# Patient Record
Sex: FEMALE | Race: WHITE | NOT HISPANIC OR LATINO | Employment: UNEMPLOYED
[De-identification: names, ages, dates, MRNs, and addresses within clinical notes are randomized per-mention and may not be internally consistent; named-entity substitution may affect disease eponyms.]

---

## 2017-06-24 ENCOUNTER — HEALTH MAINTENANCE LETTER (OUTPATIENT)
Age: 62
End: 2017-06-24

## 2018-01-25 ENCOUNTER — APPOINTMENT (OUTPATIENT)
Dept: CARDIOLOGY | Facility: CLINIC | Age: 63
End: 2018-01-25
Attending: EMERGENCY MEDICINE
Payer: COMMERCIAL

## 2018-01-25 ENCOUNTER — APPOINTMENT (OUTPATIENT)
Dept: GENERAL RADIOLOGY | Facility: CLINIC | Age: 63
End: 2018-01-25
Attending: EMERGENCY MEDICINE
Payer: COMMERCIAL

## 2018-01-25 ENCOUNTER — HOSPITAL ENCOUNTER (INPATIENT)
Facility: CLINIC | Age: 63
LOS: 6 days | Discharge: HOME HEALTH | End: 2018-01-31
Attending: INTERNAL MEDICINE | Admitting: SURGERY
Payer: COMMERCIAL

## 2018-01-25 ENCOUNTER — APPOINTMENT (OUTPATIENT)
Dept: GENERAL RADIOLOGY | Facility: CLINIC | Age: 63
End: 2018-01-25
Attending: SURGERY
Payer: COMMERCIAL

## 2018-01-25 DIAGNOSIS — T14.8XXA ABRASION: ICD-10-CM

## 2018-01-25 DIAGNOSIS — S06.9X9S TRAUMATIC BRAIN INJURY WITH LOSS OF CONSCIOUSNESS, SEQUELA (H): ICD-10-CM

## 2018-01-25 DIAGNOSIS — I21.11 ST ELEVATION MYOCARDIAL INFARCTION INVOLVING RIGHT CORONARY ARTERY (H): Primary | ICD-10-CM

## 2018-01-25 DIAGNOSIS — R55 SYNCOPE, UNSPECIFIED SYNCOPE TYPE: ICD-10-CM

## 2018-01-25 DIAGNOSIS — I21.3 ST ELEVATION MYOCARDIAL INFARCTION (STEMI), UNSPECIFIED ARTERY (H): ICD-10-CM

## 2018-01-25 DIAGNOSIS — R06.02 SOB (SHORTNESS OF BREATH): ICD-10-CM

## 2018-01-25 PROBLEM — I95.9 HYPOTENSION: Status: ACTIVE | Noted: 2018-01-25

## 2018-01-25 LAB
ANION GAP SERPL CALCULATED.3IONS-SCNC: 13 MMOL/L (ref 3–14)
BASOPHILS # BLD AUTO: 0 10E9/L (ref 0–0.2)
BASOPHILS NFR BLD AUTO: 0.2 %
BUN SERPL-MCNC: 14 MG/DL (ref 7–30)
CALCIUM SERPL-MCNC: 8.6 MG/DL (ref 8.5–10.1)
CHLORIDE SERPL-SCNC: 107 MMOL/L (ref 94–109)
CO2 BLD-SCNC: 19 MMOL/L (ref 21–28)
CO2 SERPL-SCNC: 22 MMOL/L (ref 20–32)
CREAT SERPL-MCNC: 0.91 MG/DL (ref 0.52–1.04)
DIFFERENTIAL METHOD BLD: NORMAL
EOSINOPHIL # BLD AUTO: 0.2 10E9/L (ref 0–0.7)
EOSINOPHIL NFR BLD AUTO: 1.7 %
ERYTHROCYTE [DISTWIDTH] IN BLOOD BY AUTOMATED COUNT: 12.4 % (ref 10–15)
GFR SERPL CREATININE-BSD FRML MDRD: 63 ML/MIN/1.7M2
GLUCOSE BLDC GLUCOMTR-MCNC: 224 MG/DL (ref 70–99)
GLUCOSE SERPL-MCNC: 235 MG/DL (ref 70–99)
HCT VFR BLD AUTO: 38.9 % (ref 35–47)
HCT VFR BLD CALC: 33 %PCV (ref 35–47)
HGB BLD CALC-MCNC: 11.2 G/DL (ref 11.7–15.7)
HGB BLD-MCNC: 12.6 G/DL (ref 11.7–15.7)
IMM GRANULOCYTES # BLD: 0 10E9/L (ref 0–0.4)
IMM GRANULOCYTES NFR BLD: 0.4 %
INTERPRETATION ECG - MUSE: NORMAL
KCT BLD-ACNC: 230 SEC (ref 105–167)
LYMPHOCYTES # BLD AUTO: 4.9 10E9/L (ref 0.8–5.3)
LYMPHOCYTES NFR BLD AUTO: 52.5 %
MCH RBC QN AUTO: 31 PG (ref 26.5–33)
MCHC RBC AUTO-ENTMCNC: 32.4 G/DL (ref 31.5–36.5)
MCV RBC AUTO: 96 FL (ref 78–100)
MONOCYTES # BLD AUTO: 0.8 10E9/L (ref 0–1.3)
MONOCYTES NFR BLD AUTO: 8.3 %
NEUTROPHILS # BLD AUTO: 3.5 10E9/L (ref 1.6–8.3)
NEUTROPHILS NFR BLD AUTO: 36.9 %
NRBC # BLD AUTO: 0 10*3/UL
NRBC BLD AUTO-RTO: 0 /100
PCO2 BLD: 29 MM HG (ref 35–45)
PH BLD: 7.44 PH (ref 7.35–7.45)
PLATELET # BLD AUTO: 296 10E9/L (ref 150–450)
PO2 BLD: 137 MM HG (ref 80–105)
POTASSIUM BLD-SCNC: 3.7 MMOL/L (ref 3.4–5.3)
POTASSIUM SERPL-SCNC: 3.4 MMOL/L (ref 3.4–5.3)
RBC # BLD AUTO: 4.07 10E12/L (ref 3.8–5.2)
SAO2 % BLDA FROM PO2: 99 % (ref 92–100)
SODIUM BLD-SCNC: 141 MMOL/L (ref 133–144)
SODIUM SERPL-SCNC: 142 MMOL/L (ref 133–144)
TROPONIN I BLD-MCNC: 3.84 UG/L (ref 0–0.1)
TROPONIN I SERPL-MCNC: 3.71 UG/L (ref 0–0.04)
TROPONIN I SERPL-MCNC: 3.93 UG/L (ref 0–0.04)
WBC # BLD AUTO: 9.4 10E9/L (ref 4–11)

## 2018-01-25 PROCEDURE — 92973 PRQ TRLUML C MCHN ASP THRMBC: CPT | Mod: RC

## 2018-01-25 PROCEDURE — 02713ZZ DILATION OF CORONARY ARTERY, TWO ARTERIES, PERCUTANEOUS APPROACH: ICD-10-PCS | Performed by: INTERNAL MEDICINE

## 2018-01-25 PROCEDURE — 92941 PRQ TRLML REVSC TOT OCCL AMI: CPT | Mod: LD | Performed by: INTERNAL MEDICINE

## 2018-01-25 PROCEDURE — 92924 PRQ TRLUML C ATHRC 1 ART&/BR: CPT | Mod: RC

## 2018-01-25 PROCEDURE — 93005 ELECTROCARDIOGRAM TRACING: CPT

## 2018-01-25 PROCEDURE — 27210946 ZZH KIT HC TOTES DISP CR8

## 2018-01-25 PROCEDURE — 33967 INSERT I-AORT PERCUT DEVICE: CPT

## 2018-01-25 PROCEDURE — 84132 ASSAY OF SERUM POTASSIUM: CPT

## 2018-01-25 PROCEDURE — 93458 L HRT ARTERY/VENTRICLE ANGIO: CPT

## 2018-01-25 PROCEDURE — C1887 CATHETER, GUIDING: HCPCS

## 2018-01-25 PROCEDURE — 99152 MOD SED SAME PHYS/QHP 5/>YRS: CPT | Mod: GC | Performed by: INTERNAL MEDICINE

## 2018-01-25 PROCEDURE — B2111ZZ FLUOROSCOPY OF MULTIPLE CORONARY ARTERIES USING LOW OSMOLAR CONTRAST: ICD-10-PCS | Performed by: INTERNAL MEDICINE

## 2018-01-25 PROCEDURE — 99285 EMERGENCY DEPT VISIT HI MDM: CPT | Mod: 25

## 2018-01-25 PROCEDURE — 85025 COMPLETE CBC W/AUTO DIFF WBC: CPT | Performed by: EMERGENCY MEDICINE

## 2018-01-25 PROCEDURE — 96374 THER/PROPH/DIAG INJ IV PUSH: CPT

## 2018-01-25 PROCEDURE — 84484 ASSAY OF TROPONIN QUANT: CPT

## 2018-01-25 PROCEDURE — 84295 ASSAY OF SERUM SODIUM: CPT

## 2018-01-25 PROCEDURE — 25000132 ZZH RX MED GY IP 250 OP 250 PS 637: Performed by: INTERNAL MEDICINE

## 2018-01-25 PROCEDURE — 92920 PRQ TRLUML C ANGIOP 1ART&/BR: CPT

## 2018-01-25 PROCEDURE — 92921 ZZHC PRQ TRLUML CORONARY ANGIOPLASTY ADDL BRANCH: CPT | Mod: LD

## 2018-01-25 PROCEDURE — C1769 GUIDE WIRE: HCPCS

## 2018-01-25 PROCEDURE — 27211089 ZZH KIT ACIST INJECTOR CR3

## 2018-01-25 PROCEDURE — 00000146 ZZHCL STATISTIC GLUCOSE BY METER IP

## 2018-01-25 PROCEDURE — 20000003 ZZH R&B ICU

## 2018-01-25 PROCEDURE — 85347 COAGULATION TIME ACTIVATED: CPT

## 2018-01-25 PROCEDURE — 25000125 ZZHC RX 250: Performed by: EMERGENCY MEDICINE

## 2018-01-25 PROCEDURE — 84484 ASSAY OF TROPONIN QUANT: CPT | Performed by: EMERGENCY MEDICINE

## 2018-01-25 PROCEDURE — 27210759 ZZH DEVICE INFLATION CR6

## 2018-01-25 PROCEDURE — 25000128 H RX IP 250 OP 636: Performed by: EMERGENCY MEDICINE

## 2018-01-25 PROCEDURE — 5A02210 ASSISTANCE WITH CARDIAC OUTPUT USING BALLOON PUMP, CONTINUOUS: ICD-10-PCS | Performed by: INTERNAL MEDICINE

## 2018-01-25 PROCEDURE — 80048 BASIC METABOLIC PNL TOTAL CA: CPT | Performed by: EMERGENCY MEDICINE

## 2018-01-25 PROCEDURE — C1725 CATH, TRANSLUMIN NON-LASER: HCPCS

## 2018-01-25 PROCEDURE — 71045 X-RAY EXAM CHEST 1 VIEW: CPT

## 2018-01-25 PROCEDURE — C1757 CATH, THROMBECTOMY/EMBOLECT: HCPCS

## 2018-01-25 PROCEDURE — 82803 BLOOD GASES ANY COMBINATION: CPT

## 2018-01-25 PROCEDURE — 85014 HEMATOCRIT: CPT

## 2018-01-25 PROCEDURE — 27210848 ZZH CATH IABP PUMP CR18

## 2018-01-25 PROCEDURE — 40000986 XR CHEST PORT 1 VW

## 2018-01-25 PROCEDURE — 33967 INSERT I-AORT PERCUT DEVICE: CPT | Mod: XU | Performed by: INTERNAL MEDICINE

## 2018-01-25 PROCEDURE — 02C00ZZ EXTIRPATION OF MATTER FROM CORONARY ARTERY, ONE ARTERY, OPEN APPROACH: ICD-10-PCS | Performed by: INTERNAL MEDICINE

## 2018-01-25 PROCEDURE — 27210795 ZZH PAD DEFIB QUICK CR4

## 2018-01-25 PROCEDURE — 3E043XZ INTRODUCTION OF VASOPRESSOR INTO CENTRAL VEIN, PERCUTANEOUS APPROACH: ICD-10-PCS | Performed by: INTERNAL MEDICINE

## 2018-01-25 PROCEDURE — B2151ZZ FLUOROSCOPY OF LEFT HEART USING LOW OSMOLAR CONTRAST: ICD-10-PCS | Performed by: INTERNAL MEDICINE

## 2018-01-25 PROCEDURE — 99291 CRITICAL CARE FIRST HOUR: CPT | Mod: 25 | Performed by: SURGERY

## 2018-01-25 PROCEDURE — 99153 MOD SED SAME PHYS/QHP EA: CPT

## 2018-01-25 PROCEDURE — 84484 ASSAY OF TROPONIN QUANT: CPT | Performed by: INTERNAL MEDICINE

## 2018-01-25 PROCEDURE — 99152 MOD SED SAME PHYS/QHP 5/>YRS: CPT

## 2018-01-25 PROCEDURE — 25000128 H RX IP 250 OP 636: Performed by: INTERNAL MEDICINE

## 2018-01-25 PROCEDURE — 4A023N7 MEASUREMENT OF CARDIAC SAMPLING AND PRESSURE, LEFT HEART, PERCUTANEOUS APPROACH: ICD-10-PCS | Performed by: INTERNAL MEDICINE

## 2018-01-25 PROCEDURE — 27210787 ZZH MANIFOLD CR2

## 2018-01-25 PROCEDURE — 93799 UNLISTED CV SVC/PROCEDURE: CPT | Mod: RC

## 2018-01-25 PROCEDURE — 93458 L HRT ARTERY/VENTRICLE ANGIO: CPT | Mod: 26 | Performed by: INTERNAL MEDICINE

## 2018-01-25 PROCEDURE — 36556 INSERT NON-TUNNEL CV CATH: CPT | Performed by: SURGERY

## 2018-01-25 RX ORDER — NICARDIPINE HYDROCHLORIDE 2.5 MG/ML
100 INJECTION INTRAVENOUS
Status: DISCONTINUED | OUTPATIENT
Start: 2018-01-25 | End: 2018-01-25 | Stop reason: HOSPADM

## 2018-01-25 RX ORDER — DOPAMINE HYDROCHLORIDE 160 MG/100ML
2-20 INJECTION, SOLUTION INTRAVENOUS CONTINUOUS PRN
Status: DISCONTINUED | OUTPATIENT
Start: 2018-01-25 | End: 2018-01-25 | Stop reason: HOSPADM

## 2018-01-25 RX ORDER — AMLODIPINE BESYLATE 10 MG/1
10 TABLET ORAL DAILY
Status: DISCONTINUED | OUTPATIENT
Start: 2018-01-26 | End: 2018-01-25

## 2018-01-25 RX ORDER — ASPIRIN 81 MG/1
81-324 TABLET, CHEWABLE ORAL
Status: DISCONTINUED | OUTPATIENT
Start: 2018-01-25 | End: 2018-01-25 | Stop reason: HOSPADM

## 2018-01-25 RX ORDER — POTASSIUM CHLORIDE 29.8 MG/ML
20 INJECTION INTRAVENOUS
Status: DISCONTINUED | OUTPATIENT
Start: 2018-01-25 | End: 2018-01-31 | Stop reason: HOSPADM

## 2018-01-25 RX ORDER — PRASUGREL 10 MG/1
10-60 TABLET, FILM COATED ORAL
Status: DISCONTINUED | OUTPATIENT
Start: 2018-01-25 | End: 2018-01-25 | Stop reason: HOSPADM

## 2018-01-25 RX ORDER — AMMONIUM LACTATE 12 G/100G
CREAM TOPICAL 2 TIMES DAILY
COMMUNITY
End: 2021-12-07

## 2018-01-25 RX ORDER — GABAPENTIN 100 MG/1
100 CAPSULE ORAL 3 TIMES DAILY
Status: DISCONTINUED | OUTPATIENT
Start: 2018-01-26 | End: 2018-01-31 | Stop reason: HOSPADM

## 2018-01-25 RX ORDER — HEPARIN SODIUM 1000 [USP'U]/ML
1000-10000 INJECTION, SOLUTION INTRAVENOUS; SUBCUTANEOUS EVERY 5 MIN PRN
Status: DISCONTINUED | OUTPATIENT
Start: 2018-01-25 | End: 2018-01-25 | Stop reason: HOSPADM

## 2018-01-25 RX ORDER — POTASSIUM CL/LIDO/0.9 % NACL 10MEQ/0.1L
10 INTRAVENOUS SOLUTION, PIGGYBACK (ML) INTRAVENOUS
Status: DISCONTINUED | OUTPATIENT
Start: 2018-01-25 | End: 2018-01-31 | Stop reason: HOSPADM

## 2018-01-25 RX ORDER — LOPERAMIDE HCL 2 MG
2 CAPSULE ORAL 4 TIMES DAILY PRN
COMMUNITY

## 2018-01-25 RX ORDER — ATROPINE SULFATE 0.1 MG/ML
0.5 INJECTION INTRAVENOUS EVERY 5 MIN PRN
Status: ACTIVE | OUTPATIENT
Start: 2018-01-25 | End: 2018-01-26

## 2018-01-25 RX ORDER — NALOXONE HYDROCHLORIDE 0.4 MG/ML
.1-.4 INJECTION, SOLUTION INTRAMUSCULAR; INTRAVENOUS; SUBCUTANEOUS
Status: DISCONTINUED | OUTPATIENT
Start: 2018-01-25 | End: 2018-01-25

## 2018-01-25 RX ORDER — POTASSIUM CHLORIDE 29.8 MG/ML
20 INJECTION INTRAVENOUS
Status: DISCONTINUED | OUTPATIENT
Start: 2018-01-25 | End: 2018-01-25 | Stop reason: HOSPADM

## 2018-01-25 RX ORDER — ENALAPRILAT 1.25 MG/ML
1.25-2.5 INJECTION INTRAVENOUS
Status: DISCONTINUED | OUTPATIENT
Start: 2018-01-25 | End: 2018-01-25 | Stop reason: HOSPADM

## 2018-01-25 RX ORDER — LORAZEPAM 0.5 MG/1
0.5 TABLET ORAL 3 TIMES DAILY PRN
Status: DISCONTINUED | OUTPATIENT
Start: 2018-01-25 | End: 2018-01-31 | Stop reason: HOSPADM

## 2018-01-25 RX ORDER — FLUMAZENIL 0.1 MG/ML
0.2 INJECTION, SOLUTION INTRAVENOUS
Status: ACTIVE | OUTPATIENT
Start: 2018-01-25 | End: 2018-01-26

## 2018-01-25 RX ORDER — TRIAMCINOLONE ACETONIDE 5 MG/G
CREAM TOPICAL 2 TIMES DAILY PRN
COMMUNITY
End: 2021-12-07

## 2018-01-25 RX ORDER — METOPROLOL TARTRATE 50 MG
50 TABLET ORAL 2 TIMES DAILY
Status: DISCONTINUED | OUTPATIENT
Start: 2018-01-25 | End: 2018-01-26

## 2018-01-25 RX ORDER — ASPIRIN 81 MG/1
81 TABLET ORAL DAILY
Status: DISCONTINUED | OUTPATIENT
Start: 2018-01-26 | End: 2018-01-31 | Stop reason: HOSPADM

## 2018-01-25 RX ORDER — CLOPIDOGREL 300 MG/1
300-600 TABLET, FILM COATED ORAL
Status: DISCONTINUED | OUTPATIENT
Start: 2018-01-25 | End: 2018-01-25 | Stop reason: HOSPADM

## 2018-01-25 RX ORDER — PROTAMINE SULFATE 10 MG/ML
25-100 INJECTION, SOLUTION INTRAVENOUS EVERY 5 MIN PRN
Status: DISCONTINUED | OUTPATIENT
Start: 2018-01-25 | End: 2018-01-25 | Stop reason: HOSPADM

## 2018-01-25 RX ORDER — DIPHENHYDRAMINE HYDROCHLORIDE 50 MG/ML
25-50 INJECTION INTRAMUSCULAR; INTRAVENOUS
Status: DISCONTINUED | OUTPATIENT
Start: 2018-01-25 | End: 2018-01-25 | Stop reason: HOSPADM

## 2018-01-25 RX ORDER — MORPHINE SULFATE 2 MG/ML
1-2 INJECTION, SOLUTION INTRAMUSCULAR; INTRAVENOUS EVERY 5 MIN PRN
Status: DISCONTINUED | OUTPATIENT
Start: 2018-01-25 | End: 2018-01-25 | Stop reason: HOSPADM

## 2018-01-25 RX ORDER — ACETAMINOPHEN 325 MG/1
325-650 TABLET ORAL EVERY 6 HOURS PRN
Status: DISCONTINUED | OUTPATIENT
Start: 2018-01-25 | End: 2018-01-25

## 2018-01-25 RX ORDER — NITROGLYCERIN 20 MG/100ML
.07-2 INJECTION INTRAVENOUS CONTINUOUS
Status: DISCONTINUED | OUTPATIENT
Start: 2018-01-25 | End: 2018-01-25

## 2018-01-25 RX ORDER — MAGNESIUM SULFATE HEPTAHYDRATE 40 MG/ML
4 INJECTION, SOLUTION INTRAVENOUS EVERY 4 HOURS PRN
Status: DISCONTINUED | OUTPATIENT
Start: 2018-01-25 | End: 2018-01-31 | Stop reason: HOSPADM

## 2018-01-25 RX ORDER — FENTANYL CITRATE 50 UG/ML
25-50 INJECTION, SOLUTION INTRAMUSCULAR; INTRAVENOUS
Status: DISCONTINUED | OUTPATIENT
Start: 2018-01-25 | End: 2018-01-25 | Stop reason: HOSPADM

## 2018-01-25 RX ORDER — LIDOCAINE HYDROCHLORIDE 10 MG/ML
30 INJECTION, SOLUTION EPIDURAL; INFILTRATION; INTRACAUDAL; PERINEURAL
Status: DISCONTINUED | OUTPATIENT
Start: 2018-01-25 | End: 2018-01-25 | Stop reason: HOSPADM

## 2018-01-25 RX ORDER — VERAPAMIL HYDROCHLORIDE 2.5 MG/ML
1-2.5 INJECTION, SOLUTION INTRAVENOUS
Status: DISCONTINUED | OUTPATIENT
Start: 2018-01-25 | End: 2018-01-25 | Stop reason: HOSPADM

## 2018-01-25 RX ORDER — POTASSIUM CHLORIDE 1500 MG/1
20-40 TABLET, EXTENDED RELEASE ORAL
Status: DISCONTINUED | OUTPATIENT
Start: 2018-01-25 | End: 2018-01-31 | Stop reason: HOSPADM

## 2018-01-25 RX ORDER — FENTANYL CITRATE 50 UG/ML
25-50 INJECTION, SOLUTION INTRAMUSCULAR; INTRAVENOUS
Status: ACTIVE | OUTPATIENT
Start: 2018-01-25 | End: 2018-01-26

## 2018-01-25 RX ORDER — NITROGLYCERIN 5 MG/ML
100-500 VIAL (ML) INTRAVENOUS
Status: DISCONTINUED | OUTPATIENT
Start: 2018-01-25 | End: 2018-01-25 | Stop reason: HOSPADM

## 2018-01-25 RX ORDER — POTASSIUM CHLORIDE 7.45 MG/ML
10 INJECTION INTRAVENOUS
Status: DISCONTINUED | OUTPATIENT
Start: 2018-01-25 | End: 2018-01-31 | Stop reason: HOSPADM

## 2018-01-25 RX ORDER — LEVETIRACETAM 500 MG/1
500 TABLET ORAL 2 TIMES DAILY
Status: DISCONTINUED | OUTPATIENT
Start: 2018-01-26 | End: 2018-01-31 | Stop reason: HOSPADM

## 2018-01-25 RX ORDER — DEXTROSE MONOHYDRATE 25 G/50ML
25-50 INJECTION, SOLUTION INTRAVENOUS
Status: DISCONTINUED | OUTPATIENT
Start: 2018-01-25 | End: 2018-01-29

## 2018-01-25 RX ORDER — PHENYTOIN 50 MG/1
150 TABLET, CHEWABLE ORAL 2 TIMES DAILY
Status: DISCONTINUED | OUTPATIENT
Start: 2018-01-25 | End: 2018-01-25

## 2018-01-25 RX ORDER — SODIUM CHLORIDE 9 MG/ML
INJECTION, SOLUTION INTRAVENOUS CONTINUOUS
Status: ACTIVE | OUTPATIENT
Start: 2018-01-25 | End: 2018-01-26

## 2018-01-25 RX ORDER — PROMETHAZINE HYDROCHLORIDE 25 MG/ML
6.25-25 INJECTION, SOLUTION INTRAMUSCULAR; INTRAVENOUS EVERY 4 HOURS PRN
Status: DISCONTINUED | OUTPATIENT
Start: 2018-01-25 | End: 2018-01-25 | Stop reason: HOSPADM

## 2018-01-25 RX ORDER — PHENYLEPHRINE HCL IN 0.9% NACL 1 MG/10 ML
20-100 SYRINGE (ML) INTRAVENOUS
Status: DISCONTINUED | OUTPATIENT
Start: 2018-01-25 | End: 2018-01-25 | Stop reason: HOSPADM

## 2018-01-25 RX ORDER — POTASSIUM CHLORIDE 7.45 MG/ML
10 INJECTION INTRAVENOUS
Status: DISCONTINUED | OUTPATIENT
Start: 2018-01-25 | End: 2018-01-25 | Stop reason: HOSPADM

## 2018-01-25 RX ORDER — DEXTROSE MONOHYDRATE 25 G/50ML
12.5-5 INJECTION, SOLUTION INTRAVENOUS EVERY 30 MIN PRN
Status: DISCONTINUED | OUTPATIENT
Start: 2018-01-25 | End: 2018-01-25 | Stop reason: HOSPADM

## 2018-01-25 RX ORDER — LORAZEPAM 2 MG/ML
.5-2 INJECTION INTRAMUSCULAR EVERY 4 HOURS PRN
Status: DISCONTINUED | OUTPATIENT
Start: 2018-01-25 | End: 2018-01-25 | Stop reason: HOSPADM

## 2018-01-25 RX ORDER — DOBUTAMINE HYDROCHLORIDE 200 MG/100ML
2-20 INJECTION INTRAVENOUS CONTINUOUS PRN
Status: DISCONTINUED | OUTPATIENT
Start: 2018-01-25 | End: 2018-01-25 | Stop reason: HOSPADM

## 2018-01-25 RX ORDER — NALOXONE HYDROCHLORIDE 0.4 MG/ML
0.4 INJECTION, SOLUTION INTRAMUSCULAR; INTRAVENOUS; SUBCUTANEOUS EVERY 5 MIN PRN
Status: DISCONTINUED | OUTPATIENT
Start: 2018-01-25 | End: 2018-01-25 | Stop reason: HOSPADM

## 2018-01-25 RX ORDER — EPINEPHRINE 1 MG/ML
0.3 INJECTION, SOLUTION, CONCENTRATE INTRAVENOUS
Status: DISCONTINUED | OUTPATIENT
Start: 2018-01-25 | End: 2018-01-25 | Stop reason: HOSPADM

## 2018-01-25 RX ORDER — ATORVASTATIN CALCIUM 40 MG/1
40 TABLET, FILM COATED ORAL DAILY
Status: DISCONTINUED | OUTPATIENT
Start: 2018-01-25 | End: 2018-01-31 | Stop reason: HOSPADM

## 2018-01-25 RX ORDER — BUPIVACAINE HYDROCHLORIDE 2.5 MG/ML
1-10 INJECTION, SOLUTION EPIDURAL; INFILTRATION; INTRACAUDAL
Status: DISCONTINUED | OUTPATIENT
Start: 2018-01-25 | End: 2018-01-25 | Stop reason: HOSPADM

## 2018-01-25 RX ORDER — CLOPIDOGREL BISULFATE 75 MG/1
75 TABLET ORAL
Status: DISCONTINUED | OUTPATIENT
Start: 2018-01-25 | End: 2018-01-25 | Stop reason: HOSPADM

## 2018-01-25 RX ORDER — ADENOSINE 3 MG/ML
12-12000 INJECTION, SOLUTION INTRAVENOUS
Status: DISCONTINUED | OUTPATIENT
Start: 2018-01-25 | End: 2018-01-25 | Stop reason: HOSPADM

## 2018-01-25 RX ORDER — NITROGLYCERIN 0.4 MG/1
0.4 TABLET SUBLINGUAL EVERY 5 MIN PRN
Status: DISCONTINUED | OUTPATIENT
Start: 2018-01-25 | End: 2018-01-25 | Stop reason: HOSPADM

## 2018-01-25 RX ORDER — NITROGLYCERIN 5 MG/ML
100-200 VIAL (ML) INTRAVENOUS
Status: DISCONTINUED | OUTPATIENT
Start: 2018-01-25 | End: 2018-01-25 | Stop reason: HOSPADM

## 2018-01-25 RX ORDER — LIDOCAINE 40 MG/G
CREAM TOPICAL
Status: DISCONTINUED | OUTPATIENT
Start: 2018-01-25 | End: 2018-01-31 | Stop reason: HOSPADM

## 2018-01-25 RX ORDER — FUROSEMIDE 10 MG/ML
20-100 INJECTION INTRAMUSCULAR; INTRAVENOUS
Status: DISCONTINUED | OUTPATIENT
Start: 2018-01-25 | End: 2018-01-25 | Stop reason: HOSPADM

## 2018-01-25 RX ORDER — NOREPINEPHRINE BITARTRATE/D5W 16MG/250ML
PLASTIC BAG, INJECTION (ML) INTRAVENOUS
Status: DISCONTINUED
Start: 2018-01-25 | End: 2018-01-25 | Stop reason: HOSPADM

## 2018-01-25 RX ORDER — NITROGLYCERIN 0.4 MG/1
0.4 TABLET SUBLINGUAL EVERY 5 MIN PRN
Status: DISCONTINUED | OUTPATIENT
Start: 2018-01-25 | End: 2018-01-31 | Stop reason: HOSPADM

## 2018-01-25 RX ORDER — METHYLPREDNISOLONE SODIUM SUCCINATE 125 MG/2ML
125 INJECTION, POWDER, LYOPHILIZED, FOR SOLUTION INTRAMUSCULAR; INTRAVENOUS
Status: DISCONTINUED | OUTPATIENT
Start: 2018-01-25 | End: 2018-01-25 | Stop reason: HOSPADM

## 2018-01-25 RX ORDER — SODIUM NITROPRUSSIDE 25 MG/ML
100-200 INJECTION INTRAVENOUS
Status: DISCONTINUED | OUTPATIENT
Start: 2018-01-25 | End: 2018-01-25 | Stop reason: HOSPADM

## 2018-01-25 RX ORDER — FLUMAZENIL 0.1 MG/ML
0.2 INJECTION, SOLUTION INTRAVENOUS
Status: DISCONTINUED | OUTPATIENT
Start: 2018-01-25 | End: 2018-01-25 | Stop reason: HOSPADM

## 2018-01-25 RX ORDER — ACETAMINOPHEN 325 MG/1
325-650 TABLET ORAL EVERY 4 HOURS PRN
Status: DISCONTINUED | OUTPATIENT
Start: 2018-01-25 | End: 2018-01-31 | Stop reason: HOSPADM

## 2018-01-25 RX ORDER — HYDROCODONE BITARTRATE AND ACETAMINOPHEN 5; 325 MG/1; MG/1
1-2 TABLET ORAL EVERY 4 HOURS PRN
Status: DISCONTINUED | OUTPATIENT
Start: 2018-01-25 | End: 2018-01-31

## 2018-01-25 RX ORDER — NICOTINE POLACRILEX 4 MG
15-30 LOZENGE BUCCAL
Status: DISCONTINUED | OUTPATIENT
Start: 2018-01-25 | End: 2018-01-29

## 2018-01-25 RX ORDER — PROTAMINE SULFATE 10 MG/ML
1-5 INJECTION, SOLUTION INTRAVENOUS
Status: DISCONTINUED | OUTPATIENT
Start: 2018-01-25 | End: 2018-01-25 | Stop reason: HOSPADM

## 2018-01-25 RX ORDER — BENZOYL PEROXIDE 5 G/100G
GEL TOPICAL 2 TIMES DAILY PRN
COMMUNITY
End: 2021-12-07

## 2018-01-25 RX ORDER — IOPAMIDOL 755 MG/ML
200 INJECTION, SOLUTION INTRAVASCULAR ONCE
Status: DISCONTINUED | OUTPATIENT
Start: 2018-01-25 | End: 2018-01-25

## 2018-01-25 RX ORDER — NALOXONE HYDROCHLORIDE 0.4 MG/ML
.2-.4 INJECTION, SOLUTION INTRAMUSCULAR; INTRAVENOUS; SUBCUTANEOUS
Status: DISCONTINUED | OUTPATIENT
Start: 2018-01-25 | End: 2018-01-26

## 2018-01-25 RX ORDER — ASPIRIN 325 MG
325 TABLET ORAL
Status: DISCONTINUED | OUTPATIENT
Start: 2018-01-25 | End: 2018-01-25 | Stop reason: HOSPADM

## 2018-01-25 RX ORDER — LORAZEPAM 1 MG/1
1 TABLET ORAL AT BEDTIME
Status: DISCONTINUED | OUTPATIENT
Start: 2018-01-25 | End: 2018-01-31 | Stop reason: HOSPADM

## 2018-01-25 RX ORDER — NIFEDIPINE 10 MG/1
10 CAPSULE ORAL
Status: DISCONTINUED | OUTPATIENT
Start: 2018-01-25 | End: 2018-01-25 | Stop reason: HOSPADM

## 2018-01-25 RX ORDER — NITROGLYCERIN 20 MG/100ML
.07-2 INJECTION INTRAVENOUS CONTINUOUS PRN
Status: DISCONTINUED | OUTPATIENT
Start: 2018-01-25 | End: 2018-01-25 | Stop reason: HOSPADM

## 2018-01-25 RX ORDER — LISINOPRIL 2.5 MG/1
2.5 TABLET ORAL DAILY
Status: DISCONTINUED | OUTPATIENT
Start: 2018-01-26 | End: 2018-01-26

## 2018-01-25 RX ORDER — POTASSIUM CHLORIDE 1.5 G/1.58G
20-40 POWDER, FOR SOLUTION ORAL
Status: DISCONTINUED | OUTPATIENT
Start: 2018-01-25 | End: 2018-01-31 | Stop reason: HOSPADM

## 2018-01-25 RX ORDER — NITROGLYCERIN 20 MG/100ML
INJECTION INTRAVENOUS
Status: DISCONTINUED
Start: 2018-01-25 | End: 2018-01-25 | Stop reason: HOSPADM

## 2018-01-25 RX ORDER — ATROPINE SULFATE 0.1 MG/ML
.5-1 INJECTION INTRAVENOUS
Status: DISCONTINUED | OUTPATIENT
Start: 2018-01-25 | End: 2018-01-25 | Stop reason: HOSPADM

## 2018-01-25 RX ORDER — NOREPINEPHRINE BITARTRATE/D5W 16MG/250ML
0.03-0.4 PLASTIC BAG, INJECTION (ML) INTRAVENOUS CONTINUOUS
Status: DISCONTINUED | OUTPATIENT
Start: 2018-01-25 | End: 2018-01-28

## 2018-01-25 RX ORDER — NALOXONE HYDROCHLORIDE 0.4 MG/ML
.1-.4 INJECTION, SOLUTION INTRAMUSCULAR; INTRAVENOUS; SUBCUTANEOUS
Status: DISCONTINUED | OUTPATIENT
Start: 2018-01-25 | End: 2018-01-31 | Stop reason: HOSPADM

## 2018-01-25 RX ORDER — LIDOCAINE HYDROCHLORIDE 10 MG/ML
1-10 INJECTION, SOLUTION EPIDURAL; INFILTRATION; INTRACAUDAL; PERINEURAL
Status: DISCONTINUED | OUTPATIENT
Start: 2018-01-25 | End: 2018-01-25 | Stop reason: HOSPADM

## 2018-01-25 RX ORDER — HYDRALAZINE HYDROCHLORIDE 20 MG/ML
10-20 INJECTION INTRAMUSCULAR; INTRAVENOUS
Status: DISCONTINUED | OUTPATIENT
Start: 2018-01-25 | End: 2018-01-25 | Stop reason: HOSPADM

## 2018-01-25 RX ORDER — TROLAMINE SALICYLATE 10 G/100G
CREAM TOPICAL PRN
COMMUNITY

## 2018-01-25 RX ORDER — ONDANSETRON 2 MG/ML
4 INJECTION INTRAMUSCULAR; INTRAVENOUS EVERY 4 HOURS PRN
Status: DISCONTINUED | OUTPATIENT
Start: 2018-01-25 | End: 2018-01-25 | Stop reason: HOSPADM

## 2018-01-25 RX ORDER — METOPROLOL TARTRATE 1 MG/ML
5 INJECTION, SOLUTION INTRAVENOUS EVERY 5 MIN PRN
Status: DISCONTINUED | OUTPATIENT
Start: 2018-01-25 | End: 2018-01-25 | Stop reason: HOSPADM

## 2018-01-25 RX ADMIN — Medication 5000 UNITS: at 20:31

## 2018-01-25 RX ADMIN — SODIUM CHLORIDE 1000 ML: 9 INJECTION, SOLUTION INTRAVENOUS at 20:28

## 2018-01-25 RX ADMIN — MIDAZOLAM 1 MG: 1 INJECTION INTRAMUSCULAR; INTRAVENOUS at 20:41

## 2018-01-25 RX ADMIN — HEPARIN SODIUM 5000 UNITS: 1000 INJECTION, SOLUTION INTRAVENOUS; SUBCUTANEOUS at 20:00

## 2018-01-25 RX ADMIN — NOREPINEPHRINE BITARTRATE 0.03 MCG/KG/MIN: 1 INJECTION INTRAVENOUS at 19:58

## 2018-01-25 RX ADMIN — Medication 2000 UNITS: at 20:41

## 2018-01-25 RX ADMIN — MIDAZOLAM 1 MG: 1 INJECTION INTRAMUSCULAR; INTRAVENOUS at 20:23

## 2018-01-25 RX ADMIN — SODIUM CHLORIDE: 9 INJECTION, SOLUTION INTRAVENOUS at 23:03

## 2018-01-25 RX ADMIN — TICAGRELOR 180 MG: 90 TABLET ORAL at 20:32

## 2018-01-25 ASSESSMENT — ENCOUNTER SYMPTOMS
WEAKNESS: 1
LIGHT-HEADEDNESS: 1
DIAPHORESIS: 1
SHORTNESS OF BREATH: 1
DIZZINESS: 1

## 2018-01-25 NOTE — IP AVS SNAPSHOT
MRN:[id number]                      After Visit Summary   1/25/2018    Amy Bryan    MRN: [id number]           Thank you!     Thank you for choosing [facility] for your care. Our goal is always to provide you with excellent care. Hearing back from our patients is one way we can continue to improve our services. Please take a few minutes to complete the written survey that you may receive in the mail after you visit with us. Thank you!        Patient Information     Date Of Birth          1955        Designated Caregiver       Most Recent Value    Caregiver    Will someone help with your care after discharge? no      About your hospital stay     You were admitted on:  January 25, 2018 You last received care in the:  [facility]    You were discharged on:  January 31, 2018        Reason for your hospital stay       Syncope, ST elevation MI. Pericardial hematoma.                  Who to Call     For medical emergencies, please call 911.  For non-urgent questions about your medical care, please call your primary care provider or clinic, None          Attending Provider     Provider Specialty    Tr Suazo MD Cardiology    Darvin, Saeid Rasmussen MD Cardiology    [facility] MD Surgery       Primary Care Provider    Imelda Hooper RN       When to contact your care team       Call your primary doctor if you have any of the following:  increased shortness of breath or increased pain.                  After Care Instructions     Activity       Your activity upon discharge: activity as tolerated            Diet       Follow this diet upon discharge: Orders Placed This Encounter      Low Saturated Fat Na <2400 mg                  Follow-up Appointments     Follow-up and recommended labs and tests        Follow up with primary care provider, Imelda Hooper, within 7-10 days to evaluate medication change and for hospital follow- up.                   Your next 10 appointments already scheduled     Feb 07, 2018  8:00 AM CST   (Arrive by 7:20 AM)   Return Discharge with ORESTES Ray   [facility] ([facility])    [address]  [place] [address]   [phone]            Feb 07, 2018  9:20 AM CST   LAB with [facility]   [facility] [place] ([facility])    [address]  [place]  [place] [address]   [phone]           Please do not eat 10-12 hours before your appointment if you are coming in fasting for labs on lipids, cholesterol, or glucose (sugar). This does not apply to pregnant women. Water, hot tea and black coffee (with nothing added) are okay. Do not drink other fluids, diet soda or chew gum.              Additional Services     CARDIAC REHAB REFERRAL       Patient may choose their preference of the site for Cardiac Rehab.            Follow-Up with Cardiac Advanced Practice Provider           Home Care OT Referral for Hospital Discharge       OT to eval and treat    Your provider has ordered home care - occupational therapy. If you have not been contacted within 2 days of your discharge please call the department phone number listed on the top of this document.            Home Care PT Referral for Hospital Discharge       PT to eval and treat    Cardiac Rehab to begin when home PT ends.    Your provider has ordered home care - physical therapy. If you have not been contacted within 2 days of your discharge please call the department phone number listed on the top of this document.            Home care nursing referral       RN skilled nursing visit. RN to assess vital signs and weight, respiratory and cardiac status and hydration, nutrition and bowel status.    Your provider has ordered home care nursing services. If you have not been contacted within 2 days of your discharge please call the inpatient department phone number at  "[phone] .                  Future tests that were ordered for you     Basic metabolic panel                 Further instructions from your care team       Patient will discharge to home with [facility] for RN/PT/OT.  [facility] will contact patient directly to arrange for the first visit.  [facility]'s phone number is [phone].    Pending Results     Date and Time Order Name Status Description    2018 0623 EKG 12-lead, tracing only - STAT Preliminary     2018 0556 EKG 12-lead, tracing only - STAT Preliminary             Statement of Approval     Ordered          18 1304  I have reviewed and agree with all the recommendations and orders detailed in this document.  EFFECTIVE NOW     Approved and electronically signed by:  Bhanu Grossman MD             Admission Information     Date & Time Provider Department Dept. Phone    2018 Mor Riggins MD [facility] [phone]      Your Vitals Were     Blood Pressure Pulse Temperature Respirations Height Weight    112/91 (BP Location: Left arm) 64 98  F (36.7  C) (Oral) 18 1.651 m (5' 5\") 84.6 kg (186 lb 6.4 oz)    Pulse Oximetry BMI (Body Mass Index)                96% 31.02 kg/m2          AGLOGIC Information     AGLOGIC lets you send messages to your doctor, view your test results, renew your prescriptions, schedule appointments and more. To sign up, go to [email or web address] . Click on \"Log in\" on the left side of the screen, which will take you to the Welcome page. Then click on \"Sign up Now\" on the right side of the page.     You will be asked to enter the access code listed below, as well as some personal information. Please follow the directions to create your username and password.     Your access code is: [email or web address]  Expires: 2018  8:55 AM     Your access code will  in 90 days. If you need help or a new code, please call your [place] clinic or [phone].   "      Care EveryWhere ID     This is your Care EveryWhere ID. This could be used by other organizations to access your [facility] medical records  [id number]        Equal Access to Services     TRICE BLAND : Benson Almanza, tony james, nevinrod salinassamantha palmer, feli staplesmu sal So [facility] [phone].    ATENCIÓN: Si habla español, tiene a zavala disposición servicios gratuitos de asistencia lingüística. Llame al [phone].    We comply with applicable federal civil rights laws and [place] laws. We do not discriminate on the basis of race, color, national origin, age, disability, sex, sexual orientation, or gender identity.               Review of your medicines      START taking        Dose / Directions    aspirin 81 MG EC tablet        Dose:  81 mg   Start taking on:  2/1/2018   Take 1 tablet (81 mg) by mouth daily   Quantity:  90 tablet   Refills:  0       atorvastatin 40 MG tablet   Commonly known as:  LIPITOR        Dose:  40 mg   Start taking on:  2/1/2018   Take 1 tablet (40 mg) by mouth daily   Quantity:  30 tablet   Refills:  0       bacitracin 500 UNIT/GM Oint   Used for:  Abrasion        Apply topically 2 times daily for 7 days Over the scab in neck.   Quantity:  1 Tube   Refills:  0       nitroGLYcerin 0.4 MG sublingual tablet   Commonly known as:  NITROSTAT        For chest pain place 1 tablet under the tongue every 5 minutes for 3 doses. If symptoms persist 5 minutes after 1st dose call 911.   Quantity:  25 tablet   Refills:  0       ticagrelor 90 MG tablet   Commonly known as:  BRILINTA        Dose:  90 mg   Take 1 tablet (90 mg) by mouth every 12 hours   Quantity:  60 tablet   Refills:  0         CONTINUE these medicines which may have CHANGED, or have new prescriptions. If we are uncertain of the size of tablets/capsules you have at home, strength may be listed as something that might have changed.        Dose / Directions    metoprolol tartrate 100 MG  tablet   Commonly known as:  LOPRESSOR   This may have changed:    - medication strength  - how much to take        Dose:  100 mg   Take 1 tablet (100 mg) by mouth 2 times daily   Quantity:  60 tablet   Refills:  0         CONTINUE these medicines which have NOT CHANGED        Dose / Directions    AMITRIPTYLINE HCL PO        Dose:  25 mg   Take 25 mg by mouth At Bedtime   Refills:  0       ammonium lactate 12 % cream   Commonly known as:  AMLACTIN        Apply topically 2 times daily Apply to feet   Refills:  0       benzoyl peroxide 5 % topical gel        Apply topically 2 times daily as needed (redness/rosacea of face)   Refills:  0       GABAPENTIN PO        Dose:  100 mg   Take 100 mg by mouth 3 times daily   Refills:  0       GAVISCON PO        Dose:  1 tablet   Take 1 tablet by mouth every 4 hours as needed (indigestion)   Refills:  0       KEPPRA PO        Dose:  500 mg   Take 500 mg by mouth 2 times daily   Refills:  0       loperamide 2 MG capsule   Commonly known as:  IMODIUM        Dose:  2 mg   Take 2 mg by mouth 4 times daily as needed for diarrhea   Refills:  0       * LORAZEPAM PO        Dose:  0.5 mg   Take 0.5 mg by mouth 3 times daily as needed for anxiety   Refills:  0       * LORazepam 1 MG tablet   Commonly known as:  ATIVAN   Used for:  Medication refill        Dose:  1 mg   Take 1 tablet by mouth At Bedtime.   Quantity:  30 tablet   Refills:  0       MIRTAZAPINE PO        Dose:  30 mg   Take 30 mg by mouth At Bedtime   Refills:  0       OMEPRAZOLE PO        Dose:  20 mg   Take 20 mg by mouth every other day   Refills:  0       PHENAZOPYRIDINE HCL PO        Dose:  200 mg   Take 200 mg by mouth 3 times daily as needed (burning upon urination)   Refills:  0       psyllium 58.6 % Powd   Commonly known as:  METAMUCIL        Dose:  1 teaspoonful   Take 1 teaspoonful by mouth daily Natural Fiber Pow Therapy   Refills:  0       traZODone 50 MG tablet   Commonly known as:  DESYREL        Dose:  75 mg    Take 75 mg by mouth nightly as needed for sleep   Refills:  0       triamcinolone 0.5 % cream   Commonly known as:  KENALOG        Apply topically 2 times daily as needed (right elbow and handfor psoriasis)   Refills:  0       trolamine salicylate 10 % cream   Commonly known as:  ASPERCREME        Apply topically as needed for moderate pain To affected areas   Refills:  0       TYLENOL 325 MG tablet   Generic drug:  acetaminophen        Dose:  1000 mg   Take 1,000 mg by mouth 3 times daily And at bedtime as needed for pain   Refills:  0       ZANTAC PO        Dose:  150 mg   Take 150 mg by mouth 2 times daily   Refills:  0       * Notice:  This list has 2 medication(s) that are the same as other medications prescribed for you. Read the directions carefully, and ask your doctor or other care provider to review them with you.         Where to get your medicines      These medications were sent to [facility] - [address]  [address] [facility] [address], [place] [address]     Phone:  [phone]     aspirin 81 MG EC tablet    atorvastatin 40 MG tablet    bacitracin 500 UNIT/GM Oint    metoprolol tartrate 100 MG tablet    nitroGLYcerin 0.4 MG sublingual tablet    ticagrelor 90 MG tablet                Protect others around you: Learn how to safely use, store and throw away your medicines at www.disposemymeds.org.             Medication List: This is a list of all your medications and when to take them. Check marks below indicate your daily home schedule. Keep this list as a reference.      Medications           Morning Afternoon Evening Bedtime As Needed    AMITRIPTYLINE HCL PO   Take 25 mg by mouth At Bedtime   Last time this was given:  25 mg on 1/30/2018  9:47 PM                                ammonium lactate 12 % cream   Commonly known as:  AMLACTIN   Apply topically 2 times daily Apply to feet                                aspirin 81 MG EC tablet   Take 1 tablet (81 mg) by mouth  daily   Start taking on:  2/1/2018   Last time this was given:  81 mg on 1/31/2018  8:20 AM                                atorvastatin 40 MG tablet   Commonly known as:  LIPITOR   Take 1 tablet (40 mg) by mouth daily   Start taking on:  2/1/2018   Last time this was given:  40 mg on 1/31/2018  8:20 AM                                bacitracin 500 UNIT/GM Oint   Apply topically 2 times daily for 7 days Over the scab in neck.                                benzoyl peroxide 5 % topical gel   Apply topically 2 times daily as needed (redness/rosacea of face)                                GABAPENTIN PO   Take 100 mg by mouth 3 times daily   Last time this was given:  100 mg on 1/31/2018  8:20 AM                                GAVISCON PO   Take 1 tablet by mouth every 4 hours as needed (indigestion)                                KEPPRA PO   Take 500 mg by mouth 2 times daily   Last time this was given:  500 mg on 1/31/2018  8:20 AM                                loperamide 2 MG capsule   Commonly known as:  IMODIUM   Take 2 mg by mouth 4 times daily as needed for diarrhea                                * LORAZEPAM PO   Take 0.5 mg by mouth 3 times daily as needed for anxiety   Last time this was given:  1 mg on 1/30/2018  9:47 PM                                * LORazepam 1 MG tablet   Commonly known as:  ATIVAN   Take 1 tablet by mouth At Bedtime.   Last time this was given:  1 mg on 1/30/2018  9:47 PM                                metoprolol tartrate 100 MG tablet   Commonly known as:  LOPRESSOR   Take 1 tablet (100 mg) by mouth 2 times daily   Last time this was given:  75 mg on 1/31/2018  8:19 AM                                MIRTAZAPINE PO   Take 30 mg by mouth At Bedtime   Last time this was given:  30 mg on 1/30/2018  9:47 PM                                nitroGLYcerin 0.4 MG sublingual tablet   Commonly known as:  NITROSTAT   For chest pain place 1 tablet under the tongue every 5 minutes for 3 doses. If  symptoms persist 5 minutes after 1st dose call 911.   Last time this was given:  0.4 mg on 1/29/2018  6:15 AM                                OMEPRAZOLE PO   Take 20 mg by mouth every other day   Last time this was given:  20 mg on 1/30/2018 10:15 AM                                PHENAZOPYRIDINE HCL PO   Take 200 mg by mouth 3 times daily as needed (burning upon urination)                                psyllium 58.6 % Powd   Commonly known as:  METAMUCIL   Take 1 teaspoonful by mouth daily Natural Fiber Pow Therapy                                ticagrelor 90 MG tablet   Commonly known as:  BRILINTA   Take 1 tablet (90 mg) by mouth every 12 hours   Last time this was given:  90 mg on 1/31/2018  8:20 AM                                traZODone 50 MG tablet   Commonly known as:  DESYREL   Take 75 mg by mouth nightly as needed for sleep                                triamcinolone 0.5 % cream   Commonly known as:  KENALOG   Apply topically 2 times daily as needed (right elbow and handfor psoriasis)                                trolamine salicylate 10 % cream   Commonly known as:  ASPERCREME   Apply topically as needed for moderate pain To affected areas                                TYLENOL 325 MG tablet   Take 1,000 mg by mouth 3 times daily And at bedtime as needed for pain   Last time this was given:  650 mg on 1/26/2018  2:35 PM   Generic drug:  acetaminophen                                ZANTAC PO   Take 150 mg by mouth 2 times daily   Last time this was given:  150 mg on 1/31/2018  8:20 AM                                * Notice:  This list has 2 medication(s) that are the same as other medications prescribed for you. Read the directions carefully, and ask your doctor or other care provider to review them with you.

## 2018-01-25 NOTE — IP AVS SNAPSHOT
[facility]    [address] [place] [address]., [address]    [place] [address]    Phone:  [phone]                                       After Visit Summary   1/25/2018    Amy Bryan    MRN: [id number]           After Visit Summary Signature Page     I have received my discharge instructions, and my questions have been answered. I have discussed any challenges I see with this plan with the nurse or doctor.    ..........................................................................................................................................  Patient/Patient Representative Signature      ..........................................................................................................................................  Patient Representative Print Name and Relationship to Patient    ..................................................               ................................................  Date                                            Time    ..........................................................................................................................................  Reviewed by Signature/Title    ...................................................              ..............................................  Date                                                            Time

## 2018-01-26 ENCOUNTER — APPOINTMENT (OUTPATIENT)
Dept: CARDIOLOGY | Facility: CLINIC | Age: 63
End: 2018-01-26
Attending: INTERNAL MEDICINE
Payer: COMMERCIAL

## 2018-01-26 ENCOUNTER — APPOINTMENT (OUTPATIENT)
Dept: GENERAL RADIOLOGY | Facility: CLINIC | Age: 63
End: 2018-01-26
Attending: INTERNAL MEDICINE
Payer: COMMERCIAL

## 2018-01-26 LAB
ANION GAP SERPL CALCULATED.3IONS-SCNC: 9 MMOL/L (ref 3–14)
BUN SERPL-MCNC: 22 MG/DL (ref 7–30)
CALCIUM SERPL-MCNC: 8 MG/DL (ref 8.5–10.1)
CHLORIDE SERPL-SCNC: 108 MMOL/L (ref 94–109)
CHOLEST SERPL-MCNC: 101 MG/DL
CO2 SERPL-SCNC: 25 MMOL/L (ref 20–32)
CREAT SERPL-MCNC: 1.22 MG/DL (ref 0.52–1.04)
ERYTHROCYTE [DISTWIDTH] IN BLOOD BY AUTOMATED COUNT: 12.5 % (ref 10–15)
GFR SERPL CREATININE-BSD FRML MDRD: 45 ML/MIN/1.7M2
GLUCOSE BLDC GLUCOMTR-MCNC: 108 MG/DL (ref 70–99)
GLUCOSE BLDC GLUCOMTR-MCNC: 111 MG/DL (ref 70–99)
GLUCOSE BLDC GLUCOMTR-MCNC: 120 MG/DL (ref 70–99)
GLUCOSE BLDC GLUCOMTR-MCNC: 120 MG/DL (ref 70–99)
GLUCOSE BLDC GLUCOMTR-MCNC: 122 MG/DL (ref 70–99)
GLUCOSE BLDC GLUCOMTR-MCNC: 122 MG/DL (ref 70–99)
GLUCOSE BLDC GLUCOMTR-MCNC: 129 MG/DL (ref 70–99)
GLUCOSE BLDC GLUCOMTR-MCNC: 129 MG/DL (ref 70–99)
GLUCOSE BLDC GLUCOMTR-MCNC: 134 MG/DL (ref 70–99)
GLUCOSE BLDC GLUCOMTR-MCNC: 141 MG/DL (ref 70–99)
GLUCOSE BLDC GLUCOMTR-MCNC: 173 MG/DL (ref 70–99)
GLUCOSE BLDC GLUCOMTR-MCNC: 195 MG/DL (ref 70–99)
GLUCOSE BLDC GLUCOMTR-MCNC: 93 MG/DL (ref 70–99)
GLUCOSE SERPL-MCNC: 127 MG/DL (ref 70–99)
HBA1C MFR BLD: 5.5 % (ref 4.3–6)
HCT VFR BLD AUTO: 37.9 % (ref 35–47)
HDLC SERPL-MCNC: 27 MG/DL
HGB BLD-MCNC: 12.3 G/DL (ref 11.7–15.7)
KCT BLD-ACNC: 143 SEC (ref 105–167)
LDLC SERPL CALC-MCNC: 42 MG/DL
LMWH PPP CHRO-ACNC: 0.34 IU/ML
MAGNESIUM SERPL-MCNC: 2.3 MG/DL (ref 1.6–2.3)
MCH RBC QN AUTO: 30.9 PG (ref 26.5–33)
MCHC RBC AUTO-ENTMCNC: 32.5 G/DL (ref 31.5–36.5)
MCV RBC AUTO: 95 FL (ref 78–100)
MRSA DNA SPEC QL NAA+PROBE: NEGATIVE
NONHDLC SERPL-MCNC: 74 MG/DL
PHOSPHATE SERPL-MCNC: 4.2 MG/DL (ref 2.5–4.5)
PLATELET # BLD AUTO: 293 10E9/L (ref 150–450)
POTASSIUM SERPL-SCNC: 4.9 MMOL/L (ref 3.4–5.3)
PROCALCITONIN SERPL-MCNC: 0.96 NG/ML
RBC # BLD AUTO: 3.98 10E12/L (ref 3.8–5.2)
SODIUM SERPL-SCNC: 142 MMOL/L (ref 133–144)
SPECIMEN SOURCE: NORMAL
TRIGL SERPL-MCNC: 161 MG/DL
TROPONIN I SERPL-MCNC: 5.25 UG/L (ref 0–0.04)
TROPONIN I SERPL-MCNC: 6.18 UG/L (ref 0–0.04)
TROPONIN I SERPL-MCNC: 6.34 UG/L (ref 0–0.04)
WBC # BLD AUTO: 17.7 10E9/L (ref 4–11)

## 2018-01-26 PROCEDURE — 85027 COMPLETE CBC AUTOMATED: CPT | Performed by: SURGERY

## 2018-01-26 PROCEDURE — 25000125 ZZHC RX 250: Performed by: INTERNAL MEDICINE

## 2018-01-26 PROCEDURE — 93306 TTE W/DOPPLER COMPLETE: CPT

## 2018-01-26 PROCEDURE — 25000132 ZZH RX MED GY IP 250 OP 250 PS 637: Performed by: INTERNAL MEDICINE

## 2018-01-26 PROCEDURE — 25000132 ZZH RX MED GY IP 250 OP 250 PS 637: Performed by: SURGERY

## 2018-01-26 PROCEDURE — 99233 SBSQ HOSP IP/OBS HIGH 50: CPT | Mod: 25 | Performed by: INTERNAL MEDICINE

## 2018-01-26 PROCEDURE — 84484 ASSAY OF TROPONIN QUANT: CPT | Performed by: SURGERY

## 2018-01-26 PROCEDURE — 20000003 ZZH R&B ICU

## 2018-01-26 PROCEDURE — 40000986 XR CHEST PORT 1 VW

## 2018-01-26 PROCEDURE — 85347 COAGULATION TIME ACTIVATED: CPT

## 2018-01-26 PROCEDURE — 80061 LIPID PANEL: CPT | Performed by: SURGERY

## 2018-01-26 PROCEDURE — 84484 ASSAY OF TROPONIN QUANT: CPT | Performed by: INTERNAL MEDICINE

## 2018-01-26 PROCEDURE — 83735 ASSAY OF MAGNESIUM: CPT | Performed by: INTERNAL MEDICINE

## 2018-01-26 PROCEDURE — 84100 ASSAY OF PHOSPHORUS: CPT | Performed by: INTERNAL MEDICINE

## 2018-01-26 PROCEDURE — 93306 TTE W/DOPPLER COMPLETE: CPT | Mod: 26 | Performed by: INTERNAL MEDICINE

## 2018-01-26 PROCEDURE — 25000131 ZZH RX MED GY IP 250 OP 636 PS 637: Performed by: SURGERY

## 2018-01-26 PROCEDURE — 83036 HEMOGLOBIN GLYCOSYLATED A1C: CPT | Performed by: SURGERY

## 2018-01-26 PROCEDURE — 93010 ELECTROCARDIOGRAM REPORT: CPT | Mod: 76 | Performed by: INTERNAL MEDICINE

## 2018-01-26 PROCEDURE — 93308 TTE F-UP OR LMTD: CPT | Mod: 26 | Performed by: INTERNAL MEDICINE

## 2018-01-26 PROCEDURE — 93005 ELECTROCARDIOGRAM TRACING: CPT

## 2018-01-26 PROCEDURE — 93325 DOPPLER ECHO COLOR FLOW MAPG: CPT | Mod: 26 | Performed by: INTERNAL MEDICINE

## 2018-01-26 PROCEDURE — 25000128 H RX IP 250 OP 636: Performed by: INTERNAL MEDICINE

## 2018-01-26 PROCEDURE — 87640 STAPH A DNA AMP PROBE: CPT | Performed by: SURGERY

## 2018-01-26 PROCEDURE — 87641 MR-STAPH DNA AMP PROBE: CPT | Performed by: SURGERY

## 2018-01-26 PROCEDURE — 80048 BASIC METABOLIC PNL TOTAL CA: CPT | Performed by: SURGERY

## 2018-01-26 PROCEDURE — 84145 PROCALCITONIN (PCT): CPT | Performed by: INTERNAL MEDICINE

## 2018-01-26 PROCEDURE — 25000128 H RX IP 250 OP 636: Performed by: SURGERY

## 2018-01-26 PROCEDURE — 25500064 ZZH RX 255 OP 636: Performed by: SURGERY

## 2018-01-26 PROCEDURE — 00000146 ZZHCL STATISTIC GLUCOSE BY METER IP

## 2018-01-26 PROCEDURE — 93321 DOPPLER ECHO F-UP/LMTD STD: CPT | Mod: 26 | Performed by: INTERNAL MEDICINE

## 2018-01-26 PROCEDURE — 93325 DOPPLER ECHO COLOR FLOW MAPG: CPT

## 2018-01-26 PROCEDURE — 85520 HEPARIN ASSAY: CPT | Performed by: SURGERY

## 2018-01-26 PROCEDURE — 99291 CRITICAL CARE FIRST HOUR: CPT | Performed by: INTERNAL MEDICINE

## 2018-01-26 RX ORDER — METOPROLOL TARTRATE 1 MG/ML
5 INJECTION, SOLUTION INTRAVENOUS ONCE
Status: COMPLETED | OUTPATIENT
Start: 2018-01-26 | End: 2018-01-26

## 2018-01-26 RX ORDER — MIRTAZAPINE 15 MG/1
30 TABLET, FILM COATED ORAL AT BEDTIME
Status: DISCONTINUED | OUTPATIENT
Start: 2018-01-26 | End: 2018-01-31 | Stop reason: HOSPADM

## 2018-01-26 RX ORDER — SODIUM CHLORIDE 9 MG/ML
INJECTION, SOLUTION INTRAVENOUS CONTINUOUS
Status: DISCONTINUED | OUTPATIENT
Start: 2018-01-26 | End: 2018-01-29

## 2018-01-26 RX ADMIN — SODIUM CHLORIDE 2 UNITS/HR: 9 INJECTION, SOLUTION INTRAVENOUS at 00:27

## 2018-01-26 RX ADMIN — AMIODARONE HYDROCHLORIDE 1 MG/MIN: 50 INJECTION, SOLUTION INTRAVENOUS at 21:24

## 2018-01-26 RX ADMIN — OMEPRAZOLE 20 MG: 20 CAPSULE, DELAYED RELEASE ORAL at 08:26

## 2018-01-26 RX ADMIN — METOROPROLOL TARTRATE 5 MG: 5 INJECTION, SOLUTION INTRAVENOUS at 18:47

## 2018-01-26 RX ADMIN — LORAZEPAM 0.5 MG: 0.5 TABLET ORAL at 17:34

## 2018-01-26 RX ADMIN — TICAGRELOR 90 MG: 90 TABLET ORAL at 08:26

## 2018-01-26 RX ADMIN — GABAPENTIN 100 MG: 100 CAPSULE ORAL at 08:26

## 2018-01-26 RX ADMIN — SODIUM CHLORIDE: 9 INJECTION, SOLUTION INTRAVENOUS at 07:00

## 2018-01-26 RX ADMIN — ASPIRIN 81 MG: 81 TABLET, COATED ORAL at 08:26

## 2018-01-26 RX ADMIN — HEPARIN SODIUM 850 UNITS/HR: 10000 INJECTION, SOLUTION INTRAVENOUS at 03:21

## 2018-01-26 RX ADMIN — ACETAMINOPHEN 650 MG: 325 TABLET, FILM COATED ORAL at 14:35

## 2018-01-26 RX ADMIN — AMITRIPTYLINE HYDROCHLORIDE 25 MG: 25 TABLET, FILM COATED ORAL at 01:32

## 2018-01-26 RX ADMIN — RANITIDINE 150 MG: 150 TABLET ORAL at 00:14

## 2018-01-26 RX ADMIN — TICAGRELOR 90 MG: 90 TABLET ORAL at 20:27

## 2018-01-26 RX ADMIN — GABAPENTIN 100 MG: 100 CAPSULE ORAL at 00:14

## 2018-01-26 RX ADMIN — ATORVASTATIN CALCIUM 40 MG: 40 TABLET, FILM COATED ORAL at 08:26

## 2018-01-26 RX ADMIN — GABAPENTIN 100 MG: 100 CAPSULE ORAL at 22:54

## 2018-01-26 RX ADMIN — LEVETIRACETAM 500 MG: 500 TABLET, FILM COATED ORAL at 20:27

## 2018-01-26 RX ADMIN — GABAPENTIN 100 MG: 100 CAPSULE ORAL at 16:59

## 2018-01-26 RX ADMIN — Medication 12.5 MG: at 20:27

## 2018-01-26 RX ADMIN — AMITRIPTYLINE HYDROCHLORIDE 25 MG: 25 TABLET, FILM COATED ORAL at 22:54

## 2018-01-26 RX ADMIN — VASOPRESSIN 2.4 UNITS/HR: 20 INJECTION INTRAVENOUS at 10:24

## 2018-01-26 RX ADMIN — LORAZEPAM 1 MG: 1 TABLET ORAL at 00:14

## 2018-01-26 RX ADMIN — LEVETIRACETAM 500 MG: 500 TABLET, FILM COATED ORAL at 00:14

## 2018-01-26 RX ADMIN — VASOPRESSIN 2.4 UNITS/HR: 20 INJECTION INTRAVENOUS at 01:17

## 2018-01-26 RX ADMIN — LEVETIRACETAM 500 MG: 500 TABLET, FILM COATED ORAL at 08:26

## 2018-01-26 RX ADMIN — RANITIDINE 150 MG: 150 TABLET ORAL at 08:27

## 2018-01-26 RX ADMIN — AMIODARONE HYDROCHLORIDE 150 MG: 1.5 INJECTION, SOLUTION INTRAVENOUS at 21:08

## 2018-01-26 RX ADMIN — RANITIDINE 150 MG: 150 TABLET ORAL at 20:27

## 2018-01-26 RX ADMIN — SULFUR HEXAFLUORIDE 5 ML: KIT at 10:34

## 2018-01-26 RX ADMIN — MIRTAZAPINE 30 MG: 15 TABLET, FILM COATED ORAL at 22:54

## 2018-01-26 RX ADMIN — ATORVASTATIN CALCIUM 40 MG: 40 TABLET, FILM COATED ORAL at 00:14

## 2018-01-26 ASSESSMENT — PAIN DESCRIPTION - DESCRIPTORS
DESCRIPTORS: HEADACHE
DESCRIPTORS: HEADACHE

## 2018-01-26 NOTE — H&P
Cc: syncope    HPI  62 year old woman with a history of TBI and developmental delay who had a syncopal episode in her group home.  EMS found her hypotensive with SBP of 60 and identified ST changes in anterolateral leads.  Was taken to cath lab where clot was removed from right and a chronic stenosis opened in the circ.  LV EF noted to be depressed around 25% and not explained by distribution of ischemia.  Presumptive diagnosis of takotsubo cardiomyopathy.  She had some illness earlier in the week.  A balloon pump was placed as she remained with SBP of 60 throughout and despite the intervention.    Past Medical History:   Diagnosis Date     Coma (H) 5-2012    CHT after a fall     Developmental delay      DVT (deep vein thrombosis) in pregnancy (H)     post trauma     Fall 5-2012    multiple fracture     Hypertension      Menopause     age 52     Pelvic fracture (H) 5-201`2     Rib fracture 5-2012     Past Surgical History:   Procedure Laterality Date     COLONOSCOPY  2010   tracheostomy    Allergies   Allergen Reactions     Penicillins      dizziness       No current facility-administered medications on file prior to encounter.   Current Outpatient Prescriptions on File Prior to Encounter:  acetaminophen (TYLENOL) 325 MG tablet Take 1,000 mg by mouth 3 times daily    LORazepam (ATIVAN) 1 MG tablet Take 1 tablet by mouth At Bedtime.   traZODone (DESYREL) 50 MG tablet Take 1 tablet by mouth At Bedtime.   metoprolol (LOPRESSOR) 50 MG tablet Take 50 mg by mouth 2 times daily.   amlodipine  Dilantin    Family History   Problem Relation Age of Onset     Other - See Comments Father      alpha 1 antitrypsin deficiency      DIABETES Sister      Cancer - colorectal Mother      Social History   Substance Use Topics     Smoking status: Never Smoker     Smokeless tobacco: Never Used     Alcohol use 1.0 oz/week     2 drink(s) per week     ROS unobtainable due to condition    B/P: 88/49, T: 97.2, P: Data Unavailable, R: 21  Lungs  clear  Heart irreg  abd soft  Ext cool, mottled  Neuro awake, conversant, knows location and person, moves all extremities  Skin mottled around neck, face pale    A/P  1.  Neuro: pain control as needed  Dilantin level    2.  Pulm: supplemental O2 as needed    3.  Cardiac   Hypotension secondary to depressed cardiac function, thought due to takatsubos.  Support with IABP and vasopressors.  Titrate to keep MAP > 65.  Add vasopressin   Follow troponins    4.  GI: NPO    5.  Renal: follow urine output, bender for close monitoring of tissue perfusion    Needs central line for vasopressors    Updated sister, and friend who is POA    Evaluation and management time exclusive of procedures was 30 minutes critical care time including:  examination with the ICU team, discussion of the patient's condition with other physicians and members of the care team, reviewing all data related to the patient, and time utilizing the EMR for documentation of this patient's care.      Mor Riggins MD  Acute Care Surgery/Critical Care

## 2018-01-26 NOTE — PROGRESS NOTES
Critical Care Progress Note      01/26/2018    Name: Amy Bryan MRN#: [id number]   Age: 62 year old YOB: 1955     Hsptl Day# 1  ICU DAY #1    MV DAY #             Problem List:   Active Problems:    STEMI (ST elevation myocardial infarction) (H)    Hypotension           Summary/Hospital Course:     62 year old woman with a history of TBI and developmental delay who had a syncopal episode in her group home.  EMS found her hypotensive with SBP of 60 and identified ST changes in anterolateral leads.  Was taken to cath lab where clot was removed from right and a chronic stenosis opened in the circ.  LV EF noted to be depressed around 25% and not explained by distribution of ischemia.  Presumptive diagnosis of takotsubo cardiomyopathy.  She had some illness earlier in the week.  A balloon pump was placed as she remained with SBP of 60 throughout and despite the intervention.             Interim History:     - admitted overnight following cath lab with PTCI   - IABP in place  - on levophed and vasopressin - weaning  - denies resp complaints or pain        Assessment and plan :     Amy Bryan IS a 62 year old female admitted on 1/25/2018 for cardiogenic shock following STEMI   I have personally reviewed the daily labs, imaging studies, cultures and discussed the case with referring physician and consulting physicians.     My assessment and plan by system for this patient is as follows:    Neurology/Psychiatry:   1. Hx of TBA and Developmental delay - on home remeron,keppra   2. Hx of seizures - on keppra   3. Analgesia/Anxiety   - home ativan  - home gabapentin  Plan  - prn pain medication  - continue home remeron, gabapentin and ativan     Cardiovascular:   1.Cardiogenic shock - s/p PTCI with ischemic cardiomyopathy - on two pressors and IABP , thought reversable takotsubo   2.NS Rhythm -   3. Acute lateral STEMI - though occluded rPLA, thrombotic mid RCA s/p aspiration thrombectomy   Plan  -  post PTCI cares per lorelei   - okillinta   - wean pressors - goal MAP >65, follow markers of perfusion  - heparin infusion    Pulmonary/Ventilator Management:   1. Airway  - patient protecting, no acute respiratory issues , noted prior tracheostomy  Plan  - supplemental O2 if sats <90%  - aspiration precautions    GI and Nutrition :   1. No active issues   Plan  - clear liquids   - PPI  - aspiration precautions    Renal/Fluids/Electrolytes:   1. GIOVANNI - - in setting of shock, contract and IABP placement    2. Electrolyte abnormality - 2/2 critical illness  3. Acid/base status - stable  4. Volume status- appears euvolemic   Plan  - maintain hemodynamics   - getting fluid from infusions  - monitor function and electrolytes as needed with replacement per ICU protocols.  - generally avoid nephrotoxic agents such as NSAID, IV contrast unless specifically required  - adjust medications as needed for renal clearance  - follow I/O's as appropriate.    Infectious Disease:   1. New fever, suspect related to procedures otherwise no active issues   Plan  - follow fever, curve, WBC  - send procal     Endocrine:   1. Stress induced hyperglycemia  Plan  - ICU insulin protocol, goal sugar <180, insulin infusion    Hematology/Oncology:   1. Leukocytosis -reactive   2. Anticoagulation  Plan  - serial CBC, coags as indicated      MSKL/Rheum:  1. No active issues   Plan  -     IV/Access:   1. Venous access - RIJ 1/25/18    2. Arterial access - groin sheath  Plan  - central access required and necessary      ICU Prophylaxis:   1. DVT: Hep infusion mechanical  2. Asp : HOB 30 degrees,  3. Stress Ulcer: H2   4. Restraints: Nonviolent soft two point restraints required and necessary for patient safety and continued cares and good effect as patient continues to pull at necessary lines, tubes despite education and distraction. Will readdress daily.   5. Wound care - per unit routine   6. Feeding - start clears  7. Family Update:POA at bedside    8. Disposition - ICU        Key goals for next 24 hours:   1. Wean pressors   2. IABP per Cards  3. Supportive cares              Evans Medications:       mirtazapine (REMERON) tablet 30 mg  30 mg Oral At Bedtime     metoprolol tartrate  50 mg Oral BID     LORazepam  1 mg Oral At Bedtime     sodium chloride (PF)  3 mL Intracatheter Q8H     aspirin EC  81 mg Oral Daily     ticagrelor  90 mg Oral Q12H     atorvastatin  40 mg Oral Daily     amitriptyline (ELAVIL) tablet 25 mg  25 mg Oral At Bedtime     gabapentin (NEURONTIN) capsule 100 mg  100 mg Oral TID     levETIRAcetam (KEPPRA) tablet 500 mg  500 mg Oral BID     omeprazole (priLOSEC) CR capsule 20 mg  20 mg Oral Every Other Day     ranitidine  150 mg Oral BID       HEParin 850 Units/hr (01/26/18 0715)     NaCl 10 mL/hr at 01/26/18 0700     NaCl 10 mL/hr at 01/26/18 0700     norepinephrine 0.04 mcg/kg/min (01/26/18 1108)     Percutaneous Coronary Intervention orders placed (this is information for BPA alerting)       - MEDICATION INSTRUCTIONS -       vasopressin (PITRESSIN) infusion ADULT (40 mL) 2.4 Units/hr (01/26/18 1024)     IV fluid REPLACEMENT ONLY       insulin (regular) 0.2 Units/hr (01/26/18 1132)               Physical Examination:   Temp:  [97.2  F (36.2  C)-101.5  F (38.6  C)] 101.3  F (38.5  C)  Heart Rate:  [] 108  Resp:  [10-34] 13  BP: ()/() 124/80  SpO2:  [84 %-100 %] 93 %    Intake/Output Summary (Last 24 hours) at 01/26/18 1140  Last data filed at 01/26/18 1100   Gross per 24 hour   Intake          1029.46 ml   Output              605 ml   Net           424.46 ml     Wt Readings from Last 4 Encounters:   01/26/18 90.2 kg (198 lb 13.7 oz)   10/08/12 71.4 kg (157 lb 6.4 oz)   08/29/12 63.2 kg (139 lb 6.4 oz)   07/20/12 74.4 kg (164 lb)     BP - Mean:  [] 90  Resp: 13    Recent Labs  Lab 01/25/18 2018   PH 7.44   PCO2 29*   PO2 137*   HCO3 19*       GEN: no acute distress lying in reverse trendelenberg  HEENT: head ncat,  sclera anicteric, OP patent, trachea midline   PULM: unlabored respirations, clear anteriorly moving good air   CV/COR: RRR S1S2 no gallop,  No rub, no murmur  ABD: soft nontender, hypoactive bowel sounds, no mass  EXT: no edema  warm  NEURO: grossly intact, moving all 4 extremities  SKIN: no obvious rash  LINES: clean, dry intact         Data:   All data and imaging reviewed     ROUTINE ICU LABS (Last four results)  CMP  Recent Labs  Lab 01/26/18  0500 01/25/18 2018 01/25/18 1945    141 142   POTASSIUM 4.9 3.7 3.4   CHLORIDE 108  --  107   CO2 25  --  22   ANIONGAP 9  --  13   *  --  235*   BUN 22  --  14   CR 1.22*  --  0.91   GFRESTIMATED 45*  --  63   GFRESTBLACK 54*  --  76   HECTOR 8.0*  --  8.6     CBC  Recent Labs  Lab 01/26/18  0500 01/25/18 2018 01/25/18 1945   WBC 17.7*  --  9.4   RBC 3.98  --  4.07   HGB 12.3 11.2* 12.6   HCT 37.9  --  38.9   MCV 95  --  96   MCH 30.9  --  31.0   MCHC 32.5  --  32.4   RDW 12.5  --  12.4     --  296     INRNo lab results found in last 7 days.  Arterial Blood Gas  Recent Labs  Lab 01/25/18 2018   PH 7.44   PCO2 29*   PO2 137*   HCO3 19*       All cultures:  No results for input(s): CULT in the last 168 hours.  Recent Results (from the past 24 hour(s))   XR Chest Port 1 View    Narrative    CHEST PORTABLE ONE VIEW    1/25/2018 7:57 PM     HISTORY: Irregular EKG.    COMPARISON: None.    FINDINGS: Heart size normal for a supine film. Lungs are clear.      Impression    IMPRESSION: Negative.     GUEVARA SCOTT MD   XR Chest Port 1 View    Narrative    CHEST ONE VIEW PORTABLE    1/25/2018 10:55 PM     HISTORY: Confirm line placement.     COMPARISON: 1/25/2018.    FINDINGS: Right jugular central line with tip in the distal SVC in  good position. Heart size normal. Lungs clear. No interval change in  the heart or lungs.      Impression    IMPRESSION: No acute cardiopulmonary abnormality. Right jugular  central line with tip in the SVC.    GUEVARA SCOTT,  MD         Billing: This patient is critically ill: Yes. Total critical care time today 35 min.

## 2018-01-26 NOTE — PROGRESS NOTES
[facility] Cardiology Progress Note          Assessment and Plan:   1. Acute lateral STEMI, likely due to occluded rPLA  2. Thrombotic mid RCA lesion s/p aspiration thrombectomy (no stenting)  3. Occluded (likely chronic) 1st diagonal branch status post PTCA  4. Shock, etiology unclear  5. 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 History of traumatic brain injury and seizures    The patient is a 62 female with history of traumatic brain injury and cognitive impairment who was brought to the hospital last night by EMS after she was noted to be unresponsive and hypotensive at her group home. ECG revealed lateral ST elevation. She subsequently underwent emergent coronary angiogram which revealed thrombotic mid RCA filing, occluded distal rPLA (likely due embolism from the mid RCA), occluded 1st diagonal and 70% diffuse proximal LAD stenosis. The circumflex is small with diffuse disease. She underwent aspiration thrombectomy of the mid RCA and PTCA of the 1st diagonal branch (small vessel). Subsequent angiogram revealed resolution of RCA thrombus and recanalization of the rPLA. IABP was placed for hypotension and patient admitted to ICU. Given the LV gram findings and hypotension there was concern for possible revere takotsubo    Plan:    -- Continue DAPT  -- Wean pressors this morning. Will likely remove the IABP later this afternoon if hemodynamically stable or perhaps tomorrow  -- Follow TnI trend  -- Cardiac MRI when stable.  -- Will initiate rest of cardiac medical program including ACEI, B-blocker when able.         Interval History:   Remained stable overnight. No symptoms this morning       Review of Systems:   Unable to obtain       Medications:     No current outpatient prescriptions on file.               Physical Exam:   All vitals have been reviewed  Patient Vitals for the past 24 hrs:   BP Temp Temp src Heart Rate Resp SpO2 Height Weight   01/26/18 0845 - 100  F (37.8  C) - 92 11 97 % - -    01/26/18 0830 106/43 99.3  F (37.4  C) - 97 15 97 % - -   01/26/18 0815 - 100.8  F (38.2  C) - 101 25 97 % - -   01/26/18 0800 99/87 100.6  F (38.1  C) Bladder 100 28 98 % - -   01/26/18 0745 - 100.4  F (38  C) - 95 14 98 % - -   01/26/18 0730 112/82 100.4  F (38  C) - 94 14 98 % - -   01/26/18 0715 - 100.4  F (38  C) - 96 27 98 % - -   01/26/18 0700 (!) 87/57 99.9  F (37.7  C) - 93 17 99 % - -   01/26/18 0645 - 100.4  F (38  C) - 101 20 98 % - -   01/26/18 0630 106/64 99.5  F (37.5  C) - 98 19 99 % - -   01/26/18 0615 - 100.6  F (38.1  C) - 98 16 97 % - -   01/26/18 0600 (!) 88/64 100.4  F (38  C) - 102 10 97 % - -   01/26/18 0545 - 100.6  F (38.1  C) - 95 17 99 % - -   01/26/18 0530 107/55 100.6  F (38.1  C) - 96 16 98 % - -   01/26/18 0515 - 100.6  F (38.1  C) - 93 30 100 % - -   01/26/18 0500 113/58 100.6  F (38.1  C) - 98 18 98 % - -   01/26/18 0445 - 100.6  F (38.1  C) - 99 (!) 31 99 % - -   01/26/18 0430 99/74 100.6  F (38.1  C) - 98 27 99 % - -   01/26/18 0415 - 100.6  F (38.1  C) - 101 16 99 % - -   01/26/18 0400 112/85 100.6  F (38.1  C) - 101 14 100 % - -   01/26/18 0345 - 100.4  F (38  C) - 103 20 100 % - -   01/26/18 0330 116/68 100.4  F (38  C) - 104 13 100 % - -   01/26/18 0315 - 100.2  F (37.9  C) - 101 29 100 % - -   01/26/18 0300 112/79 99.5  F (37.5  C) - 101 13 100 % - 90.2 kg (198 lb 13.7 oz)   01/26/18 0245 - 100  F (37.8  C) - 104 23 100 % - -   01/26/18 0230 108/57 99.3  F (37.4  C) - 96 11 100 % - -   01/26/18 0215 - 100  F (37.8  C) - 102 22 100 % - -   01/26/18 0200 105/80 99.9  F (37.7  C) - 103 21 100 % - -   01/26/18 0145 - 99.7  F (37.6  C) - 101 26 100 % - -   01/26/18 0130 98/64 99.7  F (37.6  C) - 110 13 100 % - -   01/26/18 0115 - 99.7  F (37.6  C) - 115 20 100 % - -   01/26/18 0100 (!) 113/104 99.7  F (37.6  C) - 116 26 100 % - -   01/26/18 0045 - 99.5  F (37.5  C) - 114 22 100 % - -   01/26/18 0030 (!) 68/46 99.3  F (37.4  C) - 110 19 99 % - -   01/26/18 0015 121/45 99.3  F  "(37.4  C) - 116 23 100 % - -   01/26/18 0000 105/77 99.3  F (37.4  C) - 123 16 100 % - -   01/25/18 2345 116/68 99.1  F (37.3  C) - 128 18 100 % - -   01/25/18 2330 112/59 98.8  F (37.1  C) - 124 20 100 % - -   01/25/18 2315 (!) 51/31 97.7  F (36.5  C) - 105 17 99 % - -   01/25/18 2300 107/44 - - 93 25 94 % - -   01/25/18 2245 92/69 - - 96 21 99 % - -   01/25/18 2230 (!) 88/49 - - 93 18 97 % - -   01/25/18 2215 98/60 - - 92 20 98 % - -   01/25/18 2200 98/48 97.2  F (36.2  C) Oral 94 26 96 % - -   01/25/18 2145 (!) 75/25 - - 97 16 (!) 84 % - -   01/25/18 1955 (!) 82/71 - - 109 13 - - -   01/25/18 1953 (!) 74/52 - - 112 11 - - -   01/25/18 1952 - - - - - - 1.651 m (5' 5\") -   01/25/18 1951 (!) 70/40 - - 109 21 (!) 86 % - -   01/25/18 1949 (!) 156/117 98.1  F (36.7  C) Oral 110 16 99 % - 91.3 kg (201 lb 4.5 oz)   01/25/18 1945 - - - 112 26 99 % - -   01/25/18 1943 - - - 109 16 99 % - -   01/25/18 1940 (!) 156/117 - - 112 (!) 34 - - -       Intake/Output Summary (Last 24 hours) at 01/26/18 0914  Last data filed at 01/26/18 0800   Gross per 24 hour   Intake           868.26 ml   Output              505 ml   Net           363.26 ml     Gen: Resting, NAD  Neck: JVP difficult to assess  Lung: Clear anteriorly  CV: RRR, heart sounds difficult to assess due to IABP  Abd: s,nt, bs+  Ext: no edema          Data:   All laboratory data reviewed  ROUTINE IP LABS (Last four results)  BMP  Recent Labs  Lab 01/26/18 0500 01/25/18 2018 01/25/18 1945    141 142   POTASSIUM 4.9 3.7 3.4   CHLORIDE 108  --  107   HECTOR 8.0*  --  8.6   CO2 25  --  22   BUN 22  --  14   CR 1.22*  --  0.91   *  --  235*     CBC  Recent Labs  Lab 01/26/18 0500 01/25/18 1945   WBC 17.7*  --  9.4   RBC 3.98  --  4.07   HGB 12.3  < > 12.6   HCT 37.9  --  38.9   MCV 95  --  96   MCH 30.9  --  31.0   MCHC 32.5  --  32.4   RDW 12.5  --  12.4     --  296   < > = values in this interval not displayed.  INRNo lab results found in last 7 days.   "             Attestation:  I have reviewed today's vital signs, notes, medications, labs and imaging.  Amount of time performed on this hospital visit: 30 minutes.     Saeid Boston MD

## 2018-01-26 NOTE — H&P
History and Physical  Amy Bryan    Age: 62 year old    YOB: 1955  MRN# [id number]    Primary care provider: Willie Muhammad  Date of Admission:  1/25/2018    CHIEF COMPLAINT: syncope and STEMI    HISTORY OF PRESENT ILLNESS  Amy Bryan is a 62 year old female with a history of traumatic brain injurty in 2012 and seizures, hypertension, who presents to ER with loss of conciousness. Apparently, she was in a friend's room in her group home this evening when she reported feeling light headed and fell to the ground possibly experiencing a syncopal episode. She reports feeling slightly short of breath, dizzy, and diaphorectic prior to this event. As per ER report, she was not postictal. However, she has some incontinent to urine during this period. EMS was called and as per their report the initial blood pressure was 58/40 with a pulse of 100. EMS reports initial 12 lead showed an anterolateral lead STEMI.  The patient was given 324 mg aspirin and 4 mg of Zofran via EMS. Her last blood pressure recorded en route was 114/70.    She denies any personal history of MI. EKG in ER showed ST elevation in the lateral leads.   She was started on pressors and taken to cath lab.    ======================================================  ALLERGIES     Allergies   Allergen Reactions     Penicillins      dizziness       MEDICATIONS  Prescriptions Prior to Admission   Medication Sig Dispense Refill Last Dose     LORazepam (ATIVAN) 0.5 MG tablet Take 2 tablets by mouth/per feeding tube at bedtime and take one tablet every 8 hours as needed. 60 tablet 5      acetaminophen (TYLENOL) 325 MG tablet Take 325-650 mg by mouth at onset of headache.        sertraline (ZOLOFT) 50 MG tablet Take 1 tablet by mouth daily. 30 tablet 1      LORazepam (ATIVAN) 1 MG tablet Take 1 tablet by mouth At Bedtime. 30 tablet       Phenytoin (DILANTIN INFATABS PO) Take 150 mg by mouth 2 times daily.        Polyethylene Glycol  "3350 (MIRALAX PO) Take  by mouth.        traZODone (DESYREL) 50 MG tablet Take 1 tablet by mouth At Bedtime.        metoprolol (LOPRESSOR) 50 MG tablet Take 50 mg by mouth 2 times daily.        amLODIPine (NORVASC) 10 MG tablet Take 10 mg by mouth daily.           PAST MEDICAL HISTORY  Past Medical History:   Diagnosis Date     Coma (H) 5-2012    CHT after a fall     Developmental delay      DVT (deep vein thrombosis) in pregnancy (H)     post trauma     Fall 5-2012    multiple fracture     Hypertension      Menopause     age 52     Pelvic fracture (H) 5-201`2     Rib fracture 5-2012       PAST SURGICAL HISTORY  Past Surgical History:   Procedure Laterality Date     COLONOSCOPY  2010        SOCIAL HISTORY  Social History     Social History     Marital status:      Spouse name: N/A     Number of children: N/A     Years of education: N/A     Occupational History     Not on file.     Social History Main Topics     Smoking status: Never Smoker     Smokeless tobacco: Never Used     Alcohol use 1.0 oz/week     2 drink(s) per week     Drug use: Not on file     Sexual activity: Not on file     Other Topics Concern     Not on file     Social History Narrative     No narrative on file        FAMILY HISTORY  Family History   Problem Relation Age of Onset     Other - See Comments Father      alpha 1 antitrypsin deficiency      DIABETES Sister      Cancer - colorectal Mother       ===================================================  REVIEW OF SYSTEMS  Skin: negative  Eyes: negative  Ears/Nose/Throat: negative  Respiratory: as above  Cardiovascular: as above  Gastrointestinal: negative  Genitourinary:  As above  Musculoskeletal: negative  Neurologic: as above  Psychiatric: negative  Hematologic/Lymphatic/Immunologic: negative  Endocrine: negative  ===================================================  PHYSICAL EXAM  BP 98/48  Temp 97.2  F (36.2  C) (Oral)  Resp 20  Ht 1.651 m (5' 5\")  Wt 91.3 kg (201 lb 4.5 oz)  SpO2 " 98%  BMI 33.49 kg/m2  201 lbs 4.48 oz  Body mass index is 33.49 kg/(m^2).    General: well-appearing, appears dry and pale on intial presentation to ER  HEENT: NC/AT, oropharynx clear  Neck: supple, no LAD  Chest: CTA b/l  Heart: RRR, no murmur or gallop  Vascular: femoral and DP/PT pulses intact b/l, however, they were febile  Abdomen: soft, NT/ND  Extremities: warm and well-perfused w/o cyanosis, clubbing or edema  Neuro: A&Ox3, non-focal  =====================================================  LABORATORY DATA: reviewed  CMP  Recent Labs  Lab 01/25/18 2018 01/25/18 1945    142   POTASSIUM 3.7 3.4   CHLORIDE  --  107   CO2  --  22   ANIONGAP  --  13   GLC  --  235*   BUN  --  14   CR  --  0.91   GFRESTIMATED  --  63   GFRESTBLACK  --  76   HECTOR  --  8.6     CBC  Recent Labs  Lab 01/25/18 2018 01/25/18 1945   WBC  --  9.4   RBC  --  4.07   HGB 11.2* 12.6   HCT  --  38.9   MCV  --  96   MCH  --  31.0   MCHC  --  32.4   RDW  --  12.4   PLT  --  296     INRNo lab results found in last 7 days.   ==================================================  ASSESSMENT AND PLAN    62 year old female with a history of traumatic brain injury in 2012 and seizures, hypertension, who presents to ER with loss of consciousness and EKG with STEMI of the lateral leads   - S/p aspiration thrombectomy of the RCA  - Balloon angioplasty of the diagonal. Eventhough there is MAXIMINO 3 flow, it is a very small caliber vessel.   - to get echocardiogram to evaluate any wall motion recovery  - to continue balloon pump  - restart metoprolol once BP improves  -consider lisinopril once BP stablizes  - to start statin    Discussed with Dr. Gabriele Montano MD  Cardiology Fellow    January 25, 2018

## 2018-01-26 NOTE — PROCEDURES
Right IJ central line placed under sterile conditions using the indirect Seldinger technique. Single pass of needle. Ultrasound used.  Placed at 20 cm.  Post procedure CXR shows good position and no pneumothorax.      Patient identified preprocedure and verbal consent from her obtained after discussing risks and benefits.

## 2018-01-26 NOTE — ED PROVIDER NOTES
History     Chief Complaint:  Loss of conciousness, STEMI    HPI   Amy Bryan is a 62 year old female with a history of TBI in 2012 and seizures who presents with loss of conciousness. The patient was in a friend's room in her group home this evening when she reported feeling light headed and fell to the ground possibly experiencing a syncopal episode. She reports feeling slightly short of breath, dizzy, and diaphorectic prior to this event. She did not experience any trauma during this fall to the ground and was not postictal at any point. The patient was incontinent to urine during this period. EMS reports the initial blood pressure was 58/40 with a pulse of 100. EMS reports initial 12 lead showed an lateral lead STEMI.  The patient was given 324 mg aspirin and 4 mg of Zofran via EMS. Her last blood pressure recorded en route was 114/70. Here in the emergency department the patient denies having any chest pain. The patient denies having consumed any alcohol today. She denies any personal history of MI. She denies having experienced any headache or changes in vision at any point.    Cardiac/PE/DVT Risk Factors:  History of hypertension - Yes  History of hyperlipidemia - No  History of diabetes - No  History of smoking - No  Personal history of PE/DVT - Yes (in pregnancy)  Family history of PE/DVT - No  Family history of heart complications - No  Recent travel - No  Recent surgery - No  Other immobilizations - No  Cancer - No     ECG en route:  ECG taken at 1922  Acute ST elevation posterior-anterolateral infarct. Sinus tachycardia with occasional supraventricular premature complexes. Moderate right axis deviagtion  Rate 105 bpm. ME interval 135. QRS duration 84. QT/QTc 358/475. P-R-T axes 72, 92, 70.     Allergies:  Penicillins    Medications:    Ativan  Zoloft  Dilantin  Trazodone  Metoprolol  Amlodipine    Past Medical History:    Closed TBI  Hypertension  Development delay  Coma  DVT  Menopause  Pelvic  "fracture  Rib fracture    Past Surgical History:    Colonoscopy    Family History:    Alpha 1 antitrypsin deficiency  Diabetes    Social History:  The patient was accompanied to the ED by friends.  Smoking Status: No  Smokeless Tobacco: No  Alcohol Use: Yes   Marital Status:   [2]    Review of Systems   Constitutional: Positive for diaphoresis.   Respiratory: Positive for shortness of breath.    Cardiovascular: Negative for chest pain.   Genitourinary: Positive for enuresis.   Neurological: Positive for dizziness, syncope, weakness and light-headedness.   All other systems reviewed and are negative.    Physical Exam   Vitals:  Patient Vitals for the past 24 hrs:   BP Temp Temp src Heart Rate Resp SpO2 Height Weight   01/26/18 0000 105/77 99.3  F (37.4  C) - 123 16 100 % - -   01/25/18 2345 116/68 99.1  F (37.3  C) - 128 18 100 % - -   01/25/18 2330 112/59 98.8  F (37.1  C) - 124 20 100 % - -   01/25/18 2315 (!) 51/31 97.7  F (36.5  C) - 105 17 99 % - -   01/25/18 2300 107/44 - - 93 25 94 % - -   01/25/18 2245 92/69 - - 96 21 99 % - -   01/25/18 2230 (!) 88/49 - - 93 18 97 % - -   01/25/18 2215 98/60 - - 92 20 98 % - -   01/25/18 2200 98/48 97.2  F (36.2  C) Oral 94 26 96 % - -   01/25/18 2145 (!) 75/25 - - 97 16 (!) 84 % - -   01/25/18 1955 (!) 82/71 - - 109 13 - - -   01/25/18 1953 (!) 74/52 - - 112 11 - - -   01/25/18 1952 - - - - - - 1.651 m (5' 5\") -   01/25/18 1951 (!) 70/40 - - 109 21 (!) 86 % - -   01/25/18 1949 (!) 156/117 98.1  F (36.7  C) Oral 110 16 99 % - 91.3 kg (201 lb 4.5 oz)   01/25/18 1945 - - - 112 26 99 % - -   01/25/18 1943 - - - 109 16 99 % - -   01/25/18 1940 (!) 156/117 - - 112 (!) 34 - - -      Physical Exam  General: Alert and cooperative with exam. Patient in moderate distress.  Baseline mentation.  Head:  Scalp is NC/AT  Eyes:  No scleral icterus, PERRL  ENT:  The external nose and ears are normal. The oropharynx is normal and without erythema; mucus membranes are moist. " \  Neck:  Normal range of motion without rigidity.  CV:  Regular rate and rhythm  Resp:  Breath sounds are clear bilaterally    Non-labored, no retractions or accessory muscle use  GI:  Abdomen is soft, no distension, no tenderness. No peritoneal signs.  Overweight  MS:  No lower extremity edema   Skin:  Cold extremities with delayed capillary refill  Neuro: Oriented x 3. No gross motor deficits.        Emergency Department Course     ECG:  ECG taken at 1941, ECG read at 1942  Sinus tachycardia. ST evaluation consider lateral injury or acute infarct. ACUTE MI/STEMI. Abnormal ECG  Rate 114 bpm. ID interval 132. QRS duration 70. QT/QTc 360/496. P-R-T axes 67, 78, 46.     Imaging:  Radiology findings were communicated with the patient who voiced understanding of the findings.  XR chest port 1 view:  Impression negative  Reading per radiology.     Laboratory:  Laboratory findings were communicated with the patient who voiced understanding of the findings.  BMP: Pending  CBC: AWNL (WBC 9.4, HGB 12.6, )   Troponin (Collected 1945): 3.84(HH)   Troponin: Pending    Interventions:  2000 Heparin 5000 units loading dose IV  2001 Norepinephrine 16 mg IV Infusion     Emergency Department Course:  Nursing notes and vitals reviewed.  I performed an exam of the patient as documented above.   IV was inserted and blood was drawn for laboratory testing, results above.  The patient had portable x ray imaging performed      1930 I arrived in the stabilization room and prepared for patient arrival  1936 Patient arrived  1938 I spoke with Dr. Freitas regarding the patient  1942 I spoke with Dr. Freitas  1949 Patient's family arrived  1953 Blood pressure recorded as 70/43  1954 Troponin resulted at  3.84  1958 Low dose Norepinephrine drip started  1959 Left for Cath Lab; I escorted patient to catheter lab and updated Dr. Freitas    Patient was transferred to the Cath Lab    Impression & Plan      Medical Decision Making:  Amy LOPEZ  Irvin is a 62 year old female who presents to the emergency department today by EMS with concern for STEMI after syncopal episode. Patient's history and medical records were reviewed. Initial consideration for, but not limited to, ACS/MI, seizure, syncope, electrolyte abnormality, infectious process, arrhythmias, among others. Labs, ECG, imaging were obtained. Patient was seen in the stabilization room on arrival and pre hospital ECG concerning for lateral STEMI. ECG obtained and continues to show ST elevation in the lateral leads with reciprocal ST depressions. Cath lab was activated from the field. Patient's blood pressure was noted to be in the 70's on initial arrival (she did have systolic reading in the 170's which was likely errant). She was provided heparin and started on levophed for presumed cardiogenic shock. Case was discussed with Dr. Freitas of interventional cardiology. Chest x ray demonstrated no significant finds. ISTAT troponin was elevated at 3.8. Patient was taken emergently to the cath lab for further evaluation and care. Presentation concerning for STEMI.     Critical Care time was 35 minutes for this patient excluding procedures.     Diagnosis:    ICD-10-CM    1. ST elevation myocardial infarction involving right coronary artery (H) I21.11 traZODone (DESYREL) half-tab 75 mg     metoprolol tartrate (LOPRESSOR) tablet 50 mg     LORazepam (ATIVAN) tablet 1 mg     LORazepam (ATIVAN) tablet 0.5 mg     lidocaine 1 % 1 mL     lidocaine (LMX4) cream     sodium chloride (PF) 0.9% PF flush 3 mL     sodium chloride (PF) 0.9% PF flush 3 mL     CARDIAC REHAB REFERRAL     0.9% sodium chloride BOLUS     lidocaine 1 % 5 mL     atropine injection 0.5 mg     fentaNYL (PF) (SUBLIMAZE) injection 25-50 mcg     midazolam (VERSED) injection 1-2 mg     naloxone (NARCAN) injection 0.2-0.4 mg     flumazenil (ROMAZICON) injection 0.2 mg     HOLD: Metformin and metformin containing medications on day of procedure and 48  hours after IV contrast given     0.9% sodium chloride infusion     nitroGLYcerin (NITROSTAT) sublingual tablet 0.4 mg     aspirin EC EC tablet 81 mg     acetaminophen (TYLENOL) tablet 325-650 mg     HYDROcodone-acetaminophen (NORCO) 5-325 MG per tablet 1-2 tablet     naloxone (NARCAN) injection 0.1-0.4 mg     Percutaneous Coronary Intervention orders placed (this is information for BPA alerting)     ticagrelor (BRILINTA) tablet 90 mg     Continuing beta blocker from home medication list OR beta blocker order already placed during this visit     lisinopril (PRINIVIL/Zestril) tablet 2.5 mg     atorvastatin (LIPITOR) tablet 40 mg     ISTAT gases elec art POCT     ISTAT gases elec art POCT     Activated clotting time POCT     Activated clotting time POCT     Troponin I     Glucose by meter     Glucose by meter     DISCONTINUED: phenytoin (DILANTIN) chewable tablet 150 mg     DISCONTINUED: amLODIPine (NORVASC) tablet 10 mg     DISCONTINUED: acetaminophen (TYLENOL) tablet 325-650 mg     DISCONTINUED: sertraline (ZOLOFT) tablet 50 mg   2. ST elevation myocardial infarction (STEMI), unspecified artery (H) I21.3    3. Syncope, unspecified syncope type R55    4. SOB (shortness of breath) R06.02         Disposition:   Transferred to Cath Lab    CMS Diagnoses: The patient has a STEMI     The patient was evaluated at 1936   Patient was transferred  to the cath lab which was activated due to ECG changes.   ASA given by medics or taken today  Scribe Disclosure:  Landry PEDERSEN, am serving as a scribe at 7:36 PM on 1/25/2018 to document services personally performed by Wale Latham DO based on my observations and the provider's statements to me.    EMERGENCY DEPARTMENT       Wale Latham DO  01/26/18 0014

## 2018-01-26 NOTE — PLAN OF CARE
Problem: Patient Care Overview  Goal: Plan of Care/Patient Progress Review  OT/PT/CR: Discussed with RN, pt not appropriate for therapy at this time.

## 2018-01-26 NOTE — ED NOTES
Bed: ST01  Expected date: 1/25/18  Expected time: 7:35 PM  Means of arrival: Ambulance  Comments:  Mariya M1 62M STEMI

## 2018-01-26 NOTE — PLAN OF CARE
Problem: Intra-Aortic Balloon Pump (Adult)  Goal: Signs and Symptoms of Listed Potential Problems Will be Absent, Minimized or Managed (Intra-Aortic Balloon Pump)  Signs and symptoms of listed potential problems will be absent, minimized or managed by discharge/transition of care (reference Intra-Aortic Balloon Pump (Adult) CPG).   Outcome: Improving  Patient arrived from cath lab following intervention and IABP placement around 2200 last evening. IABP 1:1 with 100% augmentation.  Levophed titrated down throughout shift to maintain IABP mean greater than 65, vasopressin added.  Right IJ CVC placed by Dr. Riggins.  HR SR/ST, patient denies pain, CP or SOB, cool hands and feet and pt denies N/T.  Tolerates ice chips and small sips of water to safely swallow PO meds. Chance inserted with adequate UOP.  +BS but no BM this shift.  Heparin gtt infusing per protocol, recheck 10A @ 0930.  Patient's family and POA updated at bedside re: POC.  Continue to monitor closely.

## 2018-01-26 NOTE — CONSULTS
Received standard consult to instruct patient on AHA diet guidelines post angio.  Patient is currently resting in ICU post angio with IABP in place.  Currently not appropriate for diet education - will check closer to discharge.     Mouna Ruiz RD, LD, CNSC   Clinical Dietitian - [facility]

## 2018-01-26 NOTE — PHARMACY-ADMISSION MEDICATION HISTORY
Admission medication history interview status for the 1/25/2018  admission is complete. See EPIC admission navigator for prior to admission medications     Medication history source reliability:Good, list from [facility] [place]    Actions taken by pharmacist (provider contacted, etc):multiple deletions and additions     Additional medication history information not noted on PTA med list :None    Medication reconciliation/reorder completed by provider prior to medication history? Yes    Time spent in this activity: 20 minutes    Prior to Admission medications    Medication Sig Last Dose Taking? Auth Provider   AMITRIPTYLINE HCL PO Take 25 mg by mouth At Bedtime  Yes Unknown, Entered By History   ammonium lactate (AMLACTIN) 12 % cream Apply topically 2 times daily Apply to feet  Yes Unknown, Entered By History   GABAPENTIN PO Take 100 mg by mouth 3 times daily  Yes Unknown, Entered By History   LevETIRAcetam (KEPPRA PO) Take 500 mg by mouth 2 times daily  Yes Unknown, Entered By History   LORAZEPAM PO Take 0.5 mg by mouth 3 times daily as needed for anxiety  Yes Unknown, Entered By History   MIRTAZAPINE PO Take 30 mg by mouth At Bedtime  Yes Unknown, Entered By History   psyllium (METAMUCIL) 58.6 % POWD Take 1 teaspoonful by mouth daily Natural Fiber Pow Therapy  Yes Unknown, Entered By History   OMEPRAZOLE PO Take 20 mg by mouth every other day  Yes Unknown, Entered By History   benzoyl peroxide 5 % topical gel Apply topically 2 times daily as needed (redness/rosacea of face)  Yes Unknown, Entered By History   Alum Hydroxide-Mag Carbonate (GAVISCON PO) Take 1 tablet by mouth every 4 hours as needed (indigestion)  Yes Unknown, Entered By History   loperamide (IMODIUM) 2 MG capsule Take 2 mg by mouth 4 times daily as needed for diarrhea  Yes Unknown, Entered By History   PHENAZOPYRIDINE HCL PO Take 200 mg by mouth 3 times daily as needed (burning upon urination) prn Yes Unknown, Entered By History    triamcinolone (KENALOG) 0.5 % cream Apply topically 2 times daily as needed (right elbow and handfor psoriasis) prn Yes Unknown, Entered By History   trolamine salicylate (ASPERCREME) 10 % cream Apply topically as needed for moderate pain To affected areas prn Yes Unknown, Entered By History   RaNITidine HCl (ZANTAC PO) Take 150 mg by mouth 2 times daily  Yes Unknown, Entered By History   acetaminophen (TYLENOL) 325 MG tablet Take 1,000 mg by mouth 3 times daily And at bedtime as needed for pain  Yes Reported, Patient   LORazepam (ATIVAN) 1 MG tablet Take 1 tablet by mouth At Bedtime.  Yes Tr Saenz MD   traZODone (DESYREL) 50 MG tablet Take 75 mg by mouth nightly as needed for sleep  prn Yes Reported, Patient   metoprolol (LOPRESSOR) 50 MG tablet Take 50 mg by mouth 2 times daily.  Yes Reported, Patient

## 2018-01-26 NOTE — PROGRESS NOTES
I reviewed the patient's echocardiogram from this morning.  Echo showed mild to moderate LV systolic dysfunction with an EF of 40-45% and significant regional wall motion mother.  The echo also showed a thickened fluid in the pericardial space which is likely a hemopericardium.  Etiology of this is unclear but perhaps related to the procedure last night.    Her hemodynamics have improved significantly throughout the day.  She is now off of pressors and denies any symptoms.  A repeat limited echocardiogram was performed this afternoon which again shows persistent possible coagulated blood in the pericardium.  There is no evidence of shashank tamponade.  Given the echo findings and hemodynamic stability we will discontinue the heparin and likely remove the balloon pump this evening.  If she develops hemodynamic instability tonight or over the weekend, I would recommend a repeat echocardiogram and if there is evidence of tamponade CV surgery evaluation for possible window.    The patient also had infrequent runs of SVT/atrial tachycardia this afternoon.  We will start low-dose beta-blocker this evening if blood pressure allows and continue to monitor this.     The patient is also febrile this afternoon but will defer management of this to the intensive care doctors.

## 2018-01-27 ENCOUNTER — APPOINTMENT (OUTPATIENT)
Dept: PHYSICAL THERAPY | Facility: CLINIC | Age: 63
End: 2018-01-27
Attending: INTERNAL MEDICINE
Payer: COMMERCIAL

## 2018-01-27 LAB
ANION GAP SERPL CALCULATED.3IONS-SCNC: 5 MMOL/L (ref 3–14)
BASOPHILS # BLD AUTO: 0 10E9/L (ref 0–0.2)
BASOPHILS NFR BLD AUTO: 0.1 %
BUN SERPL-MCNC: 17 MG/DL (ref 7–30)
CALCIUM SERPL-MCNC: 7.8 MG/DL (ref 8.5–10.1)
CHLORIDE SERPL-SCNC: 103 MMOL/L (ref 94–109)
CO2 SERPL-SCNC: 29 MMOL/L (ref 20–32)
CREAT SERPL-MCNC: 0.61 MG/DL (ref 0.52–1.04)
DIFFERENTIAL METHOD BLD: ABNORMAL
EOSINOPHIL # BLD AUTO: 0 10E9/L (ref 0–0.7)
EOSINOPHIL NFR BLD AUTO: 0.2 %
ERYTHROCYTE [DISTWIDTH] IN BLOOD BY AUTOMATED COUNT: 12.6 % (ref 10–15)
GFR SERPL CREATININE-BSD FRML MDRD: >90 ML/MIN/1.7M2
GLUCOSE BLDC GLUCOMTR-MCNC: 129 MG/DL (ref 70–99)
GLUCOSE BLDC GLUCOMTR-MCNC: 138 MG/DL (ref 70–99)
GLUCOSE SERPL-MCNC: 107 MG/DL (ref 70–99)
HCT VFR BLD AUTO: 31.3 % (ref 35–47)
HGB BLD-MCNC: 10.3 G/DL (ref 11.7–15.7)
IMM GRANULOCYTES # BLD: 0 10E9/L (ref 0–0.4)
IMM GRANULOCYTES NFR BLD: 0.3 %
LYMPHOCYTES # BLD AUTO: 1.4 10E9/L (ref 0.8–5.3)
LYMPHOCYTES NFR BLD AUTO: 13.2 %
MAGNESIUM SERPL-MCNC: 2.5 MG/DL (ref 1.6–2.3)
MCH RBC QN AUTO: 30.5 PG (ref 26.5–33)
MCHC RBC AUTO-ENTMCNC: 32.9 G/DL (ref 31.5–36.5)
MCV RBC AUTO: 93 FL (ref 78–100)
MONOCYTES # BLD AUTO: 0.5 10E9/L (ref 0–1.3)
MONOCYTES NFR BLD AUTO: 5 %
NEUTROPHILS # BLD AUTO: 8.8 10E9/L (ref 1.6–8.3)
NEUTROPHILS NFR BLD AUTO: 81.2 %
NRBC # BLD AUTO: 0 10*3/UL
NRBC BLD AUTO-RTO: 0 /100
PHOSPHATE SERPL-MCNC: 2.1 MG/DL (ref 2.5–4.5)
PLATELET # BLD AUTO: 172 10E9/L (ref 150–450)
POTASSIUM SERPL-SCNC: 3.8 MMOL/L (ref 3.4–5.3)
POTASSIUM SERPL-SCNC: 4 MMOL/L (ref 3.4–5.3)
RBC # BLD AUTO: 3.38 10E12/L (ref 3.8–5.2)
SODIUM SERPL-SCNC: 137 MMOL/L (ref 133–144)
WBC # BLD AUTO: 10.8 10E9/L (ref 4–11)

## 2018-01-27 PROCEDURE — 85025 COMPLETE CBC W/AUTO DIFF WBC: CPT | Performed by: INTERNAL MEDICINE

## 2018-01-27 PROCEDURE — 40000193 ZZH STATISTIC PT WARD VISIT

## 2018-01-27 PROCEDURE — 21000001 ZZH R&B HEART CARE

## 2018-01-27 PROCEDURE — 25000128 H RX IP 250 OP 636: Performed by: INTERNAL MEDICINE

## 2018-01-27 PROCEDURE — 25000132 ZZH RX MED GY IP 250 OP 250 PS 637: Performed by: SURGERY

## 2018-01-27 PROCEDURE — 84132 ASSAY OF SERUM POTASSIUM: CPT | Performed by: SURGERY

## 2018-01-27 PROCEDURE — 00000146 ZZHCL STATISTIC GLUCOSE BY METER IP

## 2018-01-27 PROCEDURE — 25000125 ZZHC RX 250: Performed by: SURGERY

## 2018-01-27 PROCEDURE — 80048 BASIC METABOLIC PNL TOTAL CA: CPT | Performed by: INTERNAL MEDICINE

## 2018-01-27 PROCEDURE — 25000128 H RX IP 250 OP 636: Performed by: SURGERY

## 2018-01-27 PROCEDURE — 25000132 ZZH RX MED GY IP 250 OP 250 PS 637: Performed by: INTERNAL MEDICINE

## 2018-01-27 PROCEDURE — 99233 SBSQ HOSP IP/OBS HIGH 50: CPT | Performed by: HOSPITALIST

## 2018-01-27 PROCEDURE — 97530 THERAPEUTIC ACTIVITIES: CPT | Mod: GP

## 2018-01-27 PROCEDURE — 97110 THERAPEUTIC EXERCISES: CPT | Mod: GP

## 2018-01-27 PROCEDURE — 40000141 ZZH STATISTIC PERIPHERAL IV START W/O US GUIDANCE

## 2018-01-27 PROCEDURE — 83735 ASSAY OF MAGNESIUM: CPT | Performed by: SURGERY

## 2018-01-27 PROCEDURE — 84100 ASSAY OF PHOSPHORUS: CPT | Performed by: SURGERY

## 2018-01-27 RX ADMIN — LEVETIRACETAM 500 MG: 500 TABLET, FILM COATED ORAL at 20:54

## 2018-01-27 RX ADMIN — RANITIDINE 150 MG: 150 TABLET ORAL at 20:55

## 2018-01-27 RX ADMIN — AMIODARONE HYDROCHLORIDE 0.5 MG/MIN: 50 INJECTION, SOLUTION INTRAVENOUS at 17:26

## 2018-01-27 RX ADMIN — POTASSIUM PHOSPHATE, MONOBASIC AND POTASSIUM PHOSPHATE, DIBASIC 15 MMOL: 224; 236 INJECTION, SOLUTION INTRAVENOUS at 06:43

## 2018-01-27 RX ADMIN — TICAGRELOR 90 MG: 90 TABLET ORAL at 09:03

## 2018-01-27 RX ADMIN — LORAZEPAM 1 MG: 1 TABLET ORAL at 20:56

## 2018-01-27 RX ADMIN — AMIODARONE HYDROCHLORIDE 0.5 MG/MIN: 50 INJECTION, SOLUTION INTRAVENOUS at 08:35

## 2018-01-27 RX ADMIN — AMIODARONE HYDROCHLORIDE 1 MG/MIN: 50 INJECTION, SOLUTION INTRAVENOUS at 01:22

## 2018-01-27 RX ADMIN — LEVETIRACETAM 500 MG: 500 TABLET, FILM COATED ORAL at 09:03

## 2018-01-27 RX ADMIN — GABAPENTIN 100 MG: 100 CAPSULE ORAL at 15:55

## 2018-01-27 RX ADMIN — GABAPENTIN 100 MG: 100 CAPSULE ORAL at 21:00

## 2018-01-27 RX ADMIN — ASPIRIN 81 MG: 81 TABLET, COATED ORAL at 09:03

## 2018-01-27 RX ADMIN — MIRTAZAPINE 30 MG: 15 TABLET, FILM COATED ORAL at 21:00

## 2018-01-27 RX ADMIN — Medication 12.5 MG: at 20:54

## 2018-01-27 RX ADMIN — GABAPENTIN 100 MG: 100 CAPSULE ORAL at 09:03

## 2018-01-27 RX ADMIN — TICAGRELOR 90 MG: 90 TABLET ORAL at 20:54

## 2018-01-27 RX ADMIN — RANITIDINE 150 MG: 150 TABLET ORAL at 09:03

## 2018-01-27 RX ADMIN — Medication 12.5 MG: at 09:03

## 2018-01-27 RX ADMIN — ATORVASTATIN CALCIUM 40 MG: 40 TABLET, FILM COATED ORAL at 09:03

## 2018-01-27 RX ADMIN — AMITRIPTYLINE HYDROCHLORIDE 25 MG: 25 TABLET, FILM COATED ORAL at 21:00

## 2018-01-27 ASSESSMENT — ACTIVITIES OF DAILY LIVING (ADL)
FALL_HISTORY_WITHIN_LAST_SIX_MONTHS: NO
SWALLOWING: 2-->DIFFICULTY SWALLOWING FOODS
TRANSFERRING: 0-->INDEPENDENT
DRESS: 0-->INDEPENDENT
RETIRED_EATING: 0-->INDEPENDENT
COGNITION: 1 - ATTENTION OR MEMORY DEFICITS
BATHING: 2-->ASSISTIVE PERSON
AMBULATION: 1-->ASSISTIVE EQUIPMENT
RETIRED_COMMUNICATION: 0-->UNDERSTANDS/COMMUNICATES WITHOUT DIFFICULTY
TOILETING: 0-->INDEPENDENT

## 2018-01-27 NOTE — PLAN OF CARE
"Problem: Patient Care Overview  Goal: Individualization & Mutuality  Patient has been alert and oriented, BP (!) 133/107 (BP Location: Right arm)  Temp 99.5  F (37.5  C)  Resp 27  Ht 1.651 m (5' 5\")  Wt 88.9 kg (195 lb 15.8 oz)  SpO2 97%  BMI 32.61 kg/m2  Up in halls walking with PT, denies pain, amio gtt 0.5mg/hour, IVF TKO, no episodes of afib/aflutter for day shift.   Ate Regular diet for lunch and tolerating well, yulia durant at 0900, void to bathroom, no BM.  Transfer to floor.      "

## 2018-01-27 NOTE — PROGRESS NOTES
01/27/18 1030   Quick Adds   Type of Visit (CR evaluation)   Living Environment   Lives With facility resident   Living Arrangements group home   Home Accessibility stairs within home   Number of Stairs Within Home (pt unsure but agreeable that there are 10+ with 1 rail)   Stair Railings at Home (1 rail to access bedroom/bathroom on 2nd level)   Transportation Available agency transportation;van, wheelchair accessible  (group home provides transportation)   Self-Care   Usual Activity Tolerance good   Current Activity Tolerance moderate   Regular Exercise yes   Activity/Exercise Type walking;other (see comments)  (bowling)   Exercise Amount/Frequency other (see comments)  (dailing walking; bowling 1x/wk)   Equipment Currently Used at Home none   Functional Level Prior   Ambulation 0-->independent   Transferring 0-->independent   Toileting 0-->independent   Bathing 0-->independent   Dressing 0-->independent   Eating 0-->independent   Communication 0-->understands/communicates without difficulty   Swallowing 0-->swallows foods/liquids without difficulty   Cognition (developmental delay per chart)   Fall history within last six months yes   Number of times patient has fallen within last six months 1  (with syncope; denies other falls)   Which of the above functional risks had a recent onset or change? ambulation;transferring;fall history   General Information   Onset of Illness/Injury or Date of Surgery - Date 01/25/18   Referring Physician Tr Suazo MD   Patient/Family Goals Statement home   Pertinent History of Current Problem (include personal factors and/or comorbidities that impact the POC) Pt is a 61 y/o female admitted 1/25/18 for syncope with fall and STEMI, PMH significant for seizures. Pt is s/p thrombectomy of mid RCA and PTCA, without stenting. 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 Per cardiology, echo showed mild to moderate LV systolic dysfunction with EF of 40-45%, thickened  fluid in pericardial space; recent TANYA with LVEF of 50%. Pt with infrequent runs of SVT/atrial tachycardia in PM 1/26/18.   Precautions/Limitations fall precautions   General Info Comments per RN appropriate to participate, to add activity orders   Cognitive Status Examination   Orientation orientation to person, place and time   Level of Consciousness alert   Follows Commands and Answers Questions 100% of the time;able to follow multistep instructions   Personal Safety and Judgment intact   Memory intact   Pain Assessment   Patient Currently in Pain No   Posture    Posture Not impaired   Range of Motion (ROM)   ROM Quick Adds No deficits were identified   ROM Comment B LEs WFL   Strength   Manual Muscle Testing Quick Adds No deficits were identified   Strength Comments B LEs WFL   Bed Mobility   Bed Mobility Comments pt completes supine>sit via log roll, HOB elevated, increased time to complete with SBA   Transfer Skills   Transfer Comments pt completes sit<>stand with SBA   Gait   Gait Comments pt ambulates x5' with SBA, R UE support to IV pole; widened TERRY and decreaed gina without UE support at this time   Balance   Balance Comments pt with UE support with mobility 2/2 unsteadiness, no overt LOB    Sensory Examination   Sensory Perception Comments pt denies numbness/tingling   General Therapy Interventions   Planned Therapy Interventions balance training;bed mobility training;gait training;transfer training;risk factor education;home program guidelines;progressive activity/exercise   Clinical Impression   Criteria for Skilled Therapeutic Intervention yes, treatment indicated   PT Diagnosis impaired functional activity tolerance   Influenced by the following impairments decreased activity tolerance, balance   Functional limitations due to impairments difficulty with bed mobility, transfers, gait, stairs   Clinical Presentation Evolving/Changing   Clinical Presentation Rationale hypertensive with activity,  "progressive IND with mobiltiy   Clinical Decision Making (Complexity) Low complexity   Therapy Frequency` 2 times/day   Predicted Duration of Therapy Intervention (days/wks) 3-4 days   Anticipated Discharge Disposition Home with Outpatient Therapy   Risk & Benefits of therapy have been explained Yes   Patient, Family & other staff in agreement with plan of care Yes   [facility] TM \"6 Clicks\"   2016, Trustees of [facility], under license to [employer].  All rights reserved.   6 Clicks Short Forms Basic Mobility Inpatient Short Form   [facility]  \"6 Clicks\" V.2 Basic Mobility Inpatient Short Form   1. Turning from your back to your side while in a flat bed without using bedrails? 4 - None   2. Moving from lying on your back to sitting on the side of a flat bed without using bedrails? 3 - A Little   3. Moving to and from a bed to a chair (including a wheelchair)? 3 - A Little   4. Standing up from a chair using your arms (e.g., wheelchair, or bedside chair)? 3 - A Little   5. To walk in hospital room? 3 - A Little   6. Climbing 3-5 steps with a railing? 3 - A Little   Basic Mobility Raw Score (Score out of 24.Lower scores equate to lower levels of function) 19   Total Evaluation Time   Total Evaluation Time (Minutes) 10     "

## 2018-01-27 NOTE — PROVIDER NOTIFICATION
MD Notification    Notified Person:  MD    Notified Persons Name: Dr Patel, Cardiology    Notification Date/Time: 1/26/18 2040    Notification Interaction:  Talked with Physician    Purpose of Notification: Pt having freq bursts of a-fib/a-flutter with rates in the 190s.  Asymptomatic.     Orders Received: Amiodarone bolus and drip per protocol.      Comments:  Also gave pt PO metoprolol scheduled.

## 2018-01-27 NOTE — PLAN OF CARE
Problem: Patient Care Overview  Goal: Plan of Care/Patient Progress Review  Neuro: Alert and oriented x4. Developmentally delayed/ previous TBI  CV: Pulled IABP at 1800. SR and ST with PAC's and this afternoon going in and out of A fib. ECHO today revealed 40-45% EF and pericardial effusion.   Pulmonary: 2L NC. Lungs clear  GI/: Chance with adequate output. Bowel sounds audible and active. No BM today.  Skin: No issues. On bedrest for 6 hrs (started 1800) for removal of IABP.  Drips: Titrated off of pressers today. Insulin drip currently on hold for FGBS of 93 at 1800.  Lines: TL IJ. Left PIV.  Update: POA/ friend Monserrat here all day and updated continuously as needed.

## 2018-01-27 NOTE — PLAN OF CARE
"Problem: Patient Care Overview  Goal: Plan of Care/Patient Progress Review  CR/PT: Orders received, chart reviewed, eval completed and treatment initiated. Per RN appropriate to participate, to enter activity orders. Pt is a 61 y/o female admitted 1/25/18 for syncope with fall and STEMI, PMH significant for seizures. Pt is s/p thrombectomy of mid RCA and PTCA, without stenting. 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 Per cardiology, echo showed mild to moderate LV systolic dysfunction with EF of 40-45%, thickened fluid in pericardial space; recent TANYA with LVEF of 50%. Pt with infrequent runs of SVT/atrial tachycardia in PM 1/26/18. At baseline pt is IND with all daily activities, resides in a group home, does not drive, participates in a [employer] weekly.    Discharge Planner PT   Patient plan for discharge: home with OP CR (referal and appointment already made prior to eval)  Current status: Pt completes supine>sit, sit<>stand with SBA, gait with SBA and single UE support. Tolerates gait for exercise x9 minutes, slowed gina. Pre-activity vitals: /66, HR 76, O2 sats on RA 95%; Post-activity vitals: /107, HR 84, O2 sats on RA 97%. HR in high 80's/low 90's with activity; O2 sats >95% throughout.   Barriers to return to prior living situation: stairs not yet trialed  Recommendations for discharge: home with OP CR  Rationale for recommendations: anticipate continued demonstration of IND with functional mobility, pt is motivated for ambulation and OOB activity, stating multiple times \"I don't want to get weaker\", would benefit from OP CR to progress activity tolerance and further monitoring of vital response to activity       Entered by: Sophy Cristina 01/27/2018 11:06 AM           "

## 2018-01-27 NOTE — PLAN OF CARE
Problem: Patient Care Overview  Goal: Plan of Care/Patient Progress Review  Outcome: Improving  Baseline developmental delay, otherwise oriented and appropriate.  LS diminished.  On 2L NC.  IS encouraged.  SR with PACs.  Amio gtt at 0.5 per protocol. No more a-fib/a-flutter episodes witnessed since amio started.  Clear liquid diet.  Adequate UOP.  Electrolytes monitored, phos replacement infusing per protocol.  Q4h BS, WNL.  Denies pain.  Right groin site CDI, soft with good CMS. POA updated on evenings.  Plan: possible tx to HC today.      Update: 0645: a new few bursts of a-fib RVR non-sustained witnessed by RN.  Amio continues at 0.5, will have day RN follow up with CARDs.

## 2018-01-27 NOTE — PROVIDER NOTIFICATION
BS <150.  Per dr Riggins Ok to check BS q4hr, d/c insulin gtt for now and have day intensivist address need for SSI.

## 2018-01-27 NOTE — PLAN OF CARE
Problem: Cardiac Catheterization (Diagnostic/Interventional) (Adult)  Goal: Signs and Symptoms of Listed Potential Problems Will be Absent, Minimized or Managed (Cardiac Catheterization)  Signs and symptoms of listed potential problems will be absent, minimized or managed by discharge/transition of care (reference Cardiac Catheterization (Diagnostic/Interventional) (Adult) CPG).   A&OX4, Denies any pain, Vss, Teli SR. Up with assist of one to bathroom. Needs a cane for ambulation per POA. Chance was removed this am in ICU and voided x2 with no difficulties. On Amgio gtt 30ml/hr. Plan for C-rehab tomorrow and possible C-MRI on Monday.

## 2018-01-27 NOTE — PROGRESS NOTES
"[facility]  Hospitalist Progress Note        Mitul Lisa Mil, DO  2018        Interval History:      Patient states that she is doing well. Discussed with ICU provider at length, intensivist ok with transfer out of ICU at this time.          Assessment and Plan:        62 year old woman with a history of TBI and developmental delay who had a syncopal episode in her group home.    STEMI: Patient admitted through ED to cardiology then to ICU on pressors with balloon pump for a short time. Left heart cath on  with balloon angioplasty to D1.   - Cardiology following.   - Hemodynamic monitoring.   - Transfer out of ICU if ok with ICU physician.   - Antiplatelets and antihypertensives per Cards.   - Telemetry.     Developmental delay: Patient with history of developmental delay on review in EPIC.   - Monitor.   - Amitriptyline.   - Gabapentin.   - Hold Lorazepam.   - Keppra.     GERD: Stable.   - Omeprazole.   - Ranitidine.     PPx: Defer to cardiology.     Code: Full.     Disposition: Transfer to Cardiology if ok with intensivist.                    Physical Exam:      Heart Rate: 78    Blood pressure 113/72, temperature 99.5  F (37.5  C), resp. rate 27, height 1.651 m (5' 5\"), weight 88.9 kg (195 lb 15.8 oz), SpO2 95 %.    Vitals:    18 1949 18 0300 18 0607   Weight: 91.3 kg (201 lb 4.5 oz) 90.2 kg (198 lb 13.7 oz) 88.9 kg (195 lb 15.8 oz)       Vital Sign Ranges  Temperature Temp  Av.6  F (38.1  C)  Min: 98.4  F (36.9  C)  Max: 101.8  F (38.8  C)   Blood pressure Systolic (24hrs), Av , Min:79 , Max:130        Diastolic (24hrs), Av, Min:53, Max:108      Pulse No Data Recorded   Respirations Resp  Av.9  Min: 8  Max: 37   Pulse oximetry SpO2  Av.7 %  Min: 91 %  Max: 98 %     Vital Signs with Ranges  Temp:  [98.4  F (36.9  C)-101.8  F (38.8  C)] 99.5  F (37.5  C)  Heart Rate:  [] 78  Resp:  [8-37] 27  BP: ()/() 113/72  SpO2:  [91 " %-98 %] 95 %    I/O Last 3 Shifts:   I/O last 3 completed shifts:  In: 1484.87 [P.O.:390; I.V.:1094.87]  Out: 1040 [Urine:1040]    I/O past 24 hours:     Intake/Output Summary (Last 24 hours) at 01/27/18 0858  Last data filed at 01/27/18 0800   Gross per 24 hour   Intake          1443.27 ml   Output             1015 ml   Net           428.27 ml     GENERAL: Alert and oriented. NAD. Conversational, appropriate.   HEENT: Normocephalic. EOMI. No icterus or injection. Nares normal.   LUNGS: Clear to auscultation. No dyspnea at rest.   HEART: Regular rate. Extremities perfused.   ABDOMEN: Soft, nontender, and nondistended. Positive bowel sounds.   EXTREMITIES: No LE edema noted.   NEUROLOGIC: Moves extremities x4. No acute focal neurologic abnormalities noted.          Prior to Admission Medications:        Prescriptions Prior to Admission   Medication Sig Dispense Refill Last Dose     AMITRIPTYLINE HCL PO Take 25 mg by mouth At Bedtime        ammonium lactate (AMLACTIN) 12 % cream Apply topically 2 times daily Apply to feet        GABAPENTIN PO Take 100 mg by mouth 3 times daily        LevETIRAcetam (KEPPRA PO) Take 500 mg by mouth 2 times daily        LORAZEPAM PO Take 0.5 mg by mouth 3 times daily as needed for anxiety        MIRTAZAPINE PO Take 30 mg by mouth At Bedtime        psyllium (METAMUCIL) 58.6 % POWD Take 1 teaspoonful by mouth daily Natural Fiber Pow Therapy        OMEPRAZOLE PO Take 20 mg by mouth every other day        benzoyl peroxide 5 % topical gel Apply topically 2 times daily as needed (redness/rosacea of face)        Alum Hydroxide-Mag Carbonate (GAVISCON PO) Take 1 tablet by mouth every 4 hours as needed (indigestion)        loperamide (IMODIUM) 2 MG capsule Take 2 mg by mouth 4 times daily as needed for diarrhea        PHENAZOPYRIDINE HCL PO Take 200 mg by mouth 3 times daily as needed (burning upon urination)   prn     triamcinolone (KENALOG) 0.5 % cream Apply topically 2 times daily as needed  (right elbow and handfor psoriasis)   prn     trolamine salicylate (ASPERCREME) 10 % cream Apply topically as needed for moderate pain To affected areas   prn     RaNITidine HCl (ZANTAC PO) Take 150 mg by mouth 2 times daily        acetaminophen (TYLENOL) 325 MG tablet Take 1,000 mg by mouth 3 times daily And at bedtime as needed for pain        LORazepam (ATIVAN) 1 MG tablet Take 1 tablet by mouth At Bedtime. 30 tablet       traZODone (DESYREL) 50 MG tablet Take 75 mg by mouth nightly as needed for sleep    prn     metoprolol (LOPRESSOR) 50 MG tablet Take 50 mg by mouth 2 times daily.               Medications:        Current Facility-Administered Medications   Medication Last Rate     NaCl Stopped (01/26/18 1900)     NaCl Stopped (01/27/18 0400)     amiodarone 0.5 mg/min (01/27/18 0835)     norepinephrine Stopped (01/26/18 1303)     Percutaneous Coronary Intervention orders placed (this is information for BPA alerting)       - MEDICATION INSTRUCTIONS -       vasopressin (PITRESSIN) infusion ADULT (40 mL) Stopped (01/26/18 1555)     IV fluid REPLACEMENT ONLY       insulin (regular) Stopped (01/26/18 1800)     Current Facility-Administered Medications   Medication Dose Route Frequency     mirtazapine (REMERON) tablet 30 mg  30 mg Oral At Bedtime     metoprolol tartrate  12.5 mg Oral BID     LORazepam  1 mg Oral At Bedtime     sodium chloride (PF)  3 mL Intracatheter Q8H     aspirin EC  81 mg Oral Daily     ticagrelor  90 mg Oral Q12H     atorvastatin  40 mg Oral Daily     amitriptyline (ELAVIL) tablet 25 mg  25 mg Oral At Bedtime     gabapentin (NEURONTIN) capsule 100 mg  100 mg Oral TID     levETIRAcetam (KEPPRA) tablet 500 mg  500 mg Oral BID     omeprazole (priLOSEC) CR capsule 20 mg  20 mg Oral Every Other Day     ranitidine  150 mg Oral BID     Current Facility-Administered Medications   Medication Dose Route Frequency     traZODone  75 mg Oral At Bedtime PRN     LORazepam  0.5 mg Oral TID PRN     lidocaine  (buffered or not buffered)  1 mL Other Q1H PRN     lidocaine 4%   Topical Q1H PRN     sodium chloride (PF)  3 mL Intracatheter Q1H PRN     HOLD MEDICATION   Does not apply HOLD     nitroGLYcerin  0.4 mg Sublingual Q5 Min PRN     acetaminophen  325-650 mg Oral Q4H PRN     HYDROcodone-acetaminophen  1-2 tablet Oral Q4H PRN     naloxone  0.1-0.4 mg Intravenous Q2 Min PRN     Percutaneous Coronary Intervention orders placed (this is information for BPA alerting)   Does not apply DOES NOT GO TO MAR     - MEDICATION INSTRUCTIONS -   Does not apply DOES NOT GO TO MAR     potassium chloride  20-40 mEq Oral Q2H PRN     potassium chloride  20-40 mEq Oral or Feeding Tube Q2H PRN     potassium chloride  10 mEq Intravenous Q1H PRN     potassium chloride with lidocaine  10 mEq Intravenous Q1H PRN     potassium chloride  20 mEq Intravenous Q1H PRN     magnesium sulfate  4 g Intravenous Q4H PRN     potassium phosphate (KPHOS) in D5W IV  15 mmol Intravenous Daily PRN     potassium phosphate (KPHOS) in D5W IV  20 mmol Intravenous Q6H PRN     potassium phosphate (KPHOS) in D5W IV  20 mmol Intravenous Q6H PRN     potassium phosphate (KPHOS) in D5W IV  25 mmol Intravenous Q8H PRN     IV fluid REPLACEMENT ONLY   Intravenous Continuous PRN     glucose  15-30 g Oral Q15 Min PRN    Or     dextrose  25-50 mL Intravenous Q15 Min PRN    Or     glucagon  1 mg Subcutaneous Q15 Min PRN            Data:      Lab data reviewed.     Recent Labs  Lab 01/27/18  0400 01/26/18  1715 01/26/18  1115 01/26/18  0500  01/25/18 2018 01/25/18  1945   HGB  --   --   --  12.3  --  11.2* 12.6   MCV  --   --   --  95  --   --  96   PLT  --   --   --  293  --   --  296   NA  --   --   --  142  --  141 142   POTASSIUM 3.8  --   --  4.9  --  3.7 3.4   CHLORIDE  --   --   --  108  --   --  107   CO2  --   --   --  25  --   --  22   BUN  --   --   --  22  --   --  14   CR  --   --   --  1.22*  --   --  0.91   ANIONGAP  --   --   --  9  --   --  13   HECTOR  --   --   --   8.0*  --   --  8.6   GLC  --   --   --  127*  --   --  235*   TROPI  --  6.343* 6.180* 5.253*  < >  --  3.934*   TROPONIN  --   --   --   --   --   --  3.84*   < > = values in this interval not displayed.        Imaging:      Imaging data reviewed.     Dr. Mitul Jaeger D.O.  [facility]  Pager [phone]

## 2018-01-27 NOTE — PROGRESS NOTES
Brief ICU Note    Chart, labs and images reviewed by me. She is s/p thrombectomy, PTCA for AMI. IABP and pressors weaned off. Recent TTE with LVEF 50%.   - Off of pressors and on room air today and remains clinically stable   - Able to ambulate a bit in the room without issues.   - RIJ in place and will be removed prior to transfer out of ICU   - Ok to transfer to telemetry with hospitalist as primary service.     Faustino Aguirre MD  Critical Care  Division of Pulmonary, Allergy, Critical Care and Sleep Medicine   [phone]

## 2018-01-28 ENCOUNTER — APPOINTMENT (OUTPATIENT)
Dept: PHYSICAL THERAPY | Facility: CLINIC | Age: 63
End: 2018-01-28
Payer: COMMERCIAL

## 2018-01-28 LAB
ANION GAP SERPL CALCULATED.3IONS-SCNC: 4 MMOL/L (ref 3–14)
BUN SERPL-MCNC: 15 MG/DL (ref 7–30)
CALCIUM SERPL-MCNC: 7.6 MG/DL (ref 8.5–10.1)
CHLORIDE SERPL-SCNC: 110 MMOL/L (ref 94–109)
CO2 SERPL-SCNC: 28 MMOL/L (ref 20–32)
CREAT SERPL-MCNC: 0.62 MG/DL (ref 0.52–1.04)
ERYTHROCYTE [DISTWIDTH] IN BLOOD BY AUTOMATED COUNT: 12.5 % (ref 10–15)
GFR SERPL CREATININE-BSD FRML MDRD: >90 ML/MIN/1.7M2
GLUCOSE SERPL-MCNC: 89 MG/DL (ref 70–99)
HCT VFR BLD AUTO: 31.6 % (ref 35–47)
HGB BLD-MCNC: 10 G/DL (ref 11.7–15.7)
MCH RBC QN AUTO: 30.6 PG (ref 26.5–33)
MCHC RBC AUTO-ENTMCNC: 31.6 G/DL (ref 31.5–36.5)
MCV RBC AUTO: 97 FL (ref 78–100)
PLATELET # BLD AUTO: 185 10E9/L (ref 150–450)
POTASSIUM SERPL-SCNC: 3.8 MMOL/L (ref 3.4–5.3)
RBC # BLD AUTO: 3.27 10E12/L (ref 3.8–5.2)
SODIUM SERPL-SCNC: 142 MMOL/L (ref 133–144)
WBC # BLD AUTO: 8.8 10E9/L (ref 4–11)

## 2018-01-28 PROCEDURE — 21000001 ZZH R&B HEART CARE

## 2018-01-28 PROCEDURE — 25000128 H RX IP 250 OP 636: Performed by: SURGERY

## 2018-01-28 PROCEDURE — 36415 COLL VENOUS BLD VENIPUNCTURE: CPT | Performed by: HOSPITALIST

## 2018-01-28 PROCEDURE — 25000132 ZZH RX MED GY IP 250 OP 250 PS 637: Performed by: INTERNAL MEDICINE

## 2018-01-28 PROCEDURE — 97110 THERAPEUTIC EXERCISES: CPT | Mod: GP

## 2018-01-28 PROCEDURE — 40000193 ZZH STATISTIC PT WARD VISIT

## 2018-01-28 PROCEDURE — 99232 SBSQ HOSP IP/OBS MODERATE 35: CPT | Performed by: INTERNAL MEDICINE

## 2018-01-28 PROCEDURE — 80048 BASIC METABOLIC PNL TOTAL CA: CPT | Performed by: HOSPITALIST

## 2018-01-28 PROCEDURE — 85027 COMPLETE CBC AUTOMATED: CPT | Performed by: HOSPITALIST

## 2018-01-28 PROCEDURE — 25000132 ZZH RX MED GY IP 250 OP 250 PS 637: Performed by: SURGERY

## 2018-01-28 PROCEDURE — 99232 SBSQ HOSP IP/OBS MODERATE 35: CPT | Performed by: HOSPITALIST

## 2018-01-28 RX ORDER — METOPROLOL TARTRATE 25 MG/1
25 TABLET, FILM COATED ORAL 2 TIMES DAILY
Status: DISCONTINUED | OUTPATIENT
Start: 2018-01-28 | End: 2018-01-29

## 2018-01-28 RX ORDER — PROCHLORPERAZINE 25 MG
25 SUPPOSITORY, RECTAL RECTAL EVERY 12 HOURS PRN
Status: DISCONTINUED | OUTPATIENT
Start: 2018-01-28 | End: 2018-01-31 | Stop reason: HOSPADM

## 2018-01-28 RX ADMIN — AMITRIPTYLINE HYDROCHLORIDE 25 MG: 25 TABLET, FILM COATED ORAL at 21:27

## 2018-01-28 RX ADMIN — METOPROLOL TARTRATE 25 MG: 25 TABLET ORAL at 21:27

## 2018-01-28 RX ADMIN — GABAPENTIN 100 MG: 100 CAPSULE ORAL at 21:27

## 2018-01-28 RX ADMIN — ATORVASTATIN CALCIUM 40 MG: 40 TABLET, FILM COATED ORAL at 09:18

## 2018-01-28 RX ADMIN — GABAPENTIN 100 MG: 100 CAPSULE ORAL at 16:30

## 2018-01-28 RX ADMIN — ASPIRIN 81 MG: 81 TABLET, COATED ORAL at 09:18

## 2018-01-28 RX ADMIN — MIRTAZAPINE 30 MG: 15 TABLET, FILM COATED ORAL at 21:27

## 2018-01-28 RX ADMIN — LEVETIRACETAM 500 MG: 500 TABLET, FILM COATED ORAL at 21:26

## 2018-01-28 RX ADMIN — TICAGRELOR 90 MG: 90 TABLET ORAL at 09:18

## 2018-01-28 RX ADMIN — LEVETIRACETAM 500 MG: 500 TABLET, FILM COATED ORAL at 09:18

## 2018-01-28 RX ADMIN — METOPROLOL TARTRATE 25 MG: 25 TABLET ORAL at 10:45

## 2018-01-28 RX ADMIN — GABAPENTIN 100 MG: 100 CAPSULE ORAL at 09:18

## 2018-01-28 RX ADMIN — SODIUM CHLORIDE: 9 INJECTION, SOLUTION INTRAVENOUS at 06:36

## 2018-01-28 RX ADMIN — LORAZEPAM 1 MG: 1 TABLET ORAL at 21:26

## 2018-01-28 RX ADMIN — RANITIDINE 150 MG: 150 TABLET ORAL at 21:27

## 2018-01-28 RX ADMIN — TICAGRELOR 90 MG: 90 TABLET ORAL at 21:27

## 2018-01-28 RX ADMIN — OMEPRAZOLE 20 MG: 20 CAPSULE, DELAYED RELEASE ORAL at 09:18

## 2018-01-28 RX ADMIN — RANITIDINE 150 MG: 150 TABLET ORAL at 09:18

## 2018-01-28 NOTE — PLAN OF CARE
Problem: Patient Care Overview  Goal: Plan of Care/Patient Progress Review  Outcome: No Change  AOx4. VSS. Up with SBA, right IV was infusing TKO and infiltrated. Warm pack applied, and arm elevated to relieve the swelling. Left PIV now infusing. Tele SR with HR of 83. Right IJ site with transparent dressing on. Right groin site WDL, pulses normal. Voiding adequately; pt forgetful so she flushes urine. Accurate UO not obtained this shift. Need for strict I&O reinforced. Metoprolol dose increased to 25 mg.

## 2018-01-28 NOTE — PROGRESS NOTES
"SPIRITUAL HEALTH SERVICES Progress Note  [facility]    Initial visit per pt request.  During this visit, pt spoke with 2 friends who called from her group home, and reported to them: \"They (the EMTs) almost lost me. My face and lips were purple.  But I feel better now.\"    Pt said it's hard to be in the hospital and told her group home friends: \"I miss you.  I want to come back.\"  Pt said she feels well supported by her friends and the group home staff.  Pt adds that she loves to bowl and carries a 210 average.  Pt said she hopes to return to [employer] as soon as she can.  Pt said she trusts God will protect her and told 2 stories of how God did just that.  Pt welcomed prayer--which  provided, and said her friend Jonna is coming back this afternoon to visit her.   will plan to visit again if pt's stay is extended beyond a couple of days.                                                                                                                                           Tr Isidro M.Div., [facility]  Staff   Pager [phone]    "

## 2018-01-28 NOTE — PROGRESS NOTES
[facility]    Cardiology Progress Note    Date of Service (when I saw the patient): 01/28/2018     Assessment & Plan   1. Acute lateral STEMI, likely due to occluded rPLA  2. Thrombotic mid RCA lesion s/p aspiration thrombectomy (no stenting)  3. Occluded 1st diagonal branch status post PTCA  4. Moderate pericardial hematoma     - Patient had a lateral STEMI s/p mid RCA thrombectomy and PTCA of D1, requiring IABP. This was ultimately weaned and she was transferred to the floor yesterday.  - Proximal circumflex has 70% ostial stenosis and mid LAD has 70% stenosis. These will need revascularization in follow-up.  - She denies any chest pain in the last 24 hours. Her echo shows a EF of 50% with lateral wall hypokinesis. Dr. Boston was concerned about reverse Takutsubo cardiomyopathy, so a cardiac MRI is scheduled for tomorrow.  - Increase metoprolol to 25 mg bid.  - Echo shows a moderate sized pericardial hematoma, probably from the PCI. No clinical signs of tamponade. Continue to follow. MRI may help delineate this as well.      Ermias Patel MD    Interval History   Patient reports SOB on walking back from the bathroom.    Physical Exam   Temp: (P) 97.9  F (36.6  C) Temp src: (P) Oral BP: (P) 122/74   Heart Rate: (P) 80 Resp: (P) 16 SpO2: (P) 97 % O2 Device: (P) None (Room air)    Vitals:    01/26/18 0300 01/27/18 0607 01/28/18 0614   Weight: 90.2 kg (198 lb 13.7 oz) 88.9 kg (195 lb 15.8 oz) 88.5 kg (195 lb)     Vital Signs with Ranges  Temp:  [97.9  F (36.6  C)-99.1  F (37.3  C)] (P) 97.9  F (36.6  C)  Heart Rate:  [75-94] (P) 80  Resp:  [16-18] (P) 16  BP: (124-164)/(72-88) (P) 122/74  SpO2:  [92 %-99 %] (P) 97 %  I/O last 3 completed shifts:  In: 1811 [P.O.:1085; I.V.:726]  Out: 1400 [Urine:1400]    Constitutional: Alert and oriented, no acute distress  Respiratory: Clear to ausculate, no crackles or wheezing.   Cardiovascular: Regular rate and rhythm. S1 S2 normal. No murmurs, gallops or rubs  heard  Extremities: No peripheral edema.     Medications     NaCl Stopped (01/26/18 1900)     NaCl 10 mL/hr at 01/28/18 0636     amiodarone Stopped (01/27/18 2103)     Percutaneous Coronary Intervention orders placed (this is information for BPA alerting)       - MEDICATION INSTRUCTIONS -       IV fluid REPLACEMENT ONLY         metoprolol tartrate  25 mg Oral BID     mirtazapine (REMERON) tablet 30 mg  30 mg Oral At Bedtime     LORazepam  1 mg Oral At Bedtime     sodium chloride (PF)  3 mL Intracatheter Q8H     aspirin EC  81 mg Oral Daily     ticagrelor  90 mg Oral Q12H     atorvastatin  40 mg Oral Daily     amitriptyline (ELAVIL) tablet 25 mg  25 mg Oral At Bedtime     gabapentin (NEURONTIN) capsule 100 mg  100 mg Oral TID     levETIRAcetam (KEPPRA) tablet 500 mg  500 mg Oral BID     omeprazole (priLOSEC) CR capsule 20 mg  20 mg Oral Every Other Day     ranitidine  150 mg Oral BID       Data   Results for orders placed or performed during the hospital encounter of 01/25/18 (from the past 24 hour(s))   Basic metabolic panel   Result Value Ref Range    Sodium 142 133 - 144 mmol/L    Potassium 3.8 3.4 - 5.3 mmol/L    Chloride 110 (H) 94 - 109 mmol/L    Carbon Dioxide 28 20 - 32 mmol/L    Anion Gap 4 3 - 14 mmol/L    Glucose 89 70 - 99 mg/dL    Urea Nitrogen 15 7 - 30 mg/dL    Creatinine 0.62 0.52 - 1.04 mg/dL    GFR Estimate >90 >60 mL/min/1.7m2    GFR Estimate If Black >90 >60 mL/min/1.7m2    Calcium 7.6 (L) 8.5 - 10.1 mg/dL   CBC with platelets   Result Value Ref Range    WBC 8.8 4.0 - 11.0 10e9/L    RBC Count 3.27 (L) 3.8 - 5.2 10e12/L    Hemoglobin 10.0 (L) 11.7 - 15.7 g/dL    Hematocrit 31.6 (L) 35.0 - 47.0 %    MCV 97 78 - 100 fl    MCH 30.6 26.5 - 33.0 pg    MCHC 31.6 31.5 - 36.5 g/dL    RDW 12.5 10.0 - 15.0 %    Platelet Count 185 150 - 450 10e9/L

## 2018-01-28 NOTE — PROGRESS NOTES
"[facility]  Hospitalist Progress Note        Mitul Jaeger,   2018        Interval History:      Patient states that she is doing well today. Discussed plan of care with patient at bedside.          Assessment and Plan:        62 year old woman with a history of TBI and developmental delay who had a syncopal episode in her group home.     STEMI: Patient admitted through ED to cardiology then to ICU on pressors with balloon pump for a short time. Left heart cath on  with balloon angioplasty to D1.   - Cardiology following.   - Antiplatelets and antihypertensives per Cards.   - Telemetry.      Developmental delay: Patient with history of developmental delay on review in EPIC.   - Monitor.   - Amitriptyline.   - Gabapentin.   - Hold Lorazepam.   - Keppra.      GERD: Stable.   - Omeprazole.   - Ranitidine.      PPx: Defer to cardiology.      Code: Full.      Disposition: Possible discharge in 1-2 days pending therapy evaluations and cardiology assessment.                    Physical Exam:      Heart Rate: 76    Blood pressure 127/79, pulse 83, temperature 97.9  F (36.6  C), temperature source Oral, resp. rate 16, height 1.651 m (5' 5\"), weight 88.5 kg (195 lb), SpO2 98 %.    Vitals:    18 0300 18 0607 18 0614   Weight: 90.2 kg (198 lb 13.7 oz) 88.9 kg (195 lb 15.8 oz) 88.5 kg (195 lb)       Vital Sign Ranges  Temperature Temp  Av.3  F (36.8  C)  Min: 97.9  F (36.6  C)  Max: 99.1  F (37.3  C)   Blood pressure Systolic (24hrs), Av , Min:114 , Max:136        Diastolic (24hrs), Av, Min:66, Max:107      Pulse Pulse  Av  Min: 83  Max: 83   Respirations Resp  Av.7  Min: 16  Max: 18   Pulse oximetry SpO2  Av.7 %  Min: 92 %  Max: 99 %     Vital Signs with Ranges  Temp:  [97.9  F (36.6  C)-99.1  F (37.3  C)] 97.9  F (36.6  C)  Pulse:  [83] 83  Heart Rate:  [75-94] 76  Resp:  [16-18] 16  BP: (114-136)/() 127/79  SpO2:  [92 %-99 %] 98 %    I/O " Last 3 Shifts:   I/O last 3 completed shifts:  In: 1108 [P.O.:365; I.V.:743]  Out: 1625 [Urine:1625]    I/O past 24 hours:     Intake/Output Summary (Last 24 hours) at 01/28/18 0927  Last data filed at 01/28/18 0626   Gross per 24 hour   Intake             1088 ml   Output             1550 ml   Net             -462 ml     GENERAL: Alert and oriented. NAD. Conversational, appropriate.   HEENT: Normocephalic. EOMI. No icterus or injection. Nares normal. Dressing to right side of neck, c/d/i.   LUNGS: Clear to auscultation. No dyspnea at rest.   HEART: Regular rate. Extremities perfused.   ABDOMEN: Soft, nontender, and nondistended. Positive bowel sounds.   EXTREMITIES: No LE edema noted.   NEUROLOGIC: Moves extremities x4. No acute focal neurologic abnormalities noted.          Prior to Admission Medications:        Prescriptions Prior to Admission   Medication Sig Dispense Refill Last Dose     AMITRIPTYLINE HCL PO Take 25 mg by mouth At Bedtime        ammonium lactate (AMLACTIN) 12 % cream Apply topically 2 times daily Apply to feet        GABAPENTIN PO Take 100 mg by mouth 3 times daily        LevETIRAcetam (KEPPRA PO) Take 500 mg by mouth 2 times daily        LORAZEPAM PO Take 0.5 mg by mouth 3 times daily as needed for anxiety        MIRTAZAPINE PO Take 30 mg by mouth At Bedtime        psyllium (METAMUCIL) 58.6 % POWD Take 1 teaspoonful by mouth daily Natural Fiber Pow Therapy        OMEPRAZOLE PO Take 20 mg by mouth every other day        benzoyl peroxide 5 % topical gel Apply topically 2 times daily as needed (redness/rosacea of face)        Alum Hydroxide-Mag Carbonate (GAVISCON PO) Take 1 tablet by mouth every 4 hours as needed (indigestion)        loperamide (IMODIUM) 2 MG capsule Take 2 mg by mouth 4 times daily as needed for diarrhea        PHENAZOPYRIDINE HCL PO Take 200 mg by mouth 3 times daily as needed (burning upon urination)   prn     triamcinolone (KENALOG) 0.5 % cream Apply topically 2 times daily  as needed (right elbow and handfor psoriasis)   prn     trolamine salicylate (ASPERCREME) 10 % cream Apply topically as needed for moderate pain To affected areas   prn     RaNITidine HCl (ZANTAC PO) Take 150 mg by mouth 2 times daily        acetaminophen (TYLENOL) 325 MG tablet Take 1,000 mg by mouth 3 times daily And at bedtime as needed for pain        LORazepam (ATIVAN) 1 MG tablet Take 1 tablet by mouth At Bedtime. 30 tablet       traZODone (DESYREL) 50 MG tablet Take 75 mg by mouth nightly as needed for sleep    prn     metoprolol (LOPRESSOR) 50 MG tablet Take 50 mg by mouth 2 times daily.               Medications:        Current Facility-Administered Medications   Medication Last Rate     NaCl Stopped (01/26/18 1900)     NaCl 10 mL/hr at 01/28/18 0636     amiodarone Stopped (01/27/18 2103)     norepinephrine Stopped (01/26/18 1303)     Percutaneous Coronary Intervention orders placed (this is information for BPA alerting)       - MEDICATION INSTRUCTIONS -       vasopressin (PITRESSIN) infusion ADULT (40 mL) Stopped (01/26/18 1555)     IV fluid REPLACEMENT ONLY       insulin (regular) Stopped (01/26/18 1800)     Current Facility-Administered Medications   Medication Dose Route Frequency     metoprolol tartrate  25 mg Oral BID     mirtazapine (REMERON) tablet 30 mg  30 mg Oral At Bedtime     LORazepam  1 mg Oral At Bedtime     sodium chloride (PF)  3 mL Intracatheter Q8H     aspirin EC  81 mg Oral Daily     ticagrelor  90 mg Oral Q12H     atorvastatin  40 mg Oral Daily     amitriptyline (ELAVIL) tablet 25 mg  25 mg Oral At Bedtime     gabapentin (NEURONTIN) capsule 100 mg  100 mg Oral TID     levETIRAcetam (KEPPRA) tablet 500 mg  500 mg Oral BID     omeprazole (priLOSEC) CR capsule 20 mg  20 mg Oral Every Other Day     ranitidine  150 mg Oral BID     Current Facility-Administered Medications   Medication Dose Route Frequency     prochlorperazine  10 mg Intravenous Q6H PRN    Or     prochlorperazine  25 mg  Rectal Q12H PRN     traZODone  75 mg Oral At Bedtime PRN     LORazepam  0.5 mg Oral TID PRN     lidocaine (buffered or not buffered)  1 mL Other Q1H PRN     lidocaine 4%   Topical Q1H PRN     sodium chloride (PF)  3 mL Intracatheter Q1H PRN     HOLD MEDICATION   Does not apply HOLD     nitroGLYcerin  0.4 mg Sublingual Q5 Min PRN     acetaminophen  325-650 mg Oral Q4H PRN     HYDROcodone-acetaminophen  1-2 tablet Oral Q4H PRN     naloxone  0.1-0.4 mg Intravenous Q2 Min PRN     Percutaneous Coronary Intervention orders placed (this is information for BPA alerting)   Does not apply DOES NOT GO TO MAR     - MEDICATION INSTRUCTIONS -   Does not apply DOES NOT GO TO MAR     potassium chloride  20-40 mEq Oral Q2H PRN     potassium chloride  20-40 mEq Oral or Feeding Tube Q2H PRN     potassium chloride  10 mEq Intravenous Q1H PRN     potassium chloride with lidocaine  10 mEq Intravenous Q1H PRN     potassium chloride  20 mEq Intravenous Q1H PRN     magnesium sulfate  4 g Intravenous Q4H PRN     potassium phosphate (KPHOS) in D5W IV  15 mmol Intravenous Daily PRN     potassium phosphate (KPHOS) in D5W IV  20 mmol Intravenous Q6H PRN     potassium phosphate (KPHOS) in D5W IV  20 mmol Intravenous Q6H PRN     potassium phosphate (KPHOS) in D5W IV  25 mmol Intravenous Q8H PRN     IV fluid REPLACEMENT ONLY   Intravenous Continuous PRN     glucose  15-30 g Oral Q15 Min PRN    Or     dextrose  25-50 mL Intravenous Q15 Min PRN    Or     glucagon  1 mg Subcutaneous Q15 Min PRN            Data:      Lab data reviewed.     Recent Labs  Lab 01/28/18  0643 01/27/18  0840 01/27/18  0400 01/26/18  1715 01/26/18  1115 01/26/18  0500  01/25/18  1945   HGB 10.0* 10.3*  --   --   --  12.3  < > 12.6   MCV 97 93  --   --   --  95  --  96    172  --   --   --  293  --  296    137  --   --   --  142  < > 142   POTASSIUM 3.8 4.0 3.8  --   --  4.9  < > 3.4   CHLORIDE 110* 103  --   --   --  108  --  107   CO2 28 29  --   --   --  25  --   22   BUN 15 17  --   --   --  22  --  14   CR 0.62 0.61  --   --   --  1.22*  --  0.91   ANIONGAP 4 5  --   --   --  9  --  13   HECTOR 7.6* 7.8*  --   --   --  8.0*  --  8.6   GLC 89 107*  --   --   --  127*  --  235*   TROPI  --   --   --  6.343* 6.180* 5.253*  < > 3.934*   TROPONIN  --   --   --   --   --   --   --  3.84*   < > = values in this interval not displayed.        Imaging:      Imaging data reviewed.     Dr. Mitul Jaeger D.O.  [facility]  Pager [phone]

## 2018-01-28 NOTE — PLAN OF CARE
Problem: Patient Care Overview  Goal: Plan of Care/Patient Progress Review  VSS. Alert & oriented. Tele: SR; HR 70s-90s. Room air. Ax1. C/O nausea. Denies pain. IV TKO. Right groin site C/D/I, CMS intact. IJ site C/D/I. Plan: cardiac rehab, possible Cardiac MRI Monday. Will continue to monitor.

## 2018-01-28 NOTE — PLAN OF CARE
Problem: Cardiac Catheterization (Diagnostic/Interventional) (Adult)  Goal: Signs and Symptoms of Listed Potential Problems Will be Absent, Minimized or Managed (Cardiac Catheterization)  Signs and symptoms of listed potential problems will be absent, minimized or managed by discharge/transition of care (reference Cardiac Catheterization (Diagnostic/Interventional) (Adult) CPG).   Outcome: No Change  8901-0599: VSS. Developmental delays, but AAOx4 and answers all questions appropriately. SR 70-80's. No c/o pain. Amio gtt d/tiana at 2100 per amio gtt protocol. Up w/ 1-assist to bathroom. Plan is cardiac rehab and possible cardiac MRI on Monday. Continue to monitor.

## 2018-01-28 NOTE — PROVIDER NOTIFICATION
MD Notification    Notified Person:  MD    Notified Persons Name: Dr. Alejandro    Notification Date/Time: 01/28/18 0045    Notification Interaction:  Physician entered orders    Purpose of Notification: pt reports nausea    Orders Received: compazine    Comments:

## 2018-01-28 NOTE — PLAN OF CARE
Problem: Patient Care Overview  Goal: Plan of Care/Patient Progress Review  Discharge Planner PT   Patient plan for discharge: return to group home, OP CR  Current status: Pt completes bed mobility IND and transfers with SBA, gait with SBA 2x5 minutes in batista with single UE support to IV pole (POA Monserrat present reports uses a SEC at baseline for out of house mobility). Pt educated on return to activity, OP CR and exercise monitoring post STEMI. Vitals documented in flow sheet.  Barriers to return to prior living situation: stairs not yet trialed (declined in AM session 2/2 fatigue)  Recommendations for discharge: home with OP CR  Rationale for recommendations: pt demonstrates increased IND with mobility, no concerns with return home and anxious to return home, would benefit from OP CR and motivated to participate to progress activity tolerance and IND.       Entered by: Sophy Cristina 01/28/2018 1:24 PM     ADDENDUM: stairs trialed in PM, pt completes 1x5 with single rail and SEC and SBA. Pt's POA reports concern re: stairs at group home as pt's bed is located in basement, discussed that during tomorrows sessions repeated stairs will be completed, pt in agreement, pt and POA also report if needed there is a electric chair lift if needed.

## 2018-01-29 LAB
ANION GAP SERPL CALCULATED.3IONS-SCNC: 7 MMOL/L (ref 3–14)
BUN SERPL-MCNC: 13 MG/DL (ref 7–30)
CALCIUM SERPL-MCNC: 8.1 MG/DL (ref 8.5–10.1)
CHLORIDE SERPL-SCNC: 109 MMOL/L (ref 94–109)
CO2 SERPL-SCNC: 25 MMOL/L (ref 20–32)
CREAT SERPL-MCNC: 0.7 MG/DL (ref 0.52–1.04)
GFR SERPL CREATININE-BSD FRML MDRD: 85 ML/MIN/1.7M2
GLUCOSE SERPL-MCNC: 80 MG/DL (ref 70–99)
PHOSPHATE SERPL-MCNC: 2.8 MG/DL (ref 2.5–4.5)
POTASSIUM SERPL-SCNC: 3.9 MMOL/L (ref 3.4–5.3)
SODIUM SERPL-SCNC: 141 MMOL/L (ref 133–144)

## 2018-01-29 PROCEDURE — 36415 COLL VENOUS BLD VENIPUNCTURE: CPT | Performed by: HOSPITALIST

## 2018-01-29 PROCEDURE — 25000132 ZZH RX MED GY IP 250 OP 250 PS 637: Performed by: INTERNAL MEDICINE

## 2018-01-29 PROCEDURE — 93010 ELECTROCARDIOGRAM REPORT: CPT | Mod: 76 | Performed by: INTERNAL MEDICINE

## 2018-01-29 PROCEDURE — 25000132 ZZH RX MED GY IP 250 OP 250 PS 637: Performed by: SURGERY

## 2018-01-29 PROCEDURE — 25000132 ZZH RX MED GY IP 250 OP 250 PS 637: Performed by: PHYSICIAN ASSISTANT

## 2018-01-29 PROCEDURE — 25000128 H RX IP 250 OP 636: Performed by: INTERNAL MEDICINE

## 2018-01-29 PROCEDURE — 80048 BASIC METABOLIC PNL TOTAL CA: CPT | Performed by: HOSPITALIST

## 2018-01-29 PROCEDURE — 84100 ASSAY OF PHOSPHORUS: CPT | Performed by: HOSPITALIST

## 2018-01-29 PROCEDURE — 99232 SBSQ HOSP IP/OBS MODERATE 35: CPT | Performed by: INTERNAL MEDICINE

## 2018-01-29 PROCEDURE — 99232 SBSQ HOSP IP/OBS MODERATE 35: CPT | Performed by: HOSPITALIST

## 2018-01-29 PROCEDURE — 93005 ELECTROCARDIOGRAM TRACING: CPT

## 2018-01-29 PROCEDURE — 21000001 ZZH R&B HEART CARE

## 2018-01-29 RX ORDER — MORPHINE SULFATE 2 MG/ML
2 INJECTION, SOLUTION INTRAMUSCULAR; INTRAVENOUS ONCE
Status: COMPLETED | OUTPATIENT
Start: 2018-01-29 | End: 2018-01-29

## 2018-01-29 RX ORDER — METOPROLOL TARTRATE 50 MG
50 TABLET ORAL 2 TIMES DAILY
Status: DISCONTINUED | OUTPATIENT
Start: 2018-01-29 | End: 2018-01-30

## 2018-01-29 RX ORDER — DIAZEPAM 5 MG
5 TABLET ORAL
Status: CANCELLED | OUTPATIENT
Start: 2018-01-29

## 2018-01-29 RX ORDER — METOPROLOL TARTRATE 25 MG/1
25 TABLET, FILM COATED ORAL ONCE
Status: COMPLETED | OUTPATIENT
Start: 2018-01-29 | End: 2018-01-29

## 2018-01-29 RX ADMIN — MIRTAZAPINE 30 MG: 15 TABLET, FILM COATED ORAL at 20:14

## 2018-01-29 RX ADMIN — METOPROLOL TARTRATE 25 MG: 25 TABLET ORAL at 09:29

## 2018-01-29 RX ADMIN — RANITIDINE 150 MG: 150 TABLET ORAL at 20:14

## 2018-01-29 RX ADMIN — ATORVASTATIN CALCIUM 40 MG: 40 TABLET, FILM COATED ORAL at 09:28

## 2018-01-29 RX ADMIN — LORAZEPAM 1 MG: 1 TABLET ORAL at 20:14

## 2018-01-29 RX ADMIN — RANITIDINE 150 MG: 150 TABLET ORAL at 07:44

## 2018-01-29 RX ADMIN — GABAPENTIN 100 MG: 100 CAPSULE ORAL at 20:14

## 2018-01-29 RX ADMIN — AMITRIPTYLINE HYDROCHLORIDE 25 MG: 25 TABLET, FILM COATED ORAL at 20:14

## 2018-01-29 RX ADMIN — NITROGLYCERIN 0.4 MG: 0.4 TABLET SUBLINGUAL at 06:15

## 2018-01-29 RX ADMIN — METOPROLOL TARTRATE 25 MG: 25 TABLET ORAL at 09:57

## 2018-01-29 RX ADMIN — ASPIRIN 81 MG: 81 TABLET, COATED ORAL at 09:29

## 2018-01-29 RX ADMIN — MORPHINE SULFATE 2 MG: 2 INJECTION, SOLUTION INTRAMUSCULAR; INTRAVENOUS at 07:44

## 2018-01-29 RX ADMIN — GABAPENTIN 100 MG: 100 CAPSULE ORAL at 16:38

## 2018-01-29 RX ADMIN — TICAGRELOR 90 MG: 90 TABLET ORAL at 20:14

## 2018-01-29 RX ADMIN — TICAGRELOR 90 MG: 90 TABLET ORAL at 09:29

## 2018-01-29 RX ADMIN — LEVETIRACETAM 500 MG: 500 TABLET, FILM COATED ORAL at 20:14

## 2018-01-29 RX ADMIN — GABAPENTIN 100 MG: 100 CAPSULE ORAL at 09:29

## 2018-01-29 RX ADMIN — LEVETIRACETAM 500 MG: 500 TABLET, FILM COATED ORAL at 09:29

## 2018-01-29 RX ADMIN — METOPROLOL TARTRATE 50 MG: 50 TABLET ORAL at 20:14

## 2018-01-29 RX ADMIN — NITROGLYCERIN 0.4 MG: 0.4 TABLET SUBLINGUAL at 06:10

## 2018-01-29 ASSESSMENT — PAIN DESCRIPTION - DESCRIPTORS
DESCRIPTORS: ACHING;SHARP
DESCRIPTORS: ACHING;CONSTANT

## 2018-01-29 NOTE — PLAN OF CARE
Problem: Patient Care Overview  Goal: Plan of Care/Patient Progress Review  PT/CR: Hold this AM. Patient with increased chest pain. Spoke with RN.    Per chart, patient continues with intermittent chest pain. Plan per cardiology is for a cardiac MRI with additional recommendations to follow. Will continue to hold cardiac rehab for PM as well to allow for medical plan.

## 2018-01-29 NOTE — PHARMACY
Cost comparison for P2Y12 inhibitors (Platelet Aggregation Inhibitors):    Brilinta copay = PA req for 30 day supply.    Effient copay = PA req for 30 day supply.    GENERIC Effient - $0    Thank you,  Jayesh Milian CPhT  [facility] Discharge Pharmacy Liaison  [phone]

## 2018-01-29 NOTE — PLAN OF CARE
Problem: Patient Care Overview  Goal: Plan of Care/Patient Progress Review  Outcome: No Change  Pt was experiencing some Lt sided chest pain this morning with V/signs stable and maintaining her O2 sats in the mid 90's on Rm air. O2 was placed at 3L per N/C this morning for comfort.  Pt anxious this morning so after conversation & second dose of Metoprolol was given Pt rested more comfortable. Cardiac MRI was to be to completed today but had to be rescheduled for tomorrow morning.Pt remains NSR with HR in the 80's without further A-fib..

## 2018-01-29 NOTE — PROGRESS NOTES
"[facility]  Hospitalist Progress Note        Mitul Jaeger,   2018        Interval History:      Discussed plan of care with patient and care taker.          Assessment and Plan:        62 year old woman with a history of TBI and developmental delay who had a syncopal episode in her group home.      STEMI, pericardial effusion, hematoma, new atrial fibrillation: Patient admitted through ED to cardiology then to ICU on pressors with balloon pump for a short time. Left heart cath on  with balloon angioplasty to D1. Echo completed  with report of pericardial effusion and hematoma, see report.   - MRI cardiac today.   - Anticoagulation per Cards.   - Cardiology following.   - Antiplatelets and antihypertensives per Cards.   - Telemetry.     Cognitive disorder with h/o TBI: Patient with history on review in EPIC.   - Monitor.   - Amitriptyline.   - Gabapentin.   - Lorazepam.   - Keppra.       GERD: Stable.   - Omeprazole.   - Ranitidine.       PPx: Defer to cardiology.       Code: Full.       Disposition: Possible discharge in 1-2 days pending cardiac MRI and completion of cardiac eval and treatment.                     Physical Exam:      Heart Rate: 79    Blood pressure 113/65, pulse 83, temperature 98.2  F (36.8  C), temperature source Oral, resp. rate 16, height 1.651 m (5' 5\"), weight 87.7 kg (193 lb 6.4 oz), SpO2 95 %.    Vitals:    18 0607 18 0614 18 0605   Weight: 88.9 kg (195 lb 15.8 oz) 88.5 kg (195 lb) 87.7 kg (193 lb 6.4 oz)       Vital Sign Ranges  Temperature Temp  Av.1  F (36.7  C)  Min: 97.9  F (36.6  C)  Max: 98.3  F (36.8  C)   Blood pressure Systolic (24hrs), Av , Min:113 , Max:164        Diastolic (24hrs), Av, Min:65, Max:88      Pulse No Data Recorded   Respirations Resp  Av  Min: 16  Max: 16   Pulse oximetry SpO2  Av.3 %  Min: 95 %  Max: 98 %     Vital Signs with Ranges  Temp:  [97.9  F (36.6  C)-98.3  F (36.8  C)] " 98.2  F (36.8  C)  Heart Rate:  [67-94] 79  Resp:  [16] 16  BP: (113-164)/(65-88) 113/65  SpO2:  [95 %-98 %] 95 %    I/O Last 3 Shifts:   I/O last 3 completed shifts:  In: 963 [P.O.:960; I.V.:3]  Out: 1550 [Urine:1550]    I/O past 24 hours:     Intake/Output Summary (Last 24 hours) at 01/29/18 0740  Last data filed at 01/29/18 0700   Gross per 24 hour   Intake              963 ml   Output             1800 ml   Net             -837 ml     GENERAL: Alert and oriented. NAD. Conversational, appropriate. Pleasant.   HEENT: Normocephalic. EOMI. No icterus or injection. Nares normal. Dressing to right side of neck, c/d/i. NC in place.   LUNGS: Clear to auscultation. No dyspnea at rest.   HEART: Regular rate. Extremities perfused.   ABDOMEN: Soft, nontender, and nondistended. Positive bowel sounds.   EXTREMITIES: No LE edema noted.   NEUROLOGIC: Moves extremities x4. No acute focal neurologic abnormalities noted.          Prior to Admission Medications:        Prescriptions Prior to Admission   Medication Sig Dispense Refill Last Dose     AMITRIPTYLINE HCL PO Take 25 mg by mouth At Bedtime        ammonium lactate (AMLACTIN) 12 % cream Apply topically 2 times daily Apply to feet        GABAPENTIN PO Take 100 mg by mouth 3 times daily        LevETIRAcetam (KEPPRA PO) Take 500 mg by mouth 2 times daily        LORAZEPAM PO Take 0.5 mg by mouth 3 times daily as needed for anxiety        MIRTAZAPINE PO Take 30 mg by mouth At Bedtime        psyllium (METAMUCIL) 58.6 % POWD Take 1 teaspoonful by mouth daily Natural Fiber Pow Therapy        OMEPRAZOLE PO Take 20 mg by mouth every other day        benzoyl peroxide 5 % topical gel Apply topically 2 times daily as needed (redness/rosacea of face)        Alum Hydroxide-Mag Carbonate (GAVISCON PO) Take 1 tablet by mouth every 4 hours as needed (indigestion)        loperamide (IMODIUM) 2 MG capsule Take 2 mg by mouth 4 times daily as needed for diarrhea        PHENAZOPYRIDINE HCL PO Take  200 mg by mouth 3 times daily as needed (burning upon urination)   prn     triamcinolone (KENALOG) 0.5 % cream Apply topically 2 times daily as needed (right elbow and handfor psoriasis)   prn     trolamine salicylate (ASPERCREME) 10 % cream Apply topically as needed for moderate pain To affected areas   prn     RaNITidine HCl (ZANTAC PO) Take 150 mg by mouth 2 times daily        acetaminophen (TYLENOL) 325 MG tablet Take 1,000 mg by mouth 3 times daily And at bedtime as needed for pain        LORazepam (ATIVAN) 1 MG tablet Take 1 tablet by mouth At Bedtime. 30 tablet       traZODone (DESYREL) 50 MG tablet Take 75 mg by mouth nightly as needed for sleep    prn     metoprolol (LOPRESSOR) 50 MG tablet Take 50 mg by mouth 2 times daily.               Medications:        Current Facility-Administered Medications   Medication Last Rate     Percutaneous Coronary Intervention orders placed (this is information for BPA alerting)       - MEDICATION INSTRUCTIONS -       IV fluid REPLACEMENT ONLY       Current Facility-Administered Medications   Medication Dose Route Frequency     morphine  2 mg Intravenous Once     metoprolol tartrate  25 mg Oral BID     mirtazapine (REMERON) tablet 30 mg  30 mg Oral At Bedtime     LORazepam  1 mg Oral At Bedtime     sodium chloride (PF)  3 mL Intracatheter Q8H     aspirin EC  81 mg Oral Daily     ticagrelor  90 mg Oral Q12H     atorvastatin  40 mg Oral Daily     amitriptyline (ELAVIL) tablet 25 mg  25 mg Oral At Bedtime     gabapentin (NEURONTIN) capsule 100 mg  100 mg Oral TID     levETIRAcetam (KEPPRA) tablet 500 mg  500 mg Oral BID     omeprazole (priLOSEC) CR capsule 20 mg  20 mg Oral Every Other Day     ranitidine  150 mg Oral BID     Current Facility-Administered Medications   Medication Dose Route Frequency     prochlorperazine  10 mg Intravenous Q6H PRN    Or     prochlorperazine  25 mg Rectal Q12H PRN     traZODone  75 mg Oral At Bedtime PRN     LORazepam  0.5 mg Oral TID PRN      lidocaine (buffered or not buffered)  1 mL Other Q1H PRN     lidocaine 4%   Topical Q1H PRN     sodium chloride (PF)  3 mL Intracatheter Q1H PRN     HOLD MEDICATION   Does not apply HOLD     nitroGLYcerin  0.4 mg Sublingual Q5 Min PRN     acetaminophen  325-650 mg Oral Q4H PRN     HYDROcodone-acetaminophen  1-2 tablet Oral Q4H PRN     naloxone  0.1-0.4 mg Intravenous Q2 Min PRN     Percutaneous Coronary Intervention orders placed (this is information for BPA alerting)   Does not apply DOES NOT GO TO MAR     - MEDICATION INSTRUCTIONS -   Does not apply DOES NOT GO TO MAR     potassium chloride  20-40 mEq Oral Q2H PRN     potassium chloride  20-40 mEq Oral or Feeding Tube Q2H PRN     potassium chloride  10 mEq Intravenous Q1H PRN     potassium chloride with lidocaine  10 mEq Intravenous Q1H PRN     potassium chloride  20 mEq Intravenous Q1H PRN     magnesium sulfate  4 g Intravenous Q4H PRN     potassium phosphate (KPHOS) in D5W IV  15 mmol Intravenous Daily PRN     potassium phosphate (KPHOS) in D5W IV  20 mmol Intravenous Q6H PRN     potassium phosphate (KPHOS) in D5W IV  20 mmol Intravenous Q6H PRN     potassium phosphate (KPHOS) in D5W IV  25 mmol Intravenous Q8H PRN     IV fluid REPLACEMENT ONLY   Intravenous Continuous PRN     glucose  15-30 g Oral Q15 Min PRN    Or     dextrose  25-50 mL Intravenous Q15 Min PRN    Or     glucagon  1 mg Subcutaneous Q15 Min PRN            Data:      Lab data reviewed.     Recent Labs  Lab 01/29/18  0624 01/28/18  0643 01/27/18  0840  01/26/18  1715 01/26/18  1115 01/26/18  0500  01/25/18  1945   HGB  --  10.0* 10.3*  --   --   --  12.3  < > 12.6   MCV  --  97 93  --   --   --  95  --  96   PLT  --  185 172  --   --   --  293  --  296    142 137  --   --   --  142  < > 142   POTASSIUM 3.9 3.8 4.0  < >  --   --  4.9  < > 3.4   CHLORIDE 109 110* 103  --   --   --  108  --  107   CO2 25 28 29  --   --   --  25  --  22   BUN 13 15 17  --   --   --  22  --  14   CR 0.70 0.62  0.61  --   --   --  1.22*  --  0.91   ANIONGAP 7 4 5  --   --   --  9  --  13   HECTOR 8.1* 7.6* 7.8*  --   --   --  8.0*  --  8.6   GLC 80 89 107*  --   --   --  127*  --  235*   TROPI  --   --   --   --  6.343* 6.180* 5.253*  < > 3.934*   TROPONIN  --   --   --   --   --   --   --   --  3.84*   < > = values in this interval not displayed.        Imaging:      Imaging data reviewed.     JAYNE HoganO.  [facility] Hospitalist  Pager [phone]

## 2018-01-29 NOTE — PROGRESS NOTES
[facility]  Cardiology Progress Note  Date of Service: 01/29/2018  Primary Cardiologist: Darvin    Assessment & Plan    Amy Bryan is a 62 year old female with past medical history significant for developmental delay from birth complicated by TBI in 2012, seizures, GERD, anxiety admitted on 1/25/2018 with witnessed syncopal episode, found to hypotensive with lateral STEMI activated by EMS and emergent angiogram.       Assessment:  1.  Acute lateral STEMI 1/25   - angiogram revealed thrombotic mid RCA filing, occluded distal rPLA (likely due embolism from the mid RCA), occluded 1st diagonal and 70% diffuse proximal LAD stenosis. C   -Circumflex is small with diffuse disease.    - She underwent aspiration thrombectomy of the mid RCA and PTCA of the 1st diagonal branch (small vessel). Subsequent angiogram revealed resolution of RCA thrombus and recanalization of the rPLA.    - IABP was placed for hypotension, admitted to ICU- pulled 1/26   - Initial EF on LVgram 25% improved within 24h to 40-45, then 50%, concern for possible reverse takotsubo   - likely hemopericardium noted on echo, no signs of tamponade-last eval 1/26   - intermittent ongoing cp at L clavicle- ECG looks improved from previous without st changes   - Proximal circumflex has 70% ostial stenosis and mid LAD has 70% stenosis    2.  Paroxysmal atrial fibrillation- brief runs noted.   - CHADS VASC 1 for vascular disease, on brilanta and asa for STEMI, given hemopericardium reluctant to add additional anticoagulation    3. Cognitive disorder with hx of TBI- friend Monserrat is POA, very supportive   - social work appreciated, working with group home for options on d/c      Plan:   1. Increase lopressor to 50 mg bid, add'l 25 mg now- follow rhythm  2. Cardiac mri today- add'l recs following this  3. Will consider staged pci of 70% prox to mid LAD and circumflex- once mri returns    Christine Alexander PA-C  [facility]  Pager: [phone]     Interval  History   Has had intermittent cp, started this am, resolved and has come back.  Per RN morphine and nitro had no impact on pain.  Pain number varies for 8-7-1 during our conversation.  CINDY German notes that pictures work better for her to describe her pain. Pt relates hx of same with anxiety that resolves with deep breathing.  Notes pain is below left clavicle, non tender.      Physical Exam   Temp: 98.4  F (36.9  C) Temp src: Oral BP: 140/67   Heart Rate: 85 Resp: 16 SpO2: 99 % O2 Device: Nasal cannula Oxygen Delivery: 3 LPM  Vitals:    01/27/18 0607 01/28/18 0614 01/29/18 0605   Weight: 88.9 kg (195 lb 15.8 oz) 88.5 kg (195 lb) 87.7 kg (193 lb 6.4 oz)     Constitutional   alert and oriented, in no acute distress.  Skin   warm and dry to touch  ENT   no pallor or cyanosis  Neck  noJVP  Lungs ctab, no wheezes or rales  Cardiac rrr, no murmur or rub  Abdomen   abdomen soft, bowel sounds normoactive, no hepatosplenomegaly  Extremities and Back   no clubbing, cyanosis. no edema.  2+ rt femoral pulse  no bruit- IABP site well healed  Neurological   no gross motor deficits noted, affect appropriate, oriented to time, person and place.    Medications     Percutaneous Coronary Intervention orders placed (this is information for BPA alerting)       - MEDICATION INSTRUCTIONS -       IV fluid REPLACEMENT ONLY         metoprolol tartrate  50 mg Oral BID     mirtazapine (REMERON) tablet 30 mg  30 mg Oral At Bedtime     LORazepam  1 mg Oral At Bedtime     sodium chloride (PF)  3 mL Intracatheter Q8H     aspirin EC  81 mg Oral Daily     ticagrelor  90 mg Oral Q12H     atorvastatin  40 mg Oral Daily     amitriptyline (ELAVIL) tablet 25 mg  25 mg Oral At Bedtime     gabapentin (NEURONTIN) capsule 100 mg  100 mg Oral TID     levETIRAcetam (KEPPRA) tablet 500 mg  500 mg Oral BID     omeprazole (priLOSEC) CR capsule 20 mg  20 mg Oral Every Other Day     ranitidine  150 mg Oral BID       Data   Most Recent 3 CBC's:  Recent Labs   Lab  Test  01/28/18   0643  01/27/18   0840  01/26/18   0500   WBC  8.8  10.8  17.7*   HGB  10.0*  10.3*  12.3   MCV  97  93  95   PLT  185  172  293     Most Recent 3 BMP's:  Recent Labs   Lab Test  01/29/18   0624  01/28/18   0643  01/27/18   0840   NA  141  142  137   POTASSIUM  3.9  3.8  4.0   CHLORIDE  109  110*  103   CO2  25 28  29   BUN  13  15  17   CR  0.70  0.62  0.61   ANIONGAP  7  4  5   HECTOR  8.1*  7.6*  7.8*   GLC  80  89  107*     Most Recent 3 Troponin's:  Recent Labs   Lab Test  01/26/18   1715  01/26/18   1115  01/26/18   0500   01/25/18   1945   TROPI  6.343*  6.180*  5.253*   < >  3.934*   TROPONIN   --    --    --    --   3.84*    < > = values in this interval not displayed.     Most Recent 3 BNP's:No lab results found.  Last 24 hours labs reviewed   EKG: (reviewed personally) sinus without st changes    Imaging: neg cxr    Tele: sinus with intermittent PAF 5-20 beats    Echo: 1/26/18  There is a moderate pericardial effusion with dense consolidation within the pericardial space. The density of consolidation is concerning for hematoma.  Findings concerning for tamponade physiology, however tamponade is a clinical, not echocardiographic, diagnosis and clinical correlation is recommended.  The visual ejection fraction is estimated at 50%.  Left ventricular systolic function is mildly reduced.  There are regional wall motion abnormalities as specified.  The study was technically difficult. The study was technically limited.    Last ischemic eval: angio 1/25    Device: none

## 2018-01-29 NOTE — PLAN OF CARE
Problem: Patient Care Overview  Goal: Plan of Care/Patient Progress Review  Outcome: No change    Patient A&O x4, forgetful,  Denies chest pain/tightness/pressure or SOB on tele SR with occasional A-fib (see sticky note) and Metoprolol increased to 25 today -  VSS,  Lungs are clear and on RA, up with SBA , on regular diet, skin is WNL , voiding well and last BM 1/27. IV is in Lforearm and SL. Plan for possible Cardiac MRI in AM and coardiology to see, continue to monitor.

## 2018-01-29 NOTE — PLAN OF CARE
Problem: Patient Care Overview  Goal: Plan of Care/Patient Progress Review  VSS. Alert & oriented but forgetful at times. Tele: SR; HR 60s-80s. Room air. SBA/ Ax1. 10/10 chest pain, EKG done, sublingual nitro given, pain is slowly decreasing. NPO since midnight. Right groin C/D/I, CMS intact. Plan: possible cardiac MRI today. Will continue to monitor.

## 2018-01-29 NOTE — PROGRESS NOTES
Met with pt and CINDY German at bedside. Monserrat had expressed concern that the Group Home may need to check on her more frequently. The pt's bedroom is in a lower level and the pt tends to stay in her room much of the day. Reached out to the  Nurse, Daniella at [phone] to express Monserrat's concerns. Daniella thought the  could accomodate that request. Therapies are recommending outpt cardiac rehab. Daniella stated the  does not provide transportation. The pt has used [facility] in the past as well as Monserrat if needed. Discussed the need for transportation with Monserrat. She stated this will be an issue. Also discussed the possibility of home care for OT and a home safety eval. Monserrat agreed with this plan. Will watch for further recommendations from therapy and follow along for discharge needs.  Per Imelda Brewer is Amy's PCP at [facility].  [phone], fax [phone]    Care Transition Initial Assessment - RN        Met with: Patient and Guardian.    DATA   Active Problems:    STEMI (ST elevation myocardial infarction) (H)    Hypotension       Cognitive Status: awake and alert.        Contact information and PCP information verified: Yes    Lives With: facility resident  Living Arrangements: group home                   Insurance concerns: No Insurance issues identified    ASSESSMENT  Patient currently receives the following services:  none        Identified issues/concerns regarding health management:     PLAN  Financial costs for the patient include: copays .  Patient given options and choices for discharge  OP CR vs home with OT/CR .  Patient/family is agreeable to the plan?  Yes:   Patient anticipates discharging to her group home with OP CR vs home w/ OT/CR .        Patient anticipates needs for home equipment: No  Discharge Planner   Discharge Plans in progress: TBD, pending weaning of O2 and rate control  Barriers to discharge plan: mobility   Plan/Disposition: [facility] [place]   Appointments: TBD    Care  (CTS) will continue to follow as needed.

## 2018-01-29 NOTE — PROVIDER NOTIFICATION
MD Notification    Notified Person:  MD    Notified Persons Name: Dr. Casas    Notification Date/Time: 0644 1/29/18    Notification Interaction:  Talked with Physician    Purpose of Notification: pt reporting chest pain    Orders Received: 2mg morphine IV once     Comments:

## 2018-01-30 ENCOUNTER — APPOINTMENT (OUTPATIENT)
Dept: PHYSICAL THERAPY | Facility: CLINIC | Age: 63
End: 2018-01-30
Payer: COMMERCIAL

## 2018-01-30 ENCOUNTER — APPOINTMENT (OUTPATIENT)
Dept: CARDIOLOGY | Facility: CLINIC | Age: 63
End: 2018-01-30
Attending: PHYSICIAN ASSISTANT
Payer: COMMERCIAL

## 2018-01-30 LAB
ANION GAP SERPL CALCULATED.3IONS-SCNC: 8 MMOL/L (ref 3–14)
BUN SERPL-MCNC: 13 MG/DL (ref 7–30)
CALCIUM SERPL-MCNC: 8.4 MG/DL (ref 8.5–10.1)
CHLORIDE SERPL-SCNC: 107 MMOL/L (ref 94–109)
CO2 SERPL-SCNC: 24 MMOL/L (ref 20–32)
CREAT SERPL-MCNC: 0.69 MG/DL (ref 0.52–1.04)
ERYTHROCYTE [DISTWIDTH] IN BLOOD BY AUTOMATED COUNT: 12.6 % (ref 10–15)
GFR SERPL CREATININE-BSD FRML MDRD: 87 ML/MIN/1.7M2
GLUCOSE SERPL-MCNC: 95 MG/DL (ref 70–99)
HCT VFR BLD AUTO: 35.4 % (ref 35–47)
HGB BLD-MCNC: 11.6 G/DL (ref 11.7–15.7)
MAGNESIUM SERPL-MCNC: 2.2 MG/DL (ref 1.6–2.3)
MCH RBC QN AUTO: 30.9 PG (ref 26.5–33)
MCHC RBC AUTO-ENTMCNC: 32.8 G/DL (ref 31.5–36.5)
MCV RBC AUTO: 94 FL (ref 78–100)
PHOSPHATE SERPL-MCNC: 3.5 MG/DL (ref 2.5–4.5)
PLATELET # BLD AUTO: 287 10E9/L (ref 150–450)
POTASSIUM SERPL-SCNC: 3.9 MMOL/L (ref 3.4–5.3)
RBC # BLD AUTO: 3.76 10E12/L (ref 3.8–5.2)
SODIUM SERPL-SCNC: 139 MMOL/L (ref 133–144)
WBC # BLD AUTO: 8 10E9/L (ref 4–11)

## 2018-01-30 PROCEDURE — 85027 COMPLETE CBC AUTOMATED: CPT | Performed by: HOSPITALIST

## 2018-01-30 PROCEDURE — 40000193 ZZH STATISTIC PT WARD VISIT

## 2018-01-30 PROCEDURE — 25000132 ZZH RX MED GY IP 250 OP 250 PS 637: Performed by: INTERNAL MEDICINE

## 2018-01-30 PROCEDURE — 75561 CARDIAC MRI FOR MORPH W/DYE: CPT

## 2018-01-30 PROCEDURE — 25000132 ZZH RX MED GY IP 250 OP 250 PS 637: Performed by: PHYSICIAN ASSISTANT

## 2018-01-30 PROCEDURE — 75561 CARDIAC MRI FOR MORPH W/DYE: CPT | Performed by: INTERNAL MEDICINE

## 2018-01-30 PROCEDURE — 99207 ZZC CDG-MDM COMPONENT: MEETS MODERATE - UP CODED: CPT | Performed by: HOSPITALIST

## 2018-01-30 PROCEDURE — 97110 THERAPEUTIC EXERCISES: CPT | Mod: GP

## 2018-01-30 PROCEDURE — 83735 ASSAY OF MAGNESIUM: CPT | Performed by: HOSPITALIST

## 2018-01-30 PROCEDURE — 25000128 H RX IP 250 OP 636: Performed by: SURGERY

## 2018-01-30 PROCEDURE — 25000132 ZZH RX MED GY IP 250 OP 250 PS 637: Performed by: SURGERY

## 2018-01-30 PROCEDURE — 21000001 ZZH R&B HEART CARE

## 2018-01-30 PROCEDURE — 99232 SBSQ HOSP IP/OBS MODERATE 35: CPT | Performed by: HOSPITALIST

## 2018-01-30 PROCEDURE — 99232 SBSQ HOSP IP/OBS MODERATE 35: CPT | Mod: 25 | Performed by: INTERNAL MEDICINE

## 2018-01-30 PROCEDURE — A9585 GADOBUTROL INJECTION: HCPCS | Performed by: SURGERY

## 2018-01-30 PROCEDURE — 84100 ASSAY OF PHOSPHORUS: CPT | Performed by: HOSPITALIST

## 2018-01-30 PROCEDURE — 80048 BASIC METABOLIC PNL TOTAL CA: CPT | Performed by: HOSPITALIST

## 2018-01-30 PROCEDURE — 36415 COLL VENOUS BLD VENIPUNCTURE: CPT | Performed by: HOSPITALIST

## 2018-01-30 RX ORDER — GADOBUTROL 604.72 MG/ML
5-65 INJECTION INTRAVENOUS ONCE
Status: COMPLETED | OUTPATIENT
Start: 2018-01-30 | End: 2018-01-30

## 2018-01-30 RX ORDER — ONDANSETRON 2 MG/ML
4 INJECTION INTRAMUSCULAR; INTRAVENOUS
Status: DISCONTINUED | OUTPATIENT
Start: 2018-01-30 | End: 2018-01-31 | Stop reason: HOSPADM

## 2018-01-30 RX ORDER — DIAZEPAM 5 MG
5 TABLET ORAL
Status: DISCONTINUED | OUTPATIENT
Start: 2018-01-30 | End: 2018-01-31 | Stop reason: HOSPADM

## 2018-01-30 RX ORDER — DIPHENHYDRAMINE HYDROCHLORIDE 50 MG/ML
25-50 INJECTION INTRAMUSCULAR; INTRAVENOUS
Status: DISCONTINUED | OUTPATIENT
Start: 2018-01-30 | End: 2018-01-31 | Stop reason: HOSPADM

## 2018-01-30 RX ORDER — ACYCLOVIR 200 MG/1
0-1 CAPSULE ORAL
Status: DISCONTINUED | OUTPATIENT
Start: 2018-01-30 | End: 2018-01-31 | Stop reason: HOSPADM

## 2018-01-30 RX ORDER — DIAZEPAM 5 MG
5 TABLET ORAL EVERY 30 MIN PRN
Status: DISCONTINUED | OUTPATIENT
Start: 2018-01-30 | End: 2018-01-31 | Stop reason: HOSPADM

## 2018-01-30 RX ORDER — DIPHENHYDRAMINE HCL 25 MG
25 CAPSULE ORAL
Status: DISCONTINUED | OUTPATIENT
Start: 2018-01-30 | End: 2018-01-31 | Stop reason: HOSPADM

## 2018-01-30 RX ORDER — METHYLPREDNISOLONE SODIUM SUCCINATE 125 MG/2ML
125 INJECTION, POWDER, LYOPHILIZED, FOR SOLUTION INTRAMUSCULAR; INTRAVENOUS
Status: DISCONTINUED | OUTPATIENT
Start: 2018-01-30 | End: 2018-01-31 | Stop reason: HOSPADM

## 2018-01-30 RX ADMIN — METOPROLOL TARTRATE 50 MG: 50 TABLET ORAL at 10:16

## 2018-01-30 RX ADMIN — OMEPRAZOLE 20 MG: 20 CAPSULE, DELAYED RELEASE ORAL at 10:15

## 2018-01-30 RX ADMIN — DIAZEPAM 5 MG: 5 TABLET ORAL at 07:45

## 2018-01-30 RX ADMIN — TICAGRELOR 90 MG: 90 TABLET ORAL at 21:47

## 2018-01-30 RX ADMIN — AMITRIPTYLINE HYDROCHLORIDE 25 MG: 25 TABLET, FILM COATED ORAL at 21:47

## 2018-01-30 RX ADMIN — LEVETIRACETAM 500 MG: 500 TABLET, FILM COATED ORAL at 10:16

## 2018-01-30 RX ADMIN — GADOBUTROL 10 ML: 604.72 INJECTION INTRAVENOUS at 09:26

## 2018-01-30 RX ADMIN — RANITIDINE 150 MG: 150 TABLET ORAL at 21:47

## 2018-01-30 RX ADMIN — GABAPENTIN 100 MG: 100 CAPSULE ORAL at 10:16

## 2018-01-30 RX ADMIN — GABAPENTIN 100 MG: 100 CAPSULE ORAL at 15:15

## 2018-01-30 RX ADMIN — LORAZEPAM 1 MG: 1 TABLET ORAL at 21:47

## 2018-01-30 RX ADMIN — RANITIDINE 150 MG: 150 TABLET ORAL at 10:15

## 2018-01-30 RX ADMIN — METOPROLOL TARTRATE 75 MG: 50 TABLET ORAL at 21:47

## 2018-01-30 RX ADMIN — ASPIRIN 81 MG: 81 TABLET, COATED ORAL at 10:15

## 2018-01-30 RX ADMIN — ATORVASTATIN CALCIUM 40 MG: 40 TABLET, FILM COATED ORAL at 10:16

## 2018-01-30 RX ADMIN — LEVETIRACETAM 500 MG: 500 TABLET, FILM COATED ORAL at 21:47

## 2018-01-30 RX ADMIN — GABAPENTIN 100 MG: 100 CAPSULE ORAL at 21:47

## 2018-01-30 RX ADMIN — TICAGRELOR 90 MG: 90 TABLET ORAL at 10:16

## 2018-01-30 RX ADMIN — MIRTAZAPINE 30 MG: 15 TABLET, FILM COATED ORAL at 21:47

## 2018-01-30 NOTE — PROGRESS NOTES
Reports 10/10 chest pain, left sided. States she is thinking about the MRI tomorrow. Appears anxious. Evening medications given which include ativan and sleep aids. Pt ambulated in batista with this RN. Reassurance provided. Tele SR, this was not occurring during a A fib RVR episode. Continue to monitor. Cardiac MRI tomorrow. Discussed valium pre procedure. On recheck pt sleeping.

## 2018-01-30 NOTE — PLAN OF CARE
Problem: Patient Care Overview  Goal: Plan of Care/Patient Progress Review  Discharge Planner PT   Patient plan for discharge: return to group home, OP CR  Current status: Pt completes bed mobility IND and transfers with SBA. Pt ambulates 450' x 2 with straight cane and SBA. A few lateral balance checks on first bout of ambulation. Pt reports she has not been up and moving. Improvement with second bout of walking. Elevated BP with activity. See VS FS.   Barriers to return to prior living situation: Mild balance concerns initially with standing, but improves with increased distance  Recommendations for discharge: Home with OP CR  Rationale for recommendations: Note possible plan for home OT from CM. However, patient would benefit more from OP CR to address endurance deficits and heart health education.        Entered by: Sabrina Hunter 01/30/2018 4:09 PM

## 2018-01-30 NOTE — PLAN OF CARE
Problem: Patient Care Overview  Goal: Plan of Care/Patient Progress Review  Outcome: No Change  VSS on RA. Reporting decreased pain at left clavicle. Up with SBA to BR x1 with good UO. Tele showing NSR, 1 short run of a-fib with RVR, self-limiting. Planning for cardiac MRI this AM.

## 2018-01-30 NOTE — PROVIDER NOTIFICATION
MD Notification    Notified Person:  MD    Notified Persons Name: Dr. Ordoñez    Notification Date/Time: 1/30/18  5:10pm    Notification Interaction:  Talked with Physician    Purpose of Notification:  Notified MD, pt had around 5-10 min run of ST vs A-fib with -170's. Pt asymptomatic.    Orders Received:  No new orders at this time. Give scheduled Metoprolol at HS as scheduled.    Comments:  Will continue to monitor pt.

## 2018-01-30 NOTE — PROGRESS NOTES
Cardiac MRI- report note      Mild to moderately reduced LV systolic function with evidence of recent infarct in diagonal  territory with the infarcted segments predominantly non viable. Rest of the myocardial segments appear  completely viable.  Small partially organized pericardial effusion.

## 2018-01-30 NOTE — PROGRESS NOTES
[facility]  Hospitalist Progress Note    Date of Service (when I saw the patient): 01/30/2018    Assessment & Plan   Amy Bryan 62 year old woman with a history of TBI and developmental delay who had a syncopal episode in her group home and was brought to ER. Hypotensive in ER, with STEMI, taken to cath lab then admitted to ICU.      STEMI  Pericardial effusion/hematoma  Paroxysmal atrial fibrillation: Patient admitted through ED to cardiology, aspiration thrombectomy of the RCA and balloon angioplasty of diagonal was performed, needed IABP due to SBPs in 60s (1/25-1/26) and transferred to ICU on pressors. - initial LVgram with EF of 25% and with repeat ECHOs, EF to 50%, concern for possible reverse takotsubo per cardiology.   - ECHO 1/26 with pericardial effusion/hematoma, no signs of temponade.  - On ASA 81 mg, Brillinta, Lopressor, lipitor per cardiology   - Antiplatelets and antihypertensives per Cards.   - Cardiac MRI done, report pending, cardiology planning staged PCI of 70% prox to mid LAD and Cx      Cognitive disorder with h/o TBI  Generalized anxiety and depression   - PTA is on Amitriptyline, Gabapentin, and Lorazepam- continued.   - On prophylactic Keppra, since TBI, no actual seizures, continued       GERD: Stable.   - PTA is on Omeprazole as well as Ranitidine, continued.       PPx: Defer to cardiology.     DVT Prophylaxis: Defer to primary service  Code Status: Full Code    Disposition: Expected discharge: per cardiology. Discussed with patient and her Friend/POMonserrat HARPER.    Bhanu Grossman MD  Hospitalist     Interval History   Patient is up in the chair, denies chest pain or dyspnea, asking about cardiac MRI report.   No other acute issues including dizziness, palpitation, nausea, vomiting abd pain, diarrhea.    -Data reviewed today: I reviewed all new labs and imaging results over the last 24 hours. I personally reviewed no images or EKG's today.    Physical Exam   Temp:  95.7  F (35.4  C) Temp src: Oral BP: 113/55 (Pre PT/CR) Pulse: 56 Heart Rate: 63 Resp: 16 SpO2: 94 % O2 Device: None (Room air)    Vitals:    01/28/18 0614 01/29/18 0605 01/30/18 0500   Weight: 88.5 kg (195 lb) 87.7 kg (193 lb 6.4 oz) 85.2 kg (187 lb 14.4 oz)     Vital Signs with Ranges  Temp:  [95.7  F (35.4  C)-98.5  F (36.9  C)] 95.7  F (35.4  C)  Pulse:  [56] 56  Heart Rate:  [63-90] 63  Resp:  [16-18] 16  BP: ()/(55-75) 113/55  SpO2:  [94 %-98 %] 94 %  I/O last 3 completed shifts:  In: 720 [P.O.:720]  Out: 1000 [Urine:1000]    Constitutional: Alert, awake. Not in distress.   HEENTL PERRLA EOMI, areas of erythema Rt neck where prior dressing was applied. No rash/blister.  Respiratory: Bilateral equal air entry, clear to auscultation. No respiratory distress.  Cardiovascular: Regular s1s2, no murmur, rub or gallop. No tachycardia.  GI: Soft, non distended, non tender, bowel tones active.  Skin/Integumen: No rash, no blister.  Other: Calm, appropriate, follows verbal commands appropriately.     Medications     Percutaneous Coronary Intervention orders placed (this is information for BPA alerting)       - MEDICATION INSTRUCTIONS -         sodium chloride (PF)  10 mL Intravenous Once     metoprolol tartrate  75 mg Oral BID     mirtazapine (REMERON) tablet 30 mg  30 mg Oral At Bedtime     LORazepam  1 mg Oral At Bedtime     sodium chloride (PF)  3 mL Intracatheter Q8H     aspirin EC  81 mg Oral Daily     ticagrelor  90 mg Oral Q12H     atorvastatin  40 mg Oral Daily     amitriptyline (ELAVIL) tablet 25 mg  25 mg Oral At Bedtime     gabapentin (NEURONTIN) capsule 100 mg  100 mg Oral TID     levETIRAcetam (KEPPRA) tablet 500 mg  500 mg Oral BID     omeprazole (priLOSEC) CR capsule 20 mg  20 mg Oral Every Other Day     ranitidine  150 mg Oral BID       Data     Recent Labs  Lab 01/30/18  0620 01/29/18  0624 01/28/18  0643 01/27/18  0840  01/26/18  1715 01/26/18  1115 01/26/18  0500  01/25/18  1945   WBC 8.0  --   8.8 10.8  --   --   --  17.7*  --  9.4   HGB 11.6*  --  10.0* 10.3*  --   --   --  12.3  < > 12.6   MCV 94  --  97 93  --   --   --  95  --  96     --  185 172  --   --   --  293  --  296    141 142 137  --   --   --  142  < > 142   POTASSIUM 3.9 3.9 3.8 4.0  < >  --   --  4.9  < > 3.4   CHLORIDE 107 109 110* 103  --   --   --  108  --  107   CO2 24 25 28 29  --   --   --  25  --  22   BUN 13 13 15 17  --   --   --  22  --  14   CR 0.69 0.70 0.62 0.61  --   --   --  1.22*  --  0.91   ANIONGAP 8 7 4 5  --   --   --  9  --  13   HECTOR 8.4* 8.1* 7.6* 7.8*  --   --   --  8.0*  --  8.6   GLC 95 80 89 107*  --   --   --  127*  --  235*   TROPI  --   --   --   --   --  6.343* 6.180* 5.253*  < > 3.934*   TROPONIN  --   --   --   --   --   --   --   --   --  3.84*   < > = values in this interval not displayed.    No results found for this or any previous visit (from the past 24 hour(s)).

## 2018-01-30 NOTE — PLAN OF CARE
Problem: Patient Care Overview  Goal: Plan of Care/Patient Progress Review  Outcome: Improving  Cardiac MRI tomorrow morning 0730. 1 complaint of 10/10 chest pain. Evening meds per orders, support and walking and pt was sleeping on recheck. Education begun with friend Monserrat and pt. SS following, CR following.

## 2018-01-30 NOTE — PROGRESS NOTES
[facility]  Cardiology Progress Note  Date of Service: 01/30/2018  Primary Cardiologist: Darvin    Assessment & Plan    Amy Bryan is a 62 year old female with past medical history significant for developmental delay from birth complicated by TBI in 2012, seizures, GERD, anxiety admitted on 1/25/2018 with witnessed syncopal episode, found to hypotensive with lateral STEMI activated by EMS and emergent angiogram.       Assessment:  1.  Acute lateral STEMI 1/25   - angiogram revealed thrombotic mid RCA filing, occluded distal rPLA (likely due embolism from the mid RCA), occluded 1st diagonal and 70% diffuse proximal LAD stenosis   -Circumflex is small with diffuse disease.    - She underwent aspiration thrombectomy of the mid RCA and PTCA of the 1st diagonal branch (small vessel). Subsequent angiogram revealed resolution of RCA thrombus and recanalization of the rPLA.    - IABP was placed for hypotension, admitted to ICU- pulled 1/26   - Initial EF on LVgram 25% improved within 24h to 40-45, then 50%, concern for possible reverse takotsubo   - likely hemopericardium noted on echo, no signs of tamponade-last eval 1/26   - intermittent ongoing cp at L clavicle- ECG improved from previous, not likely cardiac   - Proximal circumflex has 70% ostial stenosis and mid LAD has 70% stenosis   - cardiac mr pending    2.  Paroxysmal atrial fibrillation- brief runs noted.   - CHADS VASC 2 for vascular disease, female gender, on brilanta and asa for STEMI, given hemopericardium reluctant to add additional anticoagulation   - burden seems to be diminishing on tele, runs are all less than 1 min- most 5-20 beats    3. Cognitive disorder with hx of TBI- friend Monserrat is POA, very supportive   - social work appreciated, working with group home for options on d/c      Plan:   1. Increase metoprolol to 75 mg bid  2. Continue on Brillanta and asa, given brief runs of afib triple anticoagulation high risk  3. Staged pci  of prox to mid LAD and circumflex will be arranged as outpt with Dr. Boston  4. F/u arranged with Lilliana Jennings PA-C on Wed Feb 7th at 0800 with labs after that visit  5. Appreciate social work arranging home, pt/ot/rn  6. Likely d/c today, pending Dr. Ordoñez's review      Christine Alexander PA-C  [facility]  Pager: [phone]     Interval History   Feels well today, no orthopnea or pnd, no cp.  Anxious to go home.  POA is       Physical Exam   Temp: 95.7  F (35.4  C) Temp src: Oral BP: 121/70 Pulse: 56 Heart Rate: 76 Resp: 16 SpO2: 94 % O2 Device: None (Room air)    Vitals:    01/28/18 0614 01/29/18 0605 01/30/18 0500   Weight: 88.5 kg (195 lb) 87.7 kg (193 lb 6.4 oz) 85.2 kg (187 lb 14.4 oz)     Constitutional   alert and oriented, in no acute distress.  Skin   warm and dry to touch  ENT   no pallor or cyanosis  Neck  noJVP  Lungs ctab, no wheezes or rales  Cardiac rrr, no murmur or rub  Abdomen   abdomen soft, bowel sounds normoactive, no hepatosplenomegaly  Extremities and Back   no clubbing, cyanosis. no edema.  2+ rt femoral pulse  no bruit- IABP site well healed  Neurological   no gross motor deficits noted, affect appropriate, oriented to time, person and place.    Medications     Percutaneous Coronary Intervention orders placed (this is information for BPA alerting)       - MEDICATION INSTRUCTIONS -         sodium chloride (PF)  10 mL Intravenous Once     metoprolol tartrate  50 mg Oral BID     mirtazapine (REMERON) tablet 30 mg  30 mg Oral At Bedtime     LORazepam  1 mg Oral At Bedtime     sodium chloride (PF)  3 mL Intracatheter Q8H     aspirin EC  81 mg Oral Daily     ticagrelor  90 mg Oral Q12H     atorvastatin  40 mg Oral Daily     amitriptyline (ELAVIL) tablet 25 mg  25 mg Oral At Bedtime     gabapentin (NEURONTIN) capsule 100 mg  100 mg Oral TID     levETIRAcetam (KEPPRA) tablet 500 mg  500 mg Oral BID     omeprazole (priLOSEC) CR capsule 20 mg  20 mg Oral Every Other Day     ranitidine  150 mg Oral BID        Data   Most Recent 3 CBC's:  Recent Labs   Lab Test  01/30/18   0620  01/28/18   0643  01/27/18   0840   WBC  8.0  8.8  10.8   HGB  11.6*  10.0*  10.3*   MCV  94  97  93   PLT  287  185  172     Most Recent 3 BMP's:  Recent Labs   Lab Test  01/30/18   0620  01/29/18   0624  01/28/18   0643   NA  139  141  142   POTASSIUM  3.9  3.9  3.8   CHLORIDE  107  109  110*   CO2  24  25  28   BUN  13  13  15   CR  0.69  0.70  0.62   ANIONGAP  8  7  4   HECTOR  8.4*  8.1*  7.6*   GLC  95  80  89     Most Recent 3 Troponin's:  Recent Labs   Lab Test  01/26/18   1715  01/26/18   1115  01/26/18   0500   01/25/18   1945   TROPI  6.343*  6.180*  5.253*   < >  3.934*   TROPONIN   --    --    --    --   3.84*    < > = values in this interval not displayed.     Most Recent 3 BNP's:No lab results found.  Last 24 hours labs reviewed   EKG: (reviewed personally) sinus without st changes    Imaging: neg cxr    Tele: sinus with intermittent PAF 5-20 beats    Echo: 1/26/18  There is a moderate pericardial effusion with dense consolidation within the pericardial space. The density of consolidation is concerning for hematoma.  Findings concerning for tamponade physiology, however tamponade is a clinical, not echocardiographic, diagnosis and clinical correlation is recommended.  The visual ejection fraction is estimated at 50%.  Left ventricular systolic function is mildly reduced.  There are regional wall motion abnormalities as specified.  The study was technically difficult. The study was technically limited.    Last ischemic eval: angio 1/25    Device: none

## 2018-01-30 NOTE — PLAN OF CARE
Problem: Patient Care Overview  Goal: Plan of Care/Patient Progress Review  CR/PT: Patient in cardiac MRI this AM. Scheduled for PM CR session, will continue to follow.

## 2018-01-31 ENCOUNTER — APPOINTMENT (OUTPATIENT)
Dept: PHYSICAL THERAPY | Facility: CLINIC | Age: 63
End: 2018-01-31
Payer: COMMERCIAL

## 2018-01-31 VITALS
TEMPERATURE: 98 F | BODY MASS INDEX: 31.06 KG/M2 | OXYGEN SATURATION: 96 % | HEIGHT: 65 IN | RESPIRATION RATE: 18 BRPM | WEIGHT: 186.4 LBS | HEART RATE: 64 BPM | DIASTOLIC BLOOD PRESSURE: 91 MMHG | SYSTOLIC BLOOD PRESSURE: 112 MMHG

## 2018-01-31 LAB
INTERPRETATION ECG - MUSE: NORMAL

## 2018-01-31 PROCEDURE — 25000132 ZZH RX MED GY IP 250 OP 250 PS 637: Performed by: SURGERY

## 2018-01-31 PROCEDURE — 40000193 ZZH STATISTIC PT WARD VISIT: Performed by: PHYSICAL THERAPIST

## 2018-01-31 PROCEDURE — 99239 HOSP IP/OBS DSCHRG MGMT >30: CPT | Performed by: HOSPITALIST

## 2018-01-31 PROCEDURE — 97530 THERAPEUTIC ACTIVITIES: CPT | Mod: GP | Performed by: PHYSICAL THERAPIST

## 2018-01-31 PROCEDURE — 99232 SBSQ HOSP IP/OBS MODERATE 35: CPT | Performed by: PHYSICIAN ASSISTANT

## 2018-01-31 PROCEDURE — 25000132 ZZH RX MED GY IP 250 OP 250 PS 637: Performed by: INTERNAL MEDICINE

## 2018-01-31 PROCEDURE — 25000132 ZZH RX MED GY IP 250 OP 250 PS 637: Performed by: PHYSICIAN ASSISTANT

## 2018-01-31 PROCEDURE — 97110 THERAPEUTIC EXERCISES: CPT | Mod: GP | Performed by: PHYSICAL THERAPIST

## 2018-01-31 RX ORDER — GINSENG 100 MG
CAPSULE ORAL 2 TIMES DAILY
Qty: 1 TUBE | Refills: 0 | Status: SHIPPED | OUTPATIENT
Start: 2018-01-31 | End: 2018-02-07

## 2018-01-31 RX ORDER — ATORVASTATIN CALCIUM 40 MG/1
40 TABLET, FILM COATED ORAL DAILY
Qty: 30 TABLET | Refills: 0 | Status: SHIPPED | OUTPATIENT
Start: 2018-02-01 | End: 2021-08-18

## 2018-01-31 RX ORDER — NITROGLYCERIN 0.4 MG/1
TABLET SUBLINGUAL
Qty: 25 TABLET | Refills: 0 | Status: SHIPPED | OUTPATIENT
Start: 2018-01-31

## 2018-01-31 RX ORDER — METOPROLOL TARTRATE 100 MG
100 TABLET ORAL 2 TIMES DAILY
Status: DISCONTINUED | OUTPATIENT
Start: 2018-01-31 | End: 2018-01-31 | Stop reason: HOSPADM

## 2018-01-31 RX ORDER — METOPROLOL TARTRATE 100 MG
100 TABLET ORAL 2 TIMES DAILY
Qty: 60 TABLET | Refills: 0 | Status: SHIPPED | OUTPATIENT
Start: 2018-01-31 | End: 2021-08-18 | Stop reason: DRUGHIGH

## 2018-01-31 RX ADMIN — GABAPENTIN 100 MG: 100 CAPSULE ORAL at 08:20

## 2018-01-31 RX ADMIN — ASPIRIN 81 MG: 81 TABLET, COATED ORAL at 08:20

## 2018-01-31 RX ADMIN — RANITIDINE 150 MG: 150 TABLET ORAL at 08:20

## 2018-01-31 RX ADMIN — ATORVASTATIN CALCIUM 40 MG: 40 TABLET, FILM COATED ORAL at 08:20

## 2018-01-31 RX ADMIN — METOPROLOL TARTRATE 75 MG: 50 TABLET ORAL at 08:19

## 2018-01-31 RX ADMIN — TICAGRELOR 90 MG: 90 TABLET ORAL at 08:20

## 2018-01-31 RX ADMIN — LEVETIRACETAM 500 MG: 500 TABLET, FILM COATED ORAL at 08:20

## 2018-01-31 NOTE — PLAN OF CARE
Problem: Patient Care Overview  Goal: Plan of Care/Patient Progress Review  Outcome: Improving  Patient discharging to group home with friend transporting. Discharge instructions, medications, and follow-up instructions reviewed. Friend verbalizes understanding. Lipitor was reviewed with friend that does given was 80mg, so the group home needs to cut pills in half because patient is to receive 40mg per day. Patient denies pain.  VSS.  Patient in SR with occ PACs.  IV removed.

## 2018-01-31 NOTE — PROGRESS NOTES
SPIRITUAL HEALTH SERVICES Progress Note  [facility]    [place]U visit.  Pt's friend was with her, and pt reported that she REALLY hopes she'll be able to return to her group home today.  Pt said she feels better, and is eager to see her friends and the staff at her group home again.  Pt cites no other concerns and said she'll say her prayers alone today, but welcomed ongoing prayers from  too.                                                                                                                                             Tr Isidro M.Div., [facility]  Staff   Pager [phone]

## 2018-01-31 NOTE — PROGRESS NOTES
[facility]  Cardiology Progress Note  Date of Service: 01/31/2018  Primary Cardiologist: Darvin    Assessment & Plan    Amy Bryan is a 62 year old female with past medical history significant for developmental delay from birth complicated by TBI in 2012, seizures, GERD, anxiety admitted on 1/25/2018 with witnessed syncopal episode, found to hypotensive with lateral STEMI activated by EMS and emergent angiogram.       Assessment:  1.  Acute lateral STEMI 1/25   - angiogram revealed thrombotic mid RCA filing, occluded distal rPLA (likely due embolism from the mid RCA), occluded 1st diagonal and 70% diffuse proximal LAD stenosis   -Circumflex is small with diffuse disease.    - She underwent aspiration thrombectomy of the mid RCA and PTCA of the 1st diagonal branch (small vessel). Subsequent angiogram revealed resolution of RCA thrombus and recanalization of the rPLA.    - IABP was placed for hypotension, admitted to ICU- pulled 1/26   - Initial EF on LVgram 25% improved within 24h to 40-45, then 50%, concern for possible reverse takotsubo   - likely hemopericardium noted on echo, no signs of tamponade, stable on cardiac mr   - intermittent ongoing cp at L clavicle- ECG improved from previous, not likely cardiac   - Proximal circumflex has 70% ostial stenosis and mid LAD has 70% stenosis   - cardiac mr shows small effusion, with infarcted scar in diagonal perfusion- remainder of the pericardium viable    2.  Paroxysmal atrial fibrillation- brief runs noted.   - CHADS VASC 2 for vascular disease, female gender, on brilanta and asa for STEMI, given hemopericardium reluctant to add additional anticoagulation   - burden seems to be diminishing although longer episode of 5 min 1/30    3. Cognitive disorder with hx of TBI- friend Monserrat is POA, very supportive   - social work appreciated, working with group home for options on d/c      Plan:   1. Increase metoprolol to 100 mg bid  2. Continue on  Brillanta and asa only for anticoagulation with plans for coumadin, plavix and asa after staged pci IF hemopericardium is improved  3. Staged pci of prox to mid LAD and circumflex will be arranged as outpt with Dr. Boston in 3 -4 weeks  4. F/u arranged with Lilliana Jennings PA-C on Wed Feb 7th at 0800 with labs after that visit  5. Ok to d/c from a cardiac standpoint    Christine Alexander PA-C  [facility]  Pager: [phone]     Interval History   Feels well today, no orthopnea or pnd, no cp.  Anxious to go home.  No additional cp.  Understands she will need staged pci.  CINDY German will accompany to clinic visit next week.      Physical Exam   Temp: 98  F (36.7  C) Temp src: Oral BP: (!) 112/91 Pulse: 64 Heart Rate: 80 Resp: 18 SpO2: 96 % O2 Device: None (Room air)    Vitals:    01/29/18 0605 01/30/18 0500 01/31/18 0000   Weight: 87.7 kg (193 lb 6.4 oz) 85.2 kg (187 lb 14.4 oz) 84.6 kg (186 lb 6.4 oz)     Constitutional   alert and oriented, in no acute distress.  Skin   warm and dry to touch  ENT   no pallor or cyanosis  Neck  noJVP  Lungs ctab, no wheezes or rales  Cardiac rrr, no murmur or rub  Abdomen   abdomen soft, bowel sounds normoactive, no hepatosplenomegaly  Extremities and Back   no clubbing, cyanosis. no edema.  2+ rt femoral pulse  no bruit- IABP site well healed  Neurological   no gross motor deficits noted, affect appropriate, oriented to time, person and place.    Medications     Percutaneous Coronary Intervention orders placed (this is information for BPA alerting)       - MEDICATION INSTRUCTIONS -         sodium chloride (PF)  10 mL Intravenous Once     metoprolol tartrate  75 mg Oral BID     mirtazapine (REMERON) tablet 30 mg  30 mg Oral At Bedtime     LORazepam  1 mg Oral At Bedtime     sodium chloride (PF)  3 mL Intracatheter Q8H     aspirin EC  81 mg Oral Daily     ticagrelor  90 mg Oral Q12H     atorvastatin  40 mg Oral Daily     amitriptyline (ELAVIL) tablet 25 mg  25 mg Oral At Bedtime     gabapentin  (NEURONTIN) capsule 100 mg  100 mg Oral TID     levETIRAcetam (KEPPRA) tablet 500 mg  500 mg Oral BID     omeprazole (priLOSEC) CR capsule 20 mg  20 mg Oral Every Other Day     ranitidine  150 mg Oral BID       Data   Most Recent 3 CBC's:  Recent Labs   Lab Test  01/30/18   0620  01/28/18   0643  01/27/18   0840   WBC  8.0  8.8  10.8   HGB  11.6*  10.0*  10.3*   MCV  94  97  93   PLT  287  185  172     Most Recent 3 BMP's:  Recent Labs   Lab Test  01/30/18   0620  01/29/18   0624  01/28/18   0643   NA  139  141  142   POTASSIUM  3.9  3.9  3.8   CHLORIDE  107  109  110*   CO2  24  25  28   BUN  13  13  15   CR  0.69  0.70  0.62   ANIONGAP  8  7  4   HECTOR  8.4*  8.1*  7.6*   GLC  95  80  89     Most Recent 3 Troponin's:  Recent Labs   Lab Test  01/26/18   1715  01/26/18   1115  01/26/18   0500   01/25/18   1945   TROPI  6.343*  6.180*  5.253*   < >  3.934*   TROPONIN   --    --    --    --   3.84*    < > = values in this interval not displayed.     Most Recent 3 BNP's:No lab results found.  Last 24 hours labs reviewed   EKG: (reviewed personally) sinus without st changes    Imaging: neg cxr    Tele: sinus with intermittent PAF 5-20 beats    Echo: 1/26/18  There is a moderate pericardial effusion with dense consolidation within the pericardial space. The density of consolidation is concerning for hematoma.  Findings concerning for tamponade physiology, however tamponade is a clinical, not echocardiographic, diagnosis and clinical correlation is recommended.  The visual ejection fraction is estimated at 50%.  Left ventricular systolic function is mildly reduced.  There are regional wall motion abnormalities as specified.  The study was technically difficult. The study was technically limited.    Last ischemic eval: angio 1/25    Device: none

## 2018-01-31 NOTE — PLAN OF CARE
Problem: Patient Care Overview  Goal: Plan of Care/Patient Progress Review  Discharge Planner PT   Patient plan for discharge: Home with assist from group home, HHPT  Current status: Pt requires SBA for gait with SPC x 10 min continuous for aerobic activity. 2 instances of lateral balance checks requiring CGA-min A to correct and verbal cuing. Discussed having assist for all ambulation at group home at discharge due to fall risk as well as use of SPC at all times, friend present and reports this is available from group home staff. Completed up/down 1 flight of stairs with supervision ,1 rail and SPC for safety. VSS pre/post gait and stairs today (see VS FS for details). Pt reports OMNI effort of 1/10   Barriers to return to prior living situation: Fall risk with mobility, will require A x 1 for all ambulation and use of SPC at discharge  Recommendations for discharge: Home with assist from group home staff, OP Phase II CR (however after discussion with pt and friend, will not be able to make it to OP appointments several times a week, pt requests HHPT instead)  Rationale for recommendations: would benefit from continued skilled therapy intervention to progress balance, gait and aerobic endurance as well as review of cardiac risk factors and diet/activity modification       Entered by: Rachel Bowman 01/31/2018 10:13 AM

## 2018-01-31 NOTE — DISCHARGE INSTRUCTIONS
Patient will discharge to home with [facility] for RN/PT/OT.  [facility] will contact the group home directly to arrange for the first visit.  [facility]'s phone number is [phone].

## 2018-01-31 NOTE — DISCHARGE SUMMARY
[facility]    Discharge Summary  Hospitalist    Date of Admission:  1/25/2018  Date of Discharge:  1/31/2018  Discharging Provider: Bhanu Gorssman MD  Date of Service (when I saw the patient): 01/31/18    Discharge Diagnoses     STEMI    Pericardial effusion/hematoma    Shock, suspected cardiogenic.    Paroxysmal atrial fibrillation    Cognitive disorder with h/o TBI    Generalized anxiety and depression     GERD    History of Present Illness   Amy Bryan 62 year old woman with a history of TBI and developmental delay who had a syncopal episode in her group home and was brought to ER. Patient was hypotensive in ER, with STEMI, taken to cath lab then admitted to ICU.    Hospital Course   Amy Bryan was admitted on 1/25/2018.  The following problems were addressed during her hospitalization:      STEMI  Pericardial effusion/hematoma  Paroxysmal atrial fibrillation  Suspected cardiogenic shock and syncope: Patient admitted through ED to cardiology, she had syncopal episode at group home, and EMS found she was hypotensive, SBPs to 60s and had ST changes in anterolateral leads. Aspiration thrombectomy of the RCA and balloon angioplasty of diagonal was performed, needed IABP due to SBPs in 60s (1/25-1/26) and transferred to ICU on pressors. - initial LVgram with EF of 25% and with repeat ECHOs, EF improved to 50%, so concern for possible reverse takotsubo per cardiology.   - ECHO 1/26 with pericardial effusion/hematoma, no signs of temponade.  - On ASA 81 mg, Brillinta, Lopressor, lipitor per cardiology    - Cardiac MRI showed mild to moderately reduced LV systolic function with evidence of recent infarct in diagonal territory with the infarcted segments predominantly non viable. Cardiology planning staged PCI of 70% prox to mid LAD and Cx   - though she had A fib, there were only short bursts, and patient is on ASA and Brilinta, and no other anti coag recommended by  cardiology.      Cognitive disorder with h/o TBI  Generalized anxiety and depression   - PTA is on Amitriptyline, Gabapentin, and Lorazepam- continued.   - On prophylactic Keppra, since TBI, no actual seizures, continued       GERD: Stable.   - PTA is on Omeprazole as well as Ranitidine, continued.         Code Status: Full Code    Bhanu Grossman MD  Hospitalist    Significant Results and Procedures   Coronary angiogram and aspiration thrombectomy of the RCA and balloon angioplasty of diagonal with IABP  ECHOs  Cardiac MRI    Pending Results   These results will be followed up by none  Unresulted Labs Ordered in the Past 30 Days of this Admission     No orders found from 11/26/2017 to 1/26/2018.          Code Status   Full Code       Primary Care Physician   Imelda Hooper    Constitutional: Alert, awake. Not in distress.   HEENTL PERRLA EOMI, areas of erythema where prior dressing was applied. No rash/blister.  Respiratory: Bilateral equal air entry, clear to auscultation. No respiratory distress.  Cardiovascular: Regular s1s2, no murmur, rub or gallop. No tachycardia.  GI: Soft, non distended, non tender, bowel tones active.  Skin/Integumen: No rash, no blister.  Other: Calm, appropriate, follows verbal commands appropriately.     Discharge Disposition   Discharged to group home  Condition at discharge: Stable    Consultations This Hospital Stay   CARDIAC REHAB IP CONSULT  NUTRITION SERVICES ADULT IP CONSULT  PHARMACY IP CONSULT  PHARMACY IP CONSULT  CARDIOLOGY IP CONSULT  PHARMACY IP CONSULT  PHARMACY TO DOSE HEPARIN  PHARMACY LIAISON FOR MEDICATION COVERAGE CONSULT  SMOKING CESSATION PROGRAM IP CONSULT    Time Spent on this Encounter   Bhanu PEDERSEN, personally saw the patient today and spent greater than 30 minutes discharging this patient.    Discharge Orders     Basic metabolic panel     CARDIAC REHAB REFERRAL     Follow-Up with Cardiac Advanced Practice Provider     Reason for your hospital stay    Syncope, ST elevation MI. Pericardial hematoma.     Follow-up and recommended labs and tests    Follow up with primary care provider, Imelda Hooper, within 7-10 days to evaluate medication change and for hospital follow- up.     Activity   Your activity upon discharge: activity as tolerated     When to contact your care team   Call your primary doctor if you have any of the following:  increased shortness of breath or increased pain.     Full Code     Diet   Follow this diet upon discharge: Orders Placed This Encounter     Low Saturated Fat Na <2400 mg       Discharge Medications   Current Discharge Medication List      START taking these medications    Details   aspirin EC 81 MG EC tablet Take 1 tablet (81 mg) by mouth daily  Qty: 90 tablet, Refills: 0    Associated Diagnoses: ST elevation myocardial infarction involving right coronary artery (H)      nitroGLYcerin (NITROSTAT) 0.4 MG sublingual tablet For chest pain place 1 tablet under the tongue every 5 minutes for 3 doses. If symptoms persist 5 minutes after 1st dose call 911.  Qty: 25 tablet, Refills: 0    Associated Diagnoses: ST elevation myocardial infarction involving right coronary artery (H)      atorvastatin (LIPITOR) 40 MG tablet Take 1 tablet (40 mg) by mouth daily  Qty: 30 tablet, Refills: 0    Associated Diagnoses: ST elevation myocardial infarction involving right coronary artery (H)      ticagrelor (BRILINTA) 90 MG tablet Take 1 tablet (90 mg) by mouth every 12 hours  Qty: 60 tablet, Refills: 0    Associated Diagnoses: ST elevation myocardial infarction involving right coronary artery (H)      bacitracin 500 UNIT/GM OINT Apply topically 2 times daily for 7 days Over the scab in neck.  Qty: 1 Tube, Refills: 0    Associated Diagnoses: Abrasion         CONTINUE these medications which have CHANGED    Details   metoprolol tartrate (LOPRESSOR) 100 MG tablet Take 1 tablet (100 mg) by mouth 2 times daily  Qty: 60 tablet, Refills: 0    Associated  Diagnoses: ST elevation myocardial infarction involving right coronary artery (H)         CONTINUE these medications which have NOT CHANGED    Details   AMITRIPTYLINE HCL PO Take 25 mg by mouth At Bedtime      ammonium lactate (AMLACTIN) 12 % cream Apply topically 2 times daily Apply to feet      GABAPENTIN PO Take 100 mg by mouth 3 times daily      LevETIRAcetam (KEPPRA PO) Take 500 mg by mouth 2 times daily      !! LORAZEPAM PO Take 0.5 mg by mouth 3 times daily as needed for anxiety      MIRTAZAPINE PO Take 30 mg by mouth At Bedtime      psyllium (METAMUCIL) 58.6 % POWD Take 1 teaspoonful by mouth daily Natural Fiber Pow Therapy      OMEPRAZOLE PO Take 20 mg by mouth every other day      benzoyl peroxide 5 % topical gel Apply topically 2 times daily as needed (redness/rosacea of face)      Alum Hydroxide-Mag Carbonate (GAVISCON PO) Take 1 tablet by mouth every 4 hours as needed (indigestion)      loperamide (IMODIUM) 2 MG capsule Take 2 mg by mouth 4 times daily as needed for diarrhea      PHENAZOPYRIDINE HCL PO Take 200 mg by mouth 3 times daily as needed (burning upon urination)      triamcinolone (KENALOG) 0.5 % cream Apply topically 2 times daily as needed (right elbow and handfor psoriasis)      trolamine salicylate (ASPERCREME) 10 % cream Apply topically as needed for moderate pain To affected areas      RaNITidine HCl (ZANTAC PO) Take 150 mg by mouth 2 times daily      acetaminophen (TYLENOL) 325 MG tablet Take 1,000 mg by mouth 3 times daily And at bedtime as needed for pain      !! LORazepam (ATIVAN) 1 MG tablet Take 1 tablet by mouth At Bedtime.  Qty: 30 tablet    Comments: FAX RX  [phone]  Associated Diagnoses: Medication refill      traZODone (DESYREL) 50 MG tablet Take 75 mg by mouth nightly as needed for sleep        !! - Potential duplicate medications found. Please discuss with provider.        Allergies   Allergies   Allergen Reactions     Penicillins      dizziness     Data   Most Recent  3 CBC's:  Recent Labs   Lab Test  01/30/18   0620  01/28/18   0643  01/27/18   0840   WBC  8.0  8.8  10.8   HGB  11.6*  10.0*  10.3*   MCV  94  97  93   PLT  287  185  172      Most Recent 3 BMP's:  Recent Labs   Lab Test  01/30/18   0620  01/29/18   0624  01/28/18   0643   NA  139  141  142   POTASSIUM  3.9  3.9  3.8   CHLORIDE  107  109  110*   CO2  24  25  28   BUN  13  13  15   CR  0.69  0.70  0.62   ANIONGAP  8  7  4   HECTOR  8.4*  8.1*  7.6*   GLC  95  80  89     Most Recent 2 LFT's:  Recent Labs   Lab Test  01/11/12   1548   AST  26   ALT  20   ALKPHOS  113   BILITOTAL  0.4     Most Recent INR's and Anticoagulation Dosing History:  Anticoagulation Dose History     Recent Dosing and Labs 7/20/2012 7/23/2012 7/25/2012 8/1/2012 8/10/2012 8/15/2012 8/29/2012    INR 1.8 2.7 2.4 2.0 2.0 1.8 2.6        Most Recent 3 Troponin's:  Recent Labs   Lab Test  01/26/18   1715  01/26/18   1115  01/26/18   0500   01/25/18   1945   TROPI  6.343*  6.180*  5.253*   < >  3.934*   TROPONIN   --    --    --    --   3.84*    < > = values in this interval not displayed.     Most Recent Cholesterol Panel:  Recent Labs   Lab Test  01/26/18   0500   CHOL  101   LDL  42   HDL  27*   TRIG  161*     Most Recent 6 Bacteria Isolates From Any Culture (See EPIC Reports for Culture Details):No lab results found.  Most Recent TSH, T4 and A1c Labs:  Recent Labs   Lab Test  01/26/18   0500   A1C  5.5     Results for orders placed or performed during the hospital encounter of 01/25/18   XR Chest Port 1 View    Narrative    CHEST PORTABLE ONE VIEW    1/25/2018 7:57 PM     HISTORY: Irregular EKG.    COMPARISON: None.    FINDINGS: Heart size normal for a supine film. Lungs are clear.      Impression    IMPRESSION: Negative.     GUEVARA SCOTT MD   XR Chest Port 1 View    Narrative    CHEST ONE VIEW PORTABLE    1/25/2018 10:55 PM     HISTORY: Confirm line placement.     COMPARISON: 1/25/2018.    FINDINGS: Right jugular central line with tip in the  distal SVC in  good position. Heart size normal. Lungs clear. No interval change in  the heart or lungs.      Impression    IMPRESSION: No acute cardiopulmonary abnormality. Right jugular  central line with tip in the SVC.    GUEVARA SCOTT MD   XR Chest Port 1 View    Narrative    CHEST PORTABLE ONE VIEW 2018 4:10 PM     HISTORY: IABP placement verification.      COMPARISON: Chest x-ray 2018.      Impression    IMPRESSION: Portable view of the chest is performed. The heart remains  enlarged. Lungs are grossly clear with mild pulmonary vascular  congestion. No pneumothorax or obvious pleural effusions. Right IJ  central venous line is unchanged in position with catheter tip near  the SVC right atrium junction. Intra-aortic balloon pump is unchanged  in position overlying the region of the aortic knob and is stable.    CARLOS DUNBAR MD   MRI Cardiac w/contrast    Narrative                                                  [facility]                                                     CMR Report      MRN:                  [id number]                                  Name:         ANGELA HENRY                                  :                  1955                                  Scan Date:   2018 08:02:41                                    Signed by Alphonse Jarquin ([id number])  16:19:52.    SUMMARY   ==========================================================================================================    Clinical history: Recent MI, pericardial effusion    Comparison CMR: No prior study for comparision    1. Mild to moderately reduced left ventricular systolic function. Visually LVEF 40-45% (volumetric LVEF 45%  but affected by frequent PVCs and poor breath holding). There is moderate to severe basal to mid anterior  and anterolateral hypokinesia.    2. The RV is normal in cavity size. The global systolic function is normal. The RVEF is 57%.     3. Mild left atrial  enlargement    4. T2 images demonstrate mid anterior and anterolateral wall edema.     5. Late gadolinium enhancement imaging shows near transmural delayed enhancement involving the basal  anterior and basal to mid anterolateral walls. There is evidence of microvascular obstruction in mid  anterior wall.    6. Small partially organized pericardial effusion.      CONCLUSIONS:  Mild to moderately reduced LV systolic function with evidence of recent infarct in diagonal  territory with the infarcted segments predominantly non viable. Rest of the myocardial segments appear  completely viable.  Small partially organized pericardial effusion.    CORE EXAM   ==========================================================================================================    MEASUREMENTS   ----------------------------------------------------------------------------------------      VOLUMETRIC ANALYSIS       ----------------------------------------------  .------------------------------------------------------------.                    LV      Reference  RV      Reference   +------+-----------+--------+-----------+--------+-----------+   EDV   ml         104.91   ()  100.92   ()          ml/m^2       53.8   (53-87)     51.8   (49-86)     ESV   ml          57.46   (20-57)    43.76   (11-63)           ml/m^2       29.5   (13-31)     22.4   (8-34)      CO    L/min        2.09               2.52                     L/min/m^2     1.1                1.3               MASS                                                     SV    ml          47.45   ()   57.16   ()          ml/m^2       24.3   (36-60)     29.3   (34-58)     EF    %           45.23   (60-78)    56.64   (57-81)    '------+-----------+--------+-----------+--------+-----------'            HEART RATE:  44       LV DIMENSIONS       ----------------------------------------------           WALL THICKNESS - ANTEROSEPTAL:  0.9 cm          WALL THICKNESS - INFEROLATERAL:  0.9 cm        LA DIMENSIONS (LV SYSTOLE)       ----------------------------------------------          DIAMETER:  3.8 cm        AORTIC ROOT DIMENSIONS       ----------------------------------------------          DIAMETER - SINUS OF VALSALVA:  3 cm        VISUAL       ----------------------------------------------          EJECTION FRACTION:  40 %      ANATOMY   ----------------------------------------------------------------------------------------      LEFT VENTRICLE       ----------------------------------------------          WALL THICKNESS:  Normal           CAVITY SIZE:  Normal         RIGHT VENTRICLE       ----------------------------------------------          WALL THICKNESS:  Normal           CAVITY SIZE:  Normal         LEFT ATRIUM       ----------------------------------------------          CAVITY SIZE:  MILDLY ENLARGED         RIGHT ATRIUM       ----------------------------------------------          CAVITY SIZE:  Normal         PERICARDIUM       ----------------------------------------------          EFFUSION:  SMALL           COMMENTS:          small partially organized        PLEURAL EFFUSION       ----------------------------------------------   SMALL RIGHT, SMALL LEFT       VALVES   ----------------------------------------------------------------------------------------      AORTIC VALVE       ----------------------------------------------          AORTIC VALVE LEAFLETS:  Trileaflet       17 SEGMENT   ----------------------------------------------------------------------------------------  .-----------------------------------------------------------------------------------------.   Segments            Wall Motion  Hyperenhancement  Stress Perfusion  Interpretation   +--------------------+-------------+------------------+------------------+----------------+   Base Anterior       Severe Hypo  %                                                Base Anteroseptal                                                                      Base Inferoseptal                                                                      Base Inferior                                                                          Base Inferolateral                                                                     Base Anterolateral  Severe Hypo  %                                               Mid Anterior        Severe Hypo                                                        Mid Anteroseptal                                                                       Mid Inferoseptal                                                                       Mid Inferior                                                                           Mid Inferolateral                                                                      Mid Anterolateral   Severe Hypo  %                                               Apical Anterior                                                                        Apical Septal                                                                          Apical Inferior                                                                        Apical Lateral                                                                         [place]                                                                                  +--------------------+-------------+------------------+------------------+----------------+   RV Segments         Wall Motion  Hyperenhancement  Stress Perfusion  Interpretation   +--------------------+-------------+------------------+------------------+----------------+   RV Basal Anterior                                                                      RV Basal Inferior                                                                       RV Mid                                                                                 RV Apical                                                                             '--------------------+-------------+------------------+------------------+----------------'        FINDINGS       ----------------------------------------------          INFARCT/SCAR SIZE:  16 %      VASCULAR   ==========================================================================================================      SCAN INFO   ==========================================================================================================    GENERAL   ----------------------------------------------------------------------------------------      CONTRAST AGENT       ----------------------------------------------          TYPE:  Gadavist           VOLUME ADMINISTERED:  10 ml          DOSAGE FOR 0.5M:  0.06 mmol/kg        VITALS       ----------------------------------------------          HEIGHT:  65 in          HEIGHT:  165.1 cm          BODY WEIGHT:  192.99 lbs          BODY WEIGHT:  87.54 kgs          BSA::  1.95 m^2          SYSTOLIC BP:  121 mmHg          DIASTOLIC BP:  70 mmHg          HEART RATE:  76 BPM          HEART RHYTHM:  Ventricular Bigeminy         PULSE SEQUENCE       ----------------------------------------------          PULSE SEQUENCES:  IR GRE - Segmented, IR SSFP - Single Shot, SSFP Cine         SETUP       ----------------------------------------------          REASON(S) FOR SCAN:  CAD, Pericardial Evaluation           ATTENDING PHYSICIAN:  JOSE NOVOA

## 2018-01-31 NOTE — PROGRESS NOTES
SW:  D:  Received discharge orders for patient.  Patient is planning on returning home upon discharge with [facility] for RN/PT/OT.  Offered Monserrat, patient's POA, a choice of agencies.  Chose [facility].  Email referral made and process explained.  Monserrat is in agreement to the plan.  Call placed to update Daniella, the group home nurse and she is also in agreement to the plan.  She is asking that we fax the orders and the therapy notes.

## 2018-01-31 NOTE — PROGRESS NOTES
Patient's Primary Care Physician:    [facility]  Dr Tanya DAY, MSN, AGNP-C (follows patient in Group Home)  [address] [place]  [address]    Phone: [phone]    PLAN FOR HER TO VISIT AT GROUP HOME  2/9/2018

## 2018-01-31 NOTE — PROGRESS NOTES
SW:  D:  [facility] that patient's insurance is not accepted by [facility].  She is suggesting we contact [facility].  Referral made to [facility].  Per Dee, they are ble to accept the referral.  They will follow up with patient's POA and the group hme directly.

## 2018-01-31 NOTE — PLAN OF CARE
Problem: Patient Care Overview  Goal: Plan of Care/Patient Progress Review  Outcome: No Change  [facility]   Intensive Care Unit   Nursing Note            Neuro:  PERRL.  Moves all extremities.  Follows commands.  Cardiovascular: SR.  Pulmonary:  Lung sounds clear on room air.  GI/: Up with assist one of to restroom.  Skin: Intact.  Restraints: None        Continue to monitor closely.      Recent Labs  Lab 01/25/18 2018   PH 7.44   PCO2 29*   PO2 137*   HCO3 19*       Lab Results   Component Value Date    TROPI 6.343 () 01/26/2018    TROPI 6.180 (HH) 01/26/2018    TROPI 5.253 () 01/26/2018    TROPI 3.711 () 01/25/2018    TROPI 3.934 () 01/25/2018    TROPONIN 3.84 () 01/25/2018       ROUTINE IP LABS (Last four results)  BMP  Recent Labs  Lab 01/30/18  0620 01/29/18  0624 01/28/18  0643 01/27/18  0840    141 142 137   POTASSIUM 3.9 3.9 3.8 4.0   CHLORIDE 107 109 110* 103   HECTOR 8.4* 8.1* 7.6* 7.8*   CO2 24 25 28 29   BUN 13 13 15 17   CR 0.69 0.70 0.62 0.61   GLC 95 80 89 107*     CBC  Recent Labs  Lab 01/30/18  0620 01/28/18  0643 01/27/18  0840 01/26/18  0500   WBC 8.0 8.8 10.8 17.7*   RBC 3.76* 3.27* 3.38* 3.98   HGB 11.6* 10.0* 10.3* 12.3   HCT 35.4 31.6* 31.3* 37.9   MCV 94 97 93 95   MCH 30.9 30.6 30.5 30.9   MCHC 32.8 31.6 32.9 32.5   RDW 12.6 12.5 12.6 12.5    185 172 293     INRNo lab results found in last 7 days.  LACTIC ACIDNo results found for: LACT  Blood Glucose  Glucose (mg/dL)   Date Value   01/27/2018 129 (H)   01/27/2018 138 (H)   01/26/2018 122 (H)   01/26/2018 93   01/26/2018 111 (H)   01/26/2018 120 (H)       Intake/Output Summary (Last 24 hours) at 01/31/18 0622  Last data filed at 01/31/18 0600   Gross per 24 hour   Intake              483 ml   Output              500 ml   Net              -17 ml       Ynes Elizalde, RN  [facility]  Intensive Care Unit

## 2018-01-31 NOTE — PLAN OF CARE
Problem: Patient Care Overview  Goal: Plan of Care/Patient Progress Review  Outcome: No Change  VSS. Tele shows SR/SD with frequent PAC's and occasional bursts of A-fib. Metoprolol increased to 75mg BID. Pt denies any chest pain or SOB. Cardiac MRI done today. Plan is for possible D/C back to group home tomorrow if pt remains stable. Bed alarm on. Will continue to monitor pt.

## 2018-02-01 ENCOUNTER — CARE COORDINATION (OUTPATIENT)
Dept: CARDIOLOGY | Facility: CLINIC | Age: 63
End: 2018-02-01

## 2018-02-01 LAB
INTERPRETATION ECG - MUSE: NORMAL
INTERPRETATION ECG - MUSE: NORMAL

## 2018-02-01 NOTE — PROGRESS NOTES
Patient was evaluated by cardiology while inpatient for STEMI and underwent coronary angiogram resulting in aspiration thrombectomy of the mid RCA and PTCA of the 1st diagonal branch (plan is for staged procedure of LAD). Called patient's POA Monserrat and left  to discuss any post hospital d/c questions she may have, review medication changes, and confirm f/u appts. RN advised Monserrat in  to call clinic asap if patient did not receive rx for Brilinta. RN advised Monserrat in  that patient has an apt scheduled on 2/7/18 with EVAN Lilliana Jennings (7:20am with EVAN and 9:20am lab). RN also advised Monserrat in  that we would like patient to be scheduled for OP cardiac rehab (phon number provided in VM). Monserrat advised in  to call clinic with any cardiac related questions or concerns prior to patient's apt on 2/7/18.

## 2018-02-01 NOTE — PLAN OF CARE
Problem: Patient Care Overview  Goal: Plan of Care/Patient Progress Review  Physical Therapy Discharge Summary    Reason for therapy discharge:    Discharged to home with home therapy.    Progress towards therapy goal(s). See goals on Care Plan in [facility] electronic health record for goal details.  Goals partially met.  Barriers to achieving goals:   discharge from facility.    Therapy recommendation(s):    Continued therapy is recommended.  Rationale/Recommendations:  Home with assist from group home staff, OP Phase II CR (however after discussion with pt and friend, will not be able to make it to OP appointments several times a week, pt requests HHPT instead).

## 2018-05-29 ENCOUNTER — SURGERY (OUTPATIENT)
Age: 63
End: 2018-05-29

## 2018-05-29 ENCOUNTER — HOSPITAL ENCOUNTER (OUTPATIENT)
Facility: CLINIC | Age: 63
Discharge: HOME | End: 2018-05-29
Attending: OPHTHALMOLOGY | Admitting: OPHTHALMOLOGY
Payer: COMMERCIAL

## 2018-05-29 VITALS
SYSTOLIC BLOOD PRESSURE: 121 MMHG | TEMPERATURE: 96.7 F | OXYGEN SATURATION: 97 % | DIASTOLIC BLOOD PRESSURE: 61 MMHG | RESPIRATION RATE: 16 BRPM

## 2018-05-29 PROCEDURE — 25000125 ZZHC RX 250: Performed by: OPHTHALMOLOGY

## 2018-05-29 PROCEDURE — 66821 AFTER CATARACT LASER SURGERY: CPT | Mod: LT | Performed by: OPHTHALMOLOGY

## 2018-05-29 RX ORDER — TROPICAMIDE 10 MG/ML
1 SOLUTION/ DROPS OPHTHALMIC ONCE
Status: COMPLETED | OUTPATIENT
Start: 2018-05-29 | End: 2018-05-29

## 2018-05-29 RX ORDER — PHENYLEPHRINE HYDROCHLORIDE 25 MG/ML
1 SOLUTION/ DROPS OPHTHALMIC ONCE
Status: COMPLETED | OUTPATIENT
Start: 2018-05-29 | End: 2018-05-29

## 2018-05-29 RX ORDER — POTASSIUM CHLORIDE 1.5 G/1.58G
20 POWDER, FOR SOLUTION ORAL 2 TIMES DAILY
COMMUNITY

## 2018-05-29 RX ADMIN — PHENYLEPHRINE HYDROCHLORIDE 1 DROP: 2.5 SOLUTION/ DROPS OPHTHALMIC at 10:54

## 2018-05-29 RX ADMIN — TROPICAMIDE 1 DROP: 10 SOLUTION/ DROPS OPHTHALMIC at 10:54

## 2018-05-29 RX ADMIN — APRACLONIDINE HYDROCHLORIDE 1 DROP: 10 SOLUTION/ DROPS OPHTHALMIC at 10:55

## 2018-05-29 NOTE — OP NOTE
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      Comments: Pt. to follow up at office in 48 hours.

## 2018-05-29 NOTE — BRIEF OP NOTE
[facility] Brief Operative Note    Pre-operative diagnosis: CATARACT   Post-operative diagnosis posterior capsular opacification, left eye     Procedure: Procedure(s):  LEFT YAG LASER CAPSULOTOMY  - Wound Class: I-Clean   Surgeon(s): Surgeon(s) and Role:     * Tabby Guillaume MD - Primary   Estimated blood loss: * No values recorded between 5/29/2018 12:00 AM and 5/29/2018 11:29 AM *    Specimens: * No specimens in log *   Findings:

## 2018-06-05 ENCOUNTER — HOSPITAL ENCOUNTER (OUTPATIENT)
Facility: CLINIC | Age: 63
Discharge: HOME | End: 2018-06-05
Attending: OPHTHALMOLOGY | Admitting: OPHTHALMOLOGY
Payer: COMMERCIAL

## 2018-06-05 ENCOUNTER — SURGERY (OUTPATIENT)
Age: 63
End: 2018-06-05

## 2018-06-05 VITALS
RESPIRATION RATE: 18 BRPM | TEMPERATURE: 97 F | DIASTOLIC BLOOD PRESSURE: 70 MMHG | OXYGEN SATURATION: 98 % | SYSTOLIC BLOOD PRESSURE: 117 MMHG

## 2018-06-05 PROCEDURE — 25000125 ZZHC RX 250: Performed by: OPHTHALMOLOGY

## 2018-06-05 PROCEDURE — 66821 AFTER CATARACT LASER SURGERY: CPT | Performed by: OPHTHALMOLOGY

## 2018-06-05 RX ORDER — PHENYLEPHRINE HYDROCHLORIDE 25 MG/ML
1 SOLUTION/ DROPS OPHTHALMIC ONCE
Status: COMPLETED | OUTPATIENT
Start: 2018-06-05 | End: 2018-06-05

## 2018-06-05 RX ORDER — TROPICAMIDE 10 MG/ML
1 SOLUTION/ DROPS OPHTHALMIC ONCE
Status: COMPLETED | OUTPATIENT
Start: 2018-06-05 | End: 2018-06-05

## 2018-06-05 RX ADMIN — TROPICAMIDE 1 DROP: 10 SOLUTION/ DROPS OPHTHALMIC at 10:31

## 2018-06-05 RX ADMIN — PHENYLEPHRINE HYDROCHLORIDE 1 DROP: 2.5 SOLUTION/ DROPS OPHTHALMIC at 10:31

## 2018-06-05 RX ADMIN — APRACLONIDINE HYDROCHLORIDE 1 DROP: 10 SOLUTION/ DROPS OPHTHALMIC at 10:31

## 2018-06-05 NOTE — BRIEF OP NOTE
[facility] Brief Operative Note    Pre-operative diagnosis: CATARACT   Post-operative diagnosis posterior capsular opacification, right eye     Procedure: Procedure(s):  RIGHT EYE YAG LASER CAPSULOTOMY  - Wound Class: I-Clean   Surgeon(s): Surgeon(s) and Role:     * Tabby Guillaume MD - Primary   Estimated blood loss: * No values recorded between 6/5/2018 12:00 AM and 6/5/2018 11:19 AM *    Specimens: * No specimens in log *   Findings:

## 2020-05-13 ENCOUNTER — MEDICAL CORRESPONDENCE (OUTPATIENT)
Dept: HEALTH INFORMATION MANAGEMENT | Facility: CLINIC | Age: 65
End: 2020-05-13

## 2021-04-23 ENCOUNTER — TRANSFERRED RECORDS (OUTPATIENT)
Dept: HEALTH INFORMATION MANAGEMENT | Facility: CLINIC | Age: 66
End: 2021-04-23

## 2021-05-13 ENCOUNTER — MEDICAL CORRESPONDENCE (OUTPATIENT)
Dept: HEALTH INFORMATION MANAGEMENT | Facility: CLINIC | Age: 66
End: 2021-05-13

## 2021-05-19 ENCOUNTER — OFFICE VISIT (OUTPATIENT)
Dept: FAMILY MEDICINE | Facility: CLINIC | Age: 66
End: 2021-05-19
Payer: COMMERCIAL

## 2021-05-19 VITALS
BODY MASS INDEX: 33.51 KG/M2 | WEIGHT: 201.4 LBS | HEART RATE: 56 BPM | OXYGEN SATURATION: 93 % | SYSTOLIC BLOOD PRESSURE: 120 MMHG | DIASTOLIC BLOOD PRESSURE: 78 MMHG | TEMPERATURE: 96.4 F

## 2021-05-19 DIAGNOSIS — L71.9 ROSACEA: ICD-10-CM

## 2021-05-19 DIAGNOSIS — I25.10 CORONARY ARTERY DISEASE INVOLVING NATIVE HEART WITHOUT ANGINA PECTORIS, UNSPECIFIED VESSEL OR LESION TYPE: ICD-10-CM

## 2021-05-19 DIAGNOSIS — Z13.1 SCREENING FOR DIABETES MELLITUS: ICD-10-CM

## 2021-05-19 DIAGNOSIS — I10 ESSENTIAL HYPERTENSION: Primary | ICD-10-CM

## 2021-05-19 DIAGNOSIS — Z78.9 MEDICALLY COMPLEX PATIENT: ICD-10-CM

## 2021-05-19 DIAGNOSIS — Z78.0 MENOPAUSE: ICD-10-CM

## 2021-05-19 DIAGNOSIS — F41.9 ANXIETY AND DEPRESSION: ICD-10-CM

## 2021-05-19 DIAGNOSIS — E78.5 HYPERLIPIDEMIA LDL GOAL <100: ICD-10-CM

## 2021-05-19 DIAGNOSIS — R41.89 COGNITIVE AND BEHAVIORAL CHANGES: ICD-10-CM

## 2021-05-19 DIAGNOSIS — Z78.0 POST-MENOPAUSE: ICD-10-CM

## 2021-05-19 DIAGNOSIS — R46.89 COGNITIVE AND BEHAVIORAL CHANGES: ICD-10-CM

## 2021-05-19 DIAGNOSIS — Z12.31 VISIT FOR SCREENING MAMMOGRAM: ICD-10-CM

## 2021-05-19 DIAGNOSIS — L40.9 PSORIASIS: ICD-10-CM

## 2021-05-19 DIAGNOSIS — S06.9X0D CLOSED TRAUMATIC BRAIN INJURY, WITHOUT LOSS OF CONSCIOUSNESS, SUBSEQUENT ENCOUNTER: ICD-10-CM

## 2021-05-19 DIAGNOSIS — R19.7 DIARRHEA, UNSPECIFIED TYPE: ICD-10-CM

## 2021-05-19 DIAGNOSIS — I21.3 ST ELEVATION MYOCARDIAL INFARCTION (STEMI), UNSPECIFIED ARTERY (H): ICD-10-CM

## 2021-05-19 DIAGNOSIS — F32.A ANXIETY AND DEPRESSION: ICD-10-CM

## 2021-05-19 DIAGNOSIS — K21.00 GASTROESOPHAGEAL REFLUX DISEASE WITH ESOPHAGITIS, UNSPECIFIED WHETHER HEMORRHAGE: ICD-10-CM

## 2021-05-19 LAB
ALBUMIN SERPL-MCNC: 3.5 G/DL (ref 3.4–5)
ALP SERPL-CCNC: 185 U/L (ref 40–150)
ALT SERPL W P-5'-P-CCNC: 36 U/L (ref 0–50)
ANION GAP SERPL CALCULATED.3IONS-SCNC: 1 MMOL/L (ref 3–14)
AST SERPL W P-5'-P-CCNC: 25 U/L (ref 0–45)
BASOPHILS # BLD AUTO: 0 10E9/L (ref 0–0.2)
BASOPHILS NFR BLD AUTO: 0.3 %
BILIRUB SERPL-MCNC: 0.3 MG/DL (ref 0.2–1.3)
BUN SERPL-MCNC: 16 MG/DL (ref 7–30)
CALCIUM SERPL-MCNC: 9.3 MG/DL (ref 8.5–10.1)
CHLORIDE SERPL-SCNC: 107 MMOL/L (ref 94–109)
CHOLEST SERPL-MCNC: 145 MG/DL
CO2 SERPL-SCNC: 33 MMOL/L (ref 20–32)
CREAT SERPL-MCNC: 0.9 MG/DL (ref 0.52–1.04)
DEPRECATED CALCIDIOL+CALCIFEROL SERPL-MC: 12 UG/L (ref 20–75)
DIFFERENTIAL METHOD BLD: NORMAL
EOSINOPHIL # BLD AUTO: 0.2 10E9/L (ref 0–0.7)
EOSINOPHIL NFR BLD AUTO: 3 %
ERYTHROCYTE [DISTWIDTH] IN BLOOD BY AUTOMATED COUNT: 12.5 % (ref 10–15)
GFR SERPL CREATININE-BSD FRML MDRD: 67 ML/MIN/{1.73_M2}
GLUCOSE SERPL-MCNC: 97 MG/DL (ref 70–99)
HCT VFR BLD AUTO: 45.4 % (ref 35–47)
HDLC SERPL-MCNC: 27 MG/DL
HGB BLD-MCNC: 14.5 G/DL (ref 11.7–15.7)
LDLC SERPL CALC-MCNC: 51 MG/DL
LYMPHOCYTES # BLD AUTO: 1.8 10E9/L (ref 0.8–5.3)
LYMPHOCYTES NFR BLD AUTO: 23.3 %
MCH RBC QN AUTO: 30.7 PG (ref 26.5–33)
MCHC RBC AUTO-ENTMCNC: 31.9 G/DL (ref 31.5–36.5)
MCV RBC AUTO: 96 FL (ref 78–100)
MONOCYTES # BLD AUTO: 0.5 10E9/L (ref 0–1.3)
MONOCYTES NFR BLD AUTO: 6.4 %
NEUTROPHILS # BLD AUTO: 5.1 10E9/L (ref 1.6–8.3)
NEUTROPHILS NFR BLD AUTO: 67 %
NONHDLC SERPL-MCNC: 118 MG/DL
PLATELET # BLD AUTO: 266 10E9/L (ref 150–450)
POTASSIUM SERPL-SCNC: 4.7 MMOL/L (ref 3.4–5.3)
PROT SERPL-MCNC: 7.4 G/DL (ref 6.8–8.8)
RBC # BLD AUTO: 4.72 10E12/L (ref 3.8–5.2)
SODIUM SERPL-SCNC: 141 MMOL/L (ref 133–144)
T4 FREE SERPL-MCNC: 0.57 NG/DL (ref 0.76–1.46)
TRIGL SERPL-MCNC: 336 MG/DL
TSH SERPL DL<=0.005 MIU/L-ACNC: 10.69 MU/L (ref 0.4–4)
WBC # BLD AUTO: 7.6 10E9/L (ref 4–11)

## 2021-05-19 PROCEDURE — 93000 ELECTROCARDIOGRAM COMPLETE: CPT | Performed by: INTERNAL MEDICINE

## 2021-05-19 PROCEDURE — 80061 LIPID PANEL: CPT | Performed by: INTERNAL MEDICINE

## 2021-05-19 PROCEDURE — 36415 COLL VENOUS BLD VENIPUNCTURE: CPT | Performed by: INTERNAL MEDICINE

## 2021-05-19 PROCEDURE — 99204 OFFICE O/P NEW MOD 45 MIN: CPT | Performed by: INTERNAL MEDICINE

## 2021-05-19 PROCEDURE — 82306 VITAMIN D 25 HYDROXY: CPT | Performed by: INTERNAL MEDICINE

## 2021-05-19 PROCEDURE — 84439 ASSAY OF FREE THYROXINE: CPT | Performed by: INTERNAL MEDICINE

## 2021-05-19 PROCEDURE — 96127 BRIEF EMOTIONAL/BEHAV ASSMT: CPT | Performed by: INTERNAL MEDICINE

## 2021-05-19 PROCEDURE — 80050 GENERAL HEALTH PANEL: CPT | Performed by: INTERNAL MEDICINE

## 2021-05-19 RX ORDER — CLOPIDOGREL BISULFATE 75 MG/1
75 TABLET ORAL DAILY
COMMUNITY
End: 2021-08-18

## 2021-05-19 RX ORDER — FUROSEMIDE 20 MG
20 TABLET ORAL DAILY
COMMUNITY

## 2021-05-19 RX ORDER — TRIAMCINOLONE ACETONIDE 1 MG/G
CREAM TOPICAL 2 TIMES DAILY
Qty: 45 G | Refills: 0 | Status: SHIPPED | OUTPATIENT
Start: 2021-05-19 | End: 2021-12-07

## 2021-05-19 ASSESSMENT — PATIENT HEALTH QUESTIONNAIRE - PHQ9: SUM OF ALL RESPONSES TO PHQ QUESTIONS 1-9: 12

## 2021-05-19 NOTE — PROGRESS NOTES
Assessment & Plan   Problem List Items Addressed This Visit        Nervous and Auditory    Closed TBI (traumatic brain injury) (H)    Relevant Orders    NEUROLOGY ADULT REFERRAL       Circulatory    Hypertension - Primary    Relevant Orders    Comprehensive metabolic panel (BMP + Alb, Alk Phos, ALT, AST, Total. Bili, TP) (Completed)    CBC with platelets and differential (Completed)    T4 free (Completed)    STEMI (ST elevation myocardial infarction) (H)    Relevant Orders    EKG 12-lead complete w/read - Clinics (Completed)       Urinary    Menopause      Other Visit Diagnoses     Hyperlipidemia LDL goal <100        Relevant Orders    Lipid panel reflex to direct LDL Fasting (Completed)    TSH with free T4 reflex (Completed)    Anxiety and depression        Relevant Orders    Vitamin D Deficiency (Completed)    NEUROLOGY ADULT REFERRAL    MENTAL HEALTH REFERRAL  - Adult; Psychiatry; Psychiatry and Psychotherapy (Individual/Couple/Family Therapy); Other: [facility] [phone]; We will contact you to schedule the appointment or please call with any questi...    Screening for diabetes mellitus        Relevant Orders    Hemoglobin A1c    Coronary artery disease involving native heart without angina pectoris, unspecified vessel or lesion type        Relevant Orders    EKG 12-lead complete w/read - Clinics (Completed)    CARDIOLOGY EVAL ADULT REFERRAL    Visit for screening mammogram        Relevant Orders    *MA Screening Digital Bilateral    Post-menopause        Relevant Orders    DX Hip/Pelvis/Spine    Medically complex patient        Relevant Orders    MED THERAPY MANAGE REFERRAL    Cognitive and behavioral changes        Relevant Orders    NEUROLOGY ADULT REFERRAL    MENTAL HEALTH REFERRAL  - Adult; Psychiatry; Psychiatry and Psychotherapy (Individual/Couple/Family Therapy); Other: [facility] [phone]; We will contact you to schedule the appointment or  please call with any questi...    Psoriasis        Relevant Medications    metroNIDAZOLE (METROCREAM) 0.75 % external cream    triamcinolone (KENALOG) 0.1 % external cream    Other Relevant Orders    DERMATOLOGY ADULT REFERRAL    Rosacea        Relevant Medications    metroNIDAZOLE (METROCREAM) 0.75 % external cream    triamcinolone (KENALOG) 0.1 % external cream    Other Relevant Orders    DERMATOLOGY ADULT REFERRAL    Diarrhea, unspecified type        Relevant Orders    GASTROENTEROLOGY ADULT REF CONSULT ONLY    Gastroesophageal reflux disease with esophagitis, unspecified whether hemorrhage        Relevant Orders    GASTROENTEROLOGY ADULT REF CONSULT ONLY           Multiple health issues discussed  Multiple referrals; including 2 cardiology mental health clinic,neurology she is on Keppra for seizure prophylaxis due to TBI, on trazodone and mirtazapine as well as as needed lorazepam ,we will check an EKG make sure her QT is not prolonged.  She declined seeing counseling.  Chronic diarrhea ,she may need repeat colonoscopy, possible colitis on Imodium; does help with her symptoms referral to GI  Repeat labs today, including lipids CMP CBC A1c TSH vitamin D level.  Schedule screening test for mammogram DEXA scan.  Follow-up in 2 months and as needed.         Work on weight loss  Regular exercise  See Patient Instructions    Return in about 2 months (around 7/19/2021), or if symptoms worsen or fail to improve, for As needed and if symptoms worsen.    Telma Johnson MD  [facility] LILLIAN Reyes is a 65 year old who presents for the following health issues HPI     Chief Complaint   Patient presents with     [facility]     LAB REQUEST     Patient presenting to [facility], she lives in an assisted living ,presenting with her POA.  Patient has chronic medical health issues.  She has history of traumatic brain injury in 2012, she has some cognitive changes secondary to that; memory  problems, learning deficits, anger issues, anxiety and depression.  She has history of coronary artery disease 2018 and an MI ,had secondary ischemic cardiomyopathy, cardiac function has improved seen cardiology in 2019.  She has chronic intermittent diarrhea, uses as needed Imodium.  She had a colonoscopy done?  6 years ago, no records available, she also had an EGD in the past that was normal; she has chronic GERD.  She has history of insomnia ;sleep issues and she is maintained on trazodone and mirtazapine that seems to work for her.  She has hypertension adequately treated.  She has history of psoriasis and rosacea she is using hydrocortisone 1%.  The stronger potent steroid cream was not covered.      Review of Systems   Constitutional, HEENT, cardiovascular, pulmonary, GI, , musculoskeletal, neuro, skin, endocrine and psych systems are negative, except as otherwise noted.      Objective    /78 (BP Location: Left arm, Patient Position: Sitting, Cuff Size: Adult Large)   Pulse 56   Temp 96.4  F (35.8  C) (Temporal)   Wt 91.4 kg (201 lb 6.4 oz)   SpO2 93%   Breastfeeding No   BMI 33.51 kg/m    Body mass index is 33.51 kg/m .  Physical Exam   GENERAL: healthy, alert and no distress, obese pleasant  EYES: Eyes grossly normal to inspection, PERRL and conjunctivae and sclerae normal  NECK: no adenopathy, no asymmetry, masses, or scars and thyroid normal to palpation  RESP: lungs clear to auscultation - no rales, rhonchi or wheezes  CV: regular rate and rhythm, normal S1 S2, no S3 or S4, no murmur, click or rub, no peripheral edema and peripheral pulses strong  ABDOMEN: soft, nontender, no hepatosplenomegaly, no masses and bowel sounds normal  MS: no gross musculoskeletal defects noted, no edema  SKIN: no suspicious lesions or rashes and dry skin red patches on the fingers/ hands  NEURO: Normal strength and tone, mentation intact and speech normal  PSYCH: mentation appears normal, affect  normal/bright    Admission on 01/25/2018, Discharged on 01/31/2018   Component Date Value Ref Range Status     Interpretation ECG 01/25/2018 Click View Image link to view waveform and result   Final     Sodium 01/25/2018 142  133 - 144 mmol/L Final     Potassium 01/25/2018 3.4  3.4 - 5.3 mmol/L Final     Chloride 01/25/2018 107  94 - 109 mmol/L Final     Carbon Dioxide 01/25/2018 22  20 - 32 mmol/L Final     Anion Gap 01/25/2018 13  3 - 14 mmol/L Final     Glucose 01/25/2018 235* 70 - 99 mg/dL Final     Urea Nitrogen 01/25/2018 14  7 - 30 mg/dL Final     Creatinine 01/25/2018 0.91  0.52 - 1.04 mg/dL Final     GFR Estimate 01/25/2018 63  >60 mL/min/1.7m2 Final    Non  GFR Calc     GFR Estimate If Black 01/25/2018 76  >60 mL/min/1.7m2 Final    African American GFR Calc     Calcium 01/25/2018 8.6  8.5 - 10.1 mg/dL Final     WBC 01/25/2018 9.4  4.0 - 11.0 10e9/L Final     RBC Count 01/25/2018 4.07  3.8 - 5.2 10e12/L Final     Hemoglobin 01/25/2018 12.6  11.7 - 15.7 g/dL Final     Hematocrit 01/25/2018 38.9  35.0 - 47.0 % Final     MCV 01/25/2018 96  78 - 100 fl Final     MCH 01/25/2018 31.0  26.5 - 33.0 pg Final     MCHC 01/25/2018 32.4  31.5 - 36.5 g/dL Final     RDW 01/25/2018 12.4  10.0 - 15.0 % Final     Platelet Count 01/25/2018 296  150 - 450 10e9/L Final     Diff Method 01/25/2018 Automated Method   Final     % Neutrophils 01/25/2018 36.9  % Final     % Lymphocytes 01/25/2018 52.5  % Final     % Monocytes 01/25/2018 8.3  % Final     % Eosinophils 01/25/2018 1.7  % Final     % Basophils 01/25/2018 0.2  % Final     % Immature Granulocytes 01/25/2018 0.4  % Final     Nucleated RBCs 01/25/2018 0  0 /100 Final     Absolute Neutrophil 01/25/2018 3.5  1.6 - 8.3 10e9/L Final     Absolute Lymphocytes 01/25/2018 4.9  0.8 - 5.3 10e9/L Final     Absolute Monocytes 01/25/2018 0.8  0.0 - 1.3 10e9/L Final     Absolute Eosinophils 01/25/2018 0.2  0.0 - 0.7 10e9/L Final     Absolute Basophils 01/25/2018 0.0  0.0 -  0.2 10e9/L Final     Abs Immature Granulocytes 01/25/2018 0.0  0 - 0.4 10e9/L Final     Absolute Nucleated RBC 01/25/2018 0.0   Final     Troponin I ES 01/25/2018 3.934* 0.000 - 0.045 ug/L Final    Comment: The 99th percentile for upper reference range is 0.045 ug/L.  Troponin values   in the range of 0.045 - 0.120 ug/L may be associated with risks of adverse   clinical events.  Critical Value called to and read back by  OMAR METCALF ON CATH LAB AT 2045 MK       Troponin I 01/25/2018 3.84* 0.00 - 0.10 ug/L Final     pH Arterial 01/25/2018 7.44  7.35 - 7.45 pH Final     pCO2 Arterial 01/25/2018 29* 35 - 45 mm Hg Final     pO2 Arterial 01/25/2018 137* 80 - 105 mm Hg Final     Bicarbonate Arterial 01/25/2018 19* 21 - 28 mmol/L Final     O2 Sat Arterial 01/25/2018 99  92 - 100 % Final     Sodium 01/25/2018 141  133 - 144 mmol/L Final     Potassium 01/25/2018 3.7  3.4 - 5.3 mmol/L Final     Hemoglobin 01/25/2018 11.2* 11.7 - 15.7 g/dL Final     Hematocrit - POCT 01/25/2018 33* 35.0 - 47.0 %PCV Final     Activated Clot Time 01/25/2018 230* 105 - 167 sec Final     Interpretation ECG 01/26/2018 Click View Image link to view waveform and result   Final     Troponin I ES 01/25/2018 3.711* 0.000 - 0.045 ug/L Final    Comment: The 99th percentile for upper reference range is 0.045 ug/L.  Troponin values   in the range of 0.045 - 0.120 ug/L may be associated with risks of adverse   clinical events.  Previous critical documented       Specimen Description 01/26/2018 Nares   Final     Methicillin Resist/Sens S. aureus * 01/26/2018 Negative  NEG^Negative Final    Comment: MRSA Negative: SA Positive  MRSA target DNA not detected, presumed negative for MRSA colonization or the   number of bacteria present may be below the limit of detection.  Staphylococcus aureus target DNA detected, presumed positive for SA   colonization.  A positive test does not necessarily indicate the presence of viable   organisms.  It is, however,  presumptive for the presence of SA.  This result   does not preclude MRSA nasal colonization.  FDA approved assay performed using [employer] GeneXpert(R) real-time PCR.       Glucose 01/25/2018 224* 70 - 99 mg/dL Final     Hemoglobin A1C 01/26/2018 5.5  4.3 - 6.0 % Final     Sodium 01/26/2018 142  133 - 144 mmol/L Final     Potassium 01/26/2018 4.9  3.4 - 5.3 mmol/L Final     Chloride 01/26/2018 108  94 - 109 mmol/L Final     Carbon Dioxide 01/26/2018 25  20 - 32 mmol/L Final     Anion Gap 01/26/2018 9  3 - 14 mmol/L Final     Glucose 01/26/2018 127* 70 - 99 mg/dL Final     Urea Nitrogen 01/26/2018 22  7 - 30 mg/dL Final     Creatinine 01/26/2018 1.22* 0.52 - 1.04 mg/dL Final     GFR Estimate 01/26/2018 45* >60 mL/min/1.7m2 Final    Non  GFR Calc     GFR Estimate If Black 01/26/2018 54* >60 mL/min/1.7m2 Final    African American GFR Calc     Calcium 01/26/2018 8.0* 8.5 - 10.1 mg/dL Final     WBC 01/26/2018 17.7* 4.0 - 11.0 10e9/L Final     RBC Count 01/26/2018 3.98  3.8 - 5.2 10e12/L Final     Hemoglobin 01/26/2018 12.3  11.7 - 15.7 g/dL Final     Hematocrit 01/26/2018 37.9  35.0 - 47.0 % Final     MCV 01/26/2018 95  78 - 100 fl Final     MCH 01/26/2018 30.9  26.5 - 33.0 pg Final     MCHC 01/26/2018 32.5  31.5 - 36.5 g/dL Final     RDW 01/26/2018 12.5  10.0 - 15.0 % Final     Platelet Count 01/26/2018 293  150 - 450 10e9/L Final     Cholesterol 01/26/2018 101  <200 mg/dL Final     Triglycerides 01/26/2018 161* <150 mg/dL Final    Comment: Borderline high:  150-199 mg/dl  High:             200-499 mg/dl  Very high:       >499 mg/dl       HDL Cholesterol 01/26/2018 27* >49 mg/dL Final     LDL Cholesterol Calculated 01/26/2018 42  <100 mg/dL Final    Desirable:       <100 mg/dl     Non HDL Cholesterol 01/26/2018 74  <130 mg/dL Final     Troponin I ES 01/26/2018 5.253* 0.000 - 0.045 ug/L Final    Comment: The 99th percentile for upper reference range is 0.045 ug/L.  Troponin values   in the range of 0.045 -  0.120 ug/L may be associated with risks of adverse   clinical events.  Previous critical documented       Glucose 2018 195* 70 - 99 mg/dL Final     Glucose 2018 134* 70 - 99 mg/dL Final     Glucose 2018 122* 70 - 99 mg/dL Final     Glucose 2018 108* 70 - 99 mg/dL Final     Heparin 10A Level 2018 0.34  IU/mL Final    Comment: Therapeutic Range:    UFH:   0.15-0.35 IU/mL for low             intensity dosing           0.30-0.70 IU/mL for high             intensity dosing for             DVT and PE    Enoxaparin: If administered             once daily with dose             1.5mg/k.0-2.0 IU/mL                If administered             twice daily with dose             1mg/k.60-1.0 IU/mL       Glucose 2018 129* 70 - 99 mg/dL Final     Glucose 2018 141* 70 - 99 mg/dL Final     Glucose 2018 129* 70 - 99 mg/dL Final     Procalcitonin 2018 0.96  ng/ml Final    Comment: 0.50-1.99 ng/ml  Moderate risk of systemic infection.  Recommendation:   Recommend antibiotics. Evaluate culture results and clinical features to   target antibacterial therapy. Obtain blood cultures and other relevant   cultures if not done.  If empiric antibiotics were started, recheck PCT in: 2 days to guide   antibiotic de-escalation.  Discontinue or de-escalate antibiotics when PCT   concentration is <80% of peak or abs PCT <0.5. If empiric antibiotics were NOT   started, recheck PCT in 6-24 hours to re-evaluate need for antibiotics.       Glucose 2018 120* 70 - 99 mg/dL Final     Interpretation ECG 2018 Click View Image link to view waveform and result   Final     Magnesium 2018 2.3  1.6 - 2.3 mg/dL Final     Phosphorus 2018 4.2  2.5 - 4.5 mg/dL Final     Troponin I ES 2018 6.180* 0.000 - 0.045 ug/L Final    Comment: The 99th percentile for upper reference range is 0.045 ug/L.  Troponin values   in the range of 0.045 - 0.120 ug/L may be associated with risks of  adverse   clinical events.  Previous critical documented       Glucose 01/26/2018 173* 70 - 99 mg/dL Final     Glucose 01/26/2018 120* 70 - 99 mg/dL Final     Glucose 01/26/2018 111* 70 - 99 mg/dL Final     Troponin I ES 01/26/2018 6.343* 0.000 - 0.045 ug/L Final    Comment: The 99th percentile for upper reference range is 0.045 ug/L.  Troponin values   in the range of 0.045 - 0.120 ug/L may be associated with risks of adverse   clinical events.  Previous critical documented       Activated Clot Time 01/26/2018 143  105 - 167 sec Final     Glucose 01/26/2018 93  70 - 99 mg/dL Final     Interpretation ECG 01/26/2018 Click View Image link to view waveform and result   Final     Glucose 01/26/2018 122* 70 - 99 mg/dL Final     Potassium 01/27/2018 3.8  3.4 - 5.3 mmol/L Final     Magnesium 01/27/2018 2.5* 1.6 - 2.3 mg/dL Final     Phosphorus 01/27/2018 2.1* 2.5 - 4.5 mg/dL Final     Glucose 01/27/2018 138* 70 - 99 mg/dL Final     Glucose 01/27/2018 129* 70 - 99 mg/dL Final     Sodium 01/27/2018 137  133 - 144 mmol/L Final     Potassium 01/27/2018 4.0  3.4 - 5.3 mmol/L Final     Chloride 01/27/2018 103  94 - 109 mmol/L Final     Carbon Dioxide 01/27/2018 29  20 - 32 mmol/L Final     Anion Gap 01/27/2018 5  3 - 14 mmol/L Final     Glucose 01/27/2018 107* 70 - 99 mg/dL Final     Urea Nitrogen 01/27/2018 17  7 - 30 mg/dL Final     Creatinine 01/27/2018 0.61  0.52 - 1.04 mg/dL Final     GFR Estimate 01/27/2018 >90  >60 mL/min/1.7m2 Final    Non  GFR Calc     GFR Estimate If Black 01/27/2018 >90  >60 mL/min/1.7m2 Final    African American GFR Calc     Calcium 01/27/2018 7.8* 8.5 - 10.1 mg/dL Final     WBC 01/27/2018 10.8  4.0 - 11.0 10e9/L Final     RBC Count 01/27/2018 3.38* 3.8 - 5.2 10e12/L Final     Hemoglobin 01/27/2018 10.3* 11.7 - 15.7 g/dL Final     Hematocrit 01/27/2018 31.3* 35.0 - 47.0 % Final     MCV 01/27/2018 93  78 - 100 fl Final     MCH 01/27/2018 30.5  26.5 - 33.0 pg Final     MCHC 01/27/2018  32.9  31.5 - 36.5 g/dL Final     RDW 01/27/2018 12.6  10.0 - 15.0 % Final     Platelet Count 01/27/2018 172  150 - 450 10e9/L Final     Diff Method 01/27/2018 Automated Method   Final     % Neutrophils 01/27/2018 81.2  % Final     % Lymphocytes 01/27/2018 13.2  % Final     % Monocytes 01/27/2018 5.0  % Final     % Eosinophils 01/27/2018 0.2  % Final     % Basophils 01/27/2018 0.1  % Final     % Immature Granulocytes 01/27/2018 0.3  % Final     Nucleated RBCs 01/27/2018 0  0 /100 Final     Absolute Neutrophil 01/27/2018 8.8* 1.6 - 8.3 10e9/L Final     Absolute Lymphocytes 01/27/2018 1.4  0.8 - 5.3 10e9/L Final     Absolute Monocytes 01/27/2018 0.5  0.0 - 1.3 10e9/L Final     Absolute Eosinophils 01/27/2018 0.0  0.0 - 0.7 10e9/L Final     Absolute Basophils 01/27/2018 0.0  0.0 - 0.2 10e9/L Final     Abs Immature Granulocytes 01/27/2018 0.0  0 - 0.4 10e9/L Final     Absolute Nucleated RBC 01/27/2018 0.0   Final     Sodium 01/28/2018 142  133 - 144 mmol/L Final     Potassium 01/28/2018 3.8  3.4 - 5.3 mmol/L Final     Chloride 01/28/2018 110* 94 - 109 mmol/L Final     Carbon Dioxide 01/28/2018 28  20 - 32 mmol/L Final     Anion Gap 01/28/2018 4  3 - 14 mmol/L Final     Glucose 01/28/2018 89  70 - 99 mg/dL Final     Urea Nitrogen 01/28/2018 15  7 - 30 mg/dL Final     Creatinine 01/28/2018 0.62  0.52 - 1.04 mg/dL Final     GFR Estimate 01/28/2018 >90  >60 mL/min/1.7m2 Final    Non  GFR Calc     GFR Estimate If Black 01/28/2018 >90  >60 mL/min/1.7m2 Final    African American GFR Calc     Calcium 01/28/2018 7.6* 8.5 - 10.1 mg/dL Final     WBC 01/28/2018 8.8  4.0 - 11.0 10e9/L Final     RBC Count 01/28/2018 3.27* 3.8 - 5.2 10e12/L Final     Hemoglobin 01/28/2018 10.0* 11.7 - 15.7 g/dL Final     Hematocrit 01/28/2018 31.6* 35.0 - 47.0 % Final     MCV 01/28/2018 97  78 - 100 fl Final     MCH 01/28/2018 30.6  26.5 - 33.0 pg Final     MCHC 01/28/2018 31.6  31.5 - 36.5 g/dL Final     RDW 01/28/2018 12.5  10.0 -  15.0 % Final     Platelet Count 01/28/2018 185  150 - 450 10e9/L Final     Sodium 01/29/2018 141  133 - 144 mmol/L Final     Potassium 01/29/2018 3.9  3.4 - 5.3 mmol/L Final     Chloride 01/29/2018 109  94 - 109 mmol/L Final     Carbon Dioxide 01/29/2018 25  20 - 32 mmol/L Final     Anion Gap 01/29/2018 7  3 - 14 mmol/L Final     Glucose 01/29/2018 80  70 - 99 mg/dL Final     Urea Nitrogen 01/29/2018 13  7 - 30 mg/dL Final     Creatinine 01/29/2018 0.70  0.52 - 1.04 mg/dL Final     GFR Estimate 01/29/2018 85  >60 mL/min/1.7m2 Final    Non  GFR Calc     GFR Estimate If Black 01/29/2018 >90  >60 mL/min/1.7m2 Final    African American GFR Calc     Calcium 01/29/2018 8.1* 8.5 - 10.1 mg/dL Final     Interpretation ECG 01/29/2018 Click View Image link to view waveform and result   Final     Interpretation ECG 01/29/2018 Click View Image link to view waveform and result   Final     Phosphorus 01/29/2018 2.8  2.5 - 4.5 mg/dL Final     Sodium 01/30/2018 139  133 - 144 mmol/L Final     Potassium 01/30/2018 3.9  3.4 - 5.3 mmol/L Final     Chloride 01/30/2018 107  94 - 109 mmol/L Final     Carbon Dioxide 01/30/2018 24  20 - 32 mmol/L Final     Anion Gap 01/30/2018 8  3 - 14 mmol/L Final     Glucose 01/30/2018 95  70 - 99 mg/dL Final     Urea Nitrogen 01/30/2018 13  7 - 30 mg/dL Final     Creatinine 01/30/2018 0.69  0.52 - 1.04 mg/dL Final     GFR Estimate 01/30/2018 87  >60 mL/min/1.7m2 Final    Non  GFR Calc     GFR Estimate If Black 01/30/2018 >90  >60 mL/min/1.7m2 Final    African American GFR Calc     Calcium 01/30/2018 8.4* 8.5 - 10.1 mg/dL Final     WBC 01/30/2018 8.0  4.0 - 11.0 10e9/L Final     RBC Count 01/30/2018 3.76* 3.8 - 5.2 10e12/L Final     Hemoglobin 01/30/2018 11.6* 11.7 - 15.7 g/dL Final     Hematocrit 01/30/2018 35.4  35.0 - 47.0 % Final     MCV 01/30/2018 94  78 - 100 fl Final     MCH 01/30/2018 30.9  26.5 - 33.0 pg Final     MCHC 01/30/2018 32.8  31.5 - 36.5 g/dL Final      RDW 01/30/2018 12.6  10.0 - 15.0 % Final     Platelet Count 01/30/2018 287  150 - 450 10e9/L Final     Magnesium 01/30/2018 2.2  1.6 - 2.3 mg/dL Final     Phosphorus 01/30/2018 3.5  2.5 - 4.5 mg/dL Final

## 2021-05-21 ENCOUNTER — HOSPITAL ENCOUNTER (OUTPATIENT)
Dept: BONE DENSITY | Facility: CLINIC | Age: 66
End: 2021-05-21
Attending: INTERNAL MEDICINE
Payer: COMMERCIAL

## 2021-05-21 ENCOUNTER — HOSPITAL ENCOUNTER (OUTPATIENT)
Dept: MAMMOGRAPHY | Facility: CLINIC | Age: 66
End: 2021-05-21
Attending: INTERNAL MEDICINE
Payer: COMMERCIAL

## 2021-05-21 DIAGNOSIS — Z12.31 VISIT FOR SCREENING MAMMOGRAM: ICD-10-CM

## 2021-05-21 DIAGNOSIS — E55.9 VITAMIN D DEFICIENCY: ICD-10-CM

## 2021-05-21 DIAGNOSIS — Z78.0 POST-MENOPAUSE: ICD-10-CM

## 2021-05-21 DIAGNOSIS — E03.9 HYPOTHYROIDISM, UNSPECIFIED TYPE: Primary | ICD-10-CM

## 2021-05-21 PROCEDURE — 77080 DXA BONE DENSITY AXIAL: CPT

## 2021-05-21 PROCEDURE — 77067 SCR MAMMO BI INCL CAD: CPT

## 2021-05-21 RX ORDER — LEVOTHYROXINE SODIUM 25 UG/1
25 TABLET ORAL DAILY
Qty: 45 TABLET | Refills: 0 | Status: SHIPPED | OUTPATIENT
Start: 2021-05-21

## 2021-05-21 NOTE — RESULT ENCOUNTER NOTE
Please advise patient to follow-up with cardiology.   I reviewed your EKG and recommend to repeat EKG.  EKG did show atrial rhythm, instead of Normal sinus rhythm.  EKG can be repeated when she sees cardiology/we can place a referral to with cardiology at [facility] if she wishes.  Dr Johnson

## 2021-05-24 ENCOUNTER — TELEPHONE (OUTPATIENT)
Dept: FAMILY MEDICINE | Facility: CLINIC | Age: 66
End: 2021-05-24

## 2021-05-24 NOTE — TELEPHONE ENCOUNTER
----- Message from Telma Johnson MD sent at 5/21/2021  6:11 PM CDT -----  Please advise patient to follow-up with cardiology.   I reviewed your EKG and recommend to repeat EKG.  EKG did show atrial rhythm, instead of Normal sinus rhythm.  EKG can be repeated when she sees cardiology/we can place a referral to with cardiology at [facility] if she wishes.  Dr Johnson

## 2021-05-25 DIAGNOSIS — M81.0 OSTEOPOROSIS WITHOUT CURRENT PATHOLOGICAL FRACTURE, UNSPECIFIED OSTEOPOROSIS TYPE: Primary | ICD-10-CM

## 2021-05-25 NOTE — TELEPHONE ENCOUNTER
Next 5 appointments (look out 90 days)    Jun 09, 2021  8:00 AM  Office Visit with Izabela Esposito RPH  [facility] ([facility] ) [address] [place] [id number] [phone]        Discussed with Monserrat FLORES RN

## 2021-05-27 ENCOUNTER — HOSPITAL ENCOUNTER (OUTPATIENT)
Dept: MAMMOGRAPHY | Facility: CLINIC | Age: 66
End: 2021-05-27
Attending: INTERNAL MEDICINE
Payer: COMMERCIAL

## 2021-05-27 DIAGNOSIS — R92.8 ABNORMAL MAMMOGRAM: ICD-10-CM

## 2021-05-27 PROCEDURE — G0279 TOMOSYNTHESIS, MAMMO: HCPCS

## 2021-05-27 PROCEDURE — 76642 ULTRASOUND BREAST LIMITED: CPT | Mod: 50

## 2021-06-16 ENCOUNTER — OFFICE VISIT (OUTPATIENT)
Dept: CARDIOLOGY | Facility: CLINIC | Age: 66
End: 2021-06-16
Attending: INTERNAL MEDICINE
Payer: COMMERCIAL

## 2021-06-16 VITALS
SYSTOLIC BLOOD PRESSURE: 120 MMHG | DIASTOLIC BLOOD PRESSURE: 79 MMHG | OXYGEN SATURATION: 97 % | HEART RATE: 56 BPM | HEIGHT: 66 IN | BODY MASS INDEX: 32.43 KG/M2 | WEIGHT: 201.8 LBS

## 2021-06-16 DIAGNOSIS — I25.10 CORONARY ARTERY DISEASE INVOLVING NATIVE HEART WITHOUT ANGINA PECTORIS, UNSPECIFIED VESSEL OR LESION TYPE: ICD-10-CM

## 2021-06-16 PROCEDURE — 99214 OFFICE O/P EST MOD 30 MIN: CPT | Performed by: INTERNAL MEDICINE

## 2021-06-16 ASSESSMENT — MIFFLIN-ST. JEOR: SCORE: 1479.17

## 2021-06-16 NOTE — PROGRESS NOTES
HPI and Plan:   Today I had the pleasure of seeing Amy Bryan at [facility] clinic. She is a pleasant 65 year old patient with a past medical history of traumatic brain injury, coronary disease, and hypertension who presents to the clinic in initial consultation.  She is accompanied by her friend who is also her POA.  The patient lives at an assisted living facility and is in the process of moving to a new location.    The patient presented to the hospital in 2018 for cardiogenic shock thought to be secondary to Takotsubo cardiomyopathy.  She was also noted to have anterolateral ST elevations for which she underwent emergent coronary angiogram. Aspiration thrombectomy of the PL 3 branch and balloon dilation of the small D1 was performed.  She was also noted to have 70% proximal-mid LAD disease and the plan was to perform a staged PCI.  However, this never happened.  Echocardiogram showed ejection fraction of 40-45 % and a moderate pericardial effusion which was managed conservatively.    Today, the patient tells me that she was recently seen by her primary care physician.  An EKG was performed during that visit and the patient was told that it was abnormal and hence should see a cardiologist.  My review of the EKG shows a lot of baseline artifact.  It was read by the machine as a atrial rhythm which to me appears normal sinus rhythm.  As far as symptoms are concerned the patient reports shortness of breath on climbing stairs.  She is able to walk 1-1/2 blocks on a flat surface without any issues but invariably becomes short of breath on climbing 1 flight of stairs.  Does not report chest pain.  Most recent blood work shows LDL of 51 and triglyceride 336 mg/dL.    Assessment and plan  1.  Coronary artery disease-on Plavix due to aspirin allergy  2.  History of cardiogenic shock thought to be secondary to Takotsubo cardiomyopathy  3.  Hypertriglyceridemia  4.  Traumatic brain injury    I  discussed with the patient that we need to do ischemic work-up to rule out ischemia in the LAD territory for her exertional dyspnea.  Patient and her POA agree.  I will order a nuclear stress test today.  I will also order transthoracic echocardiogram to reassess her EF and wall motion.  I will have her come back and see an EVAN in a month after completion of all these tests.  If the EF is normal I suggest discontinuation of Lasix and potassium during that visit.  Also, if nuclear stress shows significant abnormality in the LAD territory I will consider performing a coronary angiogram.  As far as medications are concerned she will continue to take Plavix.  She is allergic to aspirin.  She is on 40 mg of atorvastatin which we will also continue today.  Will consider repeating lipids in a few months to reassess elevated triglycerides.    Thank you for allowing me to participate in the care of Amy Bryan    This note was completed in part using Dragon voice recognition software. Although reviewed after completion, some word and grammatical errors may occur.    Darren Wren MD  Cardiology    No orders of the defined types were placed in this encounter.      Orders Placed This Encounter   Medications     UNABLE TO FIND     Sig: MEDICATION NAME: aspercream     Ca Carbonate-Mag Hydroxide (ROLAIDS PO)       Medications Discontinued During This Encounter   Medication Reason     aspirin EC 81 MG EC tablet Stopped by Patient       Encounter Diagnosis   Name Primary?     Coronary artery disease involving native heart without angina pectoris, unspecified vessel or lesion type        CURRENT MEDICATIONS:  Current Outpatient Medications   Medication Sig Dispense Refill     acetaminophen (TYLENOL) 325 MG tablet Take 1,000 mg by mouth 2 times daily And at bedtime as needed for pain       AMITRIPTYLINE HCL PO Take 25 mg by mouth At Bedtime       ammonium lactate (AMLACTIN) 12 % cream Apply topically 2 times daily Apply to  feet       atorvastatin (LIPITOR) 40 MG tablet Take 1 tablet (40 mg) by mouth daily 30 tablet 0     benzoyl peroxide 5 % topical gel Apply topically 2 times daily as needed (redness/rosacea of face)       Ca Carbonate-Mag Hydroxide (ROLAIDS PO)        clopidogrel (PLAVIX) 75 MG tablet Take 75 mg by mouth daily       furosemide (LASIX) 20 MG tablet Take 20 mg by mouth daily       GABAPENTIN PO Take 100 mg by mouth 3 times daily       LevETIRAcetam (KEPPRA PO) Take 500 mg by mouth 2 times daily       levothyroxine (SYNTHROID/LEVOTHROID) 25 MCG tablet Take 1 tablet (25 mcg) by mouth daily 45 tablet 0     LISINOPRIL PO Take 5 mg by mouth daily       loperamide (IMODIUM) 2 MG capsule Take 2 mg by mouth 4 times daily as needed for diarrhea       LORazepam (ATIVAN) 1 MG tablet Take 1 tablet by mouth At Bedtime. 30 tablet      LORAZEPAM PO Take 0.5 mg by mouth 3 times daily as needed for anxiety       metoprolol tartrate (LOPRESSOR) 100 MG tablet Take 1 tablet (100 mg) by mouth 2 times daily (Patient taking differently: Take 75 mg by mouth daily ) 60 tablet 0     metroNIDAZOLE (METROCREAM) 0.75 % external cream Apply topically 2 times daily       MIRTAZAPINE PO Take 30 mg by mouth At Bedtime       nitroGLYcerin (NITROSTAT) 0.4 MG sublingual tablet For chest pain place 1 tablet under the tongue every 5 minutes for 3 doses. If symptoms persist 5 minutes after 1st dose call 911. 25 tablet 0     NYSTATIN PO        OMEPRAZOLE PO Take 20 mg by mouth every other day       PHENAZOPYRIDINE HCL PO Take 200 mg by mouth 3 times daily as needed (burning upon urination)       potassium chloride (KLOR-CON) 20 MEQ Packet Take 20 mEq by mouth 2 times daily       psyllium (METAMUCIL) 58.6 % POWD Take 1 teaspoonful by mouth daily Natural Fiber Pow Therapy       traZODone (DESYREL) 50 MG tablet Take 75 mg by mouth nightly as needed for sleep        triamcinolone (KENALOG) 0.1 % external cream Apply topically 2 times daily 45 g 0     trolamine  salicylate (ASPERCREME) 10 % cream Apply topically as needed for moderate pain To affected areas       UNABLE TO FIND MEDICATION NAME: aspercream       vitamin D3 (CHOLECALCIFEROL) 1.25 MG (80562 UT) capsule Take 1 capsule (50,000 Units) by mouth every 7 days 12 capsule 0     Alum Hydroxide-Mag Carbonate (GAVISCON PO) Take 1 tablet by mouth every 4 hours as needed (indigestion)       RaNITidine HCl (ZANTAC PO) Take 150 mg by mouth 2 times daily       ticagrelor (BRILINTA) 90 MG tablet Take 1 tablet (90 mg) by mouth every 12 hours (Patient not taking: Reported on 6/16/2021) 60 tablet 0     triamcinolone (KENALOG) 0.5 % cream Apply topically 2 times daily as needed (right elbow and handfor psoriasis)         ALLERGIES     Allergies   Allergen Reactions     Penicillins      dizziness       PAST MEDICAL HISTORY:  Past Medical History:   Diagnosis Date     Coma (H) 5-2012    CHT after a fall     Developmental delay      DVT (deep vein thrombosis) in pregnancy     post trauma     Fall 5-2012    multiple fracture     Hypertension      Menopause     age 52     Pelvic fracture (H) 5-201`2     Rib fracture 5-2012       PAST SURGICAL HISTORY:  Past Surgical History:   Procedure Laterality Date     COLONOSCOPY  2010     LASER YAG CAPSULOTOMY Left 5/29/2018    Procedure: LASER YAG CAPSULOTOMY;  LEFT YAG LASER CAPSULOTOMY ;  Surgeon: Tabby Guillaume MD;  Location: [facility]     LASER YAG CAPSULOTOMY Right 6/5/2018    Procedure: LASER YAG CAPSULOTOMY;  RIGHT EYE YAG LASER CAPSULOTOMY ;  Surgeon: Tabby Guillaume MD;  Location: [facility]       FAMILY HISTORY:  Family History   Problem Relation Age of Onset     Other - See Comments Father         alpha 1 antitrypsin deficiency      Cancer - colorectal Mother      Diabetes Sister        SOCIAL HISTORY:  Social History     Socioeconomic History     Marital status:      Spouse name: None     Number of children: None     Years of education: None     Highest education level: None  "  Occupational History     None   Social Needs     Financial resource strain: None     Food insecurity     Worry: None     Inability: None     Transportation needs     Medical: None     Non-medical: None   Tobacco Use     Smoking status: Never Smoker     Smokeless tobacco: Never Used   Substance and Sexual Activity     Alcohol use: Yes     Alcohol/week: 1.7 standard drinks     Types: 2 drink(s) per week     Drug use: None     Sexual activity: None   Lifestyle     Physical activity     Days per week: None     Minutes per session: None     Stress: None   Relationships     Social connections     Talks on phone: None     Gets together: None     Attends Buddhist service: None     Active member of club or organization: None     Attends meetings of clubs or organizations: None     Relationship status: None     Intimate partner violence     Fear of current or ex partner: None     Emotionally abused: None     Physically abused: None     Forced sexual activity: None   Other Topics Concern     Parent/sibling w/ CABG, MI or angioplasty before 65F 55M? Not Asked   Social History Narrative     None       Review of Systems:  Skin:  Negative rash;itching rosesa   Eyes:  Negative      ENT:  Negative      Respiratory:  Positive for dyspnea on exertion     Cardiovascular:  chest pain;palpitations;Negative for;dizziness;lightheadedness;exercise intolerance;syncope or near-syncope edema;fatigue Left ankle  Gastroenterology: Positive for heartburn    Genitourinary:  Negative nocturia;incontinence    Musculoskeletal:  Positive for arthritis    Neurologic:  Negative for migraine headaches;headaches    Psychiatric:  Positive for anxiety;depression    Heme/Lymph/Imm:  not assessed      Endocrine:  Positive for thyroid disorder      Physical Exam:  Vitals: /79   Pulse 56   Ht 1.664 m (5' 5.5\")   Wt 91.5 kg (201 lb 12.8 oz)   SpO2 97%   BMI 33.07 kg/m    Eyes: No icterus.  Pulmonary: Chest symmetric, lungs clear bilaterally and no " crackles, wheezes or rales.  Cardiovascular: RRR with normal S1 and S2, no murmur, JVP normal.  Musculoskeletal: Edema of the lower extremities: None.  Neurologic: Oriented and appropriate without obvious focal deficits.   Psychiatric: Normal affect.     Recent Lab Results:  LIPID RESULTS:  Lab Results   Component Value Date    CHOL 145 05/19/2021    HDL 27 (L) 05/19/2021    LDL 51 05/19/2021    TRIG 336 (H) 05/19/2021       LIVER ENZYME RESULTS:  Lab Results   Component Value Date    AST 25 05/19/2021    ALT 36 05/19/2021       CBC RESULTS:  Lab Results   Component Value Date    WBC 7.6 05/19/2021    RBC 4.72 05/19/2021    HGB 14.5 05/19/2021    HCT 45.4 05/19/2021    MCV 96 05/19/2021    MCH 30.7 05/19/2021    MCHC 31.9 05/19/2021    RDW 12.5 05/19/2021     05/19/2021       BMP RESULTS:  Lab Results   Component Value Date     05/19/2021    POTASSIUM 4.7 05/19/2021    CHLORIDE 107 05/19/2021    CO2 33 (H) 05/19/2021    ANIONGAP 1 (L) 05/19/2021    GLC 97 05/19/2021    BUN 16 05/19/2021    CR 0.90 05/19/2021    GFRESTIMATED 67 05/19/2021    GFRESTBLACK 78 05/19/2021    HECTOR 9.3 05/19/2021        A1C RESULTS:  Lab Results   Component Value Date    A1C 5.5 01/26/2018       INR RESULTS:  Lab Results   Component Value Date    INR 2.6 08/29/2012    INR 1.8 08/15/2012       CC  Telma Johnson MD  [address] [address]    All medical records were reviewed in detail and discussed with the patient. Greater than 30 mins were spent with the patient, 50% of this time was spent on counseling and coordination of care.  After visit summary was printed and given to the patient.

## 2021-06-16 NOTE — LETTER
6/16/2021    Telma Johnson MD  [address]  [facility] [address]    RE: Amy Bryan       Dear Colleague,    I had the pleasure of seeing Amy Bryan in the [facility].    HPI and Plan:   Today I had the pleasure of seeing Amy Bryan at [facility] clinic. She is a pleasant 65 year old patient with a past medical history of traumatic brain injury, coronary disease, and hypertension who presents to the clinic in initial consultation.  She is accompanied by her friend who is also her POA.  The patient lives at an assisted living facility and is in the process of moving to a new location.    The patient presented to the hospital in 2018 for cardiogenic shock thought to be secondary to Takotsubo cardiomyopathy.  She was also noted to have anterolateral ST elevations for which she underwent emergent coronary angiogram. Aspiration thrombectomy of the PL 3 branch and balloon dilation of the small D1 was performed.  She was also noted to have 70% proximal-mid LAD disease and the plan was to perform a staged PCI.  However, this never happened.  Echocardiogram showed ejection fraction of 40-45 % and a moderate pericardial effusion which was managed conservatively.    Today, the patient tells me that she was recently seen by her primary care physician.  An EKG was performed during that visit and the patient was told that it was abnormal and hence should see a cardiologist.  My review of the EKG shows a lot of baseline artifact.  It was read by the machine as a atrial rhythm which to me appears normal sinus rhythm.  As far as symptoms are concerned the patient reports shortness of breath on climbing stairs.  She is able to walk 1-1/2 blocks on a flat surface without any issues but invariably becomes short of breath on climbing 1 flight of stairs.  Does not report chest pain.  Most recent blood work shows LDL of 51 and  triglyceride 336 mg/dL.    Assessment and plan  1.  Coronary artery disease-on Plavix due to aspirin allergy  2.  History of cardiogenic shock thought to be secondary to Takotsubo cardiomyopathy  3.  Hypertriglyceridemia  4.  Traumatic brain injury    I discussed with the patient that we need to do ischemic work-up to rule out ischemia in the LAD territory for her exertional dyspnea.  Patient and her POA agree.  I will order a nuclear stress test today.  I will also order transthoracic echocardiogram to reassess her EF and wall motion.  I will have her come back and see an EVAN in a month after completion of all these tests.  If the EF is normal I suggest discontinuation of Lasix and potassium during that visit.  Also, if nuclear stress shows significant abnormality in the LAD territory I will consider performing a coronary angiogram.  As far as medications are concerned she will continue to take Plavix.  She is allergic to aspirin.  She is on 40 mg of atorvastatin which we will also continue today.  Will consider repeating lipids in a few months to reassess elevated triglycerides.    Thank you for allowing me to participate in the care of Amy Bryan    This note was completed in part using Dragon voice recognition software. Although reviewed after completion, some word and grammatical errors may occur.    Darren Wren MD  Cardiology    No orders of the defined types were placed in this encounter.      Orders Placed This Encounter   Medications     UNABLE TO FIND     Sig: MEDICATION NAME: aspercream     Ca Carbonate-Mag Hydroxide (ROLAIDS PO)       Medications Discontinued During This Encounter   Medication Reason     aspirin EC 81 MG EC tablet Stopped by Patient       Encounter Diagnosis   Name Primary?     Coronary artery disease involving native heart without angina pectoris, unspecified vessel or lesion type        CURRENT MEDICATIONS:  Current Outpatient Medications   Medication Sig Dispense Refill      acetaminophen (TYLENOL) 325 MG tablet Take 1,000 mg by mouth 2 times daily And at bedtime as needed for pain       AMITRIPTYLINE HCL PO Take 25 mg by mouth At Bedtime       ammonium lactate (AMLACTIN) 12 % cream Apply topically 2 times daily Apply to feet       atorvastatin (LIPITOR) 40 MG tablet Take 1 tablet (40 mg) by mouth daily 30 tablet 0     benzoyl peroxide 5 % topical gel Apply topically 2 times daily as needed (redness/rosacea of face)       Ca Carbonate-Mag Hydroxide (ROLAIDS PO)        clopidogrel (PLAVIX) 75 MG tablet Take 75 mg by mouth daily       furosemide (LASIX) 20 MG tablet Take 20 mg by mouth daily       GABAPENTIN PO Take 100 mg by mouth 3 times daily       LevETIRAcetam (KEPPRA PO) Take 500 mg by mouth 2 times daily       levothyroxine (SYNTHROID/LEVOTHROID) 25 MCG tablet Take 1 tablet (25 mcg) by mouth daily 45 tablet 0     LISINOPRIL PO Take 5 mg by mouth daily       loperamide (IMODIUM) 2 MG capsule Take 2 mg by mouth 4 times daily as needed for diarrhea       LORazepam (ATIVAN) 1 MG tablet Take 1 tablet by mouth At Bedtime. 30 tablet      LORAZEPAM PO Take 0.5 mg by mouth 3 times daily as needed for anxiety       metoprolol tartrate (LOPRESSOR) 100 MG tablet Take 1 tablet (100 mg) by mouth 2 times daily (Patient taking differently: Take 75 mg by mouth daily ) 60 tablet 0     metroNIDAZOLE (METROCREAM) 0.75 % external cream Apply topically 2 times daily       MIRTAZAPINE PO Take 30 mg by mouth At Bedtime       nitroGLYcerin (NITROSTAT) 0.4 MG sublingual tablet For chest pain place 1 tablet under the tongue every 5 minutes for 3 doses. If symptoms persist 5 minutes after 1st dose call 911. 25 tablet 0     NYSTATIN PO        OMEPRAZOLE PO Take 20 mg by mouth every other day       PHENAZOPYRIDINE HCL PO Take 200 mg by mouth 3 times daily as needed (burning upon urination)       potassium chloride (KLOR-CON) 20 MEQ Packet Take 20 mEq by mouth 2 times daily       psyllium (METAMUCIL) 58.6 % POWD  Take 1 teaspoonful by mouth daily Natural Fiber Pow Therapy       traZODone (DESYREL) 50 MG tablet Take 75 mg by mouth nightly as needed for sleep        triamcinolone (KENALOG) 0.1 % external cream Apply topically 2 times daily 45 g 0     trolamine salicylate (ASPERCREME) 10 % cream Apply topically as needed for moderate pain To affected areas       UNABLE TO FIND MEDICATION NAME: aspercream       vitamin D3 (CHOLECALCIFEROL) 1.25 MG (59932 UT) capsule Take 1 capsule (50,000 Units) by mouth every 7 days 12 capsule 0     Alum Hydroxide-Mag Carbonate (GAVISCON PO) Take 1 tablet by mouth every 4 hours as needed (indigestion)       RaNITidine HCl (ZANTAC PO) Take 150 mg by mouth 2 times daily       ticagrelor (BRILINTA) 90 MG tablet Take 1 tablet (90 mg) by mouth every 12 hours (Patient not taking: Reported on 6/16/2021) 60 tablet 0     triamcinolone (KENALOG) 0.5 % cream Apply topically 2 times daily as needed (right elbow and handfor psoriasis)         ALLERGIES     Allergies   Allergen Reactions     Penicillins      dizziness       PAST MEDICAL HISTORY:  Past Medical History:   Diagnosis Date     Coma (H) 5-2012    CHT after a fall     Developmental delay      DVT (deep vein thrombosis) in pregnancy     post trauma     Fall 5-2012    multiple fracture     Hypertension      Menopause     age 52     Pelvic fracture (H) 5-201`2     Rib fracture 5-2012       PAST SURGICAL HISTORY:  Past Surgical History:   Procedure Laterality Date     COLONOSCOPY  2010     LASER YAG CAPSULOTOMY Left 5/29/2018    Procedure: LASER YAG CAPSULOTOMY;  LEFT YAG LASER CAPSULOTOMY ;  Surgeon: Tabby Guillaume MD;  Location: [facility]     LASER YAG CAPSULOTOMY Right 6/5/2018    Procedure: LASER YAG CAPSULOTOMY;  RIGHT EYE YAG LASER CAPSULOTOMY ;  Surgeon: Tabby Guillaume MD;  Location: [facility]       FAMILY HISTORY:  Family History   Problem Relation Age of Onset     Other - See Comments Father         alpha 1 antitrypsin deficiency      Cancer -  colorectal Mother      Diabetes Sister        SOCIAL HISTORY:  Social History     Socioeconomic History     Marital status:      Spouse name: None     Number of children: None     Years of education: None     Highest education level: None   Occupational History     None   Social Needs     Financial resource strain: None     Food insecurity     Worry: None     Inability: None     Transportation needs     Medical: None     Non-medical: None   Tobacco Use     Smoking status: Never Smoker     Smokeless tobacco: Never Used   Substance and Sexual Activity     Alcohol use: Yes     Alcohol/week: 1.7 standard drinks     Types: 2 drink(s) per week     Drug use: None     Sexual activity: None   Lifestyle     Physical activity     Days per week: None     Minutes per session: None     Stress: None   Relationships     Social connections     Talks on phone: None     Gets together: None     Attends [employer] service: None     Active member of club or organization: None     Attends meetings of clubs or organizations: None     Relationship status: None     Intimate partner violence     Fear of current or ex partner: None     Emotionally abused: None     Physically abused: None     Forced sexual activity: None   Other Topics Concern     Parent/sibling w/ CABG, MI or angioplasty before 65F 55M? Not Asked   Social History Narrative     None       Review of Systems:  Skin:  Negative rash;itching rosesa   Eyes:  Negative      ENT:  Negative      Respiratory:  Positive for dyspnea on exertion     Cardiovascular:  chest pain;palpitations;Negative for;dizziness;lightheadedness;exercise intolerance;syncope or near-syncope edema;fatigue Left ankle  Gastroenterology: Positive for heartburn    Genitourinary:  Negative nocturia;incontinence    Musculoskeletal:  Positive for arthritis    Neurologic:  Negative for migraine headaches;headaches    Psychiatric:  Positive for anxiety;depression    Heme/Lymph/Imm:  not assessed      Endocrine:   "Positive for thyroid disorder      Physical Exam:  Vitals: /79   Pulse 56   Ht 1.664 m (5' 5.5\")   Wt 91.5 kg (201 lb 12.8 oz)   SpO2 97%   BMI 33.07 kg/m    Eyes: No icterus.  Pulmonary: Chest symmetric, lungs clear bilaterally and no crackles, wheezes or rales.  Cardiovascular: RRR with normal S1 and S2, no murmur, JVP normal.  Musculoskeletal: Edema of the lower extremities: None.  Neurologic: Oriented and appropriate without obvious focal deficits.   Psychiatric: Normal affect.     Recent Lab Results:  LIPID RESULTS:  Lab Results   Component Value Date    CHOL 145 05/19/2021    HDL 27 (L) 05/19/2021    LDL 51 05/19/2021    TRIG 336 (H) 05/19/2021       LIVER ENZYME RESULTS:  Lab Results   Component Value Date    AST 25 05/19/2021    ALT 36 05/19/2021       CBC RESULTS:  Lab Results   Component Value Date    WBC 7.6 05/19/2021    RBC 4.72 05/19/2021    HGB 14.5 05/19/2021    HCT 45.4 05/19/2021    MCV 96 05/19/2021    MCH 30.7 05/19/2021    MCHC 31.9 05/19/2021    RDW 12.5 05/19/2021     05/19/2021       BMP RESULTS:  Lab Results   Component Value Date     05/19/2021    POTASSIUM 4.7 05/19/2021    CHLORIDE 107 05/19/2021    CO2 33 (H) 05/19/2021    ANIONGAP 1 (L) 05/19/2021    GLC 97 05/19/2021    BUN 16 05/19/2021    CR 0.90 05/19/2021    GFRESTIMATED 67 05/19/2021    GFRESTBLACK 78 05/19/2021    HECTOR 9.3 05/19/2021        A1C RESULTS:  Lab Results   Component Value Date    A1C 5.5 01/26/2018       INR RESULTS:  Lab Results   Component Value Date    INR 2.6 08/29/2012    INR 1.8 08/15/2012       All medical records were reviewed in detail and discussed with the patient. Greater than 30 mins were spent with the patient, 50% of this time was spent on counseling and coordination of care.  After visit summary was printed and given to the patient.    Thank you for allowing me to participate in the care of your patient.      Sincerely,     Darren Wren MD     [facility]" [facility]    cc:   Telma Johnson MD  [address] [place] [id number]

## 2021-06-21 DIAGNOSIS — Z76.0 MEDICATION REFILL: ICD-10-CM

## 2021-06-21 NOTE — TELEPHONE ENCOUNTER
Reason for Call:  Medication or medication refill:    Do you use a [facility]?  Name of the pharmacy and phone number for the current request:  [facility] - [place], [place] - [address]    Name of the medication requested: LORazepam (ATIVAN) 1 MG tablet    Can we leave a detailed message on this number? YES    Phone number [facility] can be reached at: Other phone number:  [phone]    Best Time: anytime    Call taken on 6/21/2021 at 11:08 AM by Kamran Trammell

## 2021-06-22 ENCOUNTER — MEDICAL CORRESPONDENCE (OUTPATIENT)
Dept: HEALTH INFORMATION MANAGEMENT | Facility: CLINIC | Age: 66
End: 2021-06-22

## 2021-06-22 RX ORDER — LORAZEPAM 1 MG/1
1 TABLET ORAL AT BEDTIME
Qty: 30 TABLET | OUTPATIENT
Start: 2021-06-22

## 2021-06-22 NOTE — TELEPHONE ENCOUNTER
Routing refill request to provider for review/approval because:  Drug not on the [facility] refill protocol     Pending Prescriptions:                       Disp   Refills    LORazepam (ATIVAN) 1 MG tablet             30 tab*         Sig: Take 1 tablet (1 mg) by mouth At Bedtime    Last Written Prescription Date:  9-  . Not sure if this date is correct?   Last Fill Quantity: 30,  # refills: 0   Last office visit: 5/19/2021 with prescribing provider:     Future Office Visit:   Next 5 appointments (look out 90 days)    Jul 16, 2021  1:00 PM  Return Visit with Abigail Croft PA-C  [facility] ([facility] ) [address] [place] [id number] [phone]           Liss Rice RN  [facility]

## 2021-06-22 NOTE — TELEPHONE ENCOUNTER
Patient need to establish with mental health clinic for evaluation and treatment. Last refill was 8 yrs ago

## 2021-06-23 NOTE — TELEPHONE ENCOUNTER
CHARBEL   Called pt who received this from [facility] and actually may be planning to be followed by [facility] which would be a good fit for this pt in a [facility], however will call here if needing follow up.    Removed Dr. Johnson as PCP       Taty Arango, RN  [facility] RN Triage Team

## 2021-06-28 NOTE — PROGRESS NOTES
[facility] - PRIMARY CARE SKIN    CC: hand rash, redness on the face  SUBJECTIVE:   Amy Bryan is a(n) 65 year old female who presents to clinic today with her care assistant because of the following issue(s) ;     Issue one :  History of rosacea. Face gets red and hot. Uses Phisophil facial wash which has been helpful for some of her facial symptoms- she cannot tell me her skin symptoms at that time.     Issue two : develops itchy rash on the mid palms, on the index fingers. Duration : two months. Treatment tried hydrocortisone cream , most recent triamcinolone 0.1% cream       Personal Medical History  Skin Cancer: NO  Eczema Psoriasis Lupus   NO NO NO   Other:   .    Family Medical History  Skin Cancer: NO  Eczema Psoriasis Lupus   NO NO NO   Other:   .      Occupation: disabled ().    Refer to electronic medical record (EMR) for past medical history and medications.      ROS: 14 point review of systems was negative except the symptoms listed above in the HPI.        OBJECTIVE:   GENERAL: healthy, alert and no distress.  HEENT: PERRL. Conjunctiva, sclera clear.  SKIN: Perez Skin Type - I.  Face ,hands, elbows, knees  examined. The dermatoscope was used to help evaluate pigmented lesions.  Skin Pertinent Findings:  Face : mid portion of face erythematous and erythematous papuels   Forehead : some light scaling  Mid palm- erythematous, scaly patch, light erythematous macule eruption on the side of the fingers.  Finger nails- no pitting    Diagnostic Test Results:  none     ASSESSMENT:     Encounter Diagnoses   Name Primary?     Rosacea Yes     Eczema, unspecified type      MDM:  Discussed chronic nature of eczema and rosacea. Discussed control vs cure of these two conditions. I think at this time the palmar rash is more typical for eczema as compared to psoriasis     PLAN:   Patient Instructions   Rosacea     Metronidazole gel 0.75% apply to mid portion of the face two times per day     It will  take at least 6-8 weeks to  the effectiveness of the metronidazole gel.     Use facial moisturizer with sunscreen like cetaphil or cerave      Recheck in 3 months if metronidazole gel is not helpful otherwise recheck in one year    Hand eczema     Cerave in the jar - use this on the hands after you wash your hands or your hands get wet     Triamcinolone 0.1% cream apply to affected areas on hands two times per day for 14 consecutives and then when needed

## 2021-06-29 ENCOUNTER — OFFICE VISIT (OUTPATIENT)
Dept: FAMILY MEDICINE | Facility: CLINIC | Age: 66
End: 2021-06-29
Payer: COMMERCIAL

## 2021-06-29 VITALS — DIASTOLIC BLOOD PRESSURE: 62 MMHG | SYSTOLIC BLOOD PRESSURE: 118 MMHG

## 2021-06-29 DIAGNOSIS — L30.9 ECZEMA, UNSPECIFIED TYPE: ICD-10-CM

## 2021-06-29 DIAGNOSIS — L71.9 ROSACEA: Primary | ICD-10-CM

## 2021-06-29 PROCEDURE — 99213 OFFICE O/P EST LOW 20 MIN: CPT | Performed by: FAMILY MEDICINE

## 2021-06-29 RX ORDER — TRIAMCINOLONE ACETONIDE 1 MG/G
CREAM TOPICAL
Qty: 80 G | Refills: 1 | Status: SHIPPED | OUTPATIENT
Start: 2021-06-29 | End: 2021-12-07

## 2021-06-29 RX ORDER — METRONIDAZOLE 7.5 MG/G
GEL TOPICAL 2 TIMES DAILY
Qty: 45 G | Refills: 3 | Status: SHIPPED | OUTPATIENT
Start: 2021-06-29 | End: 2021-12-07

## 2021-06-29 NOTE — LETTER
6/29/2021         RE: Amy Bryan  [address] [place] [id number]        Dear Colleague,    Thank you for referring your patient, Amy Bryan, to the [facility]. Please see a copy of my visit note below.    [facility] - PRIMARY CARE SKIN    CC: hand rash, redness on the face  SUBJECTIVE:   Amy Bryan is a(n) 65 year old female who presents to clinic today with her care assistant because of the following issue(s) ;     Issue one :  History of rosacea. Face gets red and hot. Uses Phisophil facial wash which has been helpful for some of her facial symptoms- she cannot tell me her skin symptoms at that time.     Issue two : develops itchy rash on the mid palms, on the index fingers. Duration : two months. Treatment tried hydrocortisone cream , most recent triamcinolone 0.1% cream       Personal Medical History  Skin Cancer: NO  Eczema Psoriasis Lupus   NO NO NO   Other:   .    Family Medical History  Skin Cancer: NO  Eczema Psoriasis Lupus   NO NO NO   Other:   .      Occupation: disabled ().    Refer to electronic medical record (EMR) for past medical history and medications.      ROS: 14 point review of systems was negative except the symptoms listed above in the HPI.        OBJECTIVE:   GENERAL: healthy, alert and no distress.  HEENT: PERRL. Conjunctiva, sclera clear.  SKIN: Perez Skin Type - I.  Face ,hands, elbows, knees  examined. The dermatoscope was used to help evaluate pigmented lesions.  Skin Pertinent Findings:  Face : mid portion of face erythematous and erythematous papuels   Forehead : some light scaling  Mid palm- erythematous, scaly patch, light erythematous macule eruption on the side of the fingers.  Finger nails- no pitting    Diagnostic Test Results:  none     ASSESSMENT:     Encounter Diagnoses   Name Primary?     Rosacea Yes     Eczema, unspecified type      MDM:  Discussed chronic nature of eczema and rosacea. Discussed  control vs cure of these two conditions. I think at this time the palmar rash is more typical for eczema as compared to psoriasis     PLAN:   Patient Instructions   Rosacea     Metronidazole gel 0.75% apply to mid portion of the face two times per day     It will take at least 6-8 weeks to  the effectiveness of the metronidazole gel.     Use facial moisturizer with sunscreen like cetaphil or cerave      Recheck in 3 months if metronidazole gel is not helpful otherwise recheck in one year    Hand eczema     Cerave in the jar - use this on the hands after you wash your hands or your hands get wet     Triamcinolone 0.1% cream apply to affected areas on hands two times per day for 14 consecutives and then when needed        Again, thank you for allowing me to participate in the care of your patient.        Sincerely,        Radha Weiss MD

## 2021-06-29 NOTE — PATIENT INSTRUCTIONS
Rosacea     Metronidazole gel 0.75% apply to mid portion of the face two times per day     It will take at least 6-8 weeks to  the effectiveness of the metronidazole gel.     Use facial moisturizer with sunscreen like cetaphil or cerave      Recheck in 3 months if metronidazole gel is not helpful otherwise recheck in one year    Hand eczema     Cerave in the jar - use this on the hands after you wash your hands or your hands get wet     Triamcinolone 0.1% cream apply to affected areas on hands two times per day for 14 consecutives and then when needed

## 2021-06-30 ENCOUNTER — HOSPITAL ENCOUNTER (OUTPATIENT)
Dept: CARDIOLOGY | Facility: CLINIC | Age: 66
End: 2021-06-30
Attending: INTERNAL MEDICINE
Payer: COMMERCIAL

## 2021-06-30 VITALS
SYSTOLIC BLOOD PRESSURE: 128 MMHG | BODY MASS INDEX: 32.11 KG/M2 | WEIGHT: 199.8 LBS | DIASTOLIC BLOOD PRESSURE: 76 MMHG | HEIGHT: 66 IN | OXYGEN SATURATION: 96 % | HEART RATE: 60 BPM

## 2021-06-30 DIAGNOSIS — I25.10 CORONARY ARTERY DISEASE INVOLVING NATIVE HEART WITHOUT ANGINA PECTORIS, UNSPECIFIED VESSEL OR LESION TYPE: ICD-10-CM

## 2021-06-30 LAB
CV STRESS MAX HR HE: 84
NUC STRESS EJECTION FRACTION: 45 %
RATE PRESSURE PRODUCT: NORMAL
STRESS ECHO BASELINE DIASTOLIC HE: 76
STRESS ECHO BASELINE HR: 62
STRESS ECHO BASELINE SYSTOLIC BP: 128
STRESS ECHO CALCULATED PERCENT HR: 54 %
STRESS ECHO LAST STRESS DIASTOLIC BP: 74
STRESS ECHO LAST STRESS SYSTOLIC BP: 120
STRESS ECHO TARGET HR: 157

## 2021-06-30 PROCEDURE — 250N000011 HC RX IP 250 OP 636: Performed by: INTERNAL MEDICINE

## 2021-06-30 PROCEDURE — 78452 HT MUSCLE IMAGE SPECT MULT: CPT

## 2021-06-30 PROCEDURE — 93018 CV STRESS TEST I&R ONLY: CPT | Performed by: INTERNAL MEDICINE

## 2021-06-30 PROCEDURE — 93016 CV STRESS TEST SUPVJ ONLY: CPT | Performed by: INTERNAL MEDICINE

## 2021-06-30 PROCEDURE — 343N000001 HC RX 343: Performed by: INTERNAL MEDICINE

## 2021-06-30 PROCEDURE — A9502 TC99M TETROFOSMIN: HCPCS | Performed by: INTERNAL MEDICINE

## 2021-06-30 PROCEDURE — 78452 HT MUSCLE IMAGE SPECT MULT: CPT | Mod: 26 | Performed by: INTERNAL MEDICINE

## 2021-06-30 RX ORDER — REGADENOSON 0.08 MG/ML
0.4 INJECTION, SOLUTION INTRAVENOUS ONCE
Status: COMPLETED | OUTPATIENT
Start: 2021-06-30 | End: 2021-06-30

## 2021-06-30 RX ORDER — ACYCLOVIR 200 MG/1
0-1 CAPSULE ORAL
Status: DISCONTINUED | OUTPATIENT
Start: 2021-06-30 | End: 2021-07-01 | Stop reason: HOSPADM

## 2021-06-30 RX ORDER — AMINOPHYLLINE 25 MG/ML
50-100 INJECTION, SOLUTION INTRAVENOUS
Status: DISCONTINUED | OUTPATIENT
Start: 2021-06-30 | End: 2021-07-01 | Stop reason: HOSPADM

## 2021-06-30 RX ORDER — CAFFEINE CITRATE 20 MG/ML
60 SOLUTION INTRAVENOUS
Status: DISCONTINUED | OUTPATIENT
Start: 2021-06-30 | End: 2021-07-01 | Stop reason: HOSPADM

## 2021-06-30 RX ORDER — ALBUTEROL SULFATE 90 UG/1
2 AEROSOL, METERED RESPIRATORY (INHALATION) EVERY 5 MIN PRN
Status: DISCONTINUED | OUTPATIENT
Start: 2021-06-30 | End: 2021-07-01 | Stop reason: HOSPADM

## 2021-06-30 RX ADMIN — REGADENOSON 0.4 MG: 0.08 INJECTION, SOLUTION INTRAVENOUS at 10:22

## 2021-06-30 RX ADMIN — TETROFOSMIN 17.72 MCI.: 1.38 INJECTION, POWDER, LYOPHILIZED, FOR SOLUTION INTRAVENOUS at 10:24

## 2021-06-30 RX ADMIN — TETROFOSMIN 6.12 MCI.: 1.38 INJECTION, POWDER, LYOPHILIZED, FOR SOLUTION INTRAVENOUS at 08:52

## 2021-06-30 ASSESSMENT — MIFFLIN-ST. JEOR: SCORE: 1470.1

## 2021-07-01 ENCOUNTER — CARE COORDINATION (OUTPATIENT)
Dept: CARDIOLOGY | Facility: CLINIC | Age: 66
End: 2021-07-01

## 2021-07-01 DIAGNOSIS — I25.10 CAD (CORONARY ARTERY DISEASE): Primary | ICD-10-CM

## 2021-07-01 RX ORDER — ISOSORBIDE MONONITRATE 30 MG/1
30 TABLET, EXTENDED RELEASE ORAL DAILY
Qty: 30 TABLET | Refills: 0 | Status: SHIPPED | OUTPATIENT
Start: 2021-07-01 | End: 2021-07-20

## 2021-07-01 RX ORDER — ISOSORBIDE MONONITRATE 30 MG/1
30 TABLET, EXTENDED RELEASE ORAL DAILY
Qty: 30 TABLET | Refills: 0 | Status: SHIPPED | OUTPATIENT
Start: 2021-07-01 | End: 2021-07-01

## 2021-07-01 NOTE — PROGRESS NOTES
Monserrat called back. She scheduled visit with Maryanne Croft PA-C. See below:     Future Appointments   Date Time Provider Department Center   7/15/2021 10:00 AM [id number]   7/16/2021  1:00 PM Abigail Croft PA-C [facility]     She states pt is now in a group home and needs Imdur to be sent to [facility], which I sent.     She states she also needs written order sent to her group home so that they can administer the medication.    She requests that order be emailed to [facility]. E-mail: [email or web address]. Apparently they do not have fax.     Will have in clinic RN print order and email. Since Monserrat is going out of town soon, she wondered if she can give my name and number to [facility] in case they have questions. I told her that would be just fine.     She wanted me to give Maryanne Croft PA-C an update that pt may get confused and agitated during r/b conversation. Will let Maryanne know per request.     Nya Sharif, BSN, RN, PHN, HNB-BC   7/1/2021 at 12:15 PM

## 2021-07-01 NOTE — PROGRESS NOTES
Nuc stress test results available in Epic. See interpretation below:        The nuclear stress test is abnormal.     There is a medium sized area of transmural infarction in the lateral and anterolateral segment(s) of the left ventricle.     Left ventricular function is mildly reduced.     The left ventricular ejection fraction at stress is estimated at 45%.  The measured EF overestimated the true LV function.     There is no prior study for comparison.     Pt saw Dr. Wren 6/16/21, referred by PCP for abnormal EKG. Dr. Wren ordered nuc and echo to rule out ischemia. Per note, if nuc shows LAD abnormality, will recommend coronary angiogram.     Future Appointments   Date Time Provider Department Center   7/15/2021 10:00 AM [id number] [facility]G   7/16/2021  1:00 PM Abigail Croft PA-C [facility]     Will route to Dr. Wren for advisement.     Nya Sharif, BSN, RN, PHN, HNB-BC   7/1/2021 at 8:39 AM

## 2021-07-01 NOTE — PROGRESS NOTES
Darren Wren MD  You 14 minutes ago (11:08 AM)     I reviewed the images of angiogram and stress. I think she should have coronary angiogram. But 1st I would start her on imdur  30 mg daily to see if that helps her symptoms. I can see her if she needs to be seen before cath.       Called CINDY German (pt's friend) to review. No answer. Left detailed VM with pt's test results and plan. Advised that scheduling would call her to set up OV to discuss coronary angiogram.     Ordered Imdur 30 mg tabs and sent to [facility] pharmacy.     Provided my phone number and number for scheduling. Also messaged scheduling to call Monserrat to set up appt.     MIGUEL NelsonN, RN, PHN, HNB-BC   7/1/2021 at 11:27 AM

## 2021-07-05 ENCOUNTER — TELEPHONE (OUTPATIENT)
Dept: FAMILY MEDICINE | Facility: CLINIC | Age: 66
End: 2021-07-05

## 2021-07-05 RX ORDER — BENZOYL PEROXIDE 5 G/100G
GEL TOPICAL 2 TIMES DAILY PRN
Qty: 45 G | Refills: 0 | OUTPATIENT
Start: 2021-07-05

## 2021-07-05 RX ORDER — TRIAMCINOLONE ACETONIDE 5 MG/G
CREAM TOPICAL 2 TIMES DAILY PRN
Qty: 454 G | Refills: 0 | OUTPATIENT
Start: 2021-07-05

## 2021-07-05 NOTE — TELEPHONE ENCOUNTER
[facility] sent a fax asking if TMC 0.5% and Benzolyl peroxide should be renewed or d/c'd off patients med list?

## 2021-07-05 NOTE — TELEPHONE ENCOUNTER
Benzoyl peroxide should be discontinued  Triamcinolone 0.5 % discontinue for now.     Thank you,   Radha Weiss M.D.

## 2021-07-09 PROBLEM — I25.10 CORONARY ARTERY DISEASE INVOLVING NATIVE CORONARY ARTERY OF NATIVE HEART: Status: ACTIVE | Noted: 2021-07-09

## 2021-07-09 PROBLEM — R57.0 CARDIOGENIC SHOCK (H): Status: ACTIVE | Noted: 2021-07-09

## 2021-07-10 ENCOUNTER — HEALTH MAINTENANCE LETTER (OUTPATIENT)
Age: 66
End: 2021-07-10

## 2021-07-15 ENCOUNTER — HOSPITAL ENCOUNTER (OUTPATIENT)
Dept: CARDIOLOGY | Facility: CLINIC | Age: 66
Discharge: HOME | End: 2021-07-15
Attending: INTERNAL MEDICINE | Admitting: INTERNAL MEDICINE
Payer: COMMERCIAL

## 2021-07-15 DIAGNOSIS — I25.10 CORONARY ARTERY DISEASE INVOLVING NATIVE CORONARY ARTERY OF NATIVE HEART: Primary | ICD-10-CM

## 2021-07-15 DIAGNOSIS — I25.10 CORONARY ARTERY DISEASE INVOLVING NATIVE HEART WITHOUT ANGINA PECTORIS, UNSPECIFIED VESSEL OR LESION TYPE: ICD-10-CM

## 2021-07-15 LAB — LVEF ECHO: NORMAL

## 2021-07-15 PROCEDURE — 999N000208 ECHOCARDIOGRAM COMPLETE

## 2021-07-15 PROCEDURE — 93306 TTE W/DOPPLER COMPLETE: CPT | Mod: 26 | Performed by: INTERNAL MEDICINE

## 2021-07-15 PROCEDURE — 255N000002 HC RX 255 OP 636: Performed by: INTERNAL MEDICINE

## 2021-07-15 RX ADMIN — HUMAN ALBUMIN MICROSPHERES AND PERFLUTREN 9 ML: 10; .22 INJECTION, SOLUTION INTRAVENOUS at 11:04

## 2021-07-16 ENCOUNTER — OFFICE VISIT (OUTPATIENT)
Dept: CARDIOLOGY | Facility: CLINIC | Age: 66
End: 2021-07-16
Payer: COMMERCIAL

## 2021-07-16 VITALS
BODY MASS INDEX: 32.35 KG/M2 | DIASTOLIC BLOOD PRESSURE: 71 MMHG | SYSTOLIC BLOOD PRESSURE: 107 MMHG | WEIGHT: 201.3 LBS | HEART RATE: 66 BPM | HEIGHT: 66 IN

## 2021-07-16 DIAGNOSIS — I25.10 CORONARY ARTERY DISEASE INVOLVING NATIVE HEART WITHOUT ANGINA PECTORIS, UNSPECIFIED VESSEL OR LESION TYPE: Primary | ICD-10-CM

## 2021-07-16 DIAGNOSIS — R93.1 ABNORMAL FINDINGS ON DIAGNOSTIC IMAGING OF HEART AND CORONARY CIRCULATION: Primary | ICD-10-CM

## 2021-07-16 DIAGNOSIS — I25.10 CORONARY ARTERY DISEASE INVOLVING NATIVE HEART WITHOUT ANGINA PECTORIS, UNSPECIFIED VESSEL OR LESION TYPE: ICD-10-CM

## 2021-07-16 PROCEDURE — 99215 OFFICE O/P EST HI 40 MIN: CPT | Performed by: PHYSICIAN ASSISTANT

## 2021-07-16 ASSESSMENT — MIFFLIN-ST. JEOR: SCORE: 1476.9

## 2021-07-16 NOTE — PATIENT INSTRUCTIONS
"Thank you for your [facility] visit today. Your provider has recommended the following:    Medication Changes:  No medication changes today.   Recommendations:  We'll schedule an angiogram to further look at your heart arteries.   Follow-up:  See us back for cardiology follow up after the angiogram..    Reminder:  Please bring in all current medications, over the counter supplements and vitamin bottles to your next appointment.  Important \"[facility]\" telephone numbers for your reference:  Cardiology Scheduling - [phone]  Cardiology Clinic RN-  [phone]     [facility]    "

## 2021-07-16 NOTE — PROGRESS NOTES
Cardiology Progress Note    Patient seen today in follow up of: Test results  Primary cardiologist: Dr. Wren    HPI:  Amy Bryan is a very pleasant 65 year old female with a history of:  1.  Traumatic brain injury.  2.  Coronary artery disease. With a hospitalization in 2018 with cardiogenic shock and an anterolateral STEMI. She underwent emergent coronary angiogram with emergent aspiration thrombectomy of the PL 3 branch and balloon dilation of the a small D1.  She had a 70% proximal to mid LAD stenosis.  This was to be intervened on in a staged fashion but this did not happen.  He also had a 60% ostial circumflex lesion.  She is on a statin, beta-blocker, and Plavix.  Dr. Wren's note mentions an allergy to aspirin.  She told me she was taken off aspirin due to bruising but denied an allergy to me today.  I cannot see any documentation of an aspirin allergy.  3.  LV dysfunction.  With LV gram at the time of her STEMI showing an EF of 25% and wall motion abnormalities consistent with possible Takotsubo.  Her echo subsequently showed an EF of 40 to 45% and follow-up echocardiograms showed normalization of her LV function.  She has been maintained on beta-blockers and lisinopril.  3.  Moderate pericardial effusion.  Noted in 2018 which has been managed conservatively.  4.  Hypertriglyceridemia.    Amy recently was referred to cardiology clinic again by her primary care physician after she had an EKG and clinic there that appeared abnormal.  It appears following her hospitalization in 2018 she was seen in the [facility] system but was lost to follow-up after 2019.  At her visit with Dr. Wren, Amy reported some worsening symptoms of shortness of breath when climbing stairs.  For that, an echocardiogram as well as a nuclear stress test was ordered.  Her echocardiogram fortunately showed normal LV function without any wall motion abnormalities.  RV function was normal.  No significant valvular disease  was noted. A Lexiscan nuclear stress test on 6/30/2021 showed a medium sized area of transmural infarct in the lateral and anterolateral segment of the LV. Dr. Wren reviewed and recommended coronary angiography.  He also suggested she be started on Imdur 30 mg daily to see if that helped her symptoms.    She is back today to discuss coronary angiography.  She is accompanied by her friend Cande who is also her power of .  She has noted improvement in her shortness of breath symptoms after starting the Imdur.  She also was having a cough that she feels is better as well.  She not had chest discomfort per se.  Blood pressure and heart rate are stable.    ASSESSMENT/PLAN:  Diana Bryan is a very pleasant 65-year-old female with a history of traumatic brain injury, and coronary artery disease with an anterior lateral STEMI in 2018 status post emergent aspiration thrombectomy of the PL 3 branch and balloon dilation of a small D1.  She had a 70% proximal to mid LAD lesion that was to be intervened on after discharge but this was not completed.  She now has been experiencing symptoms of dyspnea on exertion for which she had an echocardiogram and nuclear stress test as noted above.  Fortunately, her echo shows normal wall motion abnormality and normal LV function.  Her nuclear stress test did suggest infarct in the lateral and anterolateral segment of the LV.  We reviewed both of these tests in detail today. Dr. Wren reviewed that in her prior angiogram and recommended coronary angiography.  She has been started on Imdur and has had some benefit with that.  She is also appropriately on high intensity statin therapy with good control and an LDL of 51, metoprolol, lisinopril, and Plavix.    I discussed the risks and benefits of coronary angiogram with Amy and her POA today including, bleeding, bruising, infection, allergic reaction, kidney damage (including need for dialysis), stroke, heart attack, vascular  damage, emergency open heart surgery, up to and including death.  They understood and were agreeable to proceed.  We did review that in the case of stenting dual antiplatelet therapy would be necessary.  Again, she denied an aspirin allergy to me today and tells me she tolerated dual antiplatelet therapy previously.    We will schedule the angiogram in the coming weeks and will arrange for follow-up in clinic after that.  In the meantime, I asked him to contact us with any questions or concerns.    Orders this Visit:  Orders Placed This Encounter   Procedures     Follow-Up with Cardiac Advanced Practice Provider     No orders of the defined types were placed in this encounter.    There are no discontinued medications.    CURRENT MEDICATIONS:  Current Outpatient Medications   Medication Sig Dispense Refill     acetaminophen (TYLENOL) 325 MG tablet Take 1,000 mg by mouth 2 times daily And at bedtime as needed for pain       Alum Hydroxide-Mag Carbonate (GAVISCON PO) Take 1 tablet by mouth every 4 hours as needed (indigestion)       AMITRIPTYLINE HCL PO Take 25 mg by mouth At Bedtime       ammonium lactate (AMLACTIN) 12 % cream Apply topically 2 times daily Apply to feet       atorvastatin (LIPITOR) 40 MG tablet Take 1 tablet (40 mg) by mouth daily 30 tablet 0     benzoyl peroxide 5 % topical gel Apply topically 2 times daily as needed (redness/rosacea of face)       Ca Carbonate-Mag Hydroxide (ROLAIDS PO)        clopidogrel (PLAVIX) 75 MG tablet Take 75 mg by mouth daily       furosemide (LASIX) 20 MG tablet Take 20 mg by mouth daily       GABAPENTIN PO Take 100 mg by mouth 3 times daily       isosorbide mononitrate (IMDUR) 30 MG 24 hr tablet Take 1 tablet (30 mg) by mouth daily 30 tablet 0     LevETIRAcetam (KEPPRA PO) Take 500 mg by mouth 2 times daily       levothyroxine (SYNTHROID/LEVOTHROID) 25 MCG tablet Take 1 tablet (25 mcg) by mouth daily 45 tablet 0     LISINOPRIL PO Take 5 mg by mouth daily       loperamide  (IMODIUM) 2 MG capsule Take 2 mg by mouth 4 times daily as needed for diarrhea       LORAZEPAM PO Take 0.5 mg by mouth 3 times daily as needed for anxiety       melatonin 3 MG CAPS        metoprolol tartrate (LOPRESSOR) 100 MG tablet Take 1 tablet (100 mg) by mouth 2 times daily (Patient taking differently: Take 75 mg by mouth daily ) 60 tablet 0     metroNIDAZOLE (METROCREAM) 0.75 % external cream Apply topically 2 times daily       metroNIDAZOLE (METROGEL) 0.75 % external gel Apply topically 2 times daily 45 g 3     MIRTAZAPINE PO Take 30 mg by mouth At Bedtime       nitroGLYcerin (NITROSTAT) 0.4 MG sublingual tablet For chest pain place 1 tablet under the tongue every 5 minutes for 3 doses. If symptoms persist 5 minutes after 1st dose call 911. 25 tablet 0     NYSTATIN PO        OMEPRAZOLE PO Take 20 mg by mouth every other day       PHENAZOPYRIDINE HCL PO Take 200 mg by mouth 3 times daily as needed (burning upon urination)       potassium chloride (KLOR-CON) 20 MEQ Packet Take 20 mEq by mouth 2 times daily       psyllium (METAMUCIL) 58.6 % POWD Take 1 teaspoonful by mouth daily Natural Fiber Pow Therapy       RaNITidine HCl (ZANTAC PO) Take 150 mg by mouth 2 times daily       traZODone (DESYREL) 50 MG tablet Take 75 mg by mouth nightly as needed for sleep        triamcinolone (KENALOG) 0.1 % external cream Apply to aa on hands bid for 14 consecutive days and then when needed For future refills 80 g 1     triamcinolone (KENALOG) 0.1 % external cream Apply topically 2 times daily 45 g 0     triamcinolone (KENALOG) 0.5 % cream Apply topically 2 times daily as needed (right elbow and handfor psoriasis)       trolamine salicylate (ASPERCREME) 10 % cream Apply topically as needed for moderate pain To affected areas       UNABLE TO FIND MEDICATION NAME: Natural fibers pills take by mouth daily       UNABLE TO FIND MEDICATION NAME: Nystatin cream 295018 usp topical daily- apply to abdominal fold rash 2 x a day; provide  small amount in a cup for her to apply.       UNABLE TO FIND MEDICATION NAME: Rolaids 4 tabs by mouth as needed maximum 12 tabs/ 24 hrs.       UNABLE TO FIND MEDICATION NAME: Trazodone Hydrochloride Give one and a half tablets 75mg by mouth ab bedtime as needed for sleep aid       UNABLE TO FIND MEDICATION NAME: aspercream       vitamin D3 (CHOLECALCIFEROL) 1.25 MG (34006 UT) capsule Take 1 capsule (50,000 Units) by mouth every 7 days 12 capsule 0     LORazepam (ATIVAN) 1 MG tablet Take 1 tablet by mouth At Bedtime. (Patient not taking: Reported on 7/16/2021) 30 tablet      ALLERGIES  Allergies   Allergen Reactions     Penicillins      dizziness     PAST MEDICAL HISTORY:  Past Medical History:   Diagnosis Date     Cardiogenic shock (H) 07/09/2021     Coma (H) 05/2012    CHT after a fall     Coronary artery disease involving native coronary artery of native heart 07/09/2021    3 vessel     Developmental delay      DVT (deep vein thrombosis) in pregnancy     post trauma     Fall 05/2012    multiple fracture     Hypertension      Menopause     age 52     Pelvic fracture (H) 5-201`2     Rib fracture 05/2012     STEMI (ST elevation myocardial infarction) (H) 01/25/2018     PAST SURGICAL HISTORY:  Past Surgical History:   Procedure Laterality Date     COLONOSCOPY  01/01/2010     HEART CATH DRUG ELUTING STENT PLACEMENT N/A 01/25/2018     LASER YAG CAPSULOTOMY Left 05/29/2018    Procedure: LASER YAG CAPSULOTOMY;  LEFT YAG LASER CAPSULOTOMY ;  Surgeon: Tabby Guillaume MD;  Location: [facility]     LASER YAG CAPSULOTOMY Right 06/05/2018    Procedure: LASER YAG CAPSULOTOMY;  RIGHT EYE YAG LASER CAPSULOTOMY ;  Surgeon: Tabby Guillaume MD;  Location: [facility]     FAMILY HISTORY:  Family History   Problem Relation Age of Onset     Other - See Comments Father         alpha 1 antitrypsin deficiency      Cancer - colorectal Mother      Diabetes Sister      SOCIAL HISTORY:  Social History     Socioeconomic History     Marital status:       Spouse name: None     Number of children: None     Years of education: None     Highest education level: None   Occupational History     None   Tobacco Use     Smoking status: Never Smoker     Smokeless tobacco: Never Used   Substance and Sexual Activity     Alcohol use: Not Currently     Alcohol/week: 1.7 standard drinks     Types: 2 Standard drinks or equivalent per week     Drug use: None     Sexual activity: None   Other Topics Concern     Parent/sibling w/ CABG, MI or angioplasty before 65F 55M? Not Asked   Social History Narrative     None     Social Determinants of Health     Financial Resource Strain:      Difficulty of Paying Living Expenses:    Food Insecurity:      Worried About Running Out of Food in the Last Year:      Ran Out of Food in the Last Year:    Transportation Needs:      Lack of Transportation (Medical):      Lack of Transportation (Non-Medical):    Physical Activity:      Days of Exercise per Week:      Minutes of Exercise per Session:    Stress:      Feeling of Stress :    Social Connections:      Frequency of Communication with Friends and Family:      Frequency of Social Gatherings with Friends and Family:      Attends Restorationist Services:      Active Member of Clubs or Organizations:      Attends Club or Organization Meetings:      Marital Status:    Intimate Partner Violence:      Fear of Current or Ex-Partner:      Emotionally Abused:      Physically Abused:      Sexually Abused:      Review of Systems:  Skin:  Negative     Eyes:  Negative    ENT:  Negative    Respiratory:  Positive for dyspnea on exertion  Cardiovascular:  chest pain;palpitations;Negative;lightheadedness;dizziness edema;Positive for  Gastroenterology: Negative    Genitourinary:  Negative    Musculoskeletal:  Positive for arthritis  Neurologic:  Negative headaches  Psychiatric:  Negative    Heme/Lymph/Imm:  Positive for allergies  Endocrine:  Positive for thyroid disorder     Physical Exam:  Vitals: BP  "107/71   Pulse 66   Ht 1.664 m (5' 5.5\")   Wt 91.3 kg (201 lb 4.8 oz)   BMI 32.99 kg/m     Wt Readings from Last 4 Encounters:   07/16/21 91.3 kg (201 lb 4.8 oz)   06/30/21 90.6 kg (199 lb 12.8 oz)   06/16/21 91.5 kg (201 lb 12.8 oz)   05/19/21 91.4 kg (201 lb 6.4 oz)     GEN: well nourished, in no acute distress.  HEENT:  Pupils equal, round. Sclerae nonicteric.   C/V:  Regular rate and rhythm, no murmur, rub or gallop.    RESP: Respirations are unlabored. Clear to auscultation bilaterally without wheezing, rales, or rhonchi.  GI: Abdomen soft, nontender.  EXTREM: no LE edema.  NEURO: Alert and oriented, cooperative.  SKIN: Warm and dry.     Recent Lab Results:  LIPID RESULTS:  Lab Results   Component Value Date    CHOL 145 05/19/2021    HDL 27 (L) 05/19/2021    LDL 51 05/19/2021    TRIG 336 (H) 05/19/2021     LIVER ENZYME RESULTS:  Lab Results   Component Value Date    AST 25 05/19/2021    ALT 36 05/19/2021     CBC RESULTS:  Lab Results   Component Value Date    WBC 7.6 05/19/2021    RBC 4.72 05/19/2021    HGB 14.5 05/19/2021    HCT 45.4 05/19/2021    MCV 96 05/19/2021    MCH 30.7 05/19/2021    MCHC 31.9 05/19/2021    RDW 12.5 05/19/2021     05/19/2021     BMP RESULTS:  Lab Results   Component Value Date     05/19/2021    POTASSIUM 4.7 05/19/2021    CHLORIDE 107 05/19/2021    CO2 33 (H) 05/19/2021    ANIONGAP 1 (L) 05/19/2021    GLC 97 05/19/2021    BUN 16 05/19/2021    CR 0.90 05/19/2021    GFRESTIMATED 67 05/19/2021    GFRESTBLACK 78 05/19/2021    HECTOR 9.3 05/19/2021      A1C RESULTS:  Lab Results   Component Value Date    A1C 5.5 01/26/2018     INR RESULTS:  Lab Results   Component Value Date    INR 2.6 08/29/2012    INR 1.8 08/15/2012     Total time today was 40 minutes review notes, imaging, labs, patient visit, orders and documentation.       Abigail Croft PA-C  [facility]  "

## 2021-07-16 NOTE — LETTER
7/16/2021    [facility]  [address] [facility] [address]  [facility] [address]    RE: Amy LOPEZ Irvin       Dear Colleague,    I had the pleasure of seeing Amy Bryan in the [facility].      Cardiology Progress Note    Patient seen today in follow up of: Test results  Primary cardiologist: Dr. Wren    HPI:  Amy Bryan is a very pleasant 65 year old female with a history of:  1.  Traumatic brain injury.  2.  Coronary artery disease. With a hospitalization in 2018 with cardiogenic shock and an anterolateral STEMI. She underwent emergent coronary angiogram with emergent aspiration thrombectomy of the PL 3 branch and balloon dilation of the a small D1.  She had a 70% proximal to mid LAD stenosis.  This was to be intervened on in a staged fashion but this did not happen.  He also had a 60% ostial circumflex lesion.  She is on a statin, beta-blocker, and Plavix.  Dr. Wren's note mentions an allergy to aspirin.  She told me she was taken off aspirin due to bruising but denied an allergy to me today.  I cannot see any documentation of an aspirin allergy.  3.  LV dysfunction.  With LV gram at the time of her STEMI showing an EF of 25% and wall motion abnormalities consistent with possible Takotsubo.  Her echo subsequently showed an EF of 40 to 45% and follow-up echocardiograms showed normalization of her LV function.  She has been maintained on beta-blockers and lisinopril.  3.  Moderate pericardial effusion.  Noted in 2018 which has been managed conservatively.  4.  Hypertriglyceridemia.    Amy recently was referred to cardiology clinic again by her primary care physician after she had an EKG and clinic there that appeared abnormal.  It appears following her hospitalization in 2018 she was seen in the [facility] system but was lost to follow-up after 2019.  At her visit with Dr. Wren, Amy reported some worsening symptoms of  shortness of breath when climbing stairs.  For that, an echocardiogram as well as a nuclear stress test was ordered.  Her echocardiogram fortunately showed normal LV function without any wall motion abnormalities.  RV function was normal.  No significant valvular disease was noted. A Lexiscan nuclear stress test on 6/30/2021 showed a medium sized area of transmural infarct in the lateral and anterolateral segment of the LV. Dr. Wren reviewed and recommended coronary angiography.  He also suggested she be started on Imdur 30 mg daily to see if that helped her symptoms.    She is back today to discuss coronary angiography.  She is accompanied by her friend Cande who is also her power of .  She has noted improvement in her shortness of breath symptoms after starting the Imdur.  She also was having a cough that she feels is better as well.  She not had chest discomfort per se.  Blood pressure and heart rate are stable.    ASSESSMENT/PLAN:  Diana Bryan is a very pleasant 65-year-old female with a history of traumatic brain injury, and coronary artery disease with an anterior lateral STEMI in 2018 status post emergent aspiration thrombectomy of the PL 3 branch and balloon dilation of a small D1.  She had a 70% proximal to mid LAD lesion that was to be intervened on after discharge but this was not completed.  She now has been experiencing symptoms of dyspnea on exertion for which she had an echocardiogram and nuclear stress test as noted above.  Fortunately, her echo shows normal wall motion abnormality and normal LV function.  Her nuclear stress test did suggest infarct in the lateral and anterolateral segment of the LV.  We reviewed both of these tests in detail today. Dr. Wren reviewed that in her prior angiogram and recommended coronary angiography.  She has been started on Imdur and has had some benefit with that.  She is also appropriately on high intensity statin therapy with good control and an  LDL of 51, metoprolol, lisinopril, and Plavix.    I discussed the risks and benefits of coronary angiogram with Amy and her POA today including, bleeding, bruising, infection, allergic reaction, kidney damage (including need for dialysis), stroke, heart attack, vascular damage, emergency open heart surgery, up to and including death.  They understood and were agreeable to proceed.  We did review that in the case of stenting dual antiplatelet therapy would be necessary.  Again, she denied an aspirin allergy to me today and tells me she tolerated dual antiplatelet therapy previously.    We will schedule the angiogram in the coming weeks and will arrange for follow-up in clinic after that.  In the meantime, I asked him to contact us with any questions or concerns.    Orders this Visit:  Orders Placed This Encounter   Procedures     Follow-Up with Cardiac Advanced Practice Provider     No orders of the defined types were placed in this encounter.    There are no discontinued medications.    CURRENT MEDICATIONS:  Current Outpatient Medications   Medication Sig Dispense Refill     acetaminophen (TYLENOL) 325 MG tablet Take 1,000 mg by mouth 2 times daily And at bedtime as needed for pain       Alum Hydroxide-Mag Carbonate (GAVISCON PO) Take 1 tablet by mouth every 4 hours as needed (indigestion)       AMITRIPTYLINE HCL PO Take 25 mg by mouth At Bedtime       ammonium lactate (AMLACTIN) 12 % cream Apply topically 2 times daily Apply to feet       atorvastatin (LIPITOR) 40 MG tablet Take 1 tablet (40 mg) by mouth daily 30 tablet 0     benzoyl peroxide 5 % topical gel Apply topically 2 times daily as needed (redness/rosacea of face)       Ca Carbonate-Mag Hydroxide (ROLAIDS PO)        clopidogrel (PLAVIX) 75 MG tablet Take 75 mg by mouth daily       furosemide (LASIX) 20 MG tablet Take 20 mg by mouth daily       GABAPENTIN PO Take 100 mg by mouth 3 times daily       isosorbide mononitrate (IMDUR) 30 MG 24 hr tablet Take  1 tablet (30 mg) by mouth daily 30 tablet 0     LevETIRAcetam (KEPPRA PO) Take 500 mg by mouth 2 times daily       levothyroxine (SYNTHROID/LEVOTHROID) 25 MCG tablet Take 1 tablet (25 mcg) by mouth daily 45 tablet 0     LISINOPRIL PO Take 5 mg by mouth daily       loperamide (IMODIUM) 2 MG capsule Take 2 mg by mouth 4 times daily as needed for diarrhea       LORAZEPAM PO Take 0.5 mg by mouth 3 times daily as needed for anxiety       melatonin 3 MG CAPS        metoprolol tartrate (LOPRESSOR) 100 MG tablet Take 1 tablet (100 mg) by mouth 2 times daily (Patient taking differently: Take 75 mg by mouth daily ) 60 tablet 0     metroNIDAZOLE (METROCREAM) 0.75 % external cream Apply topically 2 times daily       metroNIDAZOLE (METROGEL) 0.75 % external gel Apply topically 2 times daily 45 g 3     MIRTAZAPINE PO Take 30 mg by mouth At Bedtime       nitroGLYcerin (NITROSTAT) 0.4 MG sublingual tablet For chest pain place 1 tablet under the tongue every 5 minutes for 3 doses. If symptoms persist 5 minutes after 1st dose call 911. 25 tablet 0     NYSTATIN PO        OMEPRAZOLE PO Take 20 mg by mouth every other day       PHENAZOPYRIDINE HCL PO Take 200 mg by mouth 3 times daily as needed (burning upon urination)       potassium chloride (KLOR-CON) 20 MEQ Packet Take 20 mEq by mouth 2 times daily       psyllium (METAMUCIL) 58.6 % POWD Take 1 teaspoonful by mouth daily Natural Fiber Pow Therapy       RaNITidine HCl (ZANTAC PO) Take 150 mg by mouth 2 times daily       traZODone (DESYREL) 50 MG tablet Take 75 mg by mouth nightly as needed for sleep        triamcinolone (KENALOG) 0.1 % external cream Apply to aa on hands bid for 14 consecutive days and then when needed For future refills 80 g 1     triamcinolone (KENALOG) 0.1 % external cream Apply topically 2 times daily 45 g 0     triamcinolone (KENALOG) 0.5 % cream Apply topically 2 times daily as needed (right elbow and handfor psoriasis)       trolamine salicylate (ASPERCREME) 10  % cream Apply topically as needed for moderate pain To affected areas       UNABLE TO FIND MEDICATION NAME: Natural fibers pills take by mouth daily       UNABLE TO FIND MEDICATION NAME: Nystatin cream [id number] usp topical daily- apply to abdominal fold rash 2 x a day; provide small amount in a cup for her to apply.       UNABLE TO FIND MEDICATION NAME: Rolaids 4 tabs by mouth as needed maximum 12 tabs/ 24 hrs.       UNABLE TO FIND MEDICATION NAME: Trazodone Hydrochloride Give one and a half tablets 75mg by mouth ab bedtime as needed for sleep aid       UNABLE TO FIND MEDICATION NAME: aspercream       vitamin D3 (CHOLECALCIFEROL) 1.25 MG (39361 UT) capsule Take 1 capsule (50,000 Units) by mouth every 7 days 12 capsule 0     LORazepam (ATIVAN) 1 MG tablet Take 1 tablet by mouth At Bedtime. (Patient not taking: Reported on 7/16/2021) 30 tablet      ALLERGIES  Allergies   Allergen Reactions     Penicillins      dizziness     PAST MEDICAL HISTORY:  Past Medical History:   Diagnosis Date     Cardiogenic shock (H) 07/09/2021     Coma (H) 05/2012    CHT after a fall     Coronary artery disease involving native coronary artery of native heart 07/09/2021    3 vessel     Developmental delay      DVT (deep vein thrombosis) in pregnancy     post trauma     Fall 05/2012    multiple fracture     Hypertension      Menopause     age 52     Pelvic fracture (H) 5-201`2     Rib fracture 05/2012     STEMI (ST elevation myocardial infarction) (H) 01/25/2018     PAST SURGICAL HISTORY:  Past Surgical History:   Procedure Laterality Date     COLONOSCOPY  01/01/2010     HEART CATH DRUG ELUTING STENT PLACEMENT N/A 01/25/2018     LASER YAG CAPSULOTOMY Left 05/29/2018    Procedure: LASER YAG CAPSULOTOMY;  LEFT YAG LASER CAPSULOTOMY ;  Surgeon: Tabby Guillaume MD;  Location: [facility]     LASER YAG CAPSULOTOMY Right 06/05/2018    Procedure: LASER YAG CAPSULOTOMY;  RIGHT EYE YAG LASER CAPSULOTOMY ;  Surgeon: Tabby Guillaume MD;  Location: [facility]      FAMILY HISTORY:  Family History   Problem Relation Age of Onset     Other - See Comments Father         alpha 1 antitrypsin deficiency      Cancer - colorectal Mother      Diabetes Sister      SOCIAL HISTORY:  Social History     Socioeconomic History     Marital status:      Spouse name: None     Number of children: None     Years of education: None     Highest education level: None   Occupational History     None   Tobacco Use     Smoking status: Never Smoker     Smokeless tobacco: Never Used   Substance and Sexual Activity     Alcohol use: Not Currently     Alcohol/week: 1.7 standard drinks     Types: 2 Standard drinks or equivalent per week     Drug use: None     Sexual activity: None   Other Topics Concern     Parent/sibling w/ CABG, MI or angioplasty before 65F 55M? Not Asked   Social History Narrative     None     Social Determinants of Health     Financial Resource Strain:      Difficulty of Paying Living Expenses:    Food Insecurity:      Worried About Running Out of Food in the Last Year:      Ran Out of Food in the Last Year:    Transportation Needs:      Lack of Transportation (Medical):      Lack of Transportation (Non-Medical):    Physical Activity:      Days of Exercise per Week:      Minutes of Exercise per Session:    Stress:      Feeling of Stress :    Social Connections:      Frequency of Communication with Friends and Family:      Frequency of Social Gatherings with Friends and Family:      Attends Rastafari Services:      Active Member of Clubs or Organizations:      Attends Club or Organization Meetings:      Marital Status:    Intimate Partner Violence:      Fear of Current or Ex-Partner:      Emotionally Abused:      Physically Abused:      Sexually Abused:      Review of Systems:  Skin:  Negative     Eyes:  Negative    ENT:  Negative    Respiratory:  Positive for dyspnea on exertion  Cardiovascular:  chest pain;palpitations;Negative;lightheadedness;dizziness edema;Positive  "for  Gastroenterology: Negative    Genitourinary:  Negative    Musculoskeletal:  Positive for arthritis  Neurologic:  Negative headaches  Psychiatric:  Negative    Heme/Lymph/Imm:  Positive for allergies  Endocrine:  Positive for thyroid disorder     Physical Exam:  Vitals: /71   Pulse 66   Ht 1.664 m (5' 5.5\")   Wt 91.3 kg (201 lb 4.8 oz)   BMI 32.99 kg/m     Wt Readings from Last 4 Encounters:   07/16/21 91.3 kg (201 lb 4.8 oz)   06/30/21 90.6 kg (199 lb 12.8 oz)   06/16/21 91.5 kg (201 lb 12.8 oz)   05/19/21 91.4 kg (201 lb 6.4 oz)     GEN: well nourished, in no acute distress.  HEENT:  Pupils equal, round. Sclerae nonicteric.   C/V:  Regular rate and rhythm, no murmur, rub or gallop.    RESP: Respirations are unlabored. Clear to auscultation bilaterally without wheezing, rales, or rhonchi.  GI: Abdomen soft, nontender.  EXTREM: no LE edema.  NEURO: Alert and oriented, cooperative.  SKIN: Warm and dry.     Recent Lab Results:  LIPID RESULTS:  Lab Results   Component Value Date    CHOL 145 05/19/2021    HDL 27 (L) 05/19/2021    LDL 51 05/19/2021    TRIG 336 (H) 05/19/2021     LIVER ENZYME RESULTS:  Lab Results   Component Value Date    AST 25 05/19/2021    ALT 36 05/19/2021     CBC RESULTS:  Lab Results   Component Value Date    WBC 7.6 05/19/2021    RBC 4.72 05/19/2021    HGB 14.5 05/19/2021    HCT 45.4 05/19/2021    MCV 96 05/19/2021    MCH 30.7 05/19/2021    MCHC 31.9 05/19/2021    RDW 12.5 05/19/2021     05/19/2021     BMP RESULTS:  Lab Results   Component Value Date     05/19/2021    POTASSIUM 4.7 05/19/2021    CHLORIDE 107 05/19/2021    CO2 33 (H) 05/19/2021    ANIONGAP 1 (L) 05/19/2021    GLC 97 05/19/2021    BUN 16 05/19/2021    CR 0.90 05/19/2021    GFRESTIMATED 67 05/19/2021    GFRESTBLACK 78 05/19/2021    HECTOR 9.3 05/19/2021      A1C RESULTS:  Lab Results   Component Value Date    A1C 5.5 01/26/2018     INR RESULTS:  Lab Results   Component Value Date    INR 2.6 08/29/2012    INR " 1.8 08/15/2012     Total time today was 40 minutes review notes, imaging, labs, patient visit, orders and documentation.       Abigail Croft PA-C  [facility]      Thank you for allowing me to participate in the care of your patient.      Sincerely,     Abigail Croft PA-C     [facility]  cc:   Darren Wren MD  [address]  [place] [id number]

## 2021-07-20 DIAGNOSIS — I25.10 CAD (CORONARY ARTERY DISEASE): ICD-10-CM

## 2021-07-20 RX ORDER — ISOSORBIDE MONONITRATE 30 MG/1
30 TABLET, EXTENDED RELEASE ORAL DAILY
Qty: 90 TABLET | Refills: 0 | Status: SHIPPED | OUTPATIENT
Start: 2021-07-20 | End: 2021-08-18

## 2021-07-30 ENCOUNTER — LAB (OUTPATIENT)
Dept: URGENT CARE | Facility: URGENT CARE | Age: 66
End: 2021-07-30
Payer: COMMERCIAL

## 2021-07-30 ENCOUNTER — CARE COORDINATION (OUTPATIENT)
Dept: CARDIOLOGY | Facility: CLINIC | Age: 66
End: 2021-07-30

## 2021-07-30 DIAGNOSIS — I25.10 CORONARY ARTERY DISEASE INVOLVING NATIVE CORONARY ARTERY OF NATIVE HEART: Primary | ICD-10-CM

## 2021-07-30 DIAGNOSIS — I25.10 CORONARY ARTERY DISEASE INVOLVING NATIVE HEART WITHOUT ANGINA PECTORIS, UNSPECIFIED VESSEL OR LESION TYPE: ICD-10-CM

## 2021-07-30 LAB — SARS-COV-2 RNA RESP QL NAA+PROBE: NEGATIVE

## 2021-07-30 PROCEDURE — U0003 INFECTIOUS AGENT DETECTION BY NUCLEIC ACID (DNA OR RNA); SEVERE ACUTE RESPIRATORY SYNDROME CORONAVIRUS 2 (SARS-COV-2) (CORONAVIRUS DISEASE [COVID-19]), AMPLIFIED PROBE TECHNIQUE, MAKING USE OF HIGH THROUGHPUT TECHNOLOGIES AS DESCRIBED BY CMS-2020-01-R: HCPCS

## 2021-07-30 PROCEDURE — U0005 INFEC AGEN DETEC AMPLI PROBE: HCPCS

## 2021-07-30 RX ORDER — POTASSIUM CHLORIDE 1500 MG/1
20 TABLET, EXTENDED RELEASE ORAL
Status: CANCELLED | OUTPATIENT
Start: 2021-07-30

## 2021-07-30 RX ORDER — ASPIRIN 81 MG/1
243 TABLET, CHEWABLE ORAL ONCE
Status: CANCELLED | OUTPATIENT
Start: 2021-07-30

## 2021-07-30 RX ORDER — SODIUM CHLORIDE 9 MG/ML
INJECTION, SOLUTION INTRAVENOUS CONTINUOUS
Status: CANCELLED | OUTPATIENT
Start: 2021-07-30

## 2021-07-30 RX ORDER — ASPIRIN 325 MG
325 TABLET ORAL ONCE
Status: CANCELLED | OUTPATIENT
Start: 2021-07-30 | End: 2021-07-30

## 2021-07-30 RX ORDER — LIDOCAINE 40 MG/G
CREAM TOPICAL
Status: CANCELLED | OUTPATIENT
Start: 2021-07-30

## 2021-07-30 NOTE — PROGRESS NOTES
HEART CATHETERIZATION PREP INSTRUCTIONS  Left detailed VM for pt with the following instructions and advised to call back if questions/concerns.  Procedure is scheduled for at 0830, on 8/3/21.  Patient to be NPO after midnight.   Patient is NOT on insulin or on any other diabetic medications.  Patient to HOLD Lasix (furosemide) AM of procedure  Patient is NOT on anticoagulant.   Patient is not on ASA. Advised to take 1 full tab (325 mg)  Patient can take his other meds prior to procedure with small sips of water  Patient DENIES contrast allergy  Patient will need someone available to drive him to the hospital and to drive him home.  Patient should have someone available to stay with him for at least 24 hours after the  procedure.   Check in time is at 0630 and procedure time is at 0830.  COVID test today. Will call if positive. If she does not hear from us, can come in as planned. (Reminder to RN board to check COVID results on Monday).   Nursing orders signed and held? Yes  ALEKSANDRA Nelson, RN, PHN, HNB-BC   7/30/2021 at 9:53 AM    From: Soledad Bernal   Sent: 7/16/2021   2:50 PM CDT   To: Barb Jalloh [facility] Team 3   Subject: [facility] - 8/3/21 - [facility]                               Angiogram Orders     [facility]: [facility]     Procedure: Left Heart Cath     Diagnosis: Abnormal stress test     Procedure Date: 8/3/21     Procedure Time: 8:30a     Patient Arrival Time: 6:30a     Ordering Cardiologist: Dr. Wren     Performing Cardiologist: Dr. Grove     Cardiac Assessment Completed: Yes   Date: 7/16/21   Provider:      Pre-Procedure Labs completed: on admit     Post Procedure EVAN appointment scheduled: Yes   Date: 8/18/21   Provider:      Patient Diabetic on Meds/Insulin:  No   Patient on Coumadin/Warfarin:  No   Patient on Pradaxa/Xarelto/Eliquis:  No     Does Patient have a history of bypass:  No     If yes, when was it done:   If yes, where was it done:     COVID Test Scheduled: 7/30/21      Appointment was scheduled: Face to Face     Special instructions:

## 2021-08-02 ENCOUNTER — MEDICAL CORRESPONDENCE (OUTPATIENT)
Dept: HEALTH INFORMATION MANAGEMENT | Facility: CLINIC | Age: 66
End: 2021-08-02

## 2021-08-03 ENCOUNTER — HOSPITAL ENCOUNTER (OUTPATIENT)
Facility: CLINIC | Age: 66
Discharge: HOME | End: 2021-08-03
Admitting: INTERNAL MEDICINE
Payer: COMMERCIAL

## 2021-08-03 VITALS
HEIGHT: 66 IN | RESPIRATION RATE: 18 BRPM | SYSTOLIC BLOOD PRESSURE: 129 MMHG | WEIGHT: 201.7 LBS | TEMPERATURE: 97.1 F | HEART RATE: 72 BPM | OXYGEN SATURATION: 94 % | BODY MASS INDEX: 32.42 KG/M2 | DIASTOLIC BLOOD PRESSURE: 81 MMHG

## 2021-08-03 DIAGNOSIS — I25.10 CORONARY ARTERY DISEASE INVOLVING NATIVE CORONARY ARTERY OF NATIVE HEART: ICD-10-CM

## 2021-08-03 DIAGNOSIS — I25.118 CORONARY ARTERY DISEASE OF NATIVE ARTERY OF NATIVE HEART WITH STABLE ANGINA PECTORIS (H): Primary | ICD-10-CM

## 2021-08-03 DIAGNOSIS — R93.1 ABNORMAL FINDINGS ON DIAGNOSTIC IMAGING OF HEART AND CORONARY CIRCULATION: ICD-10-CM

## 2021-08-03 PROBLEM — Z98.890 STATUS POST CORONARY ANGIOGRAM: Status: ACTIVE | Noted: 2021-08-03

## 2021-08-03 LAB
ANION GAP SERPL CALCULATED.3IONS-SCNC: 2 MMOL/L (ref 3–14)
APTT PPP: 27 SECONDS (ref 22–38)
BUN SERPL-MCNC: 15 MG/DL (ref 7–30)
CALCIUM SERPL-MCNC: 9 MG/DL (ref 8.5–10.1)
CHLORIDE BLD-SCNC: 106 MMOL/L (ref 94–109)
CO2 SERPL-SCNC: 33 MMOL/L (ref 20–32)
CREAT SERPL-MCNC: 0.86 MG/DL (ref 0.52–1.04)
ERYTHROCYTE [DISTWIDTH] IN BLOOD BY AUTOMATED COUNT: 12.3 % (ref 10–15)
GFR SERPL CREATININE-BSD FRML MDRD: 72 ML/MIN/1.73M2
GLUCOSE BLD-MCNC: 99 MG/DL (ref 70–99)
HCT VFR BLD AUTO: 40.9 % (ref 35–47)
HGB BLD-MCNC: 12.9 G/DL (ref 11.7–15.7)
INR PPP: 1.14 (ref 0.85–1.15)
MCH RBC QN AUTO: 28.7 PG (ref 26.5–33)
MCHC RBC AUTO-ENTMCNC: 31.5 G/DL (ref 31.5–36.5)
MCV RBC AUTO: 91 FL (ref 78–100)
PLATELET # BLD AUTO: 254 10E3/UL (ref 150–450)
POTASSIUM BLD-SCNC: 4 MMOL/L (ref 3.4–5.3)
RBC # BLD AUTO: 4.49 10E6/UL (ref 3.8–5.2)
SODIUM SERPL-SCNC: 141 MMOL/L (ref 133–144)
WBC # BLD AUTO: 6.2 10E3/UL (ref 4–11)

## 2021-08-03 PROCEDURE — 99152 MOD SED SAME PHYS/QHP 5/>YRS: CPT | Performed by: INTERNAL MEDICINE

## 2021-08-03 PROCEDURE — C9602 PERC D-E COR STENT ATHER S: HCPCS | Performed by: INTERNAL MEDICINE

## 2021-08-03 PROCEDURE — C1894 INTRO/SHEATH, NON-LASER: HCPCS | Performed by: INTERNAL MEDICINE

## 2021-08-03 PROCEDURE — 36415 COLL VENOUS BLD VENIPUNCTURE: CPT | Performed by: INTERNAL MEDICINE

## 2021-08-03 PROCEDURE — 85730 THROMBOPLASTIN TIME PARTIAL: CPT | Performed by: INTERNAL MEDICINE

## 2021-08-03 PROCEDURE — 85610 PROTHROMBIN TIME: CPT | Performed by: INTERNAL MEDICINE

## 2021-08-03 PROCEDURE — 99153 MOD SED SAME PHYS/QHP EA: CPT | Performed by: INTERNAL MEDICINE

## 2021-08-03 PROCEDURE — 93454 CORONARY ARTERY ANGIO S&I: CPT | Mod: 26 | Performed by: INTERNAL MEDICINE

## 2021-08-03 PROCEDURE — 93005 ELECTROCARDIOGRAM TRACING: CPT

## 2021-08-03 PROCEDURE — 250N000011 HC RX IP 250 OP 636: Performed by: INTERNAL MEDICINE

## 2021-08-03 PROCEDURE — C9600 PERC DRUG-EL COR STENT SING: HCPCS | Mod: LD | Performed by: INTERNAL MEDICINE

## 2021-08-03 PROCEDURE — C1887 CATHETER, GUIDING: HCPCS | Performed by: INTERNAL MEDICINE

## 2021-08-03 PROCEDURE — 272N000001 HC OR GENERAL SUPPLY STERILE: Performed by: INTERNAL MEDICINE

## 2021-08-03 PROCEDURE — 92920 PRQ TRLUML C ANGIOP 1ART&/BR: CPT | Performed by: INTERNAL MEDICINE

## 2021-08-03 PROCEDURE — C1725 CATH, TRANSLUMIN NON-LASER: HCPCS | Performed by: INTERNAL MEDICINE

## 2021-08-03 PROCEDURE — C1753 CATH, INTRAVAS ULTRASOUND: HCPCS | Performed by: INTERNAL MEDICINE

## 2021-08-03 PROCEDURE — 85014 HEMATOCRIT: CPT | Performed by: INTERNAL MEDICINE

## 2021-08-03 PROCEDURE — 92978 ENDOLUMINL IVUS OCT C 1ST: CPT | Mod: 26 | Performed by: INTERNAL MEDICINE

## 2021-08-03 PROCEDURE — 80048 BASIC METABOLIC PNL TOTAL CA: CPT | Performed by: INTERNAL MEDICINE

## 2021-08-03 PROCEDURE — 250N000009 HC RX 250: Performed by: INTERNAL MEDICINE

## 2021-08-03 PROCEDURE — 258N000003 HC RX IP 258 OP 636: Performed by: INTERNAL MEDICINE

## 2021-08-03 PROCEDURE — 250N000013 HC RX MED GY IP 250 OP 250 PS 637: Performed by: STUDENT IN AN ORGANIZED HEALTH CARE EDUCATION/TRAINING PROGRAM

## 2021-08-03 PROCEDURE — 99152 MOD SED SAME PHYS/QHP 5/>YRS: CPT | Mod: 59 | Performed by: INTERNAL MEDICINE

## 2021-08-03 PROCEDURE — 999N000054 HC STATISTIC EKG NON-CHARGEABLE

## 2021-08-03 PROCEDURE — 92978 ENDOLUMINL IVUS OCT C 1ST: CPT | Mod: LD | Performed by: INTERNAL MEDICINE

## 2021-08-03 PROCEDURE — C9601 PERC DRUG-EL COR STENT BRAN: HCPCS | Mod: LD | Performed by: INTERNAL MEDICINE

## 2021-08-03 PROCEDURE — 93454 CORONARY ARTERY ANGIO S&I: CPT | Mod: XU | Performed by: INTERNAL MEDICINE

## 2021-08-03 PROCEDURE — C1874 STENT, COATED/COV W/DEL SYS: HCPCS | Performed by: INTERNAL MEDICINE

## 2021-08-03 PROCEDURE — 85347 COAGULATION TIME ACTIVATED: CPT

## 2021-08-03 PROCEDURE — C1761 HC OR CATH, TRANS INTRA LITHO/CORONARY: HCPCS | Performed by: INTERNAL MEDICINE

## 2021-08-03 PROCEDURE — 92929 PR PRQ TRLUML CORONARY BM STENT W/ANGIO ADDL ART/BRNCH: CPT | Mod: LD | Performed by: INTERNAL MEDICINE

## 2021-08-03 PROCEDURE — 999N000184 HC STATISTIC TELEMETRY

## 2021-08-03 PROCEDURE — C1769 GUIDE WIRE: HCPCS | Performed by: INTERNAL MEDICINE

## 2021-08-03 PROCEDURE — 999N000071 HC STATISTIC HEART CATH LAB OR EP LAB

## 2021-08-03 PROCEDURE — 92928 PRQ TCAT PLMT NTRAC ST 1 LES: CPT | Mod: LD | Performed by: INTERNAL MEDICINE

## 2021-08-03 DEVICE — STENT RESOLUTE ONYX DE 2.7FR 3.00X38MM RONYX DE30038UX: Type: IMPLANTABLE DEVICE | Status: FUNCTIONAL

## 2021-08-03 DEVICE — STENT RESOLUTE ONYX DE 2.7FR 3.50X34MM RONYX DE35034UX: Type: IMPLANTABLE DEVICE | Status: FUNCTIONAL

## 2021-08-03 DEVICE — IMPLANTABLE DEVICE: Type: IMPLANTABLE DEVICE | Status: FUNCTIONAL

## 2021-08-03 DEVICE — STENT RESOLUTE ONYX DE 2.7FR 3.00X22MM RONYX DE30022UX: Type: IMPLANTABLE DEVICE | Status: FUNCTIONAL

## 2021-08-03 RX ORDER — ATROPINE SULFATE 0.1 MG/ML
0.5 INJECTION INTRAVENOUS
Status: DISCONTINUED | OUTPATIENT
Start: 2021-08-03 | End: 2021-08-03 | Stop reason: HOSPADM

## 2021-08-03 RX ORDER — ROSUVASTATIN CALCIUM 20 MG/1
10 TABLET, COATED ORAL DAILY
Qty: 90 TABLET | Refills: 3 | Status: SHIPPED | OUTPATIENT
Start: 2021-08-03

## 2021-08-03 RX ORDER — SODIUM CHLORIDE 9 MG/ML
INJECTION, SOLUTION INTRAVENOUS CONTINUOUS
Status: DISCONTINUED | OUTPATIENT
Start: 2021-08-03 | End: 2021-08-03 | Stop reason: HOSPADM

## 2021-08-03 RX ORDER — OXYCODONE HYDROCHLORIDE 5 MG/1
5 TABLET ORAL EVERY 4 HOURS PRN
Status: DISCONTINUED | OUTPATIENT
Start: 2021-08-03 | End: 2021-08-03 | Stop reason: HOSPADM

## 2021-08-03 RX ORDER — DOBUTAMINE HYDROCHLORIDE 200 MG/100ML
2-20 INJECTION INTRAVENOUS CONTINUOUS PRN
Status: DISCONTINUED | OUTPATIENT
Start: 2021-08-03 | End: 2021-08-03 | Stop reason: HOSPADM

## 2021-08-03 RX ORDER — HEPARIN SODIUM 1000 [USP'U]/ML
INJECTION, SOLUTION INTRAVENOUS; SUBCUTANEOUS
Status: DISCONTINUED | OUTPATIENT
Start: 2021-08-03 | End: 2021-08-03 | Stop reason: HOSPADM

## 2021-08-03 RX ORDER — ONDANSETRON 2 MG/ML
4 INJECTION INTRAMUSCULAR; INTRAVENOUS EVERY 6 HOURS PRN
Status: DISCONTINUED | OUTPATIENT
Start: 2021-08-03 | End: 2021-08-03 | Stop reason: HOSPADM

## 2021-08-03 RX ORDER — NITROGLYCERIN 20 MG/100ML
10-200 INJECTION INTRAVENOUS CONTINUOUS PRN
Status: DISCONTINUED | OUTPATIENT
Start: 2021-08-03 | End: 2021-08-03 | Stop reason: HOSPADM

## 2021-08-03 RX ORDER — ARGATROBAN 1 MG/ML
150 INJECTION, SOLUTION INTRAVENOUS
Status: DISCONTINUED | OUTPATIENT
Start: 2021-08-03 | End: 2021-08-03 | Stop reason: HOSPADM

## 2021-08-03 RX ORDER — VERAPAMIL HYDROCHLORIDE 2.5 MG/ML
INJECTION, SOLUTION INTRAVENOUS
Status: DISCONTINUED | OUTPATIENT
Start: 2021-08-03 | End: 2021-08-03 | Stop reason: HOSPADM

## 2021-08-03 RX ORDER — ACETAMINOPHEN 325 MG/1
650 TABLET ORAL EVERY 4 HOURS PRN
Status: DISCONTINUED | OUTPATIENT
Start: 2021-08-03 | End: 2021-08-03 | Stop reason: HOSPADM

## 2021-08-03 RX ORDER — ASPIRIN 81 MG/1
243 TABLET, CHEWABLE ORAL ONCE
Status: DISCONTINUED | OUTPATIENT
Start: 2021-08-03 | End: 2021-08-03 | Stop reason: HOSPADM

## 2021-08-03 RX ORDER — ARGATROBAN 1 MG/ML
350 INJECTION, SOLUTION INTRAVENOUS
Status: DISCONTINUED | OUTPATIENT
Start: 2021-08-03 | End: 2021-08-03 | Stop reason: HOSPADM

## 2021-08-03 RX ORDER — HYDRALAZINE HYDROCHLORIDE 20 MG/ML
10 INJECTION INTRAMUSCULAR; INTRAVENOUS EVERY 4 HOURS PRN
Status: DISCONTINUED | OUTPATIENT
Start: 2021-08-03 | End: 2021-08-03 | Stop reason: HOSPADM

## 2021-08-03 RX ORDER — ONDANSETRON 4 MG/1
4 TABLET, ORALLY DISINTEGRATING ORAL EVERY 6 HOURS PRN
Status: DISCONTINUED | OUTPATIENT
Start: 2021-08-03 | End: 2021-08-03 | Stop reason: HOSPADM

## 2021-08-03 RX ORDER — POTASSIUM CHLORIDE 1500 MG/1
20 TABLET, EXTENDED RELEASE ORAL
Status: DISCONTINUED | OUTPATIENT
Start: 2021-08-03 | End: 2021-08-03 | Stop reason: HOSPADM

## 2021-08-03 RX ORDER — HEPARIN SODIUM 10000 [USP'U]/100ML
100-1000 INJECTION, SOLUTION INTRAVENOUS CONTINUOUS PRN
Status: DISCONTINUED | OUTPATIENT
Start: 2021-08-03 | End: 2021-08-03 | Stop reason: HOSPADM

## 2021-08-03 RX ORDER — FENTANYL CITRATE 50 UG/ML
25 INJECTION, SOLUTION INTRAMUSCULAR; INTRAVENOUS
Status: DISCONTINUED | OUTPATIENT
Start: 2021-08-03 | End: 2021-08-03 | Stop reason: HOSPADM

## 2021-08-03 RX ORDER — LIDOCAINE 40 MG/G
CREAM TOPICAL
Status: DISCONTINUED | OUTPATIENT
Start: 2021-08-03 | End: 2021-08-03 | Stop reason: HOSPADM

## 2021-08-03 RX ORDER — ASPIRIN 325 MG
325 TABLET ORAL ONCE
Status: DISCONTINUED | OUTPATIENT
Start: 2021-08-03 | End: 2021-08-03 | Stop reason: HOSPADM

## 2021-08-03 RX ORDER — NALOXONE HYDROCHLORIDE 0.4 MG/ML
0.2 INJECTION, SOLUTION INTRAMUSCULAR; INTRAVENOUS; SUBCUTANEOUS
Status: DISCONTINUED | OUTPATIENT
Start: 2021-08-03 | End: 2021-08-03 | Stop reason: HOSPADM

## 2021-08-03 RX ORDER — EPTIFIBATIDE 2 MG/ML
2 INJECTION, SOLUTION INTRAVENOUS CONTINUOUS PRN
Status: DISCONTINUED | OUTPATIENT
Start: 2021-08-03 | End: 2021-08-03 | Stop reason: HOSPADM

## 2021-08-03 RX ORDER — ASPIRIN 81 MG/1
81 TABLET ORAL DAILY
Status: DISCONTINUED | OUTPATIENT
Start: 2021-08-04 | End: 2021-08-03 | Stop reason: HOSPADM

## 2021-08-03 RX ORDER — FENTANYL CITRATE 50 UG/ML
INJECTION, SOLUTION INTRAMUSCULAR; INTRAVENOUS
Status: DISCONTINUED | OUTPATIENT
Start: 2021-08-03 | End: 2021-08-03 | Stop reason: HOSPADM

## 2021-08-03 RX ORDER — NITROGLYCERIN 5 MG/ML
VIAL (ML) INTRAVENOUS
Status: DISCONTINUED | OUTPATIENT
Start: 2021-08-03 | End: 2021-08-03 | Stop reason: HOSPADM

## 2021-08-03 RX ORDER — CLOPIDOGREL BISULFATE 75 MG/1
75 TABLET ORAL DAILY
Qty: 90 TABLET | Refills: 3 | Status: SHIPPED | OUTPATIENT
Start: 2021-08-04

## 2021-08-03 RX ORDER — NALOXONE HYDROCHLORIDE 0.4 MG/ML
0.4 INJECTION, SOLUTION INTRAMUSCULAR; INTRAVENOUS; SUBCUTANEOUS
Status: DISCONTINUED | OUTPATIENT
Start: 2021-08-03 | End: 2021-08-03 | Stop reason: HOSPADM

## 2021-08-03 RX ORDER — IOPAMIDOL 755 MG/ML
INJECTION, SOLUTION INTRAVASCULAR
Status: DISCONTINUED | OUTPATIENT
Start: 2021-08-03 | End: 2021-08-03 | Stop reason: HOSPADM

## 2021-08-03 RX ORDER — OXYCODONE HYDROCHLORIDE 5 MG/1
10 TABLET ORAL EVERY 4 HOURS PRN
Status: DISCONTINUED | OUTPATIENT
Start: 2021-08-03 | End: 2021-08-03 | Stop reason: HOSPADM

## 2021-08-03 RX ORDER — FLUMAZENIL 0.1 MG/ML
0.2 INJECTION, SOLUTION INTRAVENOUS
Status: DISCONTINUED | OUTPATIENT
Start: 2021-08-03 | End: 2021-08-03 | Stop reason: HOSPADM

## 2021-08-03 RX ORDER — EPTIFIBATIDE 2 MG/ML
180 INJECTION, SOLUTION INTRAVENOUS EVERY 10 MIN PRN
Status: DISCONTINUED | OUTPATIENT
Start: 2021-08-03 | End: 2021-08-03 | Stop reason: HOSPADM

## 2021-08-03 RX ORDER — ASPIRIN 81 MG/1
81 TABLET, CHEWABLE ORAL ONCE
Status: DISCONTINUED | OUTPATIENT
Start: 2021-08-03 | End: 2021-08-03 | Stop reason: HOSPADM

## 2021-08-03 RX ORDER — DOPAMINE HYDROCHLORIDE 160 MG/100ML
2-20 INJECTION, SOLUTION INTRAVENOUS CONTINUOUS PRN
Status: DISCONTINUED | OUTPATIENT
Start: 2021-08-03 | End: 2021-08-03 | Stop reason: HOSPADM

## 2021-08-03 RX ORDER — NITROGLYCERIN 0.4 MG/1
0.4 TABLET SUBLINGUAL EVERY 5 MIN PRN
Status: DISCONTINUED | OUTPATIENT
Start: 2021-08-03 | End: 2021-08-03 | Stop reason: HOSPADM

## 2021-08-03 RX ORDER — METOPROLOL TARTRATE 1 MG/ML
5-10 INJECTION, SOLUTION INTRAVENOUS
Status: DISCONTINUED | OUTPATIENT
Start: 2021-08-03 | End: 2021-08-03 | Stop reason: HOSPADM

## 2021-08-03 RX ADMIN — SODIUM CHLORIDE: 9 INJECTION, SOLUTION INTRAVENOUS at 07:22

## 2021-08-03 RX ADMIN — ACETAMINOPHEN 650 MG: 325 TABLET, FILM COATED ORAL at 13:58

## 2021-08-03 ASSESSMENT — MIFFLIN-ST. JEOR: SCORE: 1478.72

## 2021-08-03 NOTE — Clinical Note
The first balloon was inserted into the left anterior descending.Max pressure = 4 nia. Total duration = 10 seconds.     4 nia for 10 seconds at a time repeated.  Balloon reinflated a second time:  Balloon reinflated a third time:

## 2021-08-03 NOTE — Clinical Note
The first balloon was inserted into the left anterior descending.Max pressure = 12 nia. Total duration = 12 seconds.     Max pressure = 20 nia. Total duration = 10 seconds.    Balloon reinflated a second time: Max pressure = 20 nia. Total duration = 10 seconds.  Balloon reinflated a third time: Max pressure = 20 nia. Total duration = 10 seconds.  Balloon reinflated a fourth time: Max pressure = 20 nia. Total duration = 10 seconds.

## 2021-08-03 NOTE — PROGRESS NOTES
Care Suites Discharge Summary    Discharge Criteria:   Discharge Criteria met per MD orders: Yes.   Vital signs stable.     Pt demonstrates ability to ambulate safely: Yes.  (See discharge questionnaire for additional information)    Discharge instructions & education:   Discharge instructions reviewed with patient and Friend. Patient verbalizes understanding.   Additional patient education provided:  Cardiac/stent angio    Medications:   Patient will be discharging on new medications- Yes. Patient verbalizes reason for use, start date, and side effects Yes.    Items returned to patient:   Home and hospital acquired medications returned to patient Yes   Listed belongings gathered and returned to patient: Yes    Patient discharged to home with friend.     Harman Wells, CHARLI

## 2021-08-03 NOTE — Clinical Note
The first balloon was inserted into the left anterior descending.Max pressure = 8 nia. Total duration = 12 seconds.

## 2021-08-03 NOTE — DISCHARGE INSTRUCTIONS
Cardiac Angioplasty/Stent Discharge Instructions - Radial    After you go home:      Have an adult stay with you until tomorrow.    Drink extra fluids for 2 days.    You may resume your normal diet.    No smoking       For 24 hours - due to the sedation you received:    Relax and take it easy.    Do NOT make any important or legal decisions.    Do NOT drive or operate machines at home or at work.    Do NOT drink alcohol.    Care of Wrist Puncture Site:      For the first 24 hrs - check the puncture site every 1-2 hours while awake.    It is normal to have soreness at the puncture site and mild tingling in your hand for up to 3 days.    Remove the bandaid after 24 hours. If there is minor oozing, apply another bandaid and remove it after 12 hours.    You may shower tomorrow.  Do NOT take a bath, or use a hot tub or pool for at least 3 days. Do NOT scrub the site. Do not use lotion or powder near the puncture site.           Activity:          For 2 days:     do not use your hand or arm to support your weight (such as rising from a chair)     do not bend your wrist (such as lifting a garage door).    do not lift more than 5 pounds or exercise your arm (such as tennis, golf or bowling).    Do NOT do any heavy activity such as exercise, lifting, or straining.     Bleeding:      If you start bleeding from the site in your wrist, sit down and press firmly on/above the site for 10 minutes.     Once bleeding stops, keep arm still for 2 hours.     Call [facility] as soon as you can.       Call 911 right away if you have heavy bleeding or bleeding that does not stop.      Medicines:      If you are taking an antiplatelet medication such as Plavix, Brilinta or Effient, do not stop taking it until you talk to your cardiologist.        If you are on Metformin (Glucophage), do not restart it until you have blood tests (within 2 to 3 days after discharge).  After you have your blood drawn, you may restart the Metformin.     Take  your medications, including blood thinners, unless your provider tells you not to.      If you take Coumadin (Warfarin), have your INR checked by your provider in  3-5 days. Call your clinic to schedule this.    If you have stopped any medicines, check with your provider about when to restart them.    Follow Up Appointments:      Follow up with [facility] Nurse Practitioner at [facility] of patient preference in 7-10 days.    Cardiac Rehab will contact you for follow up care.    Call the clinic if:      You have a large or growing hard lump around the site.    The site is red, swollen, hot or tender.    Blood or fluid is draining from the site.    You have chills or a fever greater than 101 F (38 C).    Your arm feels numb, cool or changes color.    You have hives, a rash or unusual itching.    Any questions or concerns.    Other Instructions:      If you received a stent - carry your stent card with you at all times.      [facility] [place]:    [phone] [facility] (7 days a week)

## 2021-08-03 NOTE — Clinical Note
The first balloon was inserted into the left anterior descending.Max pressure = 12 nia. Total duration = 10 seconds.     Max pressure = 20 nia. Total duration = 10 seconds.    Balloon reinflated a second time: Max pressure = 20 nia. Total duration = 10 seconds.

## 2021-08-03 NOTE — Clinical Note
Multiple balloon inflation. The first balloon was inserted into the left anterior descending.Max pressure = 12 nia. Total duration = 14 seconds.     The second balloon was inserted into the Left Anterior Descending and left anterior decending diagonal. Max pressure = 12 nai. Total duration = 14 seconds.   Max pressure = 12 nia. Total duration = 14 seconds.

## 2021-08-03 NOTE — Clinical Note
Stent deployed in the left anterior descending. Max pressure = 10 nia. Total duration = 16 seconds. Balloon reinflated a second time: Max pressure = 12 nia. Total duration = 16 seconds. Balloon reinflated a third time: Max pressure = 12 nia. Total duration = 6 seconds.

## 2021-08-03 NOTE — Clinical Note
The first balloon was inserted into the left anterior descending.Max pressure = 16 nia. Total duration = 10 seconds.     Max pressure = 16 nia. Total duration = 10 seconds.    Balloon reinflated a second time: Max pressure = 16 nia. Total duration = 10 seconds.  Balloon reinflated a third time: Max pressure = 12 nia. Total duration = 10 seconds.  Balloon reinflated a fourth time: Max pressure = 20 nia. Total duration = 6 seconds.  Balloon reinflated a fourth time: Max pressure = 20 nia. Total duration = 5 seconds.

## 2021-08-03 NOTE — PROGRESS NOTES
Care Suites Admission Nursing Note    Patient Information  Name: Amy Bryan  Age: 65 year old  Reason for admission: heart cath  Care Suites arrival time:     Visitor Information  Name: Monserrat-friend  Informed of visitor restrictions: Yes  1 visitor allowed per patient   Visitor must screen negative for COVID symptoms   Visitor must wear a mask  Waiting rooms closed to visitors    Patient Admission/Assessment   Pre-procedure assessment complete: Yes  If abnormal assessment/labs, provider notified: N/A  NPO: Yes  Medications held per instructions/orders: Yes  Consent: obtained  If applicable, pregnancy test status: n/a  Patient oriented to room: Yes  Education/questions answered: Yes  Plan/other: proceed    Discharge Planning  Discharge name/phone number: [phone]  Overnight post sedation caregiver: [email or web address]  Discharge location: home    Harman Wells RN

## 2021-08-03 NOTE — Clinical Note
Multiple balloon inflation. The first balloon was inserted into the left anterior descending.Max pressure = 12 nia. Total duration = 6 seconds.     The second balloon was inserted into the Left Anterior Descending and left anterior decending diagonal. Max pressure = 12 nia. Total duration = 6 seconds.   Max pressure = 12 nia. Total duration = 6 seconds.

## 2021-08-03 NOTE — Clinical Note
Stent deployed in the distal left anterior descending. Max pressure = 9 nia. Total duration = 16 seconds. Balloon reinflated a second time: Max pressure = 14 nia. Total duration = 6 seconds.

## 2021-08-03 NOTE — Clinical Note
Stent deployed in the left anterior descending. Max pressure = 12 nia. Total duration = 12 seconds. Balloon reinflated a second time: Max pressure = 12 nia. Total duration = 6 seconds.

## 2021-08-03 NOTE — PROGRESS NOTES
Care Suites Post Procedure Note    Patient Information  Name: Amy Bryan  Age: 65 year old    Post Procedure  Time patient returned to Care Suites: 1126  Concerns/abnormal assessment: No immediate  If abnormal assessment, provider notified: N/A  Plan/Other: Continue post procedure plan of care  Right radial site CDI, no hematoma.  CMS intact. VS stable, c/o some mild HA. Monitor shows SR.  Taking po fluids well.  Friend has been updated by Dr. Singleton.  Anticipate discharge later today if stable.     Joy Arnold RN

## 2021-08-03 NOTE — Clinical Note
Multiple balloon inflation. The first balloon was inserted into the left anterior descending.Max pressure = 12 nia. Total duration = 10 seconds.     The second balloon was inserted into the Left Anterior Descending and left anterior decending diagonal. Max pressure = 12 nia. Total duration = 10 seconds.   3.0 x 15 NC  3.0 x 27 NC. Max pressure = 12 nia. Total duration = 10 seconds.

## 2021-08-03 NOTE — Clinical Note
The first balloon was inserted into the left anterior descending and left anterior descending diagonal.Max pressure = 18 nia. Total duration = 6 seconds.     Max pressure = 20 nia. Total duration = 5 seconds.    Balloon reinflated a second time: Max pressure = 20 nia. Total duration = 5 seconds.

## 2021-08-03 NOTE — PRE-PROCEDURE
GENERAL PRE-PROCEDURE:   Procedure:  Coronary angiogram with possible PCI  Date/Time:  8/3/2021 8:28 AM    Written consent obtained?: Yes    Risks and benefits: Risks, benefits and alternatives were discussed    DC Plan: Appropriate discharge home plan in place for patients who are going home after procedure   Consent given by:  Patient  Patient states understanding of procedure being performed: Yes    Patient's understanding of procedure matches consent: Yes    Procedure consent matches procedure scheduled: Yes    Expected level of sedation:  Moderate  Appropriately NPO:  Yes  Mallampati  :  Grade 2- soft palate, base of uvula, tonsillar pillars, and portion of posterior pharyngeal wall visible  Lungs:  Lungs clear with good breath sounds bilaterally  Heart:  Normal heart sounds and rate  History & Physical reviewed:  History and physical reviewed and no updates needed  Statement of review:  I have reviewed the lab findings, diagnostic data, medications, and the plan for sedation

## 2021-08-03 NOTE — Clinical Note
The first balloon was inserted into the left anterior descending.Max pressure = 12 nia. Total duration = 16 seconds.     Max pressure = 16 nia. Total duration = 10 seconds.    Balloon reinflated a second time: Max pressure = 16 nia. Total duration = 10 seconds.  Balloon reinflated a third time: Max pressure = 20 nia. Total duration = 8 seconds.

## 2021-08-03 NOTE — Clinical Note
The first balloon was inserted into the left anterior descending.Max pressure = 20 nia. Total duration = 10 seconds.     Max pressure = 20 nia. Total duration = 15 seconds.    Balloon reinflated a second time: Max pressure = 20 nia. Total duration = 15 seconds.  Balloon reinflated a third time: Max pressure = 20 nia. Total duration = 10 seconds.

## 2021-08-03 NOTE — Clinical Note
The first balloon was inserted into the left anterior descending.Max pressure = 20 nia. Total duration = 6 seconds.     Max pressure = 20 nia. Total duration = 6 seconds.    Balloon reinflated a second time: Max pressure = 20 nia. Total duration = 6 seconds.

## 2021-08-03 NOTE — Clinical Note
Stent deployed in the left main. Max pressure = 12 nia. Total duration = 22 seconds. Balloon reinflated a second time: Max pressure = 12 nia. Total duration = 6 seconds.

## 2021-08-03 NOTE — PROGRESS NOTES
PATIENT/VISITOR WELLNESS SCREENING    Step 1 Patient Screening    1. In the last month, have you been in contact with someone who was confirmed or suspected to have Coronavirus/COVID-19? No    2. Do you have the following symptoms?  Fever/Chills? No   Cough? No   Shortness of breath? No   New loss of taste or smell? No  Sore throat? No  Muscle or body aches? No  Headaches? No  Fatigue? No  Vomiting or diarrhea? No    Step 2 Visitor Screening    1. Name of Visitor (1 visitor per patient): Monserrat    2. In the last month, have you been in contact with someone who was confirmed or suspected to have Coronavirus/COVID-19? No    3. Do you have the following symptoms?  Fever/Chills? No   Cough? No   Shortness of breath? No   Skin rash? No   Loss of taste or smell? No  Sore throat? No  Runny or stuffy nose? No  Muscle or body aches? No  Headaches? No  Fatigue? No  Vomiting or diarrhea? No    If the visitor has positive symptoms, notify supervisor/manger  Per policy, the visitor will need to leave the facility     Step 3 Refer to logic grid below for actions    NO SYMPTOM(S)    ACTIONS:  1. Standard rooming process  2. Provider to assess per normal protocol  3. Implement precautions as needed and per guidelines     POSITIVE SYMPTOM(S)  If positive for ANY of the following symptoms: fever, cough, shortness of breath, rash    ACTION:  1. Continue to have the patient wear a mask   2. Room patient as soon as possible  3. Don appropriate PPE when entering room  4. Provider evaluation

## 2021-08-04 ENCOUNTER — TELEPHONE (OUTPATIENT)
Dept: CARDIOLOGY | Facility: CLINIC | Age: 66
End: 2021-08-04

## 2021-08-04 LAB
ACT BLD: 255 SECONDS (ref 74–150)
ACT BLD: 272 SECONDS (ref 74–150)
ACT BLD: 288 SECONDS (ref 74–150)

## 2021-08-04 NOTE — TELEPHONE ENCOUNTER
Left message on patient's POA's, Monserrat, voicemail to call back with questions or concerns prior to follow up.     Intervention: Successful HD IVUS guided PCI of the LAD/D2 using intracoronary lithotripsy treated with bifurcation DK crushing technique and overlapping drug-eluting stents x3 and additional drug-eluting stent into the D2    Access Site: Right radial artery    s numb or cool, or hives, a rash, or unusual itching, shortness of breath, or chest discomfort.     Heart Follow Up: Maryanne Croft PA-C     After hours contact number [phone] option 2.     Izabela Roque, ANALI, CNP  8/4/2021

## 2021-08-06 LAB
ATRIAL RATE - MUSE: 57 BPM
ATRIAL RATE - MUSE: 59 BPM
DIASTOLIC BLOOD PRESSURE - MUSE: NORMAL MMHG
DIASTOLIC BLOOD PRESSURE - MUSE: NORMAL MMHG
INTERPRETATION ECG - MUSE: NORMAL
INTERPRETATION ECG - MUSE: NORMAL
P AXIS - MUSE: -20 DEGREES
P AXIS - MUSE: 29 DEGREES
PR INTERVAL - MUSE: 152 MS
PR INTERVAL - MUSE: 158 MS
QRS DURATION - MUSE: 84 MS
QRS DURATION - MUSE: 84 MS
QT - MUSE: 468 MS
QT - MUSE: 476 MS
QTC - MUSE: 455 MS
QTC - MUSE: 471 MS
R AXIS - MUSE: -17 DEGREES
R AXIS - MUSE: -19 DEGREES
SYSTOLIC BLOOD PRESSURE - MUSE: NORMAL MMHG
SYSTOLIC BLOOD PRESSURE - MUSE: NORMAL MMHG
T AXIS - MUSE: 21 DEGREES
T AXIS - MUSE: 70 DEGREES
VENTRICULAR RATE- MUSE: 57 BPM
VENTRICULAR RATE- MUSE: 59 BPM

## 2021-08-18 ENCOUNTER — OFFICE VISIT (OUTPATIENT)
Dept: CARDIOLOGY | Facility: CLINIC | Age: 66
End: 2021-08-18
Attending: PHYSICIAN ASSISTANT
Payer: COMMERCIAL

## 2021-08-18 VITALS
HEIGHT: 66 IN | WEIGHT: 202 LBS | SYSTOLIC BLOOD PRESSURE: 111 MMHG | HEART RATE: 69 BPM | BODY MASS INDEX: 32.47 KG/M2 | DIASTOLIC BLOOD PRESSURE: 73 MMHG

## 2021-08-18 DIAGNOSIS — R93.1 ABNORMAL FINDINGS ON DIAGNOSTIC IMAGING OF HEART AND CORONARY CIRCULATION: ICD-10-CM

## 2021-08-18 DIAGNOSIS — I25.10 CORONARY ARTERY DISEASE INVOLVING NATIVE CORONARY ARTERY OF NATIVE HEART WITHOUT ANGINA PECTORIS: Primary | ICD-10-CM

## 2021-08-18 PROCEDURE — 99214 OFFICE O/P EST MOD 30 MIN: CPT | Performed by: PHYSICIAN ASSISTANT

## 2021-08-18 RX ORDER — METOPROLOL TARTRATE 25 MG/1
75 TABLET, FILM COATED ORAL 2 TIMES DAILY
COMMUNITY

## 2021-08-18 ASSESSMENT — MIFFLIN-ST. JEOR: SCORE: 1475.08

## 2021-08-18 NOTE — LETTER
8/18/2021    [facility]  [address], [address]  [place] [address]    RE: Amy Bryan       Dear Colleague,    I had the pleasure of seeing Amy Bryan in the [facility].      Cardiology Progress Note    Patient seen today in follow up of: post angiogram  Primary cardiologist: Dr. Wren    HPI:  Amy Bryan is a very pleasant 66 year old female with a history of:  1.  Traumatic brain injury.  2.  Coronary artery disease. With a hospitalization in 2018 with cardiogenic shock and an anterolateral STEMI. She underwent emergent coronary angiogram with emergent aspiration thrombectomy of the PL 3 branch and balloon dilation of the a small D1.  She had a 70% proximal to mid LAD stenosis.  This was to be intervened on in a staged fashion but this did not happen.  He also had a 60% ostial circumflex lesion.  3.  LV dysfunction.  With LV gram at the time of her STEMI showing an EF of 25% and wall motion abnormalities consistent with possible Takotsubo.  Her echo subsequently showed an EF of 40 to 45% and follow-up echocardiograms showed normalization of her LV function.  She has been maintained on beta-blockers and lisinopril.  3.  Moderate pericardial effusion.  Noted in 2018 which has been managed conservatively.  4.  Hypertriglyceridemia.    Amy had been lost to cardiology follow up since 2019 when she recently established with Dr. Wren.  She had some worsening shortness of breath on exertion for which an echo and stress test were ordered.  Her echo showed normal LV function without wall motion abnormalities but her nuclear stress test showed a medium sized area of transmural infarct in the lateral and anterolateral segment of the LV.  She was started on Imdur with improvement and referred for coronary angiography.    She underwent coronary angiography on 8/3/2021. She was found to have severe single-vessel  disease involving serial lesions of the proximal/mid/distal LAD as well as proximal D2. She underwent successful HD IVUS-guided PCI of the LAD/D2 using intracoronary lithotripsy. The LAD/D2 bifurcation was treated with a DK crush technique. The LM to mid LAD received overlapping stents x 3 and she received a single ELLIOT to D2.     She is back today for follow up after her angiogram. She tells me she notes significant improvement in her symptoms since her angiogram. Her shortness of breath has significantly improved. She is walking daily with the nurses at her home without any difficulty as before. She denies chest pain or any other new symptoms. Her radial access site has healed well.    ASSESSMENT/PLAN:  Amy Bryan is a very pleasant 66 year old female with a history of TBI, CAD with anterolateral STEMI in 2018, a stress cardiomyopathy with normalization of her LV function who is here today for follow up after her recent angiogram. As noted, she underwent coronary angiography after an abnormal nuclear stress test which was done for exertional dyspnea. She was found to have significantly calcified serial lesions involving the LAD as well as an 80% D2 lesion which were intervened on with intracoronary lithotripsy. She has a residual 70% circumflex stenosis and 80% stenosis of a small D1 vessel. We reviewed her angiogram results today. Fortunately, she has had significant symptomatic improvement. She was started on Imdur prior to her angiogram. At this point, I think we can stop that.     She has been appropriate started on aspirin and plavix. We reviewed the importance of continuing that uninterrupted. Of note, an aspirin allergy was mentioned in prior notes but she is not allergic to aspirin and has been tolerating this without difficulty. Her risk factors appear to be under good control. Recent LDL was 51. Her blood pressure is excellent on her current regimen. She'll continue on statin, beta blocker, and  ACE inhibitor.    She is scheduled to start cardiac rehab. She is also walking regularly and exercising at home. I encouarged her to continue with that.     I will have her return to see Dr. Wren in 6 months. We will repeat a lipid profile at that time. In the meantime, if she has any questions or concerns I asked her to contact us.    Orders this Visit:  Orders Placed This Encounter   Procedures     Lipid Profile     Follow-Up with Cardiologist     Orders Placed This Encounter   Medications     metoprolol tartrate (LOPRESSOR) 25 MG tablet     Sig: Take 75 mg by mouth 2 times daily     Medications Discontinued During This Encounter   Medication Reason     aspirin (ASA) 81 MG EC tablet Medication Reconciliation Clean Up     clopidogrel (PLAVIX) 75 MG tablet Medication Reconciliation Clean Up     metoprolol tartrate (LOPRESSOR) 100 MG tablet Dose adjustment     atorvastatin (LIPITOR) 40 MG tablet      isosorbide mononitrate (IMDUR) 30 MG 24 hr tablet        CURRENT MEDICATIONS:  Current Outpatient Medications   Medication Sig Dispense Refill     acetaminophen (TYLENOL) 325 MG tablet Take 1,000 mg by mouth 2 times daily And at bedtime as needed for pain       Alum Hydroxide-Mag Carbonate (GAVISCON PO) Take 1 tablet by mouth every 4 hours as needed (indigestion)       AMITRIPTYLINE HCL PO Take 25 mg by mouth At Bedtime       ammonium lactate (AMLACTIN) 12 % cream Apply topically 2 times daily Apply to feet       aspirin (ASA) 81 MG EC tablet Take 1 tablet (81 mg) by mouth daily 90 tablet 3     benzoyl peroxide 5 % topical gel Apply topically 2 times daily as needed (redness/rosacea of face)       Ca Carbonate-Mag Hydroxide (ROLAIDS PO)        clopidogrel (PLAVIX) 75 MG tablet Take 1 tablet (75 mg) by mouth daily 90 tablet 3     furosemide (LASIX) 20 MG tablet Take 20 mg by mouth daily       GABAPENTIN PO Take 100 mg by mouth 3 times daily       LevETIRAcetam (KEPPRA PO) Take 500 mg by mouth 2 times daily        levothyroxine (SYNTHROID/LEVOTHROID) 25 MCG tablet Take 1 tablet (25 mcg) by mouth daily 45 tablet 0     LISINOPRIL PO Take 5 mg by mouth daily       loperamide (IMODIUM) 2 MG capsule Take 2 mg by mouth 4 times daily as needed for diarrhea       LORAZEPAM PO Take 0.5 mg by mouth 3 times daily as needed for anxiety       melatonin 3 MG CAPS        metoprolol tartrate (LOPRESSOR) 25 MG tablet Take 75 mg by mouth 2 times daily       metroNIDAZOLE (METROCREAM) 0.75 % external cream Apply topically 2 times daily       metroNIDAZOLE (METROGEL) 0.75 % external gel Apply topically 2 times daily 45 g 3     MIRTAZAPINE PO Take 30 mg by mouth At Bedtime       nitroGLYcerin (NITROSTAT) 0.4 MG sublingual tablet For chest pain place 1 tablet under the tongue every 5 minutes for 3 doses. If symptoms persist 5 minutes after 1st dose call 911. 25 tablet 0     NYSTATIN PO        OMEPRAZOLE PO Take 20 mg by mouth every other day       PHENAZOPYRIDINE HCL PO Take 200 mg by mouth 3 times daily as needed (burning upon urination)       potassium chloride (KLOR-CON) 20 MEQ Packet Take 20 mEq by mouth 2 times daily       psyllium (METAMUCIL) 58.6 % POWD Take 1 teaspoonful by mouth daily Natural Fiber Pow Therapy       RaNITidine HCl (ZANTAC PO) Take 150 mg by mouth 2 times daily       rosuvastatin (CRESTOR) 20 MG tablet Take 0.5 tablets (10 mg) by mouth daily 90 tablet 3     traZODone (DESYREL) 50 MG tablet Take 75 mg by mouth nightly as needed for sleep        triamcinolone (KENALOG) 0.1 % external cream Apply to aa on hands bid for 14 consecutive days and then when needed For future refills 80 g 1     triamcinolone (KENALOG) 0.1 % external cream Apply topically 2 times daily 45 g 0     triamcinolone (KENALOG) 0.5 % cream Apply topically 2 times daily as needed (right elbow and handfor psoriasis)       trolamine salicylate (ASPERCREME) 10 % cream Apply topically as needed for moderate pain To affected areas       UNABLE TO FIND MEDICATION  NAME: Natural fibers pills take by mouth daily       UNABLE TO FIND MEDICATION NAME: Nystatin cream 174357 usp topical daily- apply to abdominal fold rash 2 x a day; provide small amount in a cup for her to apply.       UNABLE TO FIND MEDICATION NAME: Rolaids 4 tabs by mouth as needed maximum 12 tabs/ 24 hrs.       UNABLE TO FIND MEDICATION NAME: Trazodone Hydrochloride Give one and a half tablets 75mg by mouth ab bedtime as needed for sleep aid       UNABLE TO FIND MEDICATION NAME: aspercream       vitamin D3 (CHOLECALCIFEROL) 1.25 MG (97499 UT) capsule Take 1 capsule (50,000 Units) by mouth every 7 days 12 capsule 0     LORazepam (ATIVAN) 1 MG tablet Take 1 tablet by mouth At Bedtime. (Patient not taking: Reported on 8/18/2021) 30 tablet      ALLERGIES  Allergies   Allergen Reactions     Penicillins      dizziness     PAST MEDICAL HISTORY:  Past Medical History:   Diagnosis Date     Cardiogenic shock (H) 07/09/2021     Coma (H) 05/2012    CHT after a fall     Coronary artery disease involving native coronary artery of native heart 07/09/2021    3 vessel     Developmental delay      DVT (deep vein thrombosis) in pregnancy     post trauma     Fall 05/2012    multiple fracture     Hypertension      Menopause     age 52     Pelvic fracture (H) 5-201`2     Rib fracture 05/2012     STEMI (ST elevation myocardial infarction) (H) 01/25/2018     PAST SURGICAL HISTORY:  Past Surgical History:   Procedure Laterality Date     COLONOSCOPY  01/01/2010     CV CORONARY LITHOTRIPSY PCI N/A 8/3/2021    Procedure: CV Coronary Lithotripsy PCI;  Surgeon: Kamran Singleton MD;  Location:  [facility] LAB     CV HEART CATHETERIZATION WITH POSSIBLE INTERVENTION N/A 8/3/2021    Procedure: Heart Catheterization with Possible Intervention;  Surgeon: Kamran Singleton MD;  Location:  [facility]     CV INTRAVASULAR ULTRASOUND N/A 8/3/2021    Procedure: Intravascular Ultrasound;  Surgeon: Kamran Singleton  MD Willie;  Location:  [facility]     CV PCI STENT DRUG ELUTING N/A 8/3/2021    Procedure: Percutaneous Coronary Intervention Stent Drug Eluting;  Surgeon: Kamran Singleton MD;  Location:  [facility] LAB     HEART CATH DRUG ELUTING STENT PLACEMENT N/A 01/25/2018     LASER YAG CAPSULOTOMY Left 05/29/2018    Procedure: LASER YAG CAPSULOTOMY;  LEFT YAG LASER CAPSULOTOMY ;  Surgeon: Tabby Guillaume MD;  Location:  [facility]     LASER YAG CAPSULOTOMY Right 06/05/2018    Procedure: LASER YAG CAPSULOTOMY;  RIGHT EYE YAG LASER CAPSULOTOMY ;  Surgeon: Tabby Guillaume MD;  [facility]     FAMILY HISTORY:  Family History   Problem Relation Age of Onset     Other - See Comments Father         alpha 1 antitrypsin deficiency      Cancer - colorectal Mother      Diabetes Sister      SOCIAL HISTORY:  Social History     Socioeconomic History     Marital status:      Spouse name: None     Number of children: None     Years of education: None     Highest education level: None   Occupational History     None   Tobacco Use     Smoking status: Never Smoker     Smokeless tobacco: Never Used   Substance and Sexual Activity     Alcohol use: Not Currently     Alcohol/week: 1.7 standard drinks     Types: 2 Standard drinks or equivalent per week     Drug use: None     Sexual activity: None   Other Topics Concern     Parent/sibling w/ CABG, MI or angioplasty before 65F 55M? Not Asked   Social History Narrative     None     Social Determinants of Health     Financial Resource Strain:      Difficulty of Paying Living Expenses:    Food Insecurity:      Worried About Running Out of Food in the Last Year:      Ran Out of Food in the Last Year:    Transportation Needs:      Lack of Transportation (Medical):      Lack of Transportation (Non-Medical):    Physical Activity:      Days of Exercise per Week:      Minutes of Exercise per Session:    Stress:      Feeling of Stress :    Social Connections:       "Frequency of Communication with Friends and Family:      Frequency of Social Gatherings with Friends and Family:      Attends Sabianist Services:      Active Member of Clubs or Organizations:      Attends Club or Organization Meetings:      Marital Status:    Intimate Partner Violence:      Fear of Current or Ex-Partner:      Emotionally Abused:      Physically Abused:      Sexually Abused:      Review of Systems:  Skin:  Negative     Eyes:  Negative    ENT:  Negative    Respiratory:  Negative    Cardiovascular:  Negative;palpitations;chest pain;syncope or near-syncope;lightheadedness;dizziness;cyanosis Positive for  Gastroenterology: Negative    Genitourinary:  Positive for urinary frequency  Musculoskeletal:  Negative    Neurologic:  Positive for    Psychiatric:  Negative    Heme/Lymph/Imm:  Negative    Endocrine:  Positive for thyroid disorder     Physical Exam:  Vitals: /73   Pulse 69   Ht 1.664 m (5' 5.5\")   Wt 91.6 kg (202 lb)   BMI 33.10 kg/m     Wt Readings from Last 4 Encounters:   08/18/21 91.6 kg (202 lb)   08/03/21 91.5 kg (201 lb 11.2 oz)   07/16/21 91.3 kg (201 lb 4.8 oz)   06/30/21 90.6 kg (199 lb 12.8 oz)     GEN: well nourished, in no acute distress.  HEENT:  Pupils equal, round. Sclerae nonicteric.   C/V:   R radial access site has some very mild ecchymosis. No hematoma or bruit.  SKIN: Warm and dry.     Recent Lab Results:  LIPID RESULTS:  Lab Results   Component Value Date    CHOL 145 05/19/2021    HDL 27 (L) 05/19/2021    LDL 51 05/19/2021    TRIG 336 (H) 05/19/2021     LIVER ENZYME RESULTS:  Lab Results   Component Value Date    AST 25 05/19/2021    ALT 36 05/19/2021     CBC RESULTS:  Lab Results   Component Value Date    WBC 6.2 08/03/2021    WBC 7.6 05/19/2021    RBC 4.49 08/03/2021    RBC 4.72 05/19/2021    HGB 12.9 08/03/2021    HGB 14.5 05/19/2021    HCT 40.9 08/03/2021    HCT 45.4 05/19/2021    MCV 91 08/03/2021    MCV 96 05/19/2021    MCH 28.7 08/03/2021    MCH 30.7 05/19/2021 "    MCHC 31.5 08/03/2021    MCHC 31.9 05/19/2021    RDW 12.3 08/03/2021    RDW 12.5 05/19/2021     08/03/2021     05/19/2021     BMP RESULTS:  Lab Results   Component Value Date     08/03/2021     05/19/2021    POTASSIUM 4.0 08/03/2021    POTASSIUM 4.7 05/19/2021    CHLORIDE 106 08/03/2021    CHLORIDE 107 05/19/2021    CO2 33 (H) 08/03/2021    CO2 33 (H) 05/19/2021    ANIONGAP 2 (L) 08/03/2021    ANIONGAP 1 (L) 05/19/2021    GLC 99 08/03/2021    GLC 97 05/19/2021    BUN 15 08/03/2021    BUN 16 05/19/2021    CR 0.86 08/03/2021    CR 0.90 05/19/2021    GFRESTIMATED 72 08/03/2021    GFRESTIMATED 67 05/19/2021    GFRESTBLACK 78 05/19/2021    HECTOR 9.0 08/03/2021    HECTOR 9.3 05/19/2021      A1C RESULTS:  Lab Results   Component Value Date    A1C 5.5 01/26/2018     INR RESULTS:  Lab Results   Component Value Date    INR 1.14 08/03/2021    INR 2.6 08/29/2012    INR 1.8 08/15/2012     Total time today was 30 minutes review notes, imaging, labs, patient visit, orders and documentation.     Abigail Croft PA-C  [facility]      Thank you for allowing me to participate in the care of your patient.      Sincerely,     Abigail Croft PA-C     [facility]  cc:   Abigail Croft PA-C  [address]  [place] [id number]

## 2021-08-18 NOTE — PATIENT INSTRUCTIONS
"Thank you for your [facility] visit today. Your provider has recommended the following:    Medication Changes:  STOP Imdur.  Recommendations:  Call us with any issues.  Follow-up:  See Dr. Wren in 6 months with a lipid profile before.   Reminder:  Please bring in all current medications, over the counter supplements and vitamin bottles to your next appointment.  Important \"[facility]\" telephone numbers for your reference:  Cardiology Scheduling - [phone]  Cardiology Clinic RN-  [phone]     [facility]    "

## 2021-08-18 NOTE — PROGRESS NOTES
Cardiology Progress Note    Patient seen today in follow up of: post angiogram  Primary cardiologist: Dr. Wren    HPI:  Amy Bryan is a very pleasant 66 year old female with a history of:  1.  Traumatic brain injury.  2.  Coronary artery disease. With a hospitalization in 2018 with cardiogenic shock and an anterolateral STEMI. She underwent emergent coronary angiogram with emergent aspiration thrombectomy of the PL 3 branch and balloon dilation of the a small D1.  She had a 70% proximal to mid LAD stenosis.  This was to be intervened on in a staged fashion but this did not happen.  He also had a 60% ostial circumflex lesion.  3.  LV dysfunction.  With LV gram at the time of her STEMI showing an EF of 25% and wall motion abnormalities consistent with possible Takotsubo.  Her echo subsequently showed an EF of 40 to 45% and follow-up echocardiograms showed normalization of her LV function.  She has been maintained on beta-blockers and lisinopril.  3.  Moderate pericardial effusion.  Noted in 2018 which has been managed conservatively.  4.  Hypertriglyceridemia.    Amy had been lost to cardiology follow up since 2019 when she recently established with Dr. Wren.  She had some worsening shortness of breath on exertion for which an echo and stress test were ordered.  Her echo showed normal LV function without wall motion abnormalities but her nuclear stress test showed a medium sized area of transmural infarct in the lateral and anterolateral segment of the LV.  She was started on Imdur with improvement and referred for coronary angiography.    She underwent coronary angiography on 8/3/2021. She was found to have severe single-vessel disease involving serial lesions of the proximal/mid/distal LAD as well as proximal D2. She underwent successful HD IVUS-guided PCI of the LAD/D2 using intracoronary lithotripsy. The LAD/D2 bifurcation was treated with a DK crush technique. The LM to mid LAD received  overlapping stents x 3 and she received a single ELLIOT to D2.     She is back today for follow up after her angiogram. She tells me she notes significant improvement in her symptoms since her angiogram. Her shortness of breath has significantly improved. She is walking daily with the nurses at her home without any difficulty as before. She denies chest pain or any other new symptoms. Her radial access site has healed well.    ASSESSMENT/PLAN:  Amy Bryan is a very pleasant 66 year old female with a history of TBI, CAD with anterolateral STEMI in 2018, a stress cardiomyopathy with normalization of her LV function who is here today for follow up after her recent angiogram. As noted, she underwent coronary angiography after an abnormal nuclear stress test which was done for exertional dyspnea. She was found to have significantly calcified serial lesions involving the LAD as well as an 80% D2 lesion which were intervened on with intracoronary lithotripsy. She has a residual 70% circumflex stenosis and 80% stenosis of a small D1 vessel. We reviewed her angiogram results today. Fortunately, she has had significant symptomatic improvement. She was started on Imdur prior to her angiogram. At this point, I think we can stop that.     She has been appropriate started on aspirin and plavix. We reviewed the importance of continuing that uninterrupted. Of note, an aspirin allergy was mentioned in prior notes but she is not allergic to aspirin and has been tolerating this without difficulty. Her risk factors appear to be under good control. Recent LDL was 51. Her blood pressure is excellent on her current regimen. She'll continue on statin, beta blocker, and ACE inhibitor.    She is scheduled to start cardiac rehab. She is also walking regularly and exercising at home. I encouarged her to continue with that.     I will have her return to see Dr. Wren in 6 months. We will repeat a lipid profile at that time. In the  meantime, if she has any questions or concerns I asked her to contact us.    Orders this Visit:  Orders Placed This Encounter   Procedures     Lipid Profile     Follow-Up with Cardiologist     Orders Placed This Encounter   Medications     metoprolol tartrate (LOPRESSOR) 25 MG tablet     Sig: Take 75 mg by mouth 2 times daily     Medications Discontinued During This Encounter   Medication Reason     aspirin (ASA) 81 MG EC tablet Medication Reconciliation Clean Up     clopidogrel (PLAVIX) 75 MG tablet Medication Reconciliation Clean Up     metoprolol tartrate (LOPRESSOR) 100 MG tablet Dose adjustment     atorvastatin (LIPITOR) 40 MG tablet      isosorbide mononitrate (IMDUR) 30 MG 24 hr tablet        CURRENT MEDICATIONS:  Current Outpatient Medications   Medication Sig Dispense Refill     acetaminophen (TYLENOL) 325 MG tablet Take 1,000 mg by mouth 2 times daily And at bedtime as needed for pain       Alum Hydroxide-Mag Carbonate (GAVISCON PO) Take 1 tablet by mouth every 4 hours as needed (indigestion)       AMITRIPTYLINE HCL PO Take 25 mg by mouth At Bedtime       ammonium lactate (AMLACTIN) 12 % cream Apply topically 2 times daily Apply to feet       aspirin (ASA) 81 MG EC tablet Take 1 tablet (81 mg) by mouth daily 90 tablet 3     benzoyl peroxide 5 % topical gel Apply topically 2 times daily as needed (redness/rosacea of face)       Ca Carbonate-Mag Hydroxide (ROLAIDS PO)        clopidogrel (PLAVIX) 75 MG tablet Take 1 tablet (75 mg) by mouth daily 90 tablet 3     furosemide (LASIX) 20 MG tablet Take 20 mg by mouth daily       GABAPENTIN PO Take 100 mg by mouth 3 times daily       LevETIRAcetam (KEPPRA PO) Take 500 mg by mouth 2 times daily       levothyroxine (SYNTHROID/LEVOTHROID) 25 MCG tablet Take 1 tablet (25 mcg) by mouth daily 45 tablet 0     LISINOPRIL PO Take 5 mg by mouth daily       loperamide (IMODIUM) 2 MG capsule Take 2 mg by mouth 4 times daily as needed for diarrhea       LORAZEPAM PO Take 0.5 mg  by mouth 3 times daily as needed for anxiety       melatonin 3 MG CAPS        metoprolol tartrate (LOPRESSOR) 25 MG tablet Take 75 mg by mouth 2 times daily       metroNIDAZOLE (METROCREAM) 0.75 % external cream Apply topically 2 times daily       metroNIDAZOLE (METROGEL) 0.75 % external gel Apply topically 2 times daily 45 g 3     MIRTAZAPINE PO Take 30 mg by mouth At Bedtime       nitroGLYcerin (NITROSTAT) 0.4 MG sublingual tablet For chest pain place 1 tablet under the tongue every 5 minutes for 3 doses. If symptoms persist 5 minutes after 1st dose call 911. 25 tablet 0     NYSTATIN PO        OMEPRAZOLE PO Take 20 mg by mouth every other day       PHENAZOPYRIDINE HCL PO Take 200 mg by mouth 3 times daily as needed (burning upon urination)       potassium chloride (KLOR-CON) 20 MEQ Packet Take 20 mEq by mouth 2 times daily       psyllium (METAMUCIL) 58.6 % POWD Take 1 teaspoonful by mouth daily Natural Fiber Pow Therapy       RaNITidine HCl (ZANTAC PO) Take 150 mg by mouth 2 times daily       rosuvastatin (CRESTOR) 20 MG tablet Take 0.5 tablets (10 mg) by mouth daily 90 tablet 3     traZODone (DESYREL) 50 MG tablet Take 75 mg by mouth nightly as needed for sleep        triamcinolone (KENALOG) 0.1 % external cream Apply to aa on hands bid for 14 consecutive days and then when needed For future refills 80 g 1     triamcinolone (KENALOG) 0.1 % external cream Apply topically 2 times daily 45 g 0     triamcinolone (KENALOG) 0.5 % cream Apply topically 2 times daily as needed (right elbow and handfor psoriasis)       trolamine salicylate (ASPERCREME) 10 % cream Apply topically as needed for moderate pain To affected areas       UNABLE TO FIND MEDICATION NAME: Natural fibers pills take by mouth daily       UNABLE TO FIND MEDICATION NAME: Nystatin cream 127085 usp topical daily- apply to abdominal fold rash 2 x a day; provide small amount in a cup for her to apply.       UNABLE TO FIND MEDICATION NAME: Rolaids 4 tabs by  mouth as needed maximum 12 tabs/ 24 hrs.       UNABLE TO FIND MEDICATION NAME: Trazodone Hydrochloride Give one and a half tablets 75mg by mouth ab bedtime as needed for sleep aid       UNABLE TO FIND MEDICATION NAME: aspercream       vitamin D3 (CHOLECALCIFEROL) 1.25 MG (23849 UT) capsule Take 1 capsule (50,000 Units) by mouth every 7 days 12 capsule 0     LORazepam (ATIVAN) 1 MG tablet Take 1 tablet by mouth At Bedtime. (Patient not taking: Reported on 8/18/2021) 30 tablet      ALLERGIES  Allergies   Allergen Reactions     Penicillins      dizziness     PAST MEDICAL HISTORY:  Past Medical History:   Diagnosis Date     Cardiogenic shock (H) 07/09/2021     Coma (H) 05/2012    CHT after a fall     Coronary artery disease involving native coronary artery of native heart 07/09/2021    3 vessel     Developmental delay      DVT (deep vein thrombosis) in pregnancy     post trauma     Fall 05/2012    multiple fracture     Hypertension      Menopause     age 52     Pelvic fracture (H) 5-201`2     Rib fracture 05/2012     STEMI (ST elevation myocardial infarction) (H) 01/25/2018     PAST SURGICAL HISTORY:  Past Surgical History:   Procedure Laterality Date     COLONOSCOPY  01/01/2010     CV CORONARY LITHOTRIPSY PCI N/A 8/3/2021    Procedure: CV Coronary Lithotripsy PCI;  Surgeon: Kamran Singleton MD;  Location:  [facility]     CV HEART CATHETERIZATION WITH POSSIBLE INTERVENTION N/A 8/3/2021    Procedure: Heart Catheterization with Possible Intervention;  Surgeon: Kamran Singleton MD;  Location:  [facility]     CV INTRAVASULAR ULTRASOUND N/A 8/3/2021    Procedure: Intravascular Ultrasound;  Surgeon: Kamran Singleton MD;  Location:  [facility] CATH [facility]     CV PCI STENT DRUG ELUTING N/A 8/3/2021    Procedure: Percutaneous Coronary Intervention Stent Drug Eluting;  Surgeon: Kamran Singleton MD;  Location:  HE[facility]ARD[facility]ATH [facility]     HEART CATH DRUG ELUTING STENT  PLACEMENT N/A 01/25/2018     LASER YAG CAPSULOTOMY Left 05/29/2018    Procedure: LASER YAG CAPSULOTOMY;  LEFT YAG LASER CAPSULOTOMY ;  Surgeon: Tabby Guillaume MD;  Location: [facility]     LASER YAG CAPSULOTOMY Right 06/05/2018    Procedure: LASER YAG CAPSULOTOMY;  RIGHT EYE YAG LASER CAPSULOTOMY ;  Surgeon: Tabby Guillaume MD;  Location: [facility]     FAMILY HISTORY:  Family History   Problem Relation Age of Onset     Other - See Comments Father         alpha 1 antitrypsin deficiency      Cancer - colorectal Mother      Diabetes Sister      SOCIAL HISTORY:  Social History     Socioeconomic History     Marital status:      Spouse name: None     Number of children: None     Years of education: None     Highest education level: None   Occupational History     None   Tobacco Use     Smoking status: Never Smoker     Smokeless tobacco: Never Used   Substance and Sexual Activity     Alcohol use: Not Currently     Alcohol/week: 1.7 standard drinks     Types: 2 Standard drinks or equivalent per week     Drug use: None     Sexual activity: None   Other Topics Concern     Parent/sibling w/ CABG, MI or angioplasty before 65F 55M? Not Asked   Social History Narrative     None     Social Determinants of Health     Financial Resource Strain:      Difficulty of Paying Living Expenses:    Food Insecurity:      Worried About Running Out of Food in the Last Year:      Ran Out of Food in the Last Year:    Transportation Needs:      Lack of Transportation (Medical):      Lack of Transportation (Non-Medical):    Physical Activity:      Days of Exercise per Week:      Minutes of Exercise per Session:    Stress:      Feeling of Stress :    Social Connections:      Frequency of Communication with Friends and Family:      Frequency of Social Gatherings with Friends and Family:      Attends Temple Services:      Active Member of Clubs or Organizations:      Attends Club or Organization Meetings:      Marital Status:    Intimate  "Partner Violence:      Fear of Current or Ex-Partner:      Emotionally Abused:      Physically Abused:      Sexually Abused:      Review of Systems:  Skin:  Negative     Eyes:  Negative    ENT:  Negative    Respiratory:  Negative    Cardiovascular:  Negative;palpitations;chest pain;syncope or near-syncope;lightheadedness;dizziness;cyanosis Positive for  Gastroenterology: Negative    Genitourinary:  Positive for urinary frequency  Musculoskeletal:  Negative    Neurologic:  Positive for    Psychiatric:  Negative    Heme/Lymph/Imm:  Negative    Endocrine:  Positive for thyroid disorder     Physical Exam:  Vitals: /73   Pulse 69   Ht 1.664 m (5' 5.5\")   Wt 91.6 kg (202 lb)   BMI 33.10 kg/m     Wt Readings from Last 4 Encounters:   08/18/21 91.6 kg (202 lb)   08/03/21 91.5 kg (201 lb 11.2 oz)   07/16/21 91.3 kg (201 lb 4.8 oz)   06/30/21 90.6 kg (199 lb 12.8 oz)     GEN: well nourished, in no acute distress.  HEENT:  Pupils equal, round. Sclerae nonicteric.   C/V:   R radial access site has some very mild ecchymosis. No hematoma or bruit.  SKIN: Warm and dry.     Recent Lab Results:  LIPID RESULTS:  Lab Results   Component Value Date    CHOL 145 05/19/2021    HDL 27 (L) 05/19/2021    LDL 51 05/19/2021    TRIG 336 (H) 05/19/2021     LIVER ENZYME RESULTS:  Lab Results   Component Value Date    AST 25 05/19/2021    ALT 36 05/19/2021     CBC RESULTS:  Lab Results   Component Value Date    WBC 6.2 08/03/2021    WBC 7.6 05/19/2021    RBC 4.49 08/03/2021    RBC 4.72 05/19/2021    HGB 12.9 08/03/2021    HGB 14.5 05/19/2021    HCT 40.9 08/03/2021    HCT 45.4 05/19/2021    MCV 91 08/03/2021    MCV 96 05/19/2021    MCH 28.7 08/03/2021    MCH 30.7 05/19/2021    MCHC 31.5 08/03/2021    MCHC 31.9 05/19/2021    RDW 12.3 08/03/2021    RDW 12.5 05/19/2021     08/03/2021     05/19/2021     BMP RESULTS:  Lab Results   Component Value Date     08/03/2021     05/19/2021    POTASSIUM 4.0 08/03/2021    " POTASSIUM 4.7 05/19/2021    CHLORIDE 106 08/03/2021    CHLORIDE 107 05/19/2021    CO2 33 (H) 08/03/2021    CO2 33 (H) 05/19/2021    ANIONGAP 2 (L) 08/03/2021    ANIONGAP 1 (L) 05/19/2021    GLC 99 08/03/2021    GLC 97 05/19/2021    BUN 15 08/03/2021    BUN 16 05/19/2021    CR 0.86 08/03/2021    CR 0.90 05/19/2021    GFRESTIMATED 72 08/03/2021    GFRESTIMATED 67 05/19/2021    GFRESTBLACK 78 05/19/2021    HECTOR 9.0 08/03/2021    HECTOR 9.3 05/19/2021      A1C RESULTS:  Lab Results   Component Value Date    A1C 5.5 01/26/2018     INR RESULTS:  Lab Results   Component Value Date    INR 1.14 08/03/2021    INR 2.6 08/29/2012    INR 1.8 08/15/2012     Total time today was 30 minutes review notes, imaging, labs, patient visit, orders and documentation.     Abigail Croft PA-C  [facility]

## 2021-09-04 ENCOUNTER — HEALTH MAINTENANCE LETTER (OUTPATIENT)
Age: 66
End: 2021-09-04

## 2021-09-21 ENCOUNTER — TELEPHONE (OUTPATIENT)
Dept: FAMILY MEDICINE | Facility: CLINIC | Age: 66
End: 2021-09-21

## 2021-09-21 ENCOUNTER — TELEPHONE (OUTPATIENT)
Dept: CARDIOLOGY | Facility: CLINIC | Age: 66
End: 2021-09-21

## 2021-09-21 NOTE — TELEPHONE ENCOUNTER
The patient's care giver, Yohan, is calling to request written order to monitor patients BP and weight daily. Also monitor HR and edema twice daily. She stated that [facility] is requesting the written order for funding. Order can be faxed to [phone]. Will route to team 3 to have RN get that faxed when in clinic.

## 2021-09-21 NOTE — TELEPHONE ENCOUNTER
Reason for call:  Other   Patient called regarding (reason for call): call back  Additional comments: The patient's care giver is calling to request written order to monitor patient's skin twice daily and apply medication as ordered due to dry skin, rosacea, and psorasis. Order can be faxed to [phone]    Phone number to reach patient:  Other phone number:  [phone]    Best Time:  any    Can we leave a detailed message on this number?  YES    Travel screening: Negative

## 2021-09-21 NOTE — TELEPHONE ENCOUNTER
Called Charley- advised that I can fax the avs but if there is any alterations needed she would need to see another provider. We scheduled an appointment for 12//2021 but patient will see her pcp and see if he can help with this form- they need it for funding form the [facility]     Edna JARVISRN BSN  [facility]  [phone]

## 2021-09-22 NOTE — TELEPHONE ENCOUNTER
Orders for:    1. Daily BP/Weights  2. To check for peripheral edema BID  3. Call if any concerns develop    Faxed to Charley at [phone]

## 2021-12-07 ENCOUNTER — OFFICE VISIT (OUTPATIENT)
Dept: FAMILY MEDICINE | Facility: CLINIC | Age: 66
End: 2021-12-07
Payer: COMMERCIAL

## 2021-12-07 VITALS — DIASTOLIC BLOOD PRESSURE: 72 MMHG | SYSTOLIC BLOOD PRESSURE: 122 MMHG

## 2021-12-07 DIAGNOSIS — L71.9 ROSACEA: ICD-10-CM

## 2021-12-07 DIAGNOSIS — L40.0 PLAQUE PSORIASIS: Primary | ICD-10-CM

## 2021-12-07 DIAGNOSIS — L85.3 XEROSIS CUTIS: ICD-10-CM

## 2021-12-07 PROCEDURE — 99214 OFFICE O/P EST MOD 30 MIN: CPT | Performed by: PHYSICIAN ASSISTANT

## 2021-12-07 RX ORDER — TRIAMCINOLONE ACETONIDE 5 MG/G
CREAM TOPICAL 2 TIMES DAILY PRN
Qty: 50 G | Refills: 3 | Status: SHIPPED | OUTPATIENT
Start: 2021-12-07 | End: 2023-01-13

## 2021-12-07 RX ORDER — EMOLLIENT BASE
CREAM (GRAM) TOPICAL
Qty: 453 G | Refills: 11 | Status: SHIPPED | OUTPATIENT
Start: 2021-12-07 | End: 2021-12-07

## 2021-12-07 RX ORDER — EMOLLIENT BASE
CREAM (GRAM) TOPICAL
Qty: 453 G | Refills: 11 | Status: SHIPPED | OUTPATIENT
Start: 2021-12-07 | End: 2023-02-28

## 2021-12-07 RX ORDER — TRIAMCINOLONE ACETONIDE 5 MG/G
CREAM TOPICAL 2 TIMES DAILY PRN
Qty: 50 G | Refills: 3 | Status: SHIPPED | OUTPATIENT
Start: 2021-12-07 | End: 2021-12-07

## 2021-12-07 RX ORDER — METRONIDAZOLE 7.5 MG/G
GEL TOPICAL 2 TIMES DAILY
Qty: 45 G | Refills: 11 | Status: SHIPPED | OUTPATIENT
Start: 2021-12-07 | End: 2021-12-07

## 2021-12-07 RX ORDER — AMMONIUM LACTATE 12 G/100G
CREAM TOPICAL 2 TIMES DAILY
Qty: 385 G | Refills: 11 | Status: SHIPPED | OUTPATIENT
Start: 2021-12-07 | End: 2021-12-07

## 2021-12-07 RX ORDER — METRONIDAZOLE 7.5 MG/G
GEL TOPICAL 2 TIMES DAILY
Qty: 45 G | Refills: 11 | Status: SHIPPED | OUTPATIENT
Start: 2021-12-07 | End: 2023-02-28

## 2021-12-07 RX ORDER — AMMONIUM LACTATE 12 G/100G
CREAM TOPICAL 2 TIMES DAILY
Qty: 385 G | Refills: 11 | Status: SHIPPED | OUTPATIENT
Start: 2021-12-07 | End: 2023-02-28

## 2021-12-07 RX ORDER — CALCIUM POLYCARBOPHIL 625 MG/1
1 TABLET, FILM COATED ORAL DAILY
COMMUNITY
Start: 2021-11-30

## 2021-12-07 NOTE — LETTER
12/7/2021         RE: Amy Bryan  [address] [address]        Dear Colleague,    Thank you for referring your patient, Amy Bryan, to the [facility]. Please see a copy of my visit note below.    [facility] Dermatology Note  Encounter Date: Dec 7, 2021  Office Visit     Dermatology Problem List:  1. Acne rosacea s/p benzyl peroxide cream as needed.  Start metronidazole 0.75% gel twice daily  2.  Xerosis of feet, ammonium lactate 12% cream twice daily  3.  Plaque psoriasis elbows and hands,  Status post triamcinolone 0.1% cream    ____________________________________________    Assessment & Plan:     Acne rosacea,   Stop benzoyl peroxide cream (on the prn list)  Increase metronidazole 0.75% gel to twice daily    Xerosis of the feet and body.  Continue ammonium lactate 12% cream twice daily  -Start Vanicream apply from head to toe daily after the showers and twice daily to the hands, more often if hands are dry.    #Plaque psoriasis on your elbows and hands,  Continue triamcinolone 0.5% cream twice daily until plaques are clear. (prn)   Stop triamcinolone 0.1% cream      Procedures Performed:   None      Follow-up: 6 month(s) in-person, or earlier for new or changing lesions    Staff:     All risks, benefits and alternatives were discussed with patient.  Patient is in agreement and understands the assessment and plan.  All questions were answered.  Sun Screen Education was given.   Return to Clinic in 6 months or sooner as needed.   Mandi Mathews PA-C   ____________________________________________    CC: Derm Problem (irritated and itchy hands)    HPI:  Ms. Amy Bryan is a(n) 66 year old female who presents today as a return patient for refills for her rosacea, dry skin and psoriasis.  She is here today with a guardian from her care facility who helps provide the history.  She had been using benzyl peroxide and  metronidazole gel once daily.  She is not too concerned about her rosacea.  She states her psoriasis is still active..  They are looking for treatment options for her skin so that she does not break out as much.  She uses scented body washes in the shower.    Patient is otherwise feeling well, without additional skin concerns.     Labs Reviewed:  N/A    Physical Exam:  Vitals: /72   SKIN: Focused examination of the face, scalp, ears, upper extremities including hands and elbows and bilateral feet was performed.  - There are pink scaly papules and plaques to the elbows and dorsal hands.  No atrophy noted.  Xerosis noted to the feet.  There are a few pink papules on her central face.     - No other lesions of concern on areas examined.         Medications:  Current Outpatient Medications   Medication     acetaminophen (TYLENOL) 325 MG tablet     Alum Hydroxide-Mag Carbonate (GAVISCON PO)     AMITRIPTYLINE HCL PO     ammonium lactate (AMLACTIN) 12 % cream     aspirin (ASA) 81 MG EC tablet     benzoyl peroxide 5 % topical gel     Ca Carbonate-Mag Hydroxide (ROLAIDS PO)     clopidogrel (PLAVIX) 75 MG tablet     furosemide (LASIX) 20 MG tablet     GABAPENTIN PO     LevETIRAcetam (KEPPRA PO)     levothyroxine (SYNTHROID/LEVOTHROID) 25 MCG tablet     LISINOPRIL PO     loperamide (IMODIUM) 2 MG capsule     LORazepam (ATIVAN) 1 MG tablet     melatonin 3 MG CAPS     metroNIDAZOLE (METROCREAM) 0.75 % external cream     metroNIDAZOLE (METROGEL) 0.75 % external gel     MIRTAZAPINE PO     OMEPRAZOLE PO     potassium chloride (KLOR-CON) 20 MEQ Packet     psyllium (METAMUCIL) 58.6 % POWD     rosuvastatin (CRESTOR) 20 MG tablet     triamcinolone (KENALOG) 0.1 % external cream     vitamin D3 (CHOLECALCIFEROL) 1.25 MG (01920 UT) capsule     FIBER- MG tablet     LORAZEPAM PO     metoprolol tartrate (LOPRESSOR) 25 MG tablet     nitroGLYcerin (NITROSTAT) 0.4 MG sublingual tablet     NYSTATIN PO     PHENAZOPYRIDINE HCL PO      RaNITidine HCl (ZANTAC PO)     traZODone (DESYREL) 50 MG tablet     triamcinolone (KENALOG) 0.1 % external cream     triamcinolone (KENALOG) 0.5 % cream     trolamine salicylate (ASPERCREME) 10 % cream     UNABLE TO FIND     UNABLE TO FIND     UNABLE TO FIND     UNABLE TO FIND     UNABLE TO FIND     No current facility-administered medications for this visit.      Past Medical History:   Patient Active Problem List   Diagnosis     Hypertension     Menopause     Developmental delay     Hospital discharge follow-up     Closed TBI (traumatic brain injury) (H)     STEMI (ST elevation myocardial infarction) (H)     Hypotension     Coma (H)     Coronary artery disease involving native coronary artery of native heart     Cardiogenic shock (H)     Abnormal findings on diagnostic imaging of heart and coronary circulation     Status post coronary angiogram     Past Medical History:   Diagnosis Date     Cardiogenic shock (H) 07/09/2021     Coma (H) 05/2012    CHT after a fall     Coronary artery disease involving native coronary artery of native heart 07/09/2021    3 vessel     Developmental delay      DVT (deep vein thrombosis) in pregnancy     post trauma     Fall 05/2012    multiple fracture     Hypertension      Menopause     age 52     Pelvic fracture (H) 5-201`2     Rib fracture 05/2012     STEMI (ST elevation myocardial infarction) (H) 01/25/2018       CC No referring provider defined for this encounter. on close of this encounter.      Again, thank you for allowing me to participate in the care of your patient.        Sincerely,        Mandi Mathews PA-C

## 2021-12-07 NOTE — PATIENT INSTRUCTIONS
For your rosacea,   Stop benzoyl peroxide cream (on the prn list)  Increase metronidazole 0.75% gel to twice daily    For your feet,  Continue ammonium lactate 12% cream twice daily    For your psoriasis on your elbows and hands,  Continue triamcinolone 0.5% cream twice daily until plaques are clear. (prn)   Stop triamcinolone 0.1% cream

## 2021-12-07 NOTE — PROGRESS NOTES
[facility] Dermatology Note  Encounter Date: Dec 7, 2021  Office Visit     Dermatology Problem List:  1. Acne rosacea s/p benzyl peroxide cream as needed.  Start metronidazole 0.75% gel twice daily  2.  Xerosis of feet, ammonium lactate 12% cream twice daily  3.  Plaque psoriasis elbows and hands,  Status post triamcinolone 0.1% cream    ____________________________________________    Assessment & Plan:     Acne rosacea,   Stop benzoyl peroxide cream (on the prn list)  Increase metronidazole 0.75% gel to twice daily    Xerosis of the feet and body.  Continue ammonium lactate 12% cream twice daily  -Start Vanicream apply from head to toe daily after the showers and twice daily to the hands, more often if hands are dry.    #Plaque psoriasis on your elbows and hands,  Continue triamcinolone 0.5% cream twice daily until plaques are clear. (prn)   Stop triamcinolone 0.1% cream      Procedures Performed:   None      Follow-up: 6 month(s) in-person, or earlier for new or changing lesions    Staff:     All risks, benefits and alternatives were discussed with patient.  Patient is in agreement and understands the assessment and plan.  All questions were answered.  Sun Screen Education was given.   Return to Clinic in 6 months or sooner as needed.   Manid Mathews PA-C   ____________________________________________    CC: Derm Problem (irritated and itchy hands)    HPI:  Ms. Amy Bryan is a(n) 66 year old female who presents today as a return patient for refills for her rosacea, dry skin and psoriasis.  She is here today with a guardian from her care facility who helps provide the history.  She had been using benzyl peroxide and metronidazole gel once daily.  She is not too concerned about her rosacea.  She states her psoriasis is still active..  They are looking for treatment options for her skin so that she does not break out as much.  She uses scented body washes in the shower.    Patient is  otherwise feeling well, without additional skin concerns.     Labs Reviewed:  N/A    Physical Exam:  Vitals: /72   SKIN: Focused examination of the face, scalp, ears, upper extremities including hands and elbows and bilateral feet was performed.  - There are pink scaly papules and plaques to the elbows and dorsal hands.  No atrophy noted.  Xerosis noted to the feet.  There are a few pink papules on her central face.     - No other lesions of concern on areas examined.         Medications:  Current Outpatient Medications   Medication     acetaminophen (TYLENOL) 325 MG tablet     Alum Hydroxide-Mag Carbonate (GAVISCON PO)     AMITRIPTYLINE HCL PO     ammonium lactate (AMLACTIN) 12 % cream     aspirin (ASA) 81 MG EC tablet     benzoyl peroxide 5 % topical gel     Ca Carbonate-Mag Hydroxide (ROLAIDS PO)     clopidogrel (PLAVIX) 75 MG tablet     furosemide (LASIX) 20 MG tablet     GABAPENTIN PO     LevETIRAcetam (KEPPRA PO)     levothyroxine (SYNTHROID/LEVOTHROID) 25 MCG tablet     LISINOPRIL PO     loperamide (IMODIUM) 2 MG capsule     LORazepam (ATIVAN) 1 MG tablet     melatonin 3 MG CAPS     metroNIDAZOLE (METROCREAM) 0.75 % external cream     metroNIDAZOLE (METROGEL) 0.75 % external gel     MIRTAZAPINE PO     OMEPRAZOLE PO     potassium chloride (KLOR-CON) 20 MEQ Packet     psyllium (METAMUCIL) 58.6 % POWD     rosuvastatin (CRESTOR) 20 MG tablet     triamcinolone (KENALOG) 0.1 % external cream     vitamin D3 (CHOLECALCIFEROL) 1.25 MG (47444 UT) capsule     FIBER- MG tablet     LORAZEPAM PO     metoprolol tartrate (LOPRESSOR) 25 MG tablet     nitroGLYcerin (NITROSTAT) 0.4 MG sublingual tablet     NYSTATIN PO     PHENAZOPYRIDINE HCL PO     RaNITidine HCl (ZANTAC PO)     traZODone (DESYREL) 50 MG tablet     triamcinolone (KENALOG) 0.1 % external cream     triamcinolone (KENALOG) 0.5 % cream     trolamine salicylate (ASPERCREME) 10 % cream     UNABLE TO FIND     UNABLE TO FIND     UNABLE TO FIND     UNABLE  TO FIND     UNABLE TO FIND     No current facility-administered medications for this visit.      Past Medical History:   Patient Active Problem List   Diagnosis     Hypertension     Menopause     Developmental delay     Hospital discharge follow-up     Closed TBI (traumatic brain injury) (H)     STEMI (ST elevation myocardial infarction) (H)     Hypotension     Coma (H)     Coronary artery disease involving native coronary artery of native heart     Cardiogenic shock (H)     Abnormal findings on diagnostic imaging of heart and coronary circulation     Status post coronary angiogram     Past Medical History:   Diagnosis Date     Cardiogenic shock (H) 07/09/2021     Coma (H) 05/2012    CHT after a fall     Coronary artery disease involving native coronary artery of native heart 07/09/2021    3 vessel     Developmental delay      DVT (deep vein thrombosis) in pregnancy     post trauma     Fall 05/2012    multiple fracture     Hypertension      Menopause     age 52     Pelvic fracture (H) 5-201`2     Rib fracture 05/2012     STEMI (ST elevation myocardial infarction) (H) 01/25/2018       CC No referring provider defined for this encounter. on close of this encounter.

## 2022-02-28 ENCOUNTER — OFFICE VISIT (OUTPATIENT)
Dept: CARDIOLOGY | Facility: CLINIC | Age: 67
End: 2022-02-28
Attending: PHYSICIAN ASSISTANT
Payer: COMMERCIAL

## 2022-02-28 ENCOUNTER — LAB (OUTPATIENT)
Dept: LAB | Facility: CLINIC | Age: 67
End: 2022-02-28
Payer: COMMERCIAL

## 2022-02-28 VITALS
HEART RATE: 60 BPM | DIASTOLIC BLOOD PRESSURE: 64 MMHG | HEIGHT: 66 IN | WEIGHT: 214.5 LBS | BODY MASS INDEX: 34.47 KG/M2 | OXYGEN SATURATION: 94 % | SYSTOLIC BLOOD PRESSURE: 130 MMHG

## 2022-02-28 DIAGNOSIS — I25.10 CORONARY ARTERY DISEASE INVOLVING NATIVE CORONARY ARTERY OF NATIVE HEART WITHOUT ANGINA PECTORIS: ICD-10-CM

## 2022-02-28 LAB
CHOLEST SERPL-MCNC: 113 MG/DL
FASTING STATUS PATIENT QL REPORTED: YES
HDLC SERPL-MCNC: 31 MG/DL
LDLC SERPL CALC-MCNC: 31 MG/DL
NONHDLC SERPL-MCNC: 82 MG/DL
TRIGL SERPL-MCNC: 254 MG/DL

## 2022-02-28 PROCEDURE — 99214 OFFICE O/P EST MOD 30 MIN: CPT | Performed by: INTERNAL MEDICINE

## 2022-02-28 PROCEDURE — 36415 COLL VENOUS BLD VENIPUNCTURE: CPT | Performed by: PHYSICIAN ASSISTANT

## 2022-02-28 PROCEDURE — 80061 LIPID PANEL: CPT | Performed by: PHYSICIAN ASSISTANT

## 2022-02-28 RX ORDER — ONDANSETRON 4 MG/1
TABLET, ORALLY DISINTEGRATING ORAL
COMMUNITY
Start: 2022-02-22

## 2022-02-28 NOTE — LETTER
2/28/2022    [facility]  [address]  [place] [address]    RE: Amy Bryan       Dear Colleague,     I had the pleasure of seeing Amy Bryan in the [facility].  HPI and Plan:   Today, I had the pleasure of seeing Amy Bryan at [facility]. She is a pleasant 66 year old patient with a past medical history of traumatic brain injury, coronary disease, and hypertension who presents to the clinic for a follow up visit.  She is accompanied by her friend who is also her POA.  The patient lives at an assisted living facility.     The patient presented to the hospital in 2018 for cardiogenic shock thought to be secondary to Takotsubo cardiomyopathy.  She was also noted to have anterolateral ST elevations for which she underwent emergent coronary angiogram. Aspiration thrombectomy of the PL 3 branch and balloon dilation of the small D1 was performed.  She was also noted to have 70% proximal-mid LAD disease and the plan was to perform a staged PCI.  However, this never happened.  When I saw her last time the patient reported anginal symptoms for which I referred her to undergo coronary angiogram.  She underwent PCI of proximal to mid LAD with multiple drug-eluting stents.  The also stented segment for diagnosis.     Today, she tells me that she has been doing well and does not have any complaints.  Her symptoms have resolved since undergoing the angiogram.  She tells me that her symptoms have improved significantly and also resolved.  She is taking her medications as prescribed.  She is able to walk and exert herself without any difficulty.  Her lipid profile has significantly improved.  LDL still remains low.    Assessment and plan:-   1.  Coronary artery disease-on Plavix due to aspirin allergy  2.  History of cardiogenic shock thought to be secondary to Takotsubo cardiomyopathy  3.  Hypertriglyceridemia  4.  Traumatic brain  injury    The patient is currently doing well from cardiac standpoint he does not have any complaints today.  Her symptoms have resolved since undergoing intervention on the LAD.  I will continue her on all the current medications he is on and have her come back and see us in a year or sooner if needed.  For now we will continue all the medications she is on including the current dose of Lipitor.    Thank you for allowing me to participate in the care of Amy Bryan    This note was completed in part using Dragon voice recognition software. Although reviewed after completion, some word and grammatical errors may occur.    Darren Wren MD  Cardiology    Orders Placed This Encounter   Procedures     Follow-Up with Cardiology       Orders Placed This Encounter   Medications     benzoyl peroxide 5 % LOTN lotion     Sig: Apply topically 2 times daily     ondansetron (ZOFRAN-ODT) 4 MG ODT tab     Sig: TAKE ONE TABLET BY MOUTH TWICE A DAY AS NEEDED FOR NAUSEA & VOMITING       Medications Discontinued During This Encounter   Medication Reason     UNABLE TO FIND Medication Reconciliation Clean Up     AMITRIPTYLINE HCL PO Alternate therapy     UNABLE TO FIND Medication Reconciliation Clean Up     LORazepam (ATIVAN) 1 MG tablet Dose adjustment     PHENAZOPYRIDINE HCL PO Therapy completed     RaNITidine HCl (ZANTAC PO) Alternate therapy     UNABLE TO FIND Medication Reconciliation Clean Up     UNABLE TO FIND Medication Reconciliation Clean Up     UNABLE TO FIND Alternate therapy       Encounter Diagnosis   Name Primary?     Coronary artery disease involving native coronary artery of native heart without angina pectoris        CURRENT MEDICATIONS:  Current Outpatient Medications   Medication Sig Dispense Refill     acetaminophen (TYLENOL) 325 MG tablet Take 1,000 mg by mouth 2 times daily And at bedtime as needed for pain       Alum Hydroxide-Mag Carbonate (GAVISCON PO) Take 1 tablet by mouth every 4 hours as needed  (indigestion)       ammonium lactate (AMLACTIN) 12 % external cream Apply topically 2 times daily To the feet 385 g 11     aspirin (ASA) 81 MG EC tablet Take 1 tablet (81 mg) by mouth daily 90 tablet 3     benzoyl peroxide 5 % LOTN lotion Apply topically 2 times daily       Ca Carbonate-Mag Hydroxide (ROLAIDS PO)        clopidogrel (PLAVIX) 75 MG tablet Take 1 tablet (75 mg) by mouth daily 90 tablet 3     emollient (VANICREAM) external cream Apply from head to toe daily after the showers and twice daily to the hands, more often if hands are dry. 453 g 11     FIBER- MG tablet Take 1 tablet by mouth daily       furosemide (LASIX) 20 MG tablet Take 20 mg by mouth daily       GABAPENTIN PO Take 100 mg by mouth 3 times daily       LevETIRAcetam (KEPPRA PO) Take 500 mg by mouth 2 times daily       levothyroxine (SYNTHROID/LEVOTHROID) 25 MCG tablet Take 1 tablet (25 mcg) by mouth daily 45 tablet 0     LISINOPRIL PO Take 5 mg by mouth daily       loperamide (IMODIUM) 2 MG capsule Take 2 mg by mouth 4 times daily as needed for diarrhea       LORAZEPAM PO Take 0.5 mg by mouth 3 times daily as needed for anxiety       melatonin 3 MG CAPS        metoprolol tartrate (LOPRESSOR) 25 MG tablet Take 75 mg by mouth 2 times daily       metroNIDAZOLE (METROGEL) 0.75 % external gel Apply topically 2 times daily To the cheeks and nose for rosacea. 45 g 11     MIRTAZAPINE PO Take 30 mg by mouth At Bedtime       nitroGLYcerin (NITROSTAT) 0.4 MG sublingual tablet For chest pain place 1 tablet under the tongue every 5 minutes for 3 doses. If symptoms persist 5 minutes after 1st dose call 911. 25 tablet 0     NYSTATIN PO        OMEPRAZOLE PO Take 20 mg by mouth every other day       ondansetron (ZOFRAN-ODT) 4 MG ODT tab TAKE ONE TABLET BY MOUTH TWICE A DAY AS NEEDED FOR NAUSEA & VOMITING       potassium chloride (KLOR-CON) 20 MEQ Packet Take 20 mEq by mouth 2 times daily       psyllium (METAMUCIL) 58.6 % POWD Take 1 teaspoonful by  mouth daily Natural Fiber Pow Therapy       rosuvastatin (CRESTOR) 20 MG tablet Take 0.5 tablets (10 mg) by mouth daily 90 tablet 3     traZODone (DESYREL) 50 MG tablet Take 75 mg by mouth nightly as needed for sleep        triamcinolone (ARISTOCORT HP) 0.5 % external cream Apply topically 2 times daily as needed (elbows and hands for rashes of psoriasis until clear) 50 g 3     trolamine salicylate (ASPERCREME) 10 % cream Apply topically as needed for moderate pain To affected areas       vitamin D3 (CHOLECALCIFEROL) 1.25 MG (82187 UT) capsule Take 1 capsule (50,000 Units) by mouth every 7 days 12 capsule 0       ALLERGIES     Allergies   Allergen Reactions     Penicillins      dizziness       PAST MEDICAL HISTORY:  Past Medical History:   Diagnosis Date     Cardiogenic shock (H) 07/09/2021     Coma (H) 05/2012    CHT after a fall     Coronary artery disease involving native coronary artery of native heart 07/09/2021    3 vessel     Developmental delay      DVT (deep vein thrombosis) in pregnancy     post trauma     Fall 05/2012    multiple fracture     Hypertension      Menopause     age 52     Pelvic fracture (H) 5-201`2     Rib fracture 05/2012     STEMI (ST elevation myocardial infarction) (H) 01/25/2018       PAST SURGICAL HISTORY:  Past Surgical History:   Procedure Laterality Date     COLONOSCOPY  01/01/2010     CV CORONARY LITHOTRIPSY PCI N/A 8/3/2021    Procedure: CV Coronary Lithotripsy PCI;  Surgeon: Kamran Singleton MD;  Location:  HEART CARDIAC CATH LAB     CV HEART CATHETERIZATION WITH POSSIBLE INTERVENTION N/A 8/3/2021    Procedure: Heart Catheterization with Possible Intervention;  Surgeon: Kamran Singleton MD;  Location:  [facility]ATH LAB     CV INTRAVASULAR ULTRASOUND N/A 8/3/2021    Procedure: Intravascular Ultrasound;  Surgeon: Kamran Singleton MD;  Location:  HE[facility]ARD[facility] CATH LAB     CV PCI STENT DRUG ELUTING N/A 8/3/2021    Procedure: Percutaneous Coronary  Intervention Stent Drug Eluting;  Surgeon: Kamran Singleton MD;  Location:  HEART CARDIAC CATH LAB     HEART CATH DRUG ELUTING STENT PLACEMENT N/A 01/25/2018     LASER YAG CAPSULOTOMY Left 05/29/2018    Procedure: LASER YAG CAPSULOTOMY;  LEFT YAG LASER CAPSULOTOMY ;  Surgeon: Tabby Guillaume MD;  Location:  [facility]     LASER YAG CAPSULOTOMY Right 06/05/2018    Procedure: LASER YAG CAPSULOTOMY;  RIGHT EYE YAG LASER CAPSULOTOMY ;  Surgeon: Tabby Guillaume MD;  Location:  [facility]       FAMILY HISTORY:  Family History   Problem Relation Age of Onset     Other - See Comments Father         alpha 1 antitrypsin deficiency      Cancer - colorectal Mother      Diabetes Sister        SOCIAL HISTORY:  Social History     Socioeconomic History     Marital status:      Spouse name: None     Number of children: None     Years of education: None     Highest education level: None   Occupational History     None   Tobacco Use     Smoking status: Never Smoker     Smokeless tobacco: Never Used   Substance and Sexual Activity     Alcohol use: Not Currently     Alcohol/week: 1.7 standard drinks     Types: 2 Standard drinks or equivalent per week     Drug use: None     Sexual activity: None   Other Topics Concern     Parent/sibling w/ CABG, MI or angioplasty before 65F 55M? Not Asked   Social History Narrative     None     Social Determinants of Health     Financial Resource Strain: Not on file   Food Insecurity: Not on file   Transportation Needs: Not on file   Physical Activity: Not on file   Stress: Not on file   Social Connections: Not on file   Intimate Partner Violence: Not on file   Housing Stability: Not on file       Review of Systems:  Skin:  Negative       Eyes:  Negative      ENT:  Negative      Respiratory:  Negative       Cardiovascular:    Positive for;edema;lightheadedness energy level improved, edema improved in ankles  Gastroenterology: Positive for heartburn;nausea treated with Rolaids  Genitourinary:   "Negative      Musculoskeletal:  Positive for arthritis    Neurologic:  Positive for numbness or tingling of hands    Psychiatric:  Positive for anxiety treated  Heme/Lymph/Imm:  Negative      Endocrine:  Positive for thyroid disorder      Physical Exam:  Vitals: /64   Pulse 60   Ht 1.664 m (5' 5.5\")   Wt 97.3 kg (214 lb 8 oz)   SpO2 94%   BMI 35.15 kg/m    Eyes: No icterus.  Pulmonary: Chest symmetric, lungs clear bilaterally and no crackles, wheezes or rales.  Cardiovascular: RRR with normal S1 and S2, no murmur, JVP normal.  Musculoskeletal: Edema of the lower extremities: None.  Neurologic: Oriented and appropriate without obvious focal deficits.   Psychiatric: Normal affect.     Recent Lab Results:  LIPID RESULTS:  Lab Results   Component Value Date    CHOL 113 02/28/2022    CHOL 145 05/19/2021    HDL 31 (L) 02/28/2022    HDL 27 (L) 05/19/2021    LDL 31 02/28/2022    LDL 51 05/19/2021    TRIG 254 (H) 02/28/2022    TRIG 336 (H) 05/19/2021       LIVER ENZYME RESULTS:  Lab Results   Component Value Date    AST 25 05/19/2021    ALT 36 05/19/2021       CBC RESULTS:  Lab Results   Component Value Date    WBC 6.2 08/03/2021    WBC 7.6 05/19/2021    RBC 4.49 08/03/2021    RBC 4.72 05/19/2021    HGB 12.9 08/03/2021    HGB 14.5 05/19/2021    HCT 40.9 08/03/2021    HCT 45.4 05/19/2021    MCV 91 08/03/2021    MCV 96 05/19/2021    MCH 28.7 08/03/2021    MCH 30.7 05/19/2021    MCHC 31.5 08/03/2021    MCHC 31.9 05/19/2021    RDW 12.3 08/03/2021    RDW 12.5 05/19/2021     08/03/2021     05/19/2021       BMP RESULTS:  Lab Results   Component Value Date     08/03/2021     05/19/2021    POTASSIUM 4.0 08/03/2021    POTASSIUM 4.7 05/19/2021    CHLORIDE 106 08/03/2021    CHLORIDE 107 05/19/2021    CO2 33 (H) 08/03/2021    CO2 33 (H) 05/19/2021    ANIONGAP 2 (L) 08/03/2021    ANIONGAP 1 (L) 05/19/2021    GLC 99 08/03/2021    GLC 97 05/19/2021    BUN 15 08/03/2021    BUN 16 05/19/2021    CR 0.86 " 08/03/2021    CR 0.90 05/19/2021    GFRESTIMATED 72 08/03/2021    GFRESTIMATED 67 05/19/2021    GFRESTBLACK 78 05/19/2021    HECTOR 9.0 08/03/2021    HECTOR 9.3 05/19/2021        A1C RESULTS:  Lab Results   Component Value Date    A1C 5.5 01/26/2018       INR RESULTS:  Lab Results   Component Value Date    INR 1.14 08/03/2021    INR 2.6 08/29/2012    INR 1.8 08/15/2012       CC  Telma Johnson MD  [address]  [place] [address]    All medical records were reviewed in detail and discussed with the patient. Greater than 30 mins were spent with the patient, 50% of this time was spent on counseling and coordination of care.  After visit summary was printed and given to the patient.    Thank you for allowing me to participate in the care of your patient.      Sincerely,     Darren Wren MD     [facility]  cc:   Abigail Croft PA-C  [address]  [place] [address]

## 2022-02-28 NOTE — PROGRESS NOTES
HPI and Plan:   Today, I had the pleasure of seeing Amy Bryan at [facility]. She is a pleasant 66 year old patient with a past medical history of traumatic brain injury, coronary disease, and hypertension who presents to the clinic for a follow up visit.  She is accompanied by her friend who is also her POA.  The patient lives at an assisted living facility.     The patient presented to the hospital in 2018 for cardiogenic shock thought to be secondary to Takotsubo cardiomyopathy.  She was also noted to have anterolateral ST elevations for which she underwent emergent coronary angiogram. Aspiration thrombectomy of the PL 3 branch and balloon dilation of the small D1 was performed.  She was also noted to have 70% proximal-mid LAD disease and the plan was to perform a staged PCI.  However, this never happened.  When I saw her last time the patient reported anginal symptoms for which I referred her to undergo coronary angiogram.  She underwent PCI of proximal to mid LAD with multiple drug-eluting stents.  The also stented segment for diagnosis.     Today, she tells me that she has been doing well and does not have any complaints.  Her symptoms have resolved since undergoing the angiogram.  She tells me that her symptoms have improved significantly and also resolved.  She is taking her medications as prescribed.  She is able to walk and exert herself without any difficulty.  Her lipid profile has significantly improved.  LDL still remains low.    Assessment and plan:-   1.  Coronary artery disease-on Plavix due to aspirin allergy  2.  History of cardiogenic shock thought to be secondary to Takotsubo cardiomyopathy  3.  Hypertriglyceridemia  4.  Traumatic brain injury    The patient is currently doing well from cardiac standpoint he does not have any complaints today.  Her symptoms have resolved since undergoing intervention on the LAD.  I will continue her on all the current medications  he is on and have her come back and see us in a year or sooner if needed.  For now we will continue all the medications she is on including the current dose of Lipitor.    Thank you for allowing me to participate in the care of Amy Bryan    This note was completed in part using Dragon voice recognition software. Although reviewed after completion, some word and grammatical errors may occur.    Darren Wren MD  Cardiology    Orders Placed This Encounter   Procedures     Follow-Up with Cardiology       Orders Placed This Encounter   Medications     benzoyl peroxide 5 % LOTN lotion     Sig: Apply topically 2 times daily     ondansetron (ZOFRAN-ODT) 4 MG ODT tab     Sig: TAKE ONE TABLET BY MOUTH TWICE A DAY AS NEEDED FOR NAUSEA & VOMITING       Medications Discontinued During This Encounter   Medication Reason     UNABLE TO FIND Medication Reconciliation Clean Up     AMITRIPTYLINE HCL PO Alternate therapy     UNABLE TO FIND Medication Reconciliation Clean Up     LORazepam (ATIVAN) 1 MG tablet Dose adjustment     PHENAZOPYRIDINE HCL PO Therapy completed     RaNITidine HCl (ZANTAC PO) Alternate therapy     UNABLE TO FIND Medication Reconciliation Clean Up     UNABLE TO FIND Medication Reconciliation Clean Up     UNABLE TO FIND Alternate therapy       Encounter Diagnosis   Name Primary?     Coronary artery disease involving native coronary artery of native heart without angina pectoris        CURRENT MEDICATIONS:  Current Outpatient Medications   Medication Sig Dispense Refill     acetaminophen (TYLENOL) 325 MG tablet Take 1,000 mg by mouth 2 times daily And at bedtime as needed for pain       Alum Hydroxide-Mag Carbonate (GAVISCON PO) Take 1 tablet by mouth every 4 hours as needed (indigestion)       ammonium lactate (AMLACTIN) 12 % external cream Apply topically 2 times daily To the feet 385 g 11     aspirin (ASA) 81 MG EC tablet Take 1 tablet (81 mg) by mouth daily 90 tablet 3     benzoyl peroxide 5 % LOTN  lotion Apply topically 2 times daily       Ca Carbonate-Mag Hydroxide (ROLAIDS PO)        clopidogrel (PLAVIX) 75 MG tablet Take 1 tablet (75 mg) by mouth daily 90 tablet 3     emollient (VANICREAM) external cream Apply from head to toe daily after the showers and twice daily to the hands, more often if hands are dry. 453 g 11     FIBER- MG tablet Take 1 tablet by mouth daily       furosemide (LASIX) 20 MG tablet Take 20 mg by mouth daily       GABAPENTIN PO Take 100 mg by mouth 3 times daily       LevETIRAcetam (KEPPRA PO) Take 500 mg by mouth 2 times daily       levothyroxine (SYNTHROID/LEVOTHROID) 25 MCG tablet Take 1 tablet (25 mcg) by mouth daily 45 tablet 0     LISINOPRIL PO Take 5 mg by mouth daily       loperamide (IMODIUM) 2 MG capsule Take 2 mg by mouth 4 times daily as needed for diarrhea       LORAZEPAM PO Take 0.5 mg by mouth 3 times daily as needed for anxiety       melatonin 3 MG CAPS        metoprolol tartrate (LOPRESSOR) 25 MG tablet Take 75 mg by mouth 2 times daily       metroNIDAZOLE (METROGEL) 0.75 % external gel Apply topically 2 times daily To the cheeks and nose for rosacea. 45 g 11     MIRTAZAPINE PO Take 30 mg by mouth At Bedtime       nitroGLYcerin (NITROSTAT) 0.4 MG sublingual tablet For chest pain place 1 tablet under the tongue every 5 minutes for 3 doses. If symptoms persist 5 minutes after 1st dose call 911. 25 tablet 0     NYSTATIN PO        OMEPRAZOLE PO Take 20 mg by mouth every other day       ondansetron (ZOFRAN-ODT) 4 MG ODT tab TAKE ONE TABLET BY MOUTH TWICE A DAY AS NEEDED FOR NAUSEA & VOMITING       potassium chloride (KLOR-CON) 20 MEQ Packet Take 20 mEq by mouth 2 times daily       psyllium (METAMUCIL) 58.6 % POWD Take 1 teaspoonful by mouth daily Natural Fiber Pow Therapy       rosuvastatin (CRESTOR) 20 MG tablet Take 0.5 tablets (10 mg) by mouth daily 90 tablet 3     traZODone (DESYREL) 50 MG tablet Take 75 mg by mouth nightly as needed for sleep         triamcinolone (ARISTOCORT HP) 0.5 % external cream Apply topically 2 times daily as needed (elbows and hands for rashes of psoriasis until clear) 50 g 3     trolamine salicylate (ASPERCREME) 10 % cream Apply topically as needed for moderate pain To affected areas       vitamin D3 (CHOLECALCIFEROL) 1.25 MG (24314 UT) capsule Take 1 capsule (50,000 Units) by mouth every 7 days 12 capsule 0       ALLERGIES     Allergies   Allergen Reactions     Penicillins      dizziness       PAST MEDICAL HISTORY:  Past Medical History:   Diagnosis Date     Cardiogenic shock (H) 07/09/2021     Coma (H) 05/2012    CHT after a fall     Coronary artery disease involving native coronary artery of native heart 07/09/2021    3 vessel     Developmental delay      DVT (deep vein thrombosis) in pregnancy     post trauma     Fall 05/2012    multiple fracture     Hypertension      Menopause     age 52     Pelvic fracture (H) 5-201`2     Rib fracture 05/2012     STEMI (ST elevation myocardial infarction) (H) 01/25/2018       PAST SURGICAL HISTORY:  Past Surgical History:   Procedure Laterality Date     COLONOSCOPY  01/01/2010     CV CORONARY LITHOTRIPSY PCI N/A 8/3/2021    Procedure: CV Coronary Lithotripsy PCI;  Surgeon: Kamran Singleton MD;  Location:  HEART CARDIAC CATH LAB     CV HEART CATHETERIZATION WITH POSSIBLE INTERVENTION N/A 8/3/2021    Procedure: Heart Catheterization with Possible Intervention;  Surgeon: Kamran Singleton MD;  Location:  HEART CARDIAC CATH LAB     CV INTRAVASULAR ULTRASOUND N/A 8/3/2021    Procedure: Intravascular Ultrasound;  Surgeon: Kamran Singleton MD;  Location:  HEART CARDIAC CATH LAB     CV PCI STENT DRUG ELUTING N/A 8/3/2021    Procedure: Percutaneous Coronary Intervention Stent Drug Eluting;  Surgeon: Kamran Singleton MD;  Location:  HEART CARDIAC CATH LAB     HEART CATH DRUG ELUTING STENT PLACEMENT N/A 01/25/2018     LASER YAG CAPSULOTOMY Left 05/29/2018    Procedure:  LASER YAG CAPSULOTOMY;  LEFT YAG LASER CAPSULOTOMY ;  Surgeon: Tabby Guillaume MD;  Location: [facility]     LASER YAG CAPSULOTOMY Right 06/05/2018    Procedure: LASER YAG CAPSULOTOMY;  RIGHT EYE YAG LASER CAPSULOTOMY ;  Surgeon: Tabby Guillaume MD;  Location: [facility]       FAMILY HISTORY:  Family History   Problem Relation Age of Onset     Other - See Comments Father         alpha 1 antitrypsin deficiency      Cancer - colorectal Mother      Diabetes Sister        SOCIAL HISTORY:  Social History     Socioeconomic History     Marital status:      Spouse name: None     Number of children: None     Years of education: None     Highest education level: None   Occupational History     None   Tobacco Use     Smoking status: Never Smoker     Smokeless tobacco: Never Used   Substance and Sexual Activity     Alcohol use: Not Currently     Alcohol/week: 1.7 standard drinks     Types: 2 Standard drinks or equivalent per week     Drug use: None     Sexual activity: None   Other Topics Concern     Parent/sibling w/ CABG, MI or angioplasty before 65F 55M? Not Asked   Social History Narrative     None     Social Determinants of Health     Financial Resource Strain: Not on file   Food Insecurity: Not on file   Transportation Needs: Not on file   Physical Activity: Not on file   Stress: Not on file   Social Connections: Not on file   Intimate Partner Violence: Not on file   Housing Stability: Not on file       Review of Systems:  Skin:  Negative       Eyes:  Negative      ENT:  Negative      Respiratory:  Negative       Cardiovascular:    Positive for;edema;lightheadedness energy level improved, edema improved in ankles  Gastroenterology: Positive for heartburn;nausea treated with Rolaids  Genitourinary:  Negative      Musculoskeletal:  Positive for arthritis    Neurologic:  Positive for numbness or tingling of hands    Psychiatric:  Positive for anxiety treated  Heme/Lymph/Imm:  Negative      Endocrine:  Positive for thyroid  "disorder      Physical Exam:  Vitals: /64   Pulse 60   Ht 1.664 m (5' 5.5\")   Wt 97.3 kg (214 lb 8 oz)   SpO2 94%   BMI 35.15 kg/m    Eyes: No icterus.  Pulmonary: Chest symmetric, lungs clear bilaterally and no crackles, wheezes or rales.  Cardiovascular: RRR with normal S1 and S2, no murmur, JVP normal.  Musculoskeletal: Edema of the lower extremities: None.  Neurologic: Oriented and appropriate without obvious focal deficits.   Psychiatric: Normal affect.     Recent Lab Results:  LIPID RESULTS:  Lab Results   Component Value Date    CHOL 113 02/28/2022    CHOL 145 05/19/2021    HDL 31 (L) 02/28/2022    HDL 27 (L) 05/19/2021    LDL 31 02/28/2022    LDL 51 05/19/2021    TRIG 254 (H) 02/28/2022    TRIG 336 (H) 05/19/2021       LIVER ENZYME RESULTS:  Lab Results   Component Value Date    AST 25 05/19/2021    ALT 36 05/19/2021       CBC RESULTS:  Lab Results   Component Value Date    WBC 6.2 08/03/2021    WBC 7.6 05/19/2021    RBC 4.49 08/03/2021    RBC 4.72 05/19/2021    HGB 12.9 08/03/2021    HGB 14.5 05/19/2021    HCT 40.9 08/03/2021    HCT 45.4 05/19/2021    MCV 91 08/03/2021    MCV 96 05/19/2021    MCH 28.7 08/03/2021    MCH 30.7 05/19/2021    MCHC 31.5 08/03/2021    MCHC 31.9 05/19/2021    RDW 12.3 08/03/2021    RDW 12.5 05/19/2021     08/03/2021     05/19/2021       BMP RESULTS:  Lab Results   Component Value Date     08/03/2021     05/19/2021    POTASSIUM 4.0 08/03/2021    POTASSIUM 4.7 05/19/2021    CHLORIDE 106 08/03/2021    CHLORIDE 107 05/19/2021    CO2 33 (H) 08/03/2021    CO2 33 (H) 05/19/2021    ANIONGAP 2 (L) 08/03/2021    ANIONGAP 1 (L) 05/19/2021    GLC 99 08/03/2021    GLC 97 05/19/2021    BUN 15 08/03/2021    BUN 16 05/19/2021    CR 0.86 08/03/2021    CR 0.90 05/19/2021    GFRESTIMATED 72 08/03/2021    GFRESTIMATED 67 05/19/2021    GFRESTBLACK 78 05/19/2021    HECTOR 9.0 08/03/2021    HECTOR 9.3 05/19/2021        A1C RESULTS:  Lab Results   Component Value Date    A1C " 5.5 01/26/2018       INR RESULTS:  Lab Results   Component Value Date    INR 1.14 08/03/2021    INR 2.6 08/29/2012    INR 1.8 08/15/2012       CC  Telma Johnson MD  [address] [address]    All medical records were reviewed in detail and discussed with the patient. Greater than 30 mins were spent with the patient, 50% of this time was spent on counseling and coordination of care.  After visit summary was printed and given to the patient.

## 2022-06-22 ENCOUNTER — ANCILLARY PROCEDURE (OUTPATIENT)
Dept: MAMMOGRAPHY | Facility: CLINIC | Age: 67
End: 2022-06-22
Payer: COMMERCIAL

## 2022-06-22 DIAGNOSIS — Z12.31 VISIT FOR SCREENING MAMMOGRAM: ICD-10-CM

## 2022-06-22 PROCEDURE — 77067 SCR MAMMO BI INCL CAD: CPT | Mod: TC | Performed by: RADIOLOGY

## 2022-08-06 ENCOUNTER — HEALTH MAINTENANCE LETTER (OUTPATIENT)
Age: 67
End: 2022-08-06

## 2022-10-22 ENCOUNTER — HEALTH MAINTENANCE LETTER (OUTPATIENT)
Age: 67
End: 2022-10-22

## 2023-01-10 DIAGNOSIS — L40.0 PLAQUE PSORIASIS: ICD-10-CM

## 2023-01-13 RX ORDER — TRIAMCINOLONE ACETONIDE 5 MG/G
CREAM TOPICAL
Qty: 30 G | Refills: 0 | Status: SHIPPED | OUTPATIENT
Start: 2023-01-13 | End: 2023-02-28

## 2023-01-13 NOTE — TELEPHONE ENCOUNTER
triamcinolone (ARISTOCORT HP) 0.5 % external cream      Last Written Prescription Date:  12-  Last Fill Quantity: 50g,   # refills: 3  Last Office Visit : 12-07-21  Future Office visit:  Not on file    Routing refill request to provider for review/approval because:  Off protocol

## 2023-01-16 NOTE — TELEPHONE ENCOUNTER
Left a voice message for patient to schedule office visit with Mandi Mathews for yearly visit and f/u meds per provider request. See message below.    Thank you  An Jhoana Mathews, Mandi Max PA-C   [facility] Nurse Pool 3 days ago     DG  Please have her follow up for annual visit to check psoriasis and med refill. Thanks, Mandi Mathews PA-C

## 2023-01-18 NOTE — TELEPHONE ENCOUNTER
Left a voice message for patient to call back to schedule and sent [employer].    Thank you  Susana Ely

## 2023-01-23 ENCOUNTER — TELEPHONE (OUTPATIENT)
Dept: CARDIOLOGY | Facility: CLINIC | Age: 68
End: 2023-01-23
Payer: COMMERCIAL

## 2023-01-23 DIAGNOSIS — E78.5 HYPERLIPIDEMIA LDL GOAL <70: ICD-10-CM

## 2023-01-23 DIAGNOSIS — I25.10 CORONARY ARTERY DISEASE INVOLVING NATIVE CORONARY ARTERY OF NATIVE HEART WITHOUT ANGINA PECTORIS: Primary | ICD-10-CM

## 2023-01-23 NOTE — TELEPHONE ENCOUNTER
Left message for pt to call nurse to schedule psoriasis and med refill appt per Mandi Mathews.  See message below.    Bri VARGAS RN  [facility]  [phone]

## 2023-01-23 NOTE — TELEPHONE ENCOUNTER
Writer has reviewed charting, entered new order for the labwork.   ALT, lipid panel.    Sent notification to scheduling that patient will also need lab appointment.    Yas Camarillo RN on 1/23/2023 at 3:17 PM

## 2023-01-23 NOTE — TELEPHONE ENCOUNTER
[facility] Call Center    Phone Message    May a detailed message be left on voicemail: yes     Reason for Call: Other: Pt needs lipid panel prior to appt, per Monserrat.  Please place orders to complete prior to appt in March.       Action Taken: Other: cardio    Travel Screening: Not Applicable

## 2023-01-23 NOTE — TELEPHONE ENCOUNTER
Monserrat GOYAL called back and scheduled appt for Tuesday 2/28 at 2:30 pm with Mandi at  [facility].    Bri VARGAS RN  [facility]  [phone]

## 2023-02-28 ENCOUNTER — OFFICE VISIT (OUTPATIENT)
Dept: FAMILY MEDICINE | Facility: CLINIC | Age: 68
End: 2023-02-28
Payer: MEDICARE

## 2023-02-28 DIAGNOSIS — L71.9 ACNE ROSACEA: ICD-10-CM

## 2023-02-28 DIAGNOSIS — L85.3 XEROSIS CUTIS: ICD-10-CM

## 2023-02-28 DIAGNOSIS — L85.3 XEROSIS OF SKIN: ICD-10-CM

## 2023-02-28 DIAGNOSIS — L71.9 ROSACEA: ICD-10-CM

## 2023-02-28 DIAGNOSIS — L40.0 PLAQUE PSORIASIS: ICD-10-CM

## 2023-02-28 DIAGNOSIS — I87.2 VENOUS STASIS DERMATITIS OF LEFT LOWER EXTREMITY: Primary | ICD-10-CM

## 2023-02-28 PROCEDURE — 99214 OFFICE O/P EST MOD 30 MIN: CPT | Performed by: PHYSICIAN ASSISTANT

## 2023-02-28 RX ORDER — EMOLLIENT BASE
CREAM (GRAM) TOPICAL
Qty: 453 G | Refills: 11 | Status: SHIPPED | OUTPATIENT
Start: 2023-02-28 | End: 2024-06-14

## 2023-02-28 RX ORDER — AMMONIUM LACTATE 12 G/100G
CREAM TOPICAL 2 TIMES DAILY
Qty: 385 G | Refills: 11 | Status: SHIPPED | OUTPATIENT
Start: 2023-02-28

## 2023-02-28 RX ORDER — METRONIDAZOLE 7.5 MG/G
GEL TOPICAL 2 TIMES DAILY
Qty: 45 G | Refills: 11 | Status: SHIPPED | OUTPATIENT
Start: 2023-02-28

## 2023-02-28 RX ORDER — TRIAMCINOLONE ACETONIDE 5 MG/G
CREAM TOPICAL
Qty: 80 G | Refills: 3 | Status: SHIPPED | OUTPATIENT
Start: 2023-02-28

## 2023-02-28 ASSESSMENT — PAIN SCALES - GENERAL: PAINLEVEL: NO PAIN (0)

## 2023-02-28 NOTE — LETTER
2/28/2023         RE: Amy Bryan  [address] [place] [id number]        Dear Colleague,    Thank you for referring your patient, Amy Bryan, to the [facility]. Please see a copy of my visit note below.    [facility] D[facility]atology Note  Encounter Date: Feb 28, 2023  Office Visit     Dermatology Problem List:  1. Acne rosacea   - Current tx: metronidazole 0.75% gel  - Previous tx: BPO cream  2.  Xerosis   - Current tx: ammonium lactate 12% cream, Vanicream  3.  Plaque psoriasis  - Current tx: triamcinolone 0.5% cream  - Previous tx: triamcinolone 0.1% cream  ____________________________________________    Assessment & Plan:    # Plaque psoriasis, elbows and hands. Chronic, improved from prior.  - Continue triamcinolone 0.5% cream BID PRN to active plaques.    # Acne rosacea  - Continue metronidazole 0.75% gel BID PRN to affected areas on the face.    # Xerosis  - Continue ammonium lactate 12% cream daily PRN to affected areas on the hands and feet.  - Continue Vanicream daily PRN to affected areas on the trunk.(pt defers Vanicream on hands)    # Early Stasis dermatitis, L lower extremity  - Patient scheduled to see cardiology on 3/15/2023; encouraged inquiring about sooner appointment due to increased L leg edema and increased fatigue with exertion.    Procedures Performed:   None    Follow-up: 1 year(s) in-person, or earlier for new or changing lesions    Staff and Scribe:     Scribe Disclosure:  I, Abilio Henriquez, am serving as a scribe to document services personally performed by Mandi Mathews PA-C based on data collection and the provider's statements to me.     Provider Disclosure:   The documentation recorded by the scribe accurately reflects the services I personally performed and the decisions made by me.    All risks, benefits and alternatives were discussed with patient.  Patient is in agreement and understands the assessment  and plan.  All questions were answered.  Sun Screen Education was given.   Return to Clinic annually or sooner as needed.   Mandi Mathews PA-C   [facility]   ____________________________________________    CC: Psoriasis (Follow up psoriasis, need new Rx's) and Rosacea (Routine follow up, needs direction update to  metrogel gel , would like it changed to PRN)    HPI:  Ms. Amy Bryan is a(n) 67 year old female who presents today as a return patient for psoriasis and rosacea. Last seen in dermatology by me on 12/7/2021, at which time patient's metronidazole was increased to 0.75% BID for treatment of acne rosacea.    Today, patient reports that Vanicream helps the dry skin on her back, but makes her hands itch. She still uses AmLactin and tolerates this well. Her rosacea is well controlled with metronidazole, but she continues to experience irritation when she washes her face.    Patient is otherwise feeling well, without additional skin concerns.    Labs Reviewed:  N/A    Physical Exam:  Vitals: There were no vitals taken for this visit.  SKIN: Focused examination of the hands and lower extremities was performed.  - Few pink scaly plaques on the dorsal hands. Improved from prior.  - Erythema noted to the bilateral cheeks.  - Mild xerosis to the hands. No xerosis noted to the feet.  -Faint non tender erythema noted to the left lower leg with edema bl lower legs.   - No other lesions of concern on areas examined.             Medications:  Current Outpatient Medications   Medication     acetaminophen (TYLENOL) 325 MG tablet     Alum Hydroxide-Mag Carbonate (GAVISCON PO)     ammonium lactate (AMLACTIN) 12 % external cream     aspirin (ASA) 81 MG EC tablet     benzoyl peroxide 5 % LOTN lotion     Ca Carbonate-Mag Hydroxide (ROLAIDS PO)     clopidogrel (PLAVIX) 75 MG tablet     emollient (VANICREAM) external cream     FIBER- MG tablet     furosemide (LASIX) 20 MG tablet      GABAPENTIN PO     LevETIRAcetam (KEPPRA PO)     levothyroxine (SYNTHROID/LEVOTHROID) 25 MCG tablet     LISINOPRIL PO     loperamide (IMODIUM) 2 MG capsule     LORAZEPAM PO     melatonin 3 MG CAPS     metoprolol tartrate (LOPRESSOR) 25 MG tablet     metroNIDAZOLE (METROGEL) 0.75 % external gel     MIRTAZAPINE PO     nitroGLYcerin (NITROSTAT) 0.4 MG sublingual tablet     NYSTATIN PO     OMEPRAZOLE PO     ondansetron (ZOFRAN-ODT) 4 MG ODT tab     potassium chloride (KLOR-CON) 20 MEQ Packet     psyllium (METAMUCIL) 58.6 % POWD     rosuvastatin (CRESTOR) 20 MG tablet     traZODone (DESYREL) 50 MG tablet     triamcinolone (ARISTOCORT HP) 0.5 % external cream     trolamine salicylate (ASPERCREME) 10 % cream     vitamin D3 (CHOLECALCIFEROL) 1.25 MG (47094 UT) capsule     No current facility-administered medications for this visit.      Past Medical History:   Patient Active Problem List   Diagnosis     Hypertension     Menopause     Developmental delay     Hospital discharge follow-up     Closed TBI (traumatic brain injury)     STEMI (ST elevation myocardial infarction) (H)     Hypotension     Coma (H)     Coronary artery disease involving native coronary artery of native heart     Cardiogenic shock (H)     Abnormal findings on diagnostic imaging of heart and coronary circulation     Status post coronary angiogram     Past Medical History:   Diagnosis Date     Cardiogenic shock (H) 07/09/2021     Coma (H) 05/2012    CHT after a fall     Coronary artery disease involving native coronary artery of native heart 07/09/2021    3 vessel     Developmental delay      DVT (deep vein thrombosis) in pregnancy     post trauma     Fall 05/2012    multiple fracture     Hypertension      Menopause     age 52     Pelvic fracture (H) 5-201`2     Rib fracture 05/2012     STEMI (ST elevation myocardial infarction) (H) 01/25/2018        CC No referring provider defined for this encounter. on close of this encounter.      Again, thank you for  allowing me to participate in the care of your patient.        Sincerely,        Mandi Mathews PA-C

## 2023-02-28 NOTE — PROGRESS NOTES
[facility] Dermatology Note  Encounter Date: Feb 28, 2023  Office Visit     Dermatology Problem List:  1. Acne rosacea   - Current tx: metronidazole 0.75% gel  - Previous tx: BPO cream  2.  Xerosis   - Current tx: ammonium lactate 12% cream, Vanicream  3.  Plaque psoriasis  - Current tx: triamcinolone 0.5% cream  - Previous tx: triamcinolone 0.1% cream  ____________________________________________    Assessment & Plan:    # Plaque psoriasis, elbows and hands. Chronic, improved from prior.  - Continue triamcinolone 0.5% cream BID PRN to active plaques.    # Acne rosacea  - Continue metronidazole 0.75% gel BID PRN to affected areas on the face.    # Xerosis  - Continue ammonium lactate 12% cream daily PRN to affected areas on the hands and feet.  - Continue Vanicream daily PRN to affected areas on the trunk.(pt defers Vanicream on hands)    # Early Stasis dermatitis, L lower extremity  - Patient scheduled to see cardiology on 3/15/2023; encouraged inquiring about sooner appointment due to increased L leg edema and increased fatigue with exertion.    Procedures Performed:   None    Follow-up: 1 year(s) in-person, or earlier for new or changing lesions    Staff and Scribe:     Scribe Disclosure:  Abilio PEDERSEN, am serving as a scribe to document services personally performed by Mandi Mathews PA-C based on data collection and the provider's statements to me.     Provider Disclosure:   The documentation recorded by the scribe accurately reflects the services I personally performed and the decisions made by me.    All risks, benefits and alternatives were discussed with patient.  Patient is in agreement and understands the assessment and plan.  All questions were answered.  Sun Screen Education was given.   Return to Clinic annually or sooner as needed.   Mandi Mathews PA-C   [facility]   ____________________________________________    CC: Psoriasis (Follow  up psoriasis, need new Rx's) and Rosacea (Routine follow up, needs direction update to  metrogel gel , would like it changed to PRN)    HPI:  Ms. Amy Bryan is a(n) 67 year old female who presents today as a return patient for psoriasis and rosacea. Last seen in dermatology by me on 12/7/2021, at which time patient's metronidazole was increased to 0.75% BID for treatment of acne rosacea.    Today, patient reports that Vanicream helps the dry skin on her back, but makes her hands itch. She still uses AmLactin and tolerates this well. Her rosacea is well controlled with metronidazole, but she continues to experience irritation when she washes her face.    Patient is otherwise feeling well, without additional skin concerns.    Labs Reviewed:  N/A    Physical Exam:  Vitals: There were no vitals taken for this visit.  SKIN: Focused examination of the hands and lower extremities was performed.  - Few pink scaly plaques on the dorsal hands. Improved from prior.  - Erythema noted to the bilateral cheeks.  - Mild xerosis to the hands. No xerosis noted to the feet.  -Faint non tender erythema noted to the left lower leg with edema bl lower legs.   - No other lesions of concern on areas examined.             Medications:  Current Outpatient Medications   Medication     acetaminophen (TYLENOL) 325 MG tablet     Alum Hydroxide-Mag Carbonate (GAVISCON PO)     ammonium lactate (AMLACTIN) 12 % external cream     aspirin (ASA) 81 MG EC tablet     benzoyl peroxide 5 % LOTN lotion     Ca Carbonate-Mag Hydroxide (ROLAIDS PO)     clopidogrel (PLAVIX) 75 MG tablet     emollient (VANICREAM) external cream     FIBER- MG tablet     furosemide (LASIX) 20 MG tablet     GABAPENTIN PO     LevETIRAcetam (KEPPRA PO)     levothyroxine (SYNTHROID/LEVOTHROID) 25 MCG tablet     LISINOPRIL PO     loperamide (IMODIUM) 2 MG capsule     LORAZEPAM PO     melatonin 3 MG CAPS     metoprolol tartrate (LOPRESSOR) 25 MG tablet     metroNIDAZOLE  (METROGEL) 0.75 % external gel     MIRTAZAPINE PO     nitroGLYcerin (NITROSTAT) 0.4 MG sublingual tablet     NYSTATIN PO     OMEPRAZOLE PO     ondansetron (ZOFRAN-ODT) 4 MG ODT tab     potassium chloride (KLOR-CON) 20 MEQ Packet     psyllium (METAMUCIL) 58.6 % POWD     rosuvastatin (CRESTOR) 20 MG tablet     traZODone (DESYREL) 50 MG tablet     triamcinolone (ARISTOCORT HP) 0.5 % external cream     trolamine salicylate (ASPERCREME) 10 % cream     vitamin D3 (CHOLECALCIFEROL) 1.25 MG (91898 UT) capsule     No current facility-administered medications for this visit.      Past Medical History:   Patient Active Problem List   Diagnosis     Hypertension     Menopause     Developmental delay     Hospital discharge follow-up     Closed TBI (traumatic brain injury)     STEMI (ST elevation myocardial infarction) (H)     Hypotension     Coma (H)     Coronary artery disease involving native coronary artery of native heart     Cardiogenic shock (H)     Abnormal findings on diagnostic imaging of heart and coronary circulation     Status post coronary angiogram     Past Medical History:   Diagnosis Date     Cardiogenic shock (H) 07/09/2021     Coma (H) 05/2012    CHT after a fall     Coronary artery disease involving native coronary artery of native heart 07/09/2021    3 vessel     Developmental delay      DVT (deep vein thrombosis) in pregnancy     post trauma     Fall 05/2012    multiple fracture     Hypertension      Menopause     age 52     Pelvic fracture (H) 5-201`2     Rib fracture 05/2012     STEMI (ST elevation myocardial infarction) (H) 01/25/2018        CC No referring provider defined for this encounter. on close of this encounter.

## 2023-03-15 ENCOUNTER — OFFICE VISIT (OUTPATIENT)
Dept: CARDIOLOGY | Facility: CLINIC | Age: 68
End: 2023-03-15
Payer: MEDICARE

## 2023-03-15 VITALS
HEIGHT: 65 IN | RESPIRATION RATE: 17 BRPM | WEIGHT: 225.3 LBS | OXYGEN SATURATION: 96 % | HEART RATE: 60 BPM | SYSTOLIC BLOOD PRESSURE: 149 MMHG | DIASTOLIC BLOOD PRESSURE: 83 MMHG | BODY MASS INDEX: 37.54 KG/M2

## 2023-03-15 DIAGNOSIS — I21.3 ST ELEVATION MYOCARDIAL INFARCTION (STEMI), UNSPECIFIED ARTERY (H): ICD-10-CM

## 2023-03-15 DIAGNOSIS — E66.01 MORBID OBESITY (H): ICD-10-CM

## 2023-03-15 DIAGNOSIS — R60.9 EDEMA, UNSPECIFIED TYPE: ICD-10-CM

## 2023-03-15 DIAGNOSIS — R60.0 LOCALIZED EDEMA: Primary | ICD-10-CM

## 2023-03-15 PROCEDURE — 99214 OFFICE O/P EST MOD 30 MIN: CPT | Performed by: INTERNAL MEDICINE

## 2023-03-15 RX ORDER — ACETAMINOPHEN 500 MG/1
500 TABLET ORAL PRN
COMMUNITY
Start: 2022-10-07

## 2023-03-15 RX ORDER — LISINOPRIL 5 MG/1
5 TABLET ORAL AT BEDTIME
COMMUNITY
Start: 2023-02-18 | End: 2023-03-17 | Stop reason: DRUGHIGH

## 2023-03-15 RX ORDER — POTASSIUM CHLORIDE 1500 MG/1
1 TABLET, EXTENDED RELEASE ORAL
COMMUNITY
Start: 2023-02-18

## 2023-03-15 RX ORDER — MIRTAZAPINE 30 MG/1
30 TABLET, FILM COATED ORAL AT BEDTIME
COMMUNITY
Start: 2023-02-23

## 2023-03-15 RX ORDER — METRONIDAZOLE 7.5 MG/G
GEL TOPICAL
COMMUNITY
Start: 2021-07-29

## 2023-03-15 NOTE — LETTER
3/15/2023    [facility]  [address]  [place] [address]    RE: Amy Bryan       Dear Colleague,     I had the pleasure of seeing Amy Bryan in the [facility].  HPI and Plan:   Today, I had the pleasure of seeing Amy Bryan at [facility]. She is a pleasant 67 year old patient with a past medical history of traumatic brain injury, coronary disease, and hypertension who presents to the clinic for a follow up visit.  She is accompanied by her friend who is also her POA.  The patient lives at an assisted living facility.     The patient presented to the hospital in 2018 for cardiogenic shock thought to be secondary to Takotsubo cardiomyopathy.  She was also noted to have anterolateral ST elevations for which she underwent emergent coronary angiogram. Aspiration thrombectomy of the PL 3 branch and balloon dilation of the small D1 was performed.  She was also noted to have 70% proximal-mid LAD disease and the plan was to perform a staged PCI.  However, this did not happen until 2021 when she underwent stenting of the left main all the way into mid LAD as well as second diagonal.  She was also noted to have 70% stenosis of ostial circumflex which has been managed medically.    Today, she tells me that she has been doing well.  She is taking her medications as prescribed.   She does not have any major complaints today besides LE edema. She has gained weight and she thinks it is all due to high caloric intake and lack of exercise which she blames on the weather.  She did have a stress test in 06/2021 prior to undergoing angiogram in 08/2021.  I interpreted this nuclear stress test shows fixed lateral defect without any significant ischemia.    Her lipid profile has significantly improved.  LDL still remains low although triglyceride is elevated.  She is scheduled for another fasting lipid profile later today.  Her only  complaint today is a persistent lower extremity edema which is dependent and she especially notices it after long periods of standing.  She also has some discomfort in the lower extremity (left greater than right) associated with this.    I interpreted her EKG which shows sinus bradycardia with mild QT prolongation.    Assessment and plan:-   1.  Coronary disease-on Plavix due to aspirin allergy. PCI of LM>>mLAD and D2. Residual 70% stenosis of ostial LCx  2.  History of cardiogenic shock thought to be secondary to Takotsubo cardiomyopathy  3.  Hypertriglyceridemia-   4.  Traumatic brain injury  5.  Lower extremity edema [l>r]-most likely secondary to venous incompetency  6.  Exertional shortness of breath-deconditioning?  7.  Essential hypertension-    It was a pleasure meeting with Ms Bryan today for a follow up visit.  The patient is currently doing well from cardiac standpoint. She does not have any major complaints today besides LE edema. She has gained weight and she thinks it is all due to high caloric intake and lack of exercise which she blames on the weather.  This is associated with sob with exertion. She feels if she starts to exercise and loose wt she will feel well. I agree with her.  I have told her to take a few months to achieve this goal. We did however, discuss that if all this does not lead to resolution of symptoms we will perform a stress test.      As far as LE edema is concerned, her LE exam is consistent with venous stasis. I wont be surprised if she has varicosity.  I will order a bilateral lower extremity ultrasound with duplex for this reason.  They have recently performed an ultrasound and ruled out DVT.  We also talked about using compression stockings.    Her anginal symptoms have resolved since undergoing intervention on the LAD.  I will continue her on all the current medications he is on and have her come back and see us in a year or sooner if needed.  For now, we will continue  all the medications she is on including the current dose of Crestor.     Her blood pressure in clinic is slightly elevated.  She also has history of mild pulmonary hypertension so I suggest that during the next visit if her blood pressure remains elevated we will initiate her on amlodipine.    Thank you for allowing me to participate in the care of Amy Bryan    This note was completed in part using Dragon voice recognition software. Although reviewed after completion, some word and grammatical errors may occur.    Darren Wren MD  Cardiology    Orders Placed This Encounter   Procedures     US Venous Competency Bilateral     Follow-Up with Cardiology EVAN       Orders Placed This Encounter   Medications     sertraline (ZOLOFT) 50 MG tablet     Sig: Take 50 mg by mouth daily     SM PAIN RELIEVER EX  MG tablet     Sig: Take 500 mg by mouth as needed     lactase (LACTAID) 9000 units TABS tablet     Sig: Take 9,000 Units by mouth as needed     lisinopril (ZESTRIL) 5 MG tablet     Sig: Take 5 mg by mouth At Bedtime     metroNIDAZOLE (METROGEL) 0.75 % external gel     Sig: metronidazole 0.75 % topical gel     mirtazapine (REMERON) 30 MG tablet     Sig: Take 30 mg by mouth At Bedtime     potassium chloride ER (KLOR-CON M) 20 MEQ CR tablet     Sig: Take 1 tablet by mouth daily at 2 pm       There are no discontinued medications.    Encounter Diagnoses   Name Primary?     Localized edema Yes     Edema, unspecified type      Morbid obesity (H)      ST elevation myocardial infarction (STEMI), unspecified artery (H)        CURRENT MEDICATIONS:  Current Outpatient Medications   Medication Sig Dispense Refill     acetaminophen (TYLENOL) 325 MG tablet Take 1,000 mg by mouth 2 times daily And at bedtime as needed for pain       ammonium lactate (AMLACTIN) 12 % external cream Apply topically 2 times daily To the hands and feet 385 g 11     aspirin (ASA) 81 MG EC tablet Take 1 tablet (81 mg) by mouth daily 90 tablet 3      benzoyl peroxide 5 % LOTN lotion Apply topically 2 times daily       clopidogrel (PLAVIX) 75 MG tablet Take 1 tablet (75 mg) by mouth daily 90 tablet 3     emollient (VANICREAM) external cream Apply from head to toe daily after the showers. 453 g 11     FIBER- MG tablet Take 1 tablet by mouth daily       furosemide (LASIX) 20 MG tablet Take 20 mg by mouth daily       GABAPENTIN PO Take 100 mg by mouth 3 times daily       lactase (LACTAID) 9000 units TABS tablet Take 9,000 Units by mouth as needed       LevETIRAcetam (KEPPRA PO) Take 500 mg by mouth 2 times daily       levothyroxine (SYNTHROID/LEVOTHROID) 25 MCG tablet Take 1 tablet (25 mcg) by mouth daily 45 tablet 0     lisinopril (ZESTRIL) 5 MG tablet Take 5 mg by mouth At Bedtime       loperamide (IMODIUM) 2 MG capsule Take 2 mg by mouth 4 times daily as needed for diarrhea       LORAZEPAM PO Take 0.5 mg by mouth 3 times daily as needed for anxiety       melatonin 3 MG CAPS        metoprolol tartrate (LOPRESSOR) 25 MG tablet Take 75 mg by mouth 2 times daily       metroNIDAZOLE (METROGEL) 0.75 % external gel metronidazole 0.75 % topical gel       metroNIDAZOLE (METROGEL) 0.75 % external gel Apply topically 2 times daily To the cheeks and nose for rosacea, as needed. 45 g 11     mirtazapine (REMERON) 30 MG tablet Take 30 mg by mouth At Bedtime       MIRTAZAPINE PO Take 30 mg by mouth At Bedtime       nitroGLYcerin (NITROSTAT) 0.4 MG sublingual tablet For chest pain place 1 tablet under the tongue every 5 minutes for 3 doses. If symptoms persist 5 minutes after 1st dose call 911. 25 tablet 0     NYSTATIN PO Take by mouth as needed       OMEPRAZOLE PO Take 20 mg by mouth every other day       ondansetron (ZOFRAN-ODT) 4 MG ODT tab TAKE ONE TABLET BY MOUTH TWICE A DAY AS NEEDED FOR NAUSEA & VOMITING       potassium chloride (KLOR-CON) 20 MEQ Packet Take 20 mEq by mouth 2 times daily       rosuvastatin (CRESTOR) 20 MG tablet Take 0.5 tablets (10 mg) by mouth  daily 90 tablet 3     sertraline (ZOLOFT) 50 MG tablet Take 50 mg by mouth daily       SM PAIN RELIEVER EX  MG tablet Take 500 mg by mouth as needed       triamcinolone (ARISTOCORT HP) 0.5 % external cream Apply topically 2 times daily as needed (elbows and hands for rashes of psoriasis until clear). Follow up for annual visit. 80 g 3     trolamine salicylate (ASPERCREME) 10 % cream Apply topically as needed for moderate pain To affected areas       vitamin D3 (CHOLECALCIFEROL) 1.25 MG (37480 UT) capsule Take 1 capsule (50,000 Units) by mouth every 7 days 12 capsule 0     Alum Hydroxide-Mag Carbonate (GAVISCON PO) Take 1 tablet by mouth every 4 hours as needed (indigestion) (Patient not taking: Reported on 3/15/2023)       Ca Carbonate-Mag Hydroxide (ROLAIDS PO)        LISINOPRIL PO Take 5 mg by mouth daily (Patient not taking: Reported on 3/15/2023)       potassium chloride ER (KLOR-CON M) 20 MEQ CR tablet Take 1 tablet by mouth daily at 2 pm       psyllium (METAMUCIL) 58.6 % POWD Take 1 teaspoonful by mouth daily Natural Fiber Pow Therapy (Patient not taking: Reported on 3/15/2023)       traZODone (DESYREL) 50 MG tablet Take 75 mg by mouth nightly as needed for sleep  (Patient not taking: Reported on 3/15/2023)         ALLERGIES     Allergies   Allergen Reactions     Penicillins      dizziness       PAST MEDICAL HISTORY:  Past Medical History:   Diagnosis Date     Cardiogenic shock (H) 07/09/2021     Coma (H) 05/2012    CHT after a fall     Coronary artery disease involving native coronary artery of native heart 07/09/2021    3 vessel     Developmental delay      DVT (deep vein thrombosis) in pregnancy     post trauma     Fall 05/2012    multiple fracture     Hypertension      Menopause     age 52     Pelvic fracture (H) 5-201`2     Rib fracture 05/2012     STEMI (ST elevation myocardial infarction) (H) 01/25/2018       PAST SURGICAL HISTORY:  Past Surgical History:   Procedure Laterality Date      COLONOSCOPY  01/01/2010     CV CORONARY LITHOTRIPSY PCI N/A 8/3/2021    Procedure: CV Coronary Lithotripsy PCI;  Surgeon: Kamran Singleton MD;  Location:  HEART CARDIAC CATH LAB     CV HEART CATHETERIZATION WITH POSSIBLE INTERVENTION N/A 8/3/2021    Procedure: Heart Catheterization with Possible Intervention;  Surgeon: Kamran Singleton MD;  Location:  HEART CARDIAC CATH LAB     CV INTRAVASULAR ULTRASOUND N/A 8/3/2021    Procedure: Intravascular Ultrasound;  Surgeon: Kamran Singleton MD;  Location:  HEART CARDIAC CATH LAB     CV PCI STENT DRUG ELUTING N/A 8/3/2021    Procedure: Percutaneous Coronary Intervention Stent Drug Eluting;  Surgeon: Kamran Singleton MD;  Location:  HEART CARDIAC CATH LAB     HEART CATH DRUG ELUTING STENT PLACEMENT N/A 01/25/2018     LASER YAG CAPSULOTOMY Left 05/29/2018    Procedure: LASER YAG CAPSULOTOMY;  LEFT YAG LASER CAPSULOTOMY ;  Surgeon: Tabby Guillaume MD;  Location:  [facility]     LASER YAG CAPSULOTOMY Right 06/05/2018    Procedure: LASER YAG CAPSULOTOMY;  RIGHT EYE YAG LASER CAPSULOTOMY ;  Surgeon: Tabby Guillaume MD;  Location: [facility]       FAMILY HISTORY:  Family History   Problem Relation Age of Onset     Other - See Comments Father         alpha 1 antitrypsin deficiency      Cancer - colorectal Mother      Diabetes Sister        SOCIAL HISTORY:  Social History     Socioeconomic History     Marital status:      Spouse name: None     Number of children: None     Years of education: None     Highest education level: None   Tobacco Use     Smoking status: Never     Smokeless tobacco: Never   Substance and Sexual Activity     Alcohol use: Not Currently     Alcohol/week: 1.7 standard drinks     Types: 2 Standard drinks or equivalent per week       Review of Systems:  Skin:  not assessed       Eyes:  not assessed      ENT:  not assessed      Respiratory:  Positive for dyspnea on exertion;shortness of breath     Cardiovascular:    lower  "extremity symptoms;Positive for;heaviness;edema    Gastroenterology: not assessed      Genitourinary:  not assessed      Musculoskeletal:  not assessed      Neurologic:  not assessed      Psychiatric:  not assessed      Heme/Lymph/Imm:  not assessed      Endocrine:  Negative        Physical Exam:  Vitals: BP (!) 149/83   Pulse 60   Resp 17   Ht 1.651 m (5' 5\")   Wt 102.2 kg (225 lb 4.8 oz)   SpO2 96%   BMI 37.49 kg/m    Eyes: No icterus.  Pulmonary: Chest symmetric, lungs clear bilaterally and no crackles, wheezes or rales.  Cardiovascular: RRR with normal S1 and S2, no murmur, JVP normal.  Musculoskeletal: Edema of the lower extremities: None.  Neurologic: Oriented and appropriate without obvious focal deficits.   Psychiatric: Normal affect.     Recent Lab Results:  LIPID RESULTS:  Lab Results   Component Value Date    CHOL 113 02/28/2022    CHOL 145 05/19/2021    HDL 31 (L) 02/28/2022    HDL 27 (L) 05/19/2021    LDL 31 02/28/2022    LDL 51 05/19/2021    TRIG 254 (H) 02/28/2022    TRIG 336 (H) 05/19/2021       LIVER ENZYME RESULTS:  Lab Results   Component Value Date    AST 25 05/19/2021    ALT 36 05/19/2021       CBC RESULTS:  Lab Results   Component Value Date    WBC 6.2 08/03/2021    WBC 7.6 05/19/2021    RBC 4.49 08/03/2021    RBC 4.72 05/19/2021    HGB 12.9 08/03/2021    HGB 14.5 05/19/2021    HCT 40.9 08/03/2021    HCT 45.4 05/19/2021    MCV 91 08/03/2021    MCV 96 05/19/2021    MCH 28.7 08/03/2021    MCH 30.7 05/19/2021    MCHC 31.5 08/03/2021    MCHC 31.9 05/19/2021    RDW 12.3 08/03/2021    RDW 12.5 05/19/2021     08/03/2021     05/19/2021       BMP RESULTS:  Lab Results   Component Value Date     08/03/2021     05/19/2021    POTASSIUM 4.0 08/03/2021    POTASSIUM 4.7 05/19/2021    CHLORIDE 106 08/03/2021    CHLORIDE 107 05/19/2021    CO2 33 (H) 08/03/2021    CO2 33 (H) 05/19/2021    ANIONGAP 2 (L) 08/03/2021    ANIONGAP 1 (L) 05/19/2021    GLC 99 08/03/2021    GLC 97 " 05/19/2021    BUN 15 08/03/2021    BUN 16 05/19/2021    CR 0.86 08/03/2021    CR 0.90 05/19/2021    GFRESTIMATED 72 08/03/2021    GFRESTIMATED 67 05/19/2021    GFRESTBLACK 78 05/19/2021    HECTOR 9.0 08/03/2021    HECTOR 9.3 05/19/2021        A1C RESULTS:  Lab Results   Component Value Date    A1C 5.5 01/26/2018       INR RESULTS:  Lab Results   Component Value Date    INR 1.14 08/03/2021    INR 2.6 08/29/2012    INR 1.8 08/15/2012       CC  Telma Johnson MD  [address] [facility] [place] [address] [address]    All medical records were reviewed in detail and discussed with the patient. Greater than 30 mins were spent with the patient, 50% of this time was spent on counseling and coordination of care.  After visit summary was printed and given to the patient.    Thank you for allowing me to participate in the care of your patient.      Sincerely,     Darren Wren MD     [facility]  cc:   No referring provider defined for this encounter.

## 2023-03-17 ENCOUNTER — TELEPHONE (OUTPATIENT)
Dept: CARDIOLOGY | Facility: CLINIC | Age: 68
End: 2023-03-17
Payer: MEDICARE

## 2023-03-17 DIAGNOSIS — I25.10 CORONARY ARTERY DISEASE INVOLVING NATIVE CORONARY ARTERY OF NATIVE HEART WITHOUT ANGINA PECTORIS: ICD-10-CM

## 2023-03-17 DIAGNOSIS — I21.3 ST ELEVATION MYOCARDIAL INFARCTION (STEMI), UNSPECIFIED ARTERY (H): Primary | ICD-10-CM

## 2023-03-17 DIAGNOSIS — I10 BENIGN ESSENTIAL HYPERTENSION: ICD-10-CM

## 2023-03-17 RX ORDER — LISINOPRIL 10 MG/1
10 TABLET ORAL DAILY
Qty: 90 TABLET | Refills: 3 | Status: SHIPPED | OUTPATIENT
Start: 2023-03-17 | End: 2024-03-17

## 2023-03-17 NOTE — TELEPHONE ENCOUNTER
"Writer received communication from Dr. Wren.  \" OK to increase Amy's Lisinopril to 10mg daily.\"    Writer has sent this to her [facility] pharmacy.    Writer has called to the group home to inform them.    Yas Camarillo RN on 3/17/2023 at 3:35 PM    "

## 2023-03-17 NOTE — PROGRESS NOTES
HPI and Plan:   Today, I had the pleasure of seeing Amy Bryan at [facility]. She is a pleasant 67 year old patient with a past medical history of traumatic brain injury, coronary disease, and hypertension who presents to the clinic for a follow up visit.  She is accompanied by her friend who is also her POA.  The patient lives at an assisted living facility.     The patient presented to the hospital in 2018 for cardiogenic shock thought to be secondary to Takotsubo cardiomyopathy.  She was also noted to have anterolateral ST elevations for which she underwent emergent coronary angiogram. Aspiration thrombectomy of the PL 3 branch and balloon dilation of the small D1 was performed.  She was also noted to have 70% proximal-mid LAD disease and the plan was to perform a staged PCI.  However, this did not happen until 2021 when she underwent stenting of the left main all the way into mid LAD as well as second diagonal.  She was also noted to have 70% stenosis of ostial circumflex which has been managed medically.    Today, she tells me that she has been doing well.  She is taking her medications as prescribed.   She does not have any major complaints today besides LE edema. She has gained weight and she thinks it is all due to high caloric intake and lack of exercise which she blames on the weather.  She did have a stress test in 06/2021 prior to undergoing angiogram in 08/2021.  I interpreted this nuclear stress test shows fixed lateral defect without any significant ischemia.    Her lipid profile has significantly improved.  LDL still remains low although triglyceride is elevated.  She is scheduled for another fasting lipid profile later today.  Her only complaint today is a persistent lower extremity edema which is dependent and she especially notices it after long periods of standing.  She also has some discomfort in the lower extremity (left greater than right) associated with  this.    I interpreted her EKG which shows sinus bradycardia with mild QT prolongation.    Assessment and plan:-   1.  Coronary disease-on Plavix due to aspirin allergy. PCI of LM>>mLAD and D2. Residual 70% stenosis of ostial LCx  2.  History of cardiogenic shock thought to be secondary to Takotsubo cardiomyopathy  3.  Hypertriglyceridemia-   4.  Traumatic brain injury  5.  Lower extremity edema [l>r]-most likely secondary to venous incompetency  6.  Exertional shortness of breath-deconditioning?  7.  Essential hypertension-    It was a pleasure meeting with Ms Bryan today for a follow up visit.  The patient is currently doing well from cardiac standpoint. She does not have any major complaints today besides LE edema. She has gained weight and she thinks it is all due to high caloric intake and lack of exercise which she blames on the weather.  This is associated with sob with exertion. She feels if she starts to exercise and loose wt she will feel well. I agree with her.  I have told her to take a few months to achieve this goal. We did however, discuss that if all this does not lead to resolution of symptoms we will perform a stress test.      As far as LE edema is concerned, her LE exam is consistent with venous stasis. I wont be surprised if she has varicosity.  I will order a bilateral lower extremity ultrasound with duplex for this reason.  They have recently performed an ultrasound and ruled out DVT.  We also talked about using compression stockings.    Her anginal symptoms have resolved since undergoing intervention on the LAD.  I will continue her on all the current medications he is on and have her come back and see us in a year or sooner if needed.  For now, we will continue all the medications she is on including the current dose of Crestor.     Her blood pressure in clinic is slightly elevated.  She also has history of mild pulmonary hypertension so I suggest that during the next visit if her blood  pressure remains elevated we will initiate her on amlodipine.    Thank you for allowing me to participate in the care of Amy Bryan    This note was completed in part using Dragon voice recognition software. Although reviewed after completion, some word and grammatical errors may occur.    Darren Wren MD  Cardiology    Orders Placed This Encounter   Procedures     US Venous Competency Bilateral     Follow-Up with Cardiology EVAN       Orders Placed This Encounter   Medications     sertraline (ZOLOFT) 50 MG tablet     Sig: Take 50 mg by mouth daily     SM PAIN RELIEVER EX  MG tablet     Sig: Take 500 mg by mouth as needed     lactase (LACTAID) 9000 units TABS tablet     Sig: Take 9,000 Units by mouth as needed     lisinopril (ZESTRIL) 5 MG tablet     Sig: Take 5 mg by mouth At Bedtime     metroNIDAZOLE (METROGEL) 0.75 % external gel     Sig: metronidazole 0.75 % topical gel     mirtazapine (REMERON) 30 MG tablet     Sig: Take 30 mg by mouth At Bedtime     potassium chloride ER (KLOR-CON M) 20 MEQ CR tablet     Sig: Take 1 tablet by mouth daily at 2 pm       There are no discontinued medications.    Encounter Diagnoses   Name Primary?     Localized edema Yes     Edema, unspecified type      Morbid obesity (H)      ST elevation myocardial infarction (STEMI), unspecified artery (H)        CURRENT MEDICATIONS:  Current Outpatient Medications   Medication Sig Dispense Refill     acetaminophen (TYLENOL) 325 MG tablet Take 1,000 mg by mouth 2 times daily And at bedtime as needed for pain       ammonium lactate (AMLACTIN) 12 % external cream Apply topically 2 times daily To the hands and feet 385 g 11     aspirin (ASA) 81 MG EC tablet Take 1 tablet (81 mg) by mouth daily 90 tablet 3     benzoyl peroxide 5 % LOTN lotion Apply topically 2 times daily       clopidogrel (PLAVIX) 75 MG tablet Take 1 tablet (75 mg) by mouth daily 90 tablet 3     emollient (VANICREAM) external cream Apply from head to toe daily after  the showers. 453 g 11     FIBER- MG tablet Take 1 tablet by mouth daily       furosemide (LASIX) 20 MG tablet Take 20 mg by mouth daily       GABAPENTIN PO Take 100 mg by mouth 3 times daily       lactase (LACTAID) 9000 units TABS tablet Take 9,000 Units by mouth as needed       LevETIRAcetam (KEPPRA PO) Take 500 mg by mouth 2 times daily       levothyroxine (SYNTHROID/LEVOTHROID) 25 MCG tablet Take 1 tablet (25 mcg) by mouth daily 45 tablet 0     lisinopril (ZESTRIL) 5 MG tablet Take 5 mg by mouth At Bedtime       loperamide (IMODIUM) 2 MG capsule Take 2 mg by mouth 4 times daily as needed for diarrhea       LORAZEPAM PO Take 0.5 mg by mouth 3 times daily as needed for anxiety       melatonin 3 MG CAPS        metoprolol tartrate (LOPRESSOR) 25 MG tablet Take 75 mg by mouth 2 times daily       metroNIDAZOLE (METROGEL) 0.75 % external gel metronidazole 0.75 % topical gel       metroNIDAZOLE (METROGEL) 0.75 % external gel Apply topically 2 times daily To the cheeks and nose for rosacea, as needed. 45 g 11     mirtazapine (REMERON) 30 MG tablet Take 30 mg by mouth At Bedtime       MIRTAZAPINE PO Take 30 mg by mouth At Bedtime       nitroGLYcerin (NITROSTAT) 0.4 MG sublingual tablet For chest pain place 1 tablet under the tongue every 5 minutes for 3 doses. If symptoms persist 5 minutes after 1st dose call 911. 25 tablet 0     NYSTATIN PO Take by mouth as needed       OMEPRAZOLE PO Take 20 mg by mouth every other day       ondansetron (ZOFRAN-ODT) 4 MG ODT tab TAKE ONE TABLET BY MOUTH TWICE A DAY AS NEEDED FOR NAUSEA & VOMITING       potassium chloride (KLOR-CON) 20 MEQ Packet Take 20 mEq by mouth 2 times daily       rosuvastatin (CRESTOR) 20 MG tablet Take 0.5 tablets (10 mg) by mouth daily 90 tablet 3     sertraline (ZOLOFT) 50 MG tablet Take 50 mg by mouth daily       SM PAIN RELIEVER EX  MG tablet Take 500 mg by mouth as needed       triamcinolone (ARISTOCORT HP) 0.5 % external cream Apply topically 2  times daily as needed (elbows and hands for rashes of psoriasis until clear). Follow up for annual visit. 80 g 3     trolamine salicylate (ASPERCREME) 10 % cream Apply topically as needed for moderate pain To affected areas       vitamin D3 (CHOLECALCIFEROL) 1.25 MG (86413 UT) capsule Take 1 capsule (50,000 Units) by mouth every 7 days 12 capsule 0     Alum Hydroxide-Mag Carbonate (GAVISCON PO) Take 1 tablet by mouth every 4 hours as needed (indigestion) (Patient not taking: Reported on 3/15/2023)       Ca Carbonate-Mag Hydroxide (ROLAIDS PO)        LISINOPRIL PO Take 5 mg by mouth daily (Patient not taking: Reported on 3/15/2023)       potassium chloride ER (KLOR-CON M) 20 MEQ CR tablet Take 1 tablet by mouth daily at 2 pm       psyllium (METAMUCIL) 58.6 % POWD Take 1 teaspoonful by mouth daily Natural Fiber Pow Therapy (Patient not taking: Reported on 3/15/2023)       traZODone (DESYREL) 50 MG tablet Take 75 mg by mouth nightly as needed for sleep  (Patient not taking: Reported on 3/15/2023)         ALLERGIES     Allergies   Allergen Reactions     Penicillins      dizziness       PAST MEDICAL HISTORY:  Past Medical History:   Diagnosis Date     Cardiogenic shock (H) 07/09/2021     Coma (H) 05/2012    CHT after a fall     Coronary artery disease involving native coronary artery of native heart 07/09/2021    3 vessel     Developmental delay      DVT (deep vein thrombosis) in pregnancy     post trauma     Fall 05/2012    multiple fracture     Hypertension      Menopause     age 52     Pelvic fracture (H) 5-201`2     Rib fracture 05/2012     STEMI (ST elevation myocardial infarction) (H) 01/25/2018       PAST SURGICAL HISTORY:  Past Surgical History:   Procedure Laterality Date     COLONOSCOPY  01/01/2010     CV CORONARY LITHOTRIPSY PCI N/A 8/3/2021    Procedure: CV Coronary Lithotripsy PCI;  Surgeon: Kamran Singleton MD;  Location: [facility] CARDIAC CATH LAB     CV HEART CATHETERIZATION WITH POSSIBLE  INTERVENTION N/A 8/3/2021    Procedure: Heart Catheterization with Possible Intervention;  Surgeon: Kamran Singleton MD;  Location:  HEART CARDIAC CATH LAB     CV INTRAVASULAR ULTRASOUND N/A 8/3/2021    Procedure: Intravascular Ultrasound;  Surgeon: Kamran Singleton MD;  Location:  HEART CARDIAC CATH LAB     CV PCI STENT DRUG ELUTING N/A 8/3/2021    Procedure: Percutaneous Coronary Intervention Stent Drug Eluting;  Surgeon: Kamran Singleton MD;  Location:  HEART CARDIAC CATH LAB     HEART CATH DRUG ELUTING STENT PLACEMENT N/A 01/25/2018     LASER YAG CAPSULOTOMY Left 05/29/2018    Procedure: LASER YAG CAPSULOTOMY;  LEFT YAG LASER CAPSULOTOMY ;  Surgeon: Tabby Guillaume MD;  Location:  [facility]     LASER YAG CAPSULOTOMY Right 06/05/2018    Procedure: LASER YAG CAPSULOTOMY;  RIGHT EYE YAG LASER CAPSULOTOMY ;  Surgeon: Tabby Guillaume MD;  Location: [facility]       FAMILY HISTORY:  Family History   Problem Relation Age of Onset     Other - See Comments Father         alpha 1 antitrypsin deficiency      Cancer - colorectal Mother      Diabetes Sister        SOCIAL HISTORY:  Social History     Socioeconomic History     Marital status:      Spouse name: None     Number of children: None     Years of education: None     Highest education level: None   Tobacco Use     Smoking status: Never     Smokeless tobacco: Never   Substance and Sexual Activity     Alcohol use: Not Currently     Alcohol/week: 1.7 standard drinks     Types: 2 Standard drinks or equivalent per week       Review of Systems:  Skin:  not assessed       Eyes:  not assessed      ENT:  not assessed      Respiratory:  Positive for dyspnea on exertion;shortness of breath     Cardiovascular:    lower extremity symptoms;Positive for;heaviness;edema    Gastroenterology: not assessed      Genitourinary:  not assessed      Musculoskeletal:  not assessed      Neurologic:  not assessed      Psychiatric:  not assessed      Heme/Lymph/Imm:   "not assessed      Endocrine:  Negative        Physical Exam:  Vitals: BP (!) 149/83   Pulse 60   Resp 17   Ht 1.651 m (5' 5\")   Wt 102.2 kg (225 lb 4.8 oz)   SpO2 96%   BMI 37.49 kg/m    Eyes: No icterus.  Pulmonary: Chest symmetric, lungs clear bilaterally and no crackles, wheezes or rales.  Cardiovascular: RRR with normal S1 and S2, no murmur, JVP normal.  Musculoskeletal: Edema of the lower extremities: None.  Neurologic: Oriented and appropriate without obvious focal deficits.   Psychiatric: Normal affect.     Recent Lab Results:  LIPID RESULTS:  Lab Results   Component Value Date    CHOL 113 02/28/2022    CHOL 145 05/19/2021    HDL 31 (L) 02/28/2022    HDL 27 (L) 05/19/2021    LDL 31 02/28/2022    LDL 51 05/19/2021    TRIG 254 (H) 02/28/2022    TRIG 336 (H) 05/19/2021       LIVER ENZYME RESULTS:  Lab Results   Component Value Date    AST 25 05/19/2021    ALT 36 05/19/2021       CBC RESULTS:  Lab Results   Component Value Date    WBC 6.2 08/03/2021    WBC 7.6 05/19/2021    RBC 4.49 08/03/2021    RBC 4.72 05/19/2021    HGB 12.9 08/03/2021    HGB 14.5 05/19/2021    HCT 40.9 08/03/2021    HCT 45.4 05/19/2021    MCV 91 08/03/2021    MCV 96 05/19/2021    MCH 28.7 08/03/2021    MCH 30.7 05/19/2021    MCHC 31.5 08/03/2021    MCHC 31.9 05/19/2021    RDW 12.3 08/03/2021    RDW 12.5 05/19/2021     08/03/2021     05/19/2021       BMP RESULTS:  Lab Results   Component Value Date     08/03/2021     05/19/2021    POTASSIUM 4.0 08/03/2021    POTASSIUM 4.7 05/19/2021    CHLORIDE 106 08/03/2021    CHLORIDE 107 05/19/2021    CO2 33 (H) 08/03/2021    CO2 33 (H) 05/19/2021    ANIONGAP 2 (L) 08/03/2021    ANIONGAP 1 (L) 05/19/2021    GLC 99 08/03/2021    GLC 97 05/19/2021    BUN 15 08/03/2021    BUN 16 05/19/2021    CR 0.86 08/03/2021    CR 0.90 05/19/2021    GFRESTIMATED 72 08/03/2021    GFRESTIMATED 67 05/19/2021    GFRESTBLACK 78 05/19/2021    HECTOR 9.0 08/03/2021    HECTOR 9.3 05/19/2021        A1C " RESULTS:  Lab Results   Component Value Date    A1C 5.5 01/26/2018       INR RESULTS:  Lab Results   Component Value Date    INR 1.14 08/03/2021    INR 2.6 08/29/2012    INR 1.8 08/15/2012       CC  Telma Johnson MD  [address] [address]    All medical records were reviewed in detail and discussed with the patient. Greater than 30 mins were spent with the patient, 50% of this time was spent on counseling and coordination of care.  After visit summary was printed and given to the patient.

## 2023-03-17 NOTE — TELEPHONE ENCOUNTER
"Team has received phone message from caregiver at [facility], where Amy lives.  She states that they received information, after Amy's visit with Dr. Wren on 3\16, that Amy is to \"start taking Lisinopril 5mg daily\".  However, she states that [facility] has not received any prescriptions from Dr. Wren.    The charting in [facility] states that Amy was already prescribed this drug, but the MA who checked in Amy for appointment made a note that \"patient is not taking this med\".  Of note, Lisinopril 5mg was on this patient's med list a year ago, when Dr. Wren saw her in February of 2022.    Writer called and spoke with caregiver and the patient.  Amy and caregiver both say that Amy has been taking the 5mg all along.  They said that Dr. Wren verbally told patient he would increase this to 10mg.   Writer informed them that I will reach out to Dr. Wren for clarification, because his note just says to stay on the same dose of all meds.   They verbalized understanding.    Yas Camarillo RN on 3/17/2023 at 8:54 AM            "

## 2023-03-21 ENCOUNTER — DOCUMENTATION ONLY (OUTPATIENT)
Dept: CARDIOLOGY | Facility: CLINIC | Age: 68
End: 2023-03-21
Payer: MEDICARE

## 2023-03-21 NOTE — PROGRESS NOTES
This writer reviewed with Dr. Wren this morning lipid results done on 3/15 in the afternoon after he saw patient in morning .    She is on Crestor 10 MG daily. His recommendation is to continue Crestor 10 MG daily.

## 2023-05-16 ENCOUNTER — ANCILLARY PROCEDURE (OUTPATIENT)
Dept: ULTRASOUND IMAGING | Facility: CLINIC | Age: 68
End: 2023-05-16
Attending: INTERNAL MEDICINE
Payer: MEDICARE

## 2023-05-16 DIAGNOSIS — R60.9 EDEMA, UNSPECIFIED TYPE: ICD-10-CM

## 2023-05-16 PROCEDURE — 93970 EXTREMITY STUDY: CPT | Performed by: INTERNAL MEDICINE

## 2023-05-21 NOTE — PROGRESS NOTES
Vein Clinic Consultation Note    HPI:  Amy Bryan is a 67 year old female who was seen today in consultation for lower extremity edema, varicose veins.  Amy follows regularly with her cardiologist, Dr. Wren most recently in March.  At that time, she was doing well, however noted lower extremity edema, left greater than right.  This seems to be worse when standing towards the end of the day.  She has compression stockings at home, but does not like to get assistance in putting them on (she lives in a group home).  She states her legs do not otherwise bother her.  She denies any pain, heaviness, itching, cramping. She denies any hx of DVT, ulcers or thrombophlebitis.  She states she has been eating a lot and thought the swelling was related to weight gain.  For further evaluation, she underwent a bilateral venous competency ultrasound.  This demonstrated  This demonstrates some incompetent perforators on the left, however, her greater saphenous, small saphenous and AASV are competent without significant disease.  On the right, her GSV,SSV and AASV are competent.  She has an incompetent lateral thigh varicosity extending to the mid calf of unclear source.        Review of Systems   All other systems reviewed and are negative.      PMH:  1.  Coronary artery disease:  Hx of PCI to LM, mid LAD and D2, residual 70% stenosis of LCx  2.  Developmental delay  3.  Hypertension  4.  Traumatic fall resulting in multiple fractures  5.  Hx of cardiogenic shock in the setting of stress cardiomyopathy       Past Surgical History:   Procedure Laterality Date     COLONOSCOPY  01/01/2010     CV CORONARY LITHOTRIPSY PCI N/A 8/3/2021    Procedure: CV Coronary Lithotripsy PCI;  Surgeon: Kamran Singleton MD;  Location: [facility] CARDIAC CATH LAB     CV HEART CATHETERIZATION WITH POSSIBLE INTERVENTION N/A 8/3/2021    Procedure: Heart Catheterization with Possible Intervention;  Surgeon: Kamran Singleton MD;   Location:  [facility]ART CARDIAC CATH LAB     CV INTRAVASULAR ULTRASOUND N/A 8/3/2021    Procedure: Intravascular Ultrasound;  Surgeon: Kamran Singleton MD;  Location:  HEART CARDIAC CATH LAB     CV PCI STENT DRUG ELUTING N/A 8/3/2021    Procedure: Percutaneous Coronary Intervention Stent Drug Eluting;  Surgeon: Kamran Singleton MD;  Location:  HEART CARDIAC CATH LAB     HEART CATH DRUG ELUTING STENT PLACEMENT N/A 01/25/2018     LASER YAG CAPSULOTOMY Left 05/29/2018    Procedure: LASER YAG CAPSULOTOMY;  LEFT YAG LASER CAPSULOTOMY ;  Surgeon: Tabby Guillaume MD;  Location:  [facility]     LASER YAG CAPSULOTOMY Right 06/05/2018    Procedure: LASER YAG CAPSULOTOMY;  RIGHT EYE YAG LASER CAPSULOTOMY ;  Surgeon: Tabby Guillaume MD;  Location:  [facility]       ALLERGIES:  Penicillins    Allergies   Allergen Reactions     Penicillins      dizziness       MEDS:    Current Outpatient Medications:      acetaminophen (TYLENOL) 325 MG tablet, Take 1,000 mg by mouth 2 times daily And at bedtime as needed for pain, Disp: , Rfl:      Alum Hydroxide-Mag Carbonate (GAVISCON PO), Take 1 tablet by mouth every 4 hours as needed (indigestion) (Patient not taking: Reported on 3/15/2023), Disp: , Rfl:      ammonium lactate (AMLACTIN) 12 % external cream, Apply topically 2 times daily To the hands and feet, Disp: 385 g, Rfl: 11     aspirin (ASA) 81 MG EC tablet, Take 1 tablet (81 mg) by mouth daily, Disp: 90 tablet, Rfl: 3     benzoyl peroxide 5 % LOTN lotion, Apply topically 2 times daily, Disp: , Rfl:      Ca Carbonate-Mag Hydroxide (ROLAIDS PO), , Disp: , Rfl:      clopidogrel (PLAVIX) 75 MG tablet, Take 1 tablet (75 mg) by mouth daily, Disp: 90 tablet, Rfl: 3     emollient (VANICREAM) external cream, Apply from head to toe daily after the showers., Disp: 453 g, Rfl: 11     FIBER- MG tablet, Take 1 tablet by mouth daily, Disp: , Rfl:      furosemide (LASIX) 20 MG tablet, Take 20 mg by mouth daily, Disp: , Rfl:      GABAPENTIN  PO, Take 100 mg by mouth 3 times daily, Disp: , Rfl:      lactase (LACTAID) 9000 units TABS tablet, Take 9,000 Units by mouth as needed, Disp: , Rfl:      LevETIRAcetam (KEPPRA PO), Take 500 mg by mouth 2 times daily, Disp: , Rfl:      levothyroxine (SYNTHROID/LEVOTHROID) 25 MCG tablet, Take 1 tablet (25 mcg) by mouth daily, Disp: 45 tablet, Rfl: 0     lisinopril (ZESTRIL) 10 MG tablet, Take 1 tablet (10 mg) by mouth daily, Disp: 90 tablet, Rfl: 3     LISINOPRIL PO, Take 5 mg by mouth daily (Patient not taking: Reported on 3/15/2023), Disp: , Rfl:      loperamide (IMODIUM) 2 MG capsule, Take 2 mg by mouth 4 times daily as needed for diarrhea, Disp: , Rfl:      LORAZEPAM PO, Take 0.5 mg by mouth 3 times daily as needed for anxiety, Disp: , Rfl:      melatonin 3 MG CAPS, , Disp: , Rfl:      metoprolol tartrate (LOPRESSOR) 25 MG tablet, Take 75 mg by mouth 2 times daily, Disp: , Rfl:      metroNIDAZOLE (METROGEL) 0.75 % external gel, metronidazole 0.75 % topical gel, Disp: , Rfl:      metroNIDAZOLE (METROGEL) 0.75 % external gel, Apply topically 2 times daily To the cheeks and nose for rosacea, as needed., Disp: 45 g, Rfl: 11     mirtazapine (REMERON) 30 MG tablet, Take 30 mg by mouth At Bedtime, Disp: , Rfl:      MIRTAZAPINE PO, Take 30 mg by mouth At Bedtime, Disp: , Rfl:      nitroGLYcerin (NITROSTAT) 0.4 MG sublingual tablet, For chest pain place 1 tablet under the tongue every 5 minutes for 3 doses. If symptoms persist 5 minutes after 1st dose call 911., Disp: 25 tablet, Rfl: 0     NYSTATIN PO, Take by mouth as needed, Disp: , Rfl:      OMEPRAZOLE PO, Take 20 mg by mouth every other day, Disp: , Rfl:      ondansetron (ZOFRAN-ODT) 4 MG ODT tab, TAKE ONE TABLET BY MOUTH TWICE A DAY AS NEEDED FOR NAUSEA & VOMITING, Disp: , Rfl:      potassium chloride (KLOR-CON) 20 MEQ Packet, Take 20 mEq by mouth 2 times daily, Disp: , Rfl:      potassium chloride ER (KLOR-CON M) 20 MEQ CR tablet, Take 1 tablet by mouth daily at 2  pm, Disp: , Rfl:      psyllium (METAMUCIL) 58.6 % POWD, Take 1 teaspoonful by mouth daily Natural Fiber Pow Therapy (Patient not taking: Reported on 3/15/2023), Disp: , Rfl:      rosuvastatin (CRESTOR) 20 MG tablet, Take 0.5 tablets (10 mg) by mouth daily, Disp: 90 tablet, Rfl: 3     sertraline (ZOLOFT) 50 MG tablet, Take 50 mg by mouth daily, Disp: , Rfl:      SM PAIN RELIEVER EX  MG tablet, Take 500 mg by mouth as needed, Disp: , Rfl:      traZODone (DESYREL) 50 MG tablet, Take 75 mg by mouth nightly as needed for sleep  (Patient not taking: Reported on 3/15/2023), Disp: , Rfl:      triamcinolone (ARISTOCORT HP) 0.5 % external cream, Apply topically 2 times daily as needed (elbows and hands for rashes of psoriasis until clear). Follow up for annual visit., Disp: 80 g, Rfl: 3     trolamine salicylate (ASPERCREME) 10 % cream, Apply topically as needed for moderate pain To affected areas, Disp: , Rfl:      vitamin D3 (CHOLECALCIFEROL) 1.25 MG (42106 UT) capsule, Take 1 capsule (50,000 Units) by mouth every 7 days, Disp: 12 capsule, Rfl: 0    SOCIAL HABITS:    History   Smoking Status     Never   Smokeless Tobacco     Never     Social History    Substance and Sexual Activity      Alcohol use: Not Currently        Alcohol/week: 1.7 standard drinks of alcohol        Types: 2 Standard drinks or equivalent per week      History   Drug Use Not on file       FAMILY HISTORY:    Family History   Problem Relation Age of Onset     Other - See Comments Father         alpha 1 antitrypsin deficiency      Cancer - colorectal Mother      Diabetes Sister        PHYSICAL EXAMINATION:  General:  AO3, NAD  HEENT:  Unremarkable  Chest:  Normal respiratory effort  CV:  Normal pulse  Right lower extremity: Clusters of spider vein the medial and lateral thigh, medial and lateral lower leg.  Visible varicosity present anteromedial lower leg  Left lower extremity: Clusters of spider veins left lower leg.  Hyperpigmentation of the mid to  distal lower leg, nonpitting lower extremity edema      IMAGING:    Venous competency ultrasound dated 23 images reviewed personally  Results for orders placed or performed in visit on 23   US Venous Competency Bilateral    Narrative    Name:  Amy Bryan                                          Patient   ID: [id number]  Date: May 16, 2023                                                    :   1955  Sex: female    Impression        Examined by: MARY Aguilar RVT  Age:  67 year old                                                           Reading MD: [place]     INDICATION:  Patient is referred for bilateral lower extremity edema.     EXAM TYPE  BILATERAL LOWER EXTREMITY VENOUS DUPLEX FOR VENOUS INSUFFICIENCY  TECHNICAL SUMMARY     A duplex ultrasound study using color flow was performed, to evaluate the   bilateral lower extremity veins for valvular incompetence with the patient   in a steep reversed trendelenberg.      RIGHT:     The deep veins demonstrate phasic flow, compress and respond to   augmentations.  There is no reflux or DVT.     The GSV demonstrates phasic flow, compresses and responds to augmentations   from the saphenofemoral junction to the ankle with no evidence of    thrombus. The great saphenous vein measures 6.1 mm at the saphenofemoral   junction, 4.2 mm at the proximal thigh, and 2.4 mm at the knee. The GSV is   incompetent at the knee, with the reflux time of 770 milliseconds.       The AASV is competent ( 3.5 mm) draining into the saphenofemoral junction.     The Giacomini vein is competent ( 1.2 mm) communicating with the small   saphenous vein at the knee level.      The SSV demonstrates phasic flow, compresses and responds to augmentations   from the popliteal space to the ankle.  No reflux or thrombus is seen. The   saphenopopliteal junction is competent (3.1 mm).      Perforators: there is no evidence of incompetent  veins at any   level.      Edema is noted.       Lateral thigh varicose veins are noted measuring 3.7 mm with 917   milliseconds of incompetence. They cross below the knee to the medial calf   and measure 5.0 mm with 1995 milliseconds of incompetence.      LEFT:     The deep veins demonstrate phasic flow, compress and respond to   augmentations.  There is no reflux or DVT.  The common femoral and   popliteal veins are incompetent and free of thrombus. The remaining deep   veins are competent and free of thrombus.      The GSV demonstrates phasic flow, compresses and responds to augmentations   from the saphenofemoral junction to the ankle with no evidence of reflux   or thrombus. The great saphenous vein measures 8.2 mm at the   saphenofemoral junction, 4.7 mm at the proximal thigh,  and 4.5 mm at the   knee. The GSV gives rise to a varicose branch measuring 2.8 mm off the    Proximal Calf that courses Toward the ankle with a reflux time of 1423   milliseconds.          The AASV is competent ( 4.6 mm) draining into the saphenofemoral junction.        The Giacomini vein is competent ( 4.6 mm) communicating with the small   saphenous vein at the knee level.      The SSV demonstrates phasic flow, compresses and responds to augmentations   from the popliteal space to the ankle.  No reflux or thrombus is seen. The   saphenopopliteal junction is incompetent ( 5.0 mm). The SSV is incompetent   at the SPJ with a reflux time of 1526 milliseconds.       Perforators: There is an incompetent  vein ( 3.4 mm) at 7 cm   inferior from medial knee crease that communicates with GSV to PTVS.There   is an incompetent  vein ( 5.5 mm) at 8 cm from medial mallelous   that communicates with varicose veins to PTVS.     Edema is noted.      Lateral calf varicose veins are noted measuring 3.3 mm with 1540   milliseconds of incompetence.     FINAL SUMMARY:  1.  There is no right or left DVT present.  2.  Right GSV segmental incompetence at the knee.  3.  Right  lateral thigh varicose vein incompetence (unclear source).  4.  Left common femoral and popliteal vein incmpetence.  5.  Left SSV incompetence (saphenopopliteal junction).  6.  Left incompetence perforators.       Incompetence Criteria: Greater than 500 milliseconds in superficial and    veins and greater than 1000 milliseconds in deep veins.       Amy Ya MD [facility]           ASSESSMENT:  1.  Bilateral lymphedema:  L>R.  Limited venous insufficiency by bilateral venous competency ultrasound  2.  Coronary artery disease:  S/P PCI  3.  Hypertension  4.  Developmental delay/traumatic brain injury      PLAN:   -Reviewed the results of the bilateral venous competency ultrasound demonstrating only limited venous insufficiency.  Main truncal superficial veins are competent.  Reviewed pathophysiology of lymphedema and treatment.  She has prescription grade compression stockings at home and will start wearing them daily.  Discussed a referral to lymphedema clinic and she would like to purse this treatment.  She may follow up with vein clinic on a PRN basis.       Amy Ya MD [facility]   May 22, 2023

## 2023-05-22 ENCOUNTER — OFFICE VISIT (OUTPATIENT)
Dept: VASCULAR SURGERY | Facility: CLINIC | Age: 68
End: 2023-05-22
Attending: INTERNAL MEDICINE
Payer: MEDICARE

## 2023-05-22 DIAGNOSIS — R22.43 LOCALIZED SWELLING OF BOTH LOWER LEGS: Primary | ICD-10-CM

## 2023-05-22 PROCEDURE — 99213 OFFICE O/P EST LOW 20 MIN: CPT | Performed by: INTERNAL MEDICINE

## 2023-05-22 NOTE — LETTER
5/22/2023         RE: Amy Bryan  [address] [place] [id number]        Dear Colleague,    Thank you for referring your patient, Amy Bryan, to the [facility] [place]. Please see a copy of my visit note below.        Vein Clinic Consultation Note    HPI:  Amy Bryan is a 67 year old female who was seen today in consultation for lower extremity edema, varicose veins.  Amy follows regularly with her cardiologist, Dr. Wren most recently in March.  At that time, she was doing well, however noted lower extremity edema, left greater than right.  This seems to be worse when standing towards the end of the day.  She has compression stockings at home, but does not like to get assistance in putting them on (she lives in a group home).  She states her legs do not otherwise bother her.  She denies any pain, heaviness, itching, cramping. She denies any hx of DVT, ulcers or thrombophlebitis.  She states she has been eating a lot and thought the swelling was related to weight gain.  For further evaluation, she underwent a bilateral venous competency ultrasound.  This demonstrated  This demonstrates some incompetent perforators on the left, however, her greater saphenous, small saphenous and AASV are competent without significant disease.  On the right, her GSV,SSV and AASV are competent.  She has an incompetent lateral thigh varicosity extending to the mid calf of unclear source.        Review of Systems   All other systems reviewed and are negative.      PMH:  1.  Coronary artery disease:  Hx of PCI to LM, mid LAD and D2, residual 70% stenosis of LCx  2.  Developmental delay  3.  Hypertension  4.  Traumatic fall resulting in multiple fractures  5.  Hx of cardiogenic shock in the setting of stress cardiomyopathy       Past Surgical History:   Procedure Laterality Date     COLONOSCOPY  01/01/2010     CV CORONARY LITHOTRIPSY PCI N/A 8/3/2021    Procedure: CV Coronary  Lithotripsy PCI;  Surgeon: Kamran Singleton MD;  Location:  HEART CARDIAC CATH LAB     CV HEART CATHETERIZATION WITH POSSIBLE INTERVENTION N/A 8/3/2021    Procedure: Heart Catheterization with Possible Intervention;  Surgeon: Kamran Singleton MD;  Location:  HEART CARDIAC CATH LAB     CV INTRAVASULAR ULTRASOUND N/A 8/3/2021    Procedure: Intravascular Ultrasound;  Surgeon: Kamran Singleton MD;  Location:  HEART CARDIAC CATH LAB     CV PCI STENT DRUG ELUTING N/A 8/3/2021    Procedure: Percutaneous Coronary Intervention Stent Drug Eluting;  Surgeon: Kamran Singleton MD;  Location:  HEART CARDIAC CATH LAB     HEART CATH DRUG ELUTING STENT PLACEMENT N/A 01/25/2018     LASER YAG CAPSULOTOMY Left 05/29/2018    Procedure: LASER YAG CAPSULOTOMY;  LEFT YAG LASER CAPSULOTOMY ;  Surgeon: Tabby Guillaume MD;  Location:  [facility]     LASER YAG CAPSULOTOMY Right 06/05/2018    Procedure: LASER YAG CAPSULOTOMY;  RIGHT EYE YAG LASER CAPSULOTOMY ;  Surgeon: Tabby Guillaume MD;  Location:  [facility]       ALLERGIES:  Penicillins    Allergies   Allergen Reactions     Penicillins      dizziness       MEDS:    Current Outpatient Medications:      acetaminophen (TYLENOL) 325 MG tablet, Take 1,000 mg by mouth 2 times daily And at bedtime as needed for pain, Disp: , Rfl:      Alum Hydroxide-Mag Carbonate (GAVISCON PO), Take 1 tablet by mouth every 4 hours as needed (indigestion) (Patient not taking: Reported on 3/15/2023), Disp: , Rfl:      ammonium lactate (AMLACTIN) 12 % external cream, Apply topically 2 times daily To the hands and feet, Disp: 385 g, Rfl: 11     aspirin (ASA) 81 MG EC tablet, Take 1 tablet (81 mg) by mouth daily, Disp: 90 tablet, Rfl: 3     benzoyl peroxide 5 % LOTN lotion, Apply topically 2 times daily, Disp: , Rfl:      Ca Carbonate-Mag Hydroxide (ROLAIDS PO), , Disp: , Rfl:      clopidogrel (PLAVIX) 75 MG tablet, Take 1 tablet (75 mg) by mouth daily, Disp: 90 tablet, Rfl: 3     emollient  (VANICREAM) external cream, Apply from head to toe daily after the showers., Disp: 453 g, Rfl: 11     FIBER- MG tablet, Take 1 tablet by mouth daily, Disp: , Rfl:      furosemide (LASIX) 20 MG tablet, Take 20 mg by mouth daily, Disp: , Rfl:      GABAPENTIN PO, Take 100 mg by mouth 3 times daily, Disp: , Rfl:      lactase (LACTAID) 9000 units TABS tablet, Take 9,000 Units by mouth as needed, Disp: , Rfl:      LevETIRAcetam (KEPPRA PO), Take 500 mg by mouth 2 times daily, Disp: , Rfl:      levothyroxine (SYNTHROID/LEVOTHROID) 25 MCG tablet, Take 1 tablet (25 mcg) by mouth daily, Disp: 45 tablet, Rfl: 0     lisinopril (ZESTRIL) 10 MG tablet, Take 1 tablet (10 mg) by mouth daily, Disp: 90 tablet, Rfl: 3     LISINOPRIL PO, Take 5 mg by mouth daily (Patient not taking: Reported on 3/15/2023), Disp: , Rfl:      loperamide (IMODIUM) 2 MG capsule, Take 2 mg by mouth 4 times daily as needed for diarrhea, Disp: , Rfl:      LORAZEPAM PO, Take 0.5 mg by mouth 3 times daily as needed for anxiety, Disp: , Rfl:      melatonin 3 MG CAPS, , Disp: , Rfl:      metoprolol tartrate (LOPRESSOR) 25 MG tablet, Take 75 mg by mouth 2 times daily, Disp: , Rfl:      metroNIDAZOLE (METROGEL) 0.75 % external gel, metronidazole 0.75 % topical gel, Disp: , Rfl:      metroNIDAZOLE (METROGEL) 0.75 % external gel, Apply topically 2 times daily To the cheeks and nose for rosacea, as needed., Disp: 45 g, Rfl: 11     mirtazapine (REMERON) 30 MG tablet, Take 30 mg by mouth At Bedtime, Disp: , Rfl:      MIRTAZAPINE PO, Take 30 mg by mouth At Bedtime, Disp: , Rfl:      nitroGLYcerin (NITROSTAT) 0.4 MG sublingual tablet, For chest pain place 1 tablet under the tongue every 5 minutes for 3 doses. If symptoms persist 5 minutes after 1st dose call 911., Disp: 25 tablet, Rfl: 0     NYSTATIN PO, Take by mouth as needed, Disp: , Rfl:      OMEPRAZOLE PO, Take 20 mg by mouth every other day, Disp: , Rfl:      ondansetron (ZOFRAN-ODT) 4 MG ODT tab, TAKE ONE  TABLET BY MOUTH TWICE A DAY AS NEEDED FOR NAUSEA & VOMITING, Disp: , Rfl:      potassium chloride (KLOR-CON) 20 MEQ Packet, Take 20 mEq by mouth 2 times daily, Disp: , Rfl:      potassium chloride ER (KLOR-CON M) 20 MEQ CR tablet, Take 1 tablet by mouth daily at 2 pm, Disp: , Rfl:      psyllium (METAMUCIL) 58.6 % POWD, Take 1 teaspoonful by mouth daily Natural Fiber Pow Therapy (Patient not taking: Reported on 3/15/2023), Disp: , Rfl:      rosuvastatin (CRESTOR) 20 MG tablet, Take 0.5 tablets (10 mg) by mouth daily, Disp: 90 tablet, Rfl: 3     sertraline (ZOLOFT) 50 MG tablet, Take 50 mg by mouth daily, Disp: , Rfl:      SM PAIN RELIEVER EX  MG tablet, Take 500 mg by mouth as needed, Disp: , Rfl:      traZODone (DESYREL) 50 MG tablet, Take 75 mg by mouth nightly as needed for sleep  (Patient not taking: Reported on 3/15/2023), Disp: , Rfl:      triamcinolone (ARISTOCORT HP) 0.5 % external cream, Apply topically 2 times daily as needed (elbows and hands for rashes of psoriasis until clear). Follow up for annual visit., Disp: 80 g, Rfl: 3     trolamine salicylate (ASPERCREME) 10 % cream, Apply topically as needed for moderate pain To affected areas, Disp: , Rfl:      vitamin D3 (CHOLECALCIFEROL) 1.25 MG (15104 UT) capsule, Take 1 capsule (50,000 Units) by mouth every 7 days, Disp: 12 capsule, Rfl: 0    SOCIAL HABITS:    History   Smoking Status     Never   Smokeless Tobacco     Never     Social History    Substance and Sexual Activity      Alcohol use: Not Currently        Alcohol/week: 1.7 standard drinks of alcohol        Types: 2 Standard drinks or equivalent per week      History   Drug Use Not on file       FAMILY HISTORY:    Family History   Problem Relation Age of Onset     Other - See Comments Father         alpha 1 antitrypsin deficiency      Cancer - colorectal Mother      Diabetes Sister        PHYSICAL EXAMINATION:  General:  AO3, NAD  HEENT:  Unremarkable  Chest:  Normal respiratory effort  CV:   Normal pulse  Right lower extremity: Clusters of spider vein the medial and lateral thigh, medial and lateral lower leg.  Visible varicosity present anteromedial lower leg  Left lower extremity: Clusters of spider veins left lower leg.  Hyperpigmentation of the mid to distal lower leg, nonpitting lower extremity edema      IMAGING:    Venous competency ultrasound dated 23 images reviewed personally  Results for orders placed or performed in visit on 23   US Venous Competency Bilateral    Narrative    Name:  Amy Bryan                                          Patient   ID: [id number]  Date: May 16, 2023                                                    :   1955  Sex: female    Impression        Examined by: MARY Aguilar RVT  Age:  67 year old                                                           Reading MD: Felton     INDICATION:  Patient is referred for bilateral lower extremity edema.     EXAM TYPE  BILATERAL LOWER EXTREMITY VENOUS DUPLEX FOR VENOUS INSUFFICIENCY  TECHNICAL SUMMARY     A duplex ultrasound study using color flow was performed, to evaluate the   bilateral lower extremity veins for valvular incompetence with the patient   in a steep reversed trendelenberg.      RIGHT:     The deep veins demonstrate phasic flow, compress and respond to   augmentations.  There is no reflux or DVT.     The GSV demonstrates phasic flow, compresses and responds to augmentations   from the saphenofemoral junction to the ankle with no evidence of    thrombus. The great saphenous vein measures 6.1 mm at the saphenofemoral   junction, 4.2 mm at the proximal thigh, and 2.4 mm at the knee. The GSV is   incompetent at the knee, with the reflux time of 770 milliseconds.       The AASV is competent ( 3.5 mm) draining into the saphenofemoral junction.     The Giacomini vein is competent ( 1.2 mm) communicating with the small   saphenous vein at the knee level.      The SSV demonstrates phasic flow,  compresses and responds to augmentations   from the popliteal space to the ankle.  No reflux or thrombus is seen. The   saphenopopliteal junction is competent (3.1 mm).      Perforators: there is no evidence of incompetent  veins at any   level.      Edema is noted.      Lateral thigh varicose veins are noted measuring 3.7 mm with 917   milliseconds of incompetence. They cross below the knee to the medial calf   and measure 5.0 mm with 1995 milliseconds of incompetence.      LEFT:     The deep veins demonstrate phasic flow, compress and respond to   augmentations.  There is no reflux or DVT.  The common femoral and   popliteal veins are incompetent and free of thrombus. The remaining deep   veins are competent and free of thrombus.      The GSV demonstrates phasic flow, compresses and responds to augmentations   from the saphenofemoral junction to the ankle with no evidence of reflux   or thrombus. The great saphenous vein measures 8.2 mm at the   saphenofemoral junction, 4.7 mm at the proximal thigh,  and 4.5 mm at the   knee. The GSV gives rise to a varicose branch measuring 2.8 mm off the    Proximal Calf that courses Toward the ankle with a reflux time of 1423   milliseconds.          The AASV is competent ( 4.6 mm) draining into the saphenofemoral junction.        The Giacomini vein is competent ( 4.6 mm) communicating with the small   saphenous vein at the knee level.      The SSV demonstrates phasic flow, compresses and responds to augmentations   from the popliteal space to the ankle.  No reflux or thrombus is seen. The   saphenopopliteal junction is incompetent ( 5.0 mm). The SSV is incompetent   at the SPJ with a reflux time of 1526 milliseconds.       Perforators: There is an incompetent  vein ( 3.4 mm) at 7 cm   inferior from medial knee crease that communicates with GSV to PTVS.There   is an incompetent  vein ( 5.5 mm) at 8 cm from medial mallelous   that communicates with  varicose veins to PTVS.     Edema is noted.      Lateral calf varicose veins are noted measuring 3.3 mm with 1540   milliseconds of incompetence.     FINAL SUMMARY:  1.  There is no right or left DVT present.  2.  Right GSV segmental incompetence at the knee.  3.  Right lateral thigh varicose vein incompetence (unclear source).  4.  Left common femoral and popliteal vein incmpetence.  5.  Left SSV incompetence (saphenopopliteal junction).  6.  Left incompetence perforators.       Incompetence Criteria: Greater than 500 milliseconds in superficial and    veins and greater than 1000 milliseconds in deep veins.       Amy Ya MD [facility]           ASSESSMENT:  1.  Bilateral lymphedema:  L>R.  Limited venous insufficiency by bilateral venous competency ultrasound  2.  Coronary artery disease:  S/P PCI  3.  Hypertension  4.  Developmental delay/traumatic brain injury      PLAN:   -Reviewed the results of the bilateral venous competency ultrasound demonstrating only limited venous insufficiency.  Main truncal superficial veins are competent.  Reviewed pathophysiology of lymphedema and treatment.  She has prescription grade compression stockings at home and will start wearing them daily.  Discussed a referral to lymphedema clinic and she would like to purse this treatment.  She may follow up with vein clinic on a PRN basis.       Amy Ya MD [facility]   May 22, 2023      Again, thank you for allowing me to participate in the care of your patient.        Sincerely,        Amy Ya MD

## 2023-05-22 NOTE — NURSING NOTE
Patient Reported symptoms:    Right leg   Heaviness None of the time   Achiness All of the time  Swelling Some of the time   Throbbing None of the time   Itching Most of the time   Appearance Extremely noticeable   Impact on work/activities Symptoms but full able to participate    Left Leg   Heaviness None of the time   Achiness All of the time  Swelling Some of the time   Throbbing None of the time   Itching Most of the time   Appearance Extremely noticeable   Impact on work/activities symptoms but full able to participate.

## 2023-11-05 ENCOUNTER — HEALTH MAINTENANCE LETTER (OUTPATIENT)
Age: 68
End: 2023-11-05

## 2023-12-18 ENCOUNTER — ANCILLARY PROCEDURE (OUTPATIENT)
Dept: MAMMOGRAPHY | Facility: CLINIC | Age: 68
End: 2023-12-18
Payer: MEDICARE

## 2023-12-18 DIAGNOSIS — Z12.31 VISIT FOR SCREENING MAMMOGRAM: ICD-10-CM

## 2023-12-18 PROCEDURE — 77067 SCR MAMMO BI INCL CAD: CPT | Mod: TC | Performed by: RADIOLOGY

## 2024-01-23 DIAGNOSIS — I21.3 ST ELEVATION MYOCARDIAL INFARCTION (STEMI), UNSPECIFIED ARTERY (H): Primary | ICD-10-CM

## 2024-01-23 DIAGNOSIS — I10 BENIGN ESSENTIAL HYPERTENSION: ICD-10-CM

## 2024-01-23 DIAGNOSIS — I25.10 CORONARY ARTERY DISEASE INVOLVING NATIVE CORONARY ARTERY OF NATIVE HEART WITHOUT ANGINA PECTORIS: ICD-10-CM

## 2024-01-23 DIAGNOSIS — I10 BENIGN ESSENTIAL HYPERTENSION: Primary | ICD-10-CM

## 2024-01-23 NOTE — TELEPHONE ENCOUNTER
"Received request for lisinopril refill - patient needs orders for return visit and lisinopril is marked as \"no longer using\".  Will message Team 3 to review  "

## 2024-01-30 NOTE — TELEPHONE ENCOUNTER
Received information from Charley, she is the owner of the group home where Amy lives.   [phone].   She states that [facility] provider (PCP for the group home) has just had labs checked recently, and we should call them to get the results.    Writer spoke with Charley.  She states that she requested yesterday that [facility] group fax us the lab results.  Confirmed that she gave them correct fax number.    Will watch for lab results fax.    Yas Camarillo RN on 1/30/2024 at 8:02 AM

## 2024-02-02 RX ORDER — LISINOPRIL 10 MG/1
5 TABLET ORAL DAILY
Qty: 45 TABLET | Refills: 3 | Status: SHIPPED | OUTPATIENT
Start: 2024-02-02

## 2024-02-02 NOTE — TELEPHONE ENCOUNTER
Received lab results from [facility].    K 4.1,   creat 0.79   GFR greater than 60    Refill for Lisinopril sent to [facility] per request of family.    Yas Camarillo RN on 2/2/2024 at 8:16 AM

## 2024-06-12 DIAGNOSIS — L85.3 XEROSIS CUTIS: ICD-10-CM

## 2024-06-13 NOTE — TELEPHONE ENCOUNTER
Vanicream  Last Written Prescription Date:  2/28/23  Last Fill Quantity: 453 g,  # refills: 11   Last office visit: 2/28/2023 ; last virtual visit: Visit date not found with prescribing provider:  Reid   Future Office Visit: none    Routing refill request to provider for review/approval because:  Drug not on the [facility] refill protocol     Bri VARGAS RN  [facility] [place]  [phone]

## 2024-06-14 RX ORDER — EMOLLIENT BASE
CREAM (GRAM) TOPICAL
Qty: 453 G | Refills: 11 | Status: SHIPPED | OUTPATIENT
Start: 2024-06-14

## 2024-06-19 ENCOUNTER — APPOINTMENT (OUTPATIENT)
Dept: CT IMAGING | Facility: CLINIC | Age: 69
End: 2024-06-19
Attending: EMERGENCY MEDICINE
Payer: MEDICARE

## 2024-06-19 ENCOUNTER — APPOINTMENT (OUTPATIENT)
Dept: GENERAL RADIOLOGY | Facility: CLINIC | Age: 69
End: 2024-06-19
Attending: EMERGENCY MEDICINE
Payer: MEDICARE

## 2024-06-19 ENCOUNTER — HOSPITAL ENCOUNTER (EMERGENCY)
Facility: CLINIC | Age: 69
Discharge: HOME | End: 2024-06-19
Attending: EMERGENCY MEDICINE | Admitting: EMERGENCY MEDICINE
Payer: MEDICARE

## 2024-06-19 VITALS
SYSTOLIC BLOOD PRESSURE: 153 MMHG | OXYGEN SATURATION: 93 % | HEART RATE: 72 BPM | DIASTOLIC BLOOD PRESSURE: 86 MMHG | RESPIRATION RATE: 22 BRPM | TEMPERATURE: 97.7 F
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
## 2024-06-19 DIAGNOSIS — M79.622 PAIN OF LEFT UPPER ARM: ICD-10-CM

## 2024-06-19 PROCEDURE — G1010 CDSM STANSON: HCPCS

## 2024-06-19 PROCEDURE — 70450 CT HEAD/BRAIN W/O DYE: CPT | Mod: ME

## 2024-06-19 PROCEDURE — 250N000013 HC RX MED GY IP 250 OP 250 PS 637: Performed by: EMERGENCY MEDICINE

## 2024-06-19 PROCEDURE — 73060 X-RAY EXAM OF HUMERUS: CPT | Mod: LT

## 2024-06-19 PROCEDURE — 73030 X-RAY EXAM OF SHOULDER: CPT | Mod: LT

## 2024-06-19 PROCEDURE — 99285 EMERGENCY DEPT VISIT HI MDM: CPT | Mod: 25

## 2024-06-19 RX ORDER — ACETAMINOPHEN 325 MG/1
975 TABLET ORAL ONCE
Status: COMPLETED | OUTPATIENT
Start: 2024-06-19 | End: 2024-06-19

## 2024-06-19 RX ADMIN — ACETAMINOPHEN 975 MG: 325 TABLET, FILM COATED ORAL at 14:14

## 2024-06-19 ASSESSMENT — ACTIVITIES OF DAILY LIVING (ADL)
ADLS_ACUITY_SCORE: 38
ADLS_ACUITY_SCORE: 38

## 2024-06-19 ASSESSMENT — COLUMBIA-SUICIDE SEVERITY RATING SCALE - C-SSRS
6. HAVE YOU EVER DONE ANYTHING, STARTED TO DO ANYTHING, OR PREPARED TO DO ANYTHING TO END YOUR LIFE?: NO
1. IN THE PAST MONTH, HAVE YOU WISHED YOU WERE DEAD OR WISHED YOU COULD GO TO SLEEP AND NOT WAKE UP?: NO
2. HAVE YOU ACTUALLY HAD ANY THOUGHTS OF KILLING YOURSELF IN THE PAST MONTH?: NO

## 2024-06-19 NOTE — ED PROVIDER NOTES
Emergency Department Note      History of Present Illness     Chief Complaint  Fall    HPI  Amy Bryan is a 68 year old female with history of TBI, CAD, hypertension, DVT, and STEMI anticoagulated on Plavix who presents from her group home via EMS after a witnessed mechanical fall. Patient reports that earlier today when she went to open the patio door, she lost her balance and fell, landing on her left-side. She denies any chest pain, shortness of breath, or syncope prior to the fall. Patient thinks that she hit her head. Her pain is isolated to her left upper arm and it is neurovascularly in-tact. No elbow, wrist, or right arm pain. Patient denies pain in her neck, legs, or abdomen. She has not attempted to ambulate since the fall.     Independent Historian  None    Review of External Notes  None  Past Medical History   Medical History and Problem List  Coma  CAD   Developmental delay  DVT   Hypertension  STEMI  Syncope   TBI  Depression  CONNOR   Subarachnoid hemorrhage     Medications  Aspirin (ASA) 81 MG   Clopidogrel   Furosemide   Gabapentin  Levetiracetam  Levothyroxine   Lisinopril   Loperamide  Lorazepam   Metoprolol tartrate   Mirtazapine   Nystatin   Omeprazole   Potassium chloride   Rosuvastatin  Sertraline   Trazodone     Surgical History   Coronary lithotripsy   Percutaneous stent placement   Left capsulotomy   Right capsulotomy  Physical Exam   Patient Vitals for the past 24 hrs:   BP Temp Temp src Pulse Resp SpO2   06/19/24 1339 (!) 156/78 97.7  F (36.5  C) Oral 64 22 91 %     Physical Exam  General: Laying on the ED bed  HEENT: Normocephalic, atraumatic  Cardiac: Radial pulses 2+, regular rate and rhythm  Pulm: Breathing comfortably, no accessory muscle usage, no conversational dyspnea, and lungs clear bilaterally  GI: Abdomen soft, nontender, no rigidity or guarding  MSK: C nontender to palpation, no step-offs.  Pelvis stable.  Tender to palpation of the left upper arm without bony  deformity.  Otherwise, extremities x4 without bony deformity, no instability, tenderness to palpation, or painful range of motion.  Skin: Warm and dry  Neuro: Awake and alert, answers questions appropriately in context  Psych: Pleasant mood and affect  Diagnostics   Lab Results   Labs Ordered and Resulted from Time of ED Arrival to Time of ED Departure - No data to display    Imaging  Humerus XR,  G/E 2 views, left   Preliminary Result   IMPRESSION: Old healed proximal humerus fracture. No evidence of an   acute or subacute fracture. No subluxation or dislocation.      XR Shoulder Left G/E 3 Views   Preliminary Result   IMPRESSION: Old healed proximal humerus fracture. Diffuse bone   demineralization. No subluxation or dislocation. No evidence of acute   pathology.      CT Cervical Spine w/o Contrast   Final Result   IMPRESSION: No acute fracture identified.      THEA PERLA MD            SYSTEM ID:  [id number]      CT Head w/o Contrast   Final Result   IMPRESSION:    No acute intracranial abnormality.      THEA PERLA MD            SYSTEM ID:  [id number]        EKG   None    Independent Interpretation  CT head shows no ICH  ED Course    Medications Administered  Medications   acetaminophen (TYLENOL) tablet 975 mg (975 mg Oral $Given 6/19/24 1414)     Procedures  None     Discussion of Management  None    Social Determinants of Health adding to complexity of care  None    ED Course  ED Course as of 06/19/24 1538   Wed Jun 19, 2024   1357 I obtained history and examined the patient as noted above.    1526 I rechecked and updated the patient. She is safe for discharge back to her group home.     Medical Decision Making / Diagnosis   CMS Diagnoses: None    MIPS  None    [facility]  Amy Bryan is a 68 year old female presents with a mechanical fall as above.  No preceding symptoms to suggest a medical etiology.  The patient has left upper arm pain on exam, thankfully neurovascularly intact to the distal left upper  extremity.  She did strike her head and is on Plavix.  As such, CT of the head and cervical spine were obtained and show no acute traumatic injuries.  Left shoulder and humerus films show an old proximal humerus fracture, but no acute fracture.  The patient's pain was treated with Tylenol.  Upon reevaluation, her pain is much improved and she has good range of motion of the left shoulder without significant pain.  No further painful complaints.  Plan is discharge home.    Disposition  The patient was discharged.     ICD-10 Codes:    ICD-10-CM    1. Fall, initial encounter  W19.XXXA       2. Pain of left upper arm  M79.622              Scribe Disclosure:  Dinorah PEDERSEN, am serving as a scribe at 2:15 PM on 6/19/2024 to document services personally performed by Tai Swift MD based on my observations and the provider's statements to me.      Tai Swift MD  06/19/24 [id number]

## 2024-06-19 NOTE — ED TRIAGE NOTES
Pt BIBA from group home after having a witnessed fall this morning. Wearing slippery socks on kitchen floor. Fell onto L side, c/o L shoulder pain, no deformity. Denies hitting head, neck or back pain. No loc. On plavix. Hx of TBI

## 2024-06-19 NOTE — ED NOTES
Bed: ED13  Expected date:   Expected time:   Means of arrival:   Comments:  [age]F Fall/ Left Shoulder Pain

## 2024-12-22 ENCOUNTER — HEALTH MAINTENANCE LETTER (OUTPATIENT)
Age: 69
End: 2024-12-22

## 2025-01-21 NOTE — Clinical Note
Balloon inserted to left anterior descending. [High Risk Surgery - Intraperitoneal, Intrathoracic or Supringuinal Vascular Procedures] : High Risk Surgery - Intraperitoneal, Intrathoracic or Supringuinal Vascular Procedures - No (0) [Ischemic Heart Disease] : Ischemic Heart Disease - No (0) [Congestive Heart Failure] : Congestive Heart Failure - No (0) [Prior Cerebrovascular Accident or TIA] : Prior Cerebrovascular Accident or TIA - No (0) [Creatinine >= 2mg/dL (1 Point)] : Creatinine >= 2mg/dL - No (0) [Insulin-dependent Diabetic (1 Point)] : Insulin-dependent Diabetic - No (0) [0] : 0 , RCRI Class: I, Risk of Post-Op Cardiac Complications: 3.9%, 95% CI for Risk Estimate: 2.8% - 5.4% [Patient Optimized for Surgery] : Patient optimized for surgery [As per surgery] : as per surgery [FreeTextEntry4] : 41 y/o female presents for preoperative examination.

## 2025-04-10 ENCOUNTER — TRANSFERRED RECORDS (OUTPATIENT)
Dept: HEALTH INFORMATION MANAGEMENT | Facility: CLINIC | Age: 70
End: 2025-04-10

## 2025-04-29 ENCOUNTER — HOSPITAL ENCOUNTER (INPATIENT)
Facility: CLINIC | Age: 70
DRG: 280 | End: 2025-04-29
Attending: EMERGENCY MEDICINE | Admitting: STUDENT IN AN ORGANIZED HEALTH CARE EDUCATION/TRAINING PROGRAM
Payer: COMMERCIAL

## 2025-04-29 ENCOUNTER — APPOINTMENT (OUTPATIENT)
Dept: ULTRASOUND IMAGING | Facility: CLINIC | Age: 70
DRG: 280 | End: 2025-04-29
Attending: EMERGENCY MEDICINE
Payer: COMMERCIAL

## 2025-04-29 ENCOUNTER — APPOINTMENT (OUTPATIENT)
Dept: GENERAL RADIOLOGY | Facility: CLINIC | Age: 70
DRG: 280 | End: 2025-04-29
Attending: EMERGENCY MEDICINE
Payer: COMMERCIAL

## 2025-04-29 DIAGNOSIS — J96.01 ACUTE RESPIRATORY FAILURE WITH HYPOXIA (H): ICD-10-CM

## 2025-04-29 DIAGNOSIS — R79.89 ELEVATED TROPONIN: ICD-10-CM

## 2025-04-29 DIAGNOSIS — E87.5 HYPERKALEMIA: ICD-10-CM

## 2025-04-29 DIAGNOSIS — R29.6 MULTIPLE FALLS: ICD-10-CM

## 2025-04-29 DIAGNOSIS — Z98.890 STATUS POST CORONARY ANGIOGRAM: ICD-10-CM

## 2025-04-29 DIAGNOSIS — J90 PLEURAL EFFUSION: ICD-10-CM

## 2025-04-29 DIAGNOSIS — I21.4 NSTEMI (NON-ST ELEVATED MYOCARDIAL INFARCTION) (H): Primary | ICD-10-CM

## 2025-04-29 DIAGNOSIS — N17.9 AKI (ACUTE KIDNEY INJURY): ICD-10-CM

## 2025-04-29 LAB
ALBUMIN SERPL BCG-MCNC: 3.3 G/DL (ref 3.5–5.2)
ALP SERPL-CCNC: 335 U/L (ref 40–150)
ALT SERPL W P-5'-P-CCNC: 416 U/L (ref 0–50)
ANION GAP SERPL CALCULATED.3IONS-SCNC: 12 MMOL/L (ref 7–15)
ANION GAP SERPL CALCULATED.3IONS-SCNC: 14 MMOL/L (ref 7–15)
AST SERPL W P-5'-P-CCNC: 518 U/L (ref 0–45)
ATRIAL RATE - MUSE: 56 BPM
ATRIAL RATE - MUSE: 59 BPM
BASOPHILS # BLD AUTO: 0 10E3/UL (ref 0–0.2)
BASOPHILS NFR BLD AUTO: 0 %
BILIRUB DIRECT SERPL-MCNC: 1.03 MG/DL (ref 0–0.3)
BILIRUB SERPL-MCNC: 1.5 MG/DL
BUN SERPL-MCNC: 48.4 MG/DL (ref 8–23)
BUN SERPL-MCNC: 55.6 MG/DL (ref 8–23)
CALCIUM SERPL-MCNC: 8.3 MG/DL (ref 8.8–10.4)
CALCIUM SERPL-MCNC: 8.6 MG/DL (ref 8.8–10.4)
CHLORIDE SERPL-SCNC: 102 MMOL/L (ref 98–107)
CHLORIDE SERPL-SCNC: 103 MMOL/L (ref 98–107)
CK SERPL-CCNC: 272 U/L (ref 26–192)
CREAT SERPL-MCNC: 3.23 MG/DL (ref 0.51–0.95)
CREAT SERPL-MCNC: 3.59 MG/DL (ref 0.51–0.95)
D DIMER PPP FEU-MCNC: 4.42 UG/ML FEU (ref 0–0.5)
DIASTOLIC BLOOD PRESSURE - MUSE: NORMAL MMHG
DIASTOLIC BLOOD PRESSURE - MUSE: NORMAL MMHG
EGFRCR SERPLBLD CKD-EPI 2021: 13 ML/MIN/1.73M2
EGFRCR SERPLBLD CKD-EPI 2021: 15 ML/MIN/1.73M2
EOSINOPHIL # BLD AUTO: 0 10E3/UL (ref 0–0.7)
EOSINOPHIL NFR BLD AUTO: 0 %
ERYTHROCYTE [DISTWIDTH] IN BLOOD BY AUTOMATED COUNT: 14.2 % (ref 10–15)
ERYTHROCYTE [DISTWIDTH] IN BLOOD BY AUTOMATED COUNT: 14.3 % (ref 10–15)
GLUCOSE SERPL-MCNC: 122 MG/DL (ref 70–99)
GLUCOSE SERPL-MCNC: 126 MG/DL (ref 70–99)
HCO3 SERPL-SCNC: 25 MMOL/L (ref 22–29)
HCO3 SERPL-SCNC: 27 MMOL/L (ref 22–29)
HCT VFR BLD AUTO: 36.5 % (ref 35–47)
HCT VFR BLD AUTO: 43.1 % (ref 35–47)
HGB BLD-MCNC: 11.1 G/DL (ref 11.7–15.7)
HGB BLD-MCNC: 13.2 G/DL (ref 11.7–15.7)
IMM GRANULOCYTES # BLD: 0.1 10E3/UL
IMM GRANULOCYTES NFR BLD: 1 %
INTERPRETATION ECG - MUSE: NORMAL
INTERPRETATION ECG - MUSE: NORMAL
LACTATE SERPL-SCNC: 1.1 MMOL/L (ref 0.7–2)
LIPASE SERPL-CCNC: 14 U/L (ref 13–60)
LYMPHOCYTES # BLD AUTO: 0.9 10E3/UL (ref 0.8–5.3)
LYMPHOCYTES NFR BLD AUTO: 9 %
MCH RBC QN AUTO: 29.1 PG (ref 26.5–33)
MCH RBC QN AUTO: 29.3 PG (ref 26.5–33)
MCHC RBC AUTO-ENTMCNC: 30.4 G/DL (ref 31.5–36.5)
MCHC RBC AUTO-ENTMCNC: 30.6 G/DL (ref 31.5–36.5)
MCV RBC AUTO: 95 FL (ref 78–100)
MCV RBC AUTO: 96 FL (ref 78–100)
MONOCYTES # BLD AUTO: 0.4 10E3/UL (ref 0–1.3)
MONOCYTES NFR BLD AUTO: 4 %
NEUTROPHILS # BLD AUTO: 8.3 10E3/UL (ref 1.6–8.3)
NEUTROPHILS NFR BLD AUTO: 86 %
NRBC # BLD AUTO: 0 10E3/UL
NRBC BLD AUTO-RTO: 0 /100
NT-PROBNP SERPL-MCNC: ABNORMAL PG/ML (ref 0–900)
P AXIS - MUSE: 29 DEGREES
P AXIS - MUSE: 47 DEGREES
PLATELET # BLD AUTO: 194 10E3/UL (ref 150–450)
PLATELET # BLD AUTO: 227 10E3/UL (ref 150–450)
POTASSIUM SERPL-SCNC: 5.5 MMOL/L (ref 3.4–5.3)
POTASSIUM SERPL-SCNC: 5.7 MMOL/L (ref 3.4–5.3)
POTASSIUM SERPL-SCNC: 5.9 MMOL/L (ref 3.4–5.3)
PR INTERVAL - MUSE: 194 MS
PR INTERVAL - MUSE: 204 MS
PROT SERPL-MCNC: 5.9 G/DL (ref 6.4–8.3)
QRS DURATION - MUSE: 130 MS
QRS DURATION - MUSE: 132 MS
QT - MUSE: 546 MS
QT - MUSE: 554 MS
QTC - MUSE: 526 MS
QTC - MUSE: 548 MS
R AXIS - MUSE: -66 DEGREES
R AXIS - MUSE: 74 DEGREES
RBC # BLD AUTO: 3.79 10E6/UL (ref 3.8–5.2)
RBC # BLD AUTO: 4.53 10E6/UL (ref 3.8–5.2)
SODIUM SERPL-SCNC: 141 MMOL/L (ref 135–145)
SODIUM SERPL-SCNC: 142 MMOL/L (ref 135–145)
SYSTOLIC BLOOD PRESSURE - MUSE: NORMAL MMHG
SYSTOLIC BLOOD PRESSURE - MUSE: NORMAL MMHG
T AXIS - MUSE: -29 DEGREES
T AXIS - MUSE: 74 DEGREES
TROPONIN T SERPL HS-MCNC: 692 NG/L
TROPONIN T SERPL HS-MCNC: 814 NG/L
TROPONIN T SERPL HS-MCNC: 905 NG/L
TROPONIN T SERPL HS-MCNC: 994 NG/L
UFH PPP CHRO-ACNC: 0.44 IU/ML
VENTRICULAR RATE- MUSE: 56 BPM
VENTRICULAR RATE- MUSE: 59 BPM
WBC # BLD AUTO: 12 10E3/UL (ref 4–11)
WBC # BLD AUTO: 9.7 10E3/UL (ref 4–11)

## 2025-04-29 PROCEDURE — 258N000003 HC RX IP 258 OP 636: Performed by: EMERGENCY MEDICINE

## 2025-04-29 PROCEDURE — 99285 EMERGENCY DEPT VISIT HI MDM: CPT | Mod: 25

## 2025-04-29 PROCEDURE — 200N000001 HC R&B ICU

## 2025-04-29 PROCEDURE — 85520 HEPARIN ASSAY: CPT | Performed by: EMERGENCY MEDICINE

## 2025-04-29 PROCEDURE — 83690 ASSAY OF LIPASE: CPT | Performed by: EMERGENCY MEDICINE

## 2025-04-29 PROCEDURE — 82565 ASSAY OF CREATININE: CPT | Performed by: STUDENT IN AN ORGANIZED HEALTH CARE EDUCATION/TRAINING PROGRAM

## 2025-04-29 PROCEDURE — 84132 ASSAY OF SERUM POTASSIUM: CPT | Performed by: EMERGENCY MEDICINE

## 2025-04-29 PROCEDURE — 250N000013 HC RX MED GY IP 250 OP 250 PS 637: Performed by: STUDENT IN AN ORGANIZED HEALTH CARE EDUCATION/TRAINING PROGRAM

## 2025-04-29 PROCEDURE — 258N000003 HC RX IP 258 OP 636: Performed by: STUDENT IN AN ORGANIZED HEALTH CARE EDUCATION/TRAINING PROGRAM

## 2025-04-29 PROCEDURE — 76705 ECHO EXAM OF ABDOMEN: CPT

## 2025-04-29 PROCEDURE — 36415 COLL VENOUS BLD VENIPUNCTURE: CPT | Performed by: STUDENT IN AN ORGANIZED HEALTH CARE EDUCATION/TRAINING PROGRAM

## 2025-04-29 PROCEDURE — 93005 ELECTROCARDIOGRAM TRACING: CPT | Mod: 76

## 2025-04-29 PROCEDURE — 96365 THER/PROPH/DIAG IV INF INIT: CPT

## 2025-04-29 PROCEDURE — 85041 AUTOMATED RBC COUNT: CPT | Performed by: STUDENT IN AN ORGANIZED HEALTH CARE EDUCATION/TRAINING PROGRAM

## 2025-04-29 PROCEDURE — 84484 ASSAY OF TROPONIN QUANT: CPT | Performed by: EMERGENCY MEDICINE

## 2025-04-29 PROCEDURE — 93970 EXTREMITY STUDY: CPT

## 2025-04-29 PROCEDURE — 93005 ELECTROCARDIOGRAM TRACING: CPT

## 2025-04-29 PROCEDURE — 96366 THER/PROPH/DIAG IV INF ADDON: CPT

## 2025-04-29 PROCEDURE — 250N000013 HC RX MED GY IP 250 OP 250 PS 637: Performed by: EMERGENCY MEDICINE

## 2025-04-29 PROCEDURE — 36415 COLL VENOUS BLD VENIPUNCTURE: CPT | Performed by: EMERGENCY MEDICINE

## 2025-04-29 PROCEDURE — 96361 HYDRATE IV INFUSION ADD-ON: CPT

## 2025-04-29 PROCEDURE — 83605 ASSAY OF LACTIC ACID: CPT | Performed by: STUDENT IN AN ORGANIZED HEALTH CARE EDUCATION/TRAINING PROGRAM

## 2025-04-29 PROCEDURE — 84484 ASSAY OF TROPONIN QUANT: CPT | Performed by: STUDENT IN AN ORGANIZED HEALTH CARE EDUCATION/TRAINING PROGRAM

## 2025-04-29 PROCEDURE — 80048 BASIC METABOLIC PNL TOTAL CA: CPT | Performed by: EMERGENCY MEDICINE

## 2025-04-29 PROCEDURE — 83880 ASSAY OF NATRIURETIC PEPTIDE: CPT | Performed by: EMERGENCY MEDICINE

## 2025-04-29 PROCEDURE — 71046 X-RAY EXAM CHEST 2 VIEWS: CPT

## 2025-04-29 PROCEDURE — 85379 FIBRIN DEGRADATION QUANT: CPT | Performed by: EMERGENCY MEDICINE

## 2025-04-29 PROCEDURE — 85025 COMPLETE CBC W/AUTO DIFF WBC: CPT | Performed by: EMERGENCY MEDICINE

## 2025-04-29 PROCEDURE — 82247 BILIRUBIN TOTAL: CPT | Performed by: EMERGENCY MEDICINE

## 2025-04-29 PROCEDURE — 82550 ASSAY OF CK (CPK): CPT | Performed by: STUDENT IN AN ORGANIZED HEALTH CARE EDUCATION/TRAINING PROGRAM

## 2025-04-29 PROCEDURE — 250N000011 HC RX IP 250 OP 636: Performed by: EMERGENCY MEDICINE

## 2025-04-29 PROCEDURE — 99223 1ST HOSP IP/OBS HIGH 75: CPT | Mod: AI | Performed by: STUDENT IN AN ORGANIZED HEALTH CARE EDUCATION/TRAINING PROGRAM

## 2025-04-29 RX ORDER — CLOPIDOGREL BISULFATE 75 MG/1
75 TABLET ORAL DAILY
Status: DISCONTINUED | OUTPATIENT
Start: 2025-04-30 | End: 2025-04-29

## 2025-04-29 RX ORDER — AMOXICILLIN 250 MG
2 CAPSULE ORAL 2 TIMES DAILY PRN
Status: CANCELLED | OUTPATIENT
Start: 2025-04-29

## 2025-04-29 RX ORDER — MIRTAZAPINE 15 MG/1
30 TABLET, FILM COATED ORAL AT BEDTIME
Status: DISCONTINUED | OUTPATIENT
Start: 2025-04-29 | End: 2025-05-07 | Stop reason: HOSPADM

## 2025-04-29 RX ORDER — CALCIUM CARBONATE 500 MG/1
1000 TABLET, CHEWABLE ORAL 4 TIMES DAILY PRN
Status: CANCELLED | OUTPATIENT
Start: 2025-04-29

## 2025-04-29 RX ORDER — AMOXICILLIN 250 MG
2 CAPSULE ORAL 2 TIMES DAILY PRN
Status: DISCONTINUED | OUTPATIENT
Start: 2025-04-29 | End: 2025-05-07 | Stop reason: HOSPADM

## 2025-04-29 RX ORDER — NYSTATIN 100000 [USP'U]/G
POWDER TOPICAL 2 TIMES DAILY PRN
COMMUNITY

## 2025-04-29 RX ORDER — LIDOCAINE 40 MG/G
CREAM TOPICAL
Status: DISCONTINUED | OUTPATIENT
Start: 2025-04-29 | End: 2025-05-04

## 2025-04-29 RX ORDER — SODIUM CHLORIDE 9 MG/ML
INJECTION, SOLUTION INTRAVENOUS CONTINUOUS
Status: DISCONTINUED | OUTPATIENT
Start: 2025-04-29 | End: 2025-04-30

## 2025-04-29 RX ORDER — ASPIRIN 81 MG/1
162 TABLET, CHEWABLE ORAL ONCE
Status: COMPLETED | OUTPATIENT
Start: 2025-04-29 | End: 2025-04-29

## 2025-04-29 RX ORDER — LEVOTHYROXINE SODIUM 75 UG/1
75 TABLET ORAL
Status: DISCONTINUED | OUTPATIENT
Start: 2025-04-30 | End: 2025-05-07 | Stop reason: HOSPADM

## 2025-04-29 RX ORDER — QUETIAPINE FUMARATE 25 MG/1
25 TABLET, FILM COATED ORAL 4 TIMES DAILY
Status: DISCONTINUED | OUTPATIENT
Start: 2025-04-29 | End: 2025-05-07 | Stop reason: HOSPADM

## 2025-04-29 RX ORDER — AMOXICILLIN 250 MG
1 CAPSULE ORAL 2 TIMES DAILY PRN
Status: DISCONTINUED | OUTPATIENT
Start: 2025-04-29 | End: 2025-05-07 | Stop reason: HOSPADM

## 2025-04-29 RX ORDER — PANTOPRAZOLE SODIUM 40 MG/1
40 TABLET, DELAYED RELEASE ORAL EVERY MORNING
Status: DISCONTINUED | OUTPATIENT
Start: 2025-04-30 | End: 2025-05-07 | Stop reason: HOSPADM

## 2025-04-29 RX ORDER — LORAZEPAM 0.5 MG/1
0.5 TABLET ORAL 3 TIMES DAILY PRN
Status: DISCONTINUED | OUTPATIENT
Start: 2025-04-29 | End: 2025-05-07 | Stop reason: HOSPADM

## 2025-04-29 RX ORDER — GABAPENTIN 100 MG/1
200 CAPSULE ORAL AT BEDTIME
Status: DISCONTINUED | OUTPATIENT
Start: 2025-04-29 | End: 2025-05-07 | Stop reason: HOSPADM

## 2025-04-29 RX ORDER — HEPARIN SODIUM 10000 [USP'U]/100ML
0-5000 INJECTION, SOLUTION INTRAVENOUS CONTINUOUS
Status: DISCONTINUED | OUTPATIENT
Start: 2025-04-29 | End: 2025-04-30

## 2025-04-29 RX ORDER — SERTRALINE HYDROCHLORIDE 100 MG/1
100 TABLET, FILM COATED ORAL DAILY
COMMUNITY

## 2025-04-29 RX ORDER — ASPIRIN 81 MG/1
324 TABLET, CHEWABLE ORAL ONCE
Status: DISCONTINUED | OUTPATIENT
Start: 2025-04-29 | End: 2025-04-29

## 2025-04-29 RX ORDER — ROSUVASTATIN CALCIUM 10 MG/1
10 TABLET, COATED ORAL DAILY
Status: DISCONTINUED | OUTPATIENT
Start: 2025-04-30 | End: 2025-04-30

## 2025-04-29 RX ORDER — CALCIUM CARBONATE 500 MG/1
1000 TABLET, CHEWABLE ORAL 4 TIMES DAILY PRN
Status: DISCONTINUED | OUTPATIENT
Start: 2025-04-29 | End: 2025-05-07 | Stop reason: HOSPADM

## 2025-04-29 RX ORDER — LIDOCAINE 40 MG/G
CREAM TOPICAL
Status: CANCELLED | OUTPATIENT
Start: 2025-04-29

## 2025-04-29 RX ORDER — HEPARIN SODIUM 10000 [USP'U]/100ML
0-5000 INJECTION, SOLUTION INTRAVENOUS CONTINUOUS
Status: DISCONTINUED | OUTPATIENT
Start: 2025-04-29 | End: 2025-04-29

## 2025-04-29 RX ORDER — CHOLECALCIFEROL (VITAMIN D3) 50 MCG
1 TABLET ORAL DAILY
COMMUNITY

## 2025-04-29 RX ORDER — ASPIRIN 81 MG/1
81 TABLET, CHEWABLE ORAL DAILY
Status: DISCONTINUED | OUTPATIENT
Start: 2025-04-30 | End: 2025-05-06

## 2025-04-29 RX ORDER — SODIUM CHLORIDE 9 MG/ML
INJECTION, SOLUTION INTRAVENOUS CONTINUOUS
Status: DISCONTINUED | OUTPATIENT
Start: 2025-04-29 | End: 2025-04-29

## 2025-04-29 RX ORDER — LEVETIRACETAM 500 MG/1
500 TABLET ORAL 2 TIMES DAILY
Status: DISCONTINUED | OUTPATIENT
Start: 2025-04-29 | End: 2025-05-07 | Stop reason: HOSPADM

## 2025-04-29 RX ORDER — GUAIFENESIN AND DEXTROMETHORPHAN HYDROBROMIDE 10; 100 MG/5ML; MG/5ML
10 SYRUP ORAL EVERY 4 HOURS PRN
COMMUNITY

## 2025-04-29 RX ORDER — QUETIAPINE FUMARATE 25 MG/1
25 TABLET, FILM COATED ORAL 4 TIMES DAILY
Status: ON HOLD | COMMUNITY
End: 2025-05-07

## 2025-04-29 RX ORDER — CHOLECALCIFEROL (VITAMIN D3) 125 MCG
9000 CAPSULE ORAL
Status: DISCONTINUED | OUTPATIENT
Start: 2025-04-30 | End: 2025-05-07 | Stop reason: HOSPADM

## 2025-04-29 RX ORDER — LEVOTHYROXINE SODIUM 75 UG/1
75 TABLET ORAL
COMMUNITY

## 2025-04-29 RX ORDER — NITROGLYCERIN 0.4 MG/1
0.4 TABLET SUBLINGUAL EVERY 5 MIN PRN
Status: DISCONTINUED | OUTPATIENT
Start: 2025-04-29 | End: 2025-05-07 | Stop reason: HOSPADM

## 2025-04-29 RX ORDER — AMOXICILLIN 250 MG
1 CAPSULE ORAL 2 TIMES DAILY PRN
Status: CANCELLED | OUTPATIENT
Start: 2025-04-29

## 2025-04-29 RX ORDER — SERTRALINE HYDROCHLORIDE 100 MG/1
100 TABLET, FILM COATED ORAL DAILY
Status: DISCONTINUED | OUTPATIENT
Start: 2025-04-30 | End: 2025-04-29

## 2025-04-29 RX ADMIN — MIRTAZAPINE 30 MG: 30 TABLET, FILM COATED ORAL at 22:40

## 2025-04-29 RX ADMIN — LEVETIRACETAM 500 MG: 500 TABLET, FILM COATED ORAL at 22:40

## 2025-04-29 RX ADMIN — SODIUM CHLORIDE 500 ML: 0.9 INJECTION, SOLUTION INTRAVENOUS at 13:49

## 2025-04-29 RX ADMIN — SODIUM CHLORIDE: 0.9 INJECTION, SOLUTION INTRAVENOUS at 18:31

## 2025-04-29 RX ADMIN — QUETIAPINE 25 MG: 25 TABLET, FILM COATED ORAL at 22:40

## 2025-04-29 RX ADMIN — ASPIRIN 81 MG CHEWABLE TABLET 162 MG: 81 TABLET CHEWABLE at 16:12

## 2025-04-29 RX ADMIN — HEPARIN SODIUM 1200 UNITS/HR: 10000 INJECTION, SOLUTION INTRAVENOUS at 17:25

## 2025-04-29 RX ADMIN — GABAPENTIN 200 MG: 100 CAPSULE ORAL at 22:40

## 2025-04-29 ASSESSMENT — ACTIVITIES OF DAILY LIVING (ADL)
ADLS_ACUITY_SCORE: 46

## 2025-04-29 ASSESSMENT — COLUMBIA-SUICIDE SEVERITY RATING SCALE - C-SSRS
2. HAVE YOU ACTUALLY HAD ANY THOUGHTS OF KILLING YOURSELF IN THE PAST MONTH?: NO
6. HAVE YOU EVER DONE ANYTHING, STARTED TO DO ANYTHING, OR PREPARED TO DO ANYTHING TO END YOUR LIFE?: NO
1. IN THE PAST MONTH, HAVE YOU WISHED YOU WERE DEAD OR WISHED YOU COULD GO TO SLEEP AND NOT WAKE UP?: NO

## 2025-04-29 NOTE — ED NOTES
Bed: ED02  Expected date:   Expected time:   Means of arrival:   Comments:  [age]F Fall/Hypotensive

## 2025-04-29 NOTE — PROGRESS NOTES
[facility]  ED Nurse Handoff Report    ED Chief complaint: Fall and Hypotension      ED Diagnosis:   Final diagnoses:   None       Code Status: Full Code    Allergies:   Allergies   Allergen Reactions    Penicillins      dizziness       Patient Story:   Per EMS, patient coming from group home. Had a fall at home yesterday, seen by EMS. Stayed at home. Another fall today. Did not hit head was able to be brought to the ground. Hypotensive, pale, diaphoretic, and hypoxic in the 80's. Hx TBI and significant cardiac history. Blood glucose 126.      500 ml given en route            Focused Assessment:  Elevated K+, troponin, BNP and d-dimer, weakness and falls at Group home, cardiac hx     Treatments and/or interventions provided:   Medications   sodium chloride 0.9% BOLUS 500 mL (0 mLs Intravenous Stopped 4/29/25 1513)   aspirin (ASA) chewable tablet 162 mg (162 mg Oral $Given 4/29/25 1612)     Labs Ordered and Resulted from Time of ED Arrival to Time of ED Departure   BASIC METABOLIC PANEL - Abnormal       Result Value    Sodium 142      Potassium 5.9 (*)     Chloride 103      Carbon Dioxide (CO2) 25      Anion Gap 14      Urea Nitrogen 48.4 (*)     Creatinine 3.23 (*)     GFR Estimate 15 (*)     Calcium 8.6 (*)     Glucose 126 (*)    TROPONIN T, HIGH SENSITIVITY - Abnormal    Troponin T, High Sensitivity 814 (*)    NT PROBNP INPATIENT - Abnormal    N terminal Pro BNP Inpatient 57,315 (*)    D DIMER QUANTITATIVE - Abnormal    D-Dimer Quantitative 4.42 (*)    CBC WITH PLATELETS AND DIFFERENTIAL - Abnormal    WBC Count 9.7      RBC Count 4.53      Hemoglobin 13.2      Hematocrit 43.1      MCV 95      MCH 29.1      MCHC 30.6 (*)     RDW 14.2      Platelet Count 227      % Neutrophils 86      % Lymphocytes 9      % Monocytes 4      % Eosinophils 0      % Basophils 0      % Immature Granulocytes 1      NRBCs per 100 WBC 0      Absolute Neutrophils 8.3      Absolute Lymphocytes 0.9      Absolute Monocytes 0.4       Absolute Eosinophils 0.0      Absolute Basophils 0.0      Absolute Immature Granulocytes 0.1      Absolute NRBCs 0.0     TROPONIN T, HIGH SENSITIVITY - Abnormal    Troponin T, High Sensitivity 905 (*)    POTASSIUM - Abnormal    Potassium 5.7 (*)    HEPATIC FUNCTION PANEL   LIPASE     US Lower Extremity Venous Duplex Bilateral   Final Result   IMPRESSION:   1.  No deep venous thrombosis in the bilateral lower extremities.      US Abdomen Limited   Final Result   IMPRESSION:   1.  Cholelithiasis. Nonspecific gallbladder wall thickening could represent cholecystitis although there is a negative sonographic Damon sign.            Chest XR,  PA & LAT   Final Result   IMPRESSION: Moderate to large right pleural effusion with adjacent presumed atelectasis. Trace left pleural effusion. Interstitial pulmonary edema. Enlarged cardiac silhouette. Coronary stent. Aortic calcification. Chronic deformity of the left humerus.          Patient's response to treatments and/or interventions: Improving    To be done/followed up on inpatient unit:  pending IP orders and plan of care    Does this patient have any cognitive concerns?:  Hx TBI lives in group home, functions very well and is A&O , occasionally will answer questions incorrectly and mix up words per Monserrat her POA might also downplay pain    Activity level - Baseline/Home:  Independent  Activity Level - Current:   Unknown    Patient's Preferred language: English   Needed?: No    Isolation: None  Infection: Not Applicable  Patient tested for COVID 19 prior to admission: NO  Bariatric?: No    Vital Signs:   Vitals:    04/29/25 1515 04/29/25 1530 04/29/25 1611 04/29/25 1615   BP: 106/85 106/72 103/61 102/73   Pulse: 58 55 56 60   Resp: 28 21  26   Temp:       TempSrc:       SpO2: 96% 97%  99%   Weight:       Height:           Cardiac Rhythm:     Was the PSS-3 completed:   Yes  What interventions are required if any?               Family Comments: CINDY German at  bedside for now very kind and helpful  OBS brochure/video discussed/provided to patient/family: No              Name of person given brochure if not patient:               Relationship to patient:     For the majority of the shift this patient's behavior was Green.   Behavioral interventions performed were .    ED NURSE PHONE NUMBER: *[phone]

## 2025-04-29 NOTE — ED PROVIDER NOTES
"  Emergency Department Note      History of Present Illness     Chief Complaint   Fall and Hypotension      HPI   Amy Bryan is a 69 year old female with a history of hypertension, hyperlipidemia, hypothyroidism, vitamin D deficiency, developmental delay, coma, myocardial infarction, TBI, CAD, STEMI, and GERD who presents to the ED for evaluation of a fall and hypotension. Nurse reports via EMS that the patient comes from a group home after having a fall yesterday followed by another fall this morning, prompting today's visit. Nurse adds that EMS reported the patient being hypotensive with a systolic in the 70s-80s, blood sugar of 126, and hypoxia with saturation in the 80s during transit requiring 2 L nasal cannula oxygen. Nurse via EMS denies any loss of consciousness.  There is been no significant trauma associated with falls.  The patient notes that she has been feeling unwell since yesterday and has been in bed for the last two days due to generalized weakness, but mentions currently feeling \"normal.\" She endorses living in a group home for the last four years. She also endorses a lack of appetitie, lower extremity swelling, right-sided abdominal pain to palpation, left lower extremity pain, and weight loss. She denies any shortness of breath, chest pain, or back pain.     Independent Historian   Nurse via EMS as detailed above.    Review of External Notes   N/A    Past Medical History     Medical History and Problem List   Cardiogenic shock  Coma  CAD  Developmental delay  DVT in pregnancy  Hypertension  Menopause  Pelvic fracture  Rib fracture  Humeral fracture  STEMI  Anxiety  Depression  Gait instability  GERD  Shingles  Myocardial infarction  Hyperlipidemia  Hypothyroidism  Psoriasis  SAH  Vitamin D deficiency  TBI    Medications   Norvasc  Seroquel  Cholecalciferol  Lasix  Neurontin  Keppra PO  Synthroid/Levothroid  Zoloft  ASA 81 " "MG  Plavix  Zestril  Lopressor  Remeron  Klor-Con  Crestor    Surgical History   Colonoscopy  CV Coronary lithotripsy PCI  CV Heart catheterization with possible intervention  CV Intravascular ultrasound  CV PCI stent drug eluting  Heart cath drug eluting stent placement  Laser YAG capsulotomy, bilateral    Physical Exam     Patient Vitals for the past 24 hrs:   BP Temp Temp src Pulse Resp SpO2 Height Weight   04/29/25 1915 91/59 -- -- 61 22 94 % -- --   04/29/25 1845 (!) 87/55 -- -- 63 22 97 % -- --   04/29/25 1706 96/70 -- -- 57 27 99 % -- --   04/29/25 1645 111/61 -- -- 57 20 99 % -- --   04/29/25 1630 100/66 -- -- 56 26 99 % -- --   04/29/25 1615 102/73 -- -- 60 26 99 % -- --   04/29/25 1611 103/61 -- -- 56 -- -- -- --   04/29/25 1530 106/72 -- -- 55 21 97 % -- --   04/29/25 1515 106/85 -- -- 58 28 96 % -- --   04/29/25 1500 105/73 -- -- 56 -- 98 % -- --   04/29/25 1445 104/57 -- -- 60 -- 96 % -- --   04/29/25 1432 100/55 -- -- 58 18 97 % -- --   04/29/25 1350 -- -- -- -- -- -- 1.651 m (5' 5\") 102.1 kg (225 lb)   04/29/25 1328 -- -- -- -- -- 98 % -- --   04/29/25 1326 94/52 97.9  F (36.6  C) Temporal 59 16 (!) 83 % -- --     Physical Exam  General: Alert and cooperative with exam. Patient in mild distress. Overweight. Baseline mentation history of TBI.  Nontoxic appearance  Head:  Scalp is NC/AT  Eyes:  No scleral icterus, PERRL  ENT:  The external nose and ears are normal. The oropharynx is normal and without erythema; mucus membranes are moist. Uvula midline, no evidence of deep space infection.  Neck:  Normal range of motion without rigidity.  CV:  Regular rate and rhythm    No pathologic murmur   Resp:  Diminished lung sounds in right lung base.  Nasal cannula in place    Non-labored, no retractions or accessory muscle use  GI:  Abdomen is soft, no distension, no tenderness. No peritoneal signs  MS:  Trace lower extremity edema   Skin:  Warm and dry, No rash or lesions noted.  Neuro: Oriented x 3. No gross " motor deficits.    Diagnostics     Lab Results   Labs Ordered and Resulted from Time of ED Arrival to Time of ED Departure   BASIC METABOLIC PANEL - Abnormal       Result Value    Sodium 142      Potassium 5.9 (*)     Chloride 103      Carbon Dioxide (CO2) 25      Anion Gap 14      Urea Nitrogen 48.4 (*)     Creatinine 3.23 (*)     GFR Estimate 15 (*)     Calcium 8.6 (*)     Glucose 126 (*)    TROPONIN T, HIGH SENSITIVITY - Abnormal    Troponin T, High Sensitivity 814 (*)    NT PROBNP INPATIENT - Abnormal    N terminal Pro BNP Inpatient 57,315 (*)    D DIMER QUANTITATIVE - Abnormal    D-Dimer Quantitative 4.42 (*)    CBC WITH PLATELETS AND DIFFERENTIAL - Abnormal    WBC Count 9.7      RBC Count 4.53      Hemoglobin 13.2      Hematocrit 43.1      MCV 95      MCH 29.1      MCHC 30.6 (*)     RDW 14.2      Platelet Count 227      % Neutrophils 86      % Lymphocytes 9      % Monocytes 4      % Eosinophils 0      % Basophils 0      % Immature Granulocytes 1      NRBCs per 100 WBC 0      Absolute Neutrophils 8.3      Absolute Lymphocytes 0.9      Absolute Monocytes 0.4      Absolute Eosinophils 0.0      Absolute Basophils 0.0      Absolute Immature Granulocytes 0.1      Absolute NRBCs 0.0     TROPONIN T, HIGH SENSITIVITY - Abnormal    Troponin T, High Sensitivity 905 (*)    POTASSIUM - Abnormal    Potassium 5.7 (*)    HEPATIC FUNCTION PANEL - Abnormal    Protein Total 5.9 (*)     Albumin 3.3 (*)     Bilirubin Total 1.5 (*)     Alkaline Phosphatase 335 (*)      (*)      (*)     Bilirubin Direct 1.03 (*)    CK TOTAL - Abnormal     (*)    TROPONIN T, HIGH SENSITIVITY - Abnormal    Troponin T, High Sensitivity 994 (*)    LIPASE - Normal    Lipase 14     LACTIC ACID WHOLE BLOOD       Imaging   US Lower Extremity Venous Duplex Bilateral   Final Result   IMPRESSION:   1.  No deep venous thrombosis in the bilateral lower extremities.      US Abdomen Limited   Final Result   IMPRESSION:   1.  Cholelithiasis.  Nonspecific gallbladder wall thickening could represent cholecystitis although there is a negative sonographic Damon sign.            Chest XR,  PA & LAT   Final Result   IMPRESSION: Moderate to large right pleural effusion with adjacent presumed atelectasis. Trace left pleural effusion. Interstitial pulmonary edema. Enlarged cardiac silhouette. Coronary stent. Aortic calcification. Chronic deformity of the left humerus.          EKG #1   ECG taken at 1349, ECG read at 1352  Sinus bradycardia with sinus arrhythmia  Left axis deviation  Right bundle branch block  T wave abnormality, consider lateral ischemia  Abnormal ECG   No significant change as compared to prior, dated 8/3/21.  Rate 59 bpm. MD interval 194 ms. QRS duration 132 ms. QT/QTc 554/548 ms. P-R-T axes 29 -66 74.    EKG #2  ECG taken at 1651, ECG read at 1725  Sinus bradycardia with sinus arrhythmia  Right bundle branch block  T wave abnormality, consider lateral ischemia  Abnormal ECG   No significant change as compared to earlier today.  Rate 56 bpm. MD interval 204 ms. QRS duration 130 ms. QT/QTc 546/526 ms. P-R-T axes 47 74 -29.    Independent Interpretation   CXR: Moderate right pleural effusion without evidence of infiltrate or pneumothorax.    ED Course      Medications Administered   Medications   heparin 25,000 units in 0.45% NaCl 250 mL ANTICOAGULANT infusion (1,200 Units/hr Intravenous $New Bag 4/29/25 1725)   sodium chloride 0.9 % infusion ( Intravenous $New Bag 4/29/25 1831)   sodium chloride 0.9% BOLUS 500 mL (0 mLs Intravenous Stopped 4/29/25 1513)   aspirin (ASA) chewable tablet 162 mg (162 mg Oral $Given 4/29/25 1612)   heparin ANTICOAGULANT loading dose for  LOW INTENSITY TREATMENT* Give BEFORE starting heparin infusion (6,000 Units Intravenous $Given 4/29/25 1725)       Procedures   Procedures     Discussion of Management   Admitting Hospitalist, Dr. Gunn    ED Course   ED Course as of 04/29/25 1922   Tue Apr 29, 2025   1328 I  obtained history and examined the patient as noted above     1506 Troponin T, High Sensitivity(!!): 814   1506 D-Dimer Quantitative(!): 4.42  I rechecked the patient and explained findings. Diminished lung sounds in right lung base.   1627 Troponin T, High Sensitivity(!!): 905  I rechecked the patient and explained findings.     1651 I spoke with Dr. Gunn of the hospitalist team, who accepted the patient for admission.       Additional Documentation  None    Medical Decision Making / Diagnosis     CMS Diagnoses: None    MIPS       None    MDM   Amy Bryan is a 69 year old female presents with generalized weakness, multiple recent falls without injury, and mild hypoxia.  Patient's medical history and records were reviewed.  On evaluation patient is without complaint and oxygen saturations normalized with 2 L nasal cannula.  Labs, EKG, and imaging was obtained.  EKG demonstrates new T wave inversions anterior laterally when compared to previous though these were not significant changed on subsequent EKG.  Labs were notable for significant lab abnormalities including of significant elevated troponin (814), elevated BNP (63033), elevated D-dimer (4.42), hyperkalemia (potassium 5.9), and significant GIOVANNI (creatinine 3.23).  Patient continued to deny chest pain and was provided aspirin.  Chest x-ray does demonstrate moderate to large right pleural effusion with trace left pleural effusion.  Patient's work of breathing is normal.  She also reported right upper quadrant pain which was intermittent on exam.  LFTs are noted to be elevated above as above and right upper quadrant ultrasound demonstrates nonspecific gallbladder wall thickening; no indication for emergent surgery consultation.  Repeat troponin returned more elevated (905).  After discussion with hospitalist service she was initiated on a heparin infusion.  Unable to obtain CT PE study secondary to significant GIOVANNI though lower extremity ultrasound is  negative for DVT.  Informal bedside ultrasound without evidence of pericardial effusion and patient does not appear hypervolemic; RV is not noted to be significantly dilated.  She did receive a total 1 L normal saline during her ED course.  Potassium improved to 5.7 on recheck.  On reassessment patient continues to demonstrate hemodynamic stability and is without complaint.  Patient requires admission for NSTEMI and further cardiac workup including formal echocardiogram.  Stable at time of admission.  Admitted to hospitalist service.    Disposition   The patient was admitted to the hospital.     Diagnosis     ICD-10-CM    1. NSTEMI (non-ST elevated myocardial infarction) (H)  I21.4       2. Multiple falls  R29.6       3. Elevated troponin  R79.89       4. GIOVANNI (acute kidney injury)  N17.9       5. Hyperkalemia  E87.5       6. Pleural effusion  J90       7. Acute respiratory failure with hypoxia (H)  J96.01              Scribe Disclosure:  I, Gentry Olivas, am serving as a scribe at 2:22 PM on 4/29/2025 to document services personally performed by Wale Latham DO based on my observations and the provider's statements to me.        Wale Latham DO  04/29/25 1931

## 2025-04-29 NOTE — PHARMACY-ADMISSION MEDICATION HISTORY
Pharmacist Admission Medication History    Admission medication history is complete. The information provided in this note is only as accurate as the sources available at the time of the update.    Information Source(s): Caregiver, Facility (TCU/NH/) medication list/MAR, and CareEverywhere/SureScripts via phone    Pertinent Information: Confirmed last doses with facility ([facility] - [phone]) over the phone.     Changes made to PTA medication list:  Added: quetiapine, Robitussin DM  Deleted: trazodone PRN, triamcinolone, Metamucil, potassium chloride BID, benzoyl peroxide, ammonium lactate, Gaviscon  Changed:   Tylenol 100mg BID to 100mg BID PRN  Fiberlax daily to PRN  Gabapentin 100mg TID to 100mg BID plus 200mg at bedtime   Lactase PRN to TID w/ meals  Levothyroxine 25mcg to 75mcg daily  Nystatin oral to powder PRN  Omeprazole every other day to daily  Sertraline 50mg to 150mg daily  Vitamin d3 50,000IU weekly to 2000IU daily    Allergies reviewed with patient and updates made in EHR: no    Medication History Completed By: Rachel Hoover RPH 4/29/2025 5:47 PM    PTA Med List   Medication Sig Last Dose/Taking    acetaminophen (TYLENOL) 500 MG tablet Take 1,000 mg by mouth 2 times daily as needed. Taking As Needed    aspirin (ASA) 81 MG EC tablet Take 1 tablet (81 mg) by mouth daily 4/29/2025 at  8:00 AM    clopidogrel (PLAVIX) 75 MG tablet Take 1 tablet (75 mg) by mouth daily 4/29/2025 at  8:00 AM    Dextromethorphan-guaiFENesin  MG/5ML syrup Take 10 mLs by mouth every 4 hours as needed for cough. Taking As Needed    Dihydroxyaluminum Sod Carb (ROLAIDS PO) Take 2 tablets by mouth 4 times daily as needed. Taking As Needed    emollient (VANICREAM) external cream APPLY FROM HEAD TO TOE DAILY AFTER SHOWERS *CALL TO REORDER* Taking    FIBER- MG tablet Take 1 tablet by mouth daily as needed. Taking As Needed    furosemide (LASIX) 20 MG tablet Take 20 mg by mouth daily 4/29/2025 at  8:00 AM     gabapentin (NEURONTIN) 100 MG tablet Take 200 mg by mouth at bedtime. 4/28/2025 at  8:00 PM    gabapentin (NEURONTIN) 100 MG tablet Take 100 mg by mouth 2 times daily. Takes at 0800 and 1200 4/29/2025 at 12:00 PM    lactase (LACTAID) 9000 units TABS tablet Take 9,000 Units by mouth 3 times daily (with meals). 4/29/2025 at 12:00 PM    levETIRAcetam (KEPPRA) 500 MG tablet Take 500 mg by mouth 2 times daily. 4/29/2025 at  8:00 AM    levothyroxine (SYNTHROID/LEVOTHROID) 75 MCG tablet Take 75 mcg by mouth every morning (before breakfast). 4/29/2025 at  8:00 AM    lisinopril (ZESTRIL) 10 MG tablet Take 0.5 tablets (5 mg) by mouth daily 4/28/2025 at  8:00 PM    loperamide (IMODIUM) 2 MG capsule Take 2 mg by mouth 4 times daily as needed for diarrhea Taking As Needed    LORazepam (ATIVAN) 0.5 MG tablet Take 0.5 mg by mouth 3 times daily as needed for anxiety. Taking As Needed    melatonin 3 MG tablet Take 1 tablet by mouth at bedtime. 4/28/2025 Bedtime    metoprolol tartrate (LOPRESSOR) 25 MG tablet Take 75 mg by mouth 2 times daily 4/29/2025 at  8:00 AM    metroNIDAZOLE (METROGEL) 0.75 % external gel Apply topically 2 times daily as needed. Taking As Needed    mirtazapine (REMERON) 30 MG tablet Take 30 mg by mouth At Bedtime 4/28/2025 at  8:00 PM    nitroGLYcerin (NITROSTAT) 0.4 MG sublingual tablet For chest pain place 1 tablet under the tongue every 5 minutes for 3 doses. If symptoms persist 5 minutes after 1st dose call 911. Taking    nystatin (MYCOSTATIN) 702938 UNIT/GM external powder Apply topically 2 times daily as needed for dry skin. Taking As Needed    omeprazole (PRILOSEC) 20 MG DR capsule Take 20 mg by mouth every morning. 4/29/2025 at  8:00 AM    ondansetron (ZOFRAN-ODT) 4 MG ODT tab TAKE ONE TABLET BY MOUTH TWICE A DAY AS NEEDED FOR NAUSEA & VOMITING Taking    potassium chloride ER (KLOR-CON M) 20 MEQ CR tablet Take 1 tablet by mouth daily. 4/29/2025 at  8:00 AM    QUEtiapine (SEROQUEL) 25 MG tablet Take 25  mg by mouth 4 times daily. At 0800, 1200, 1700, 2000 4/29/2025 at 12:00 PM    rosuvastatin (CRESTOR) 20 MG tablet Take 0.5 tablets (10 mg) by mouth daily 4/29/2025 at  8:00 AM    sertraline (ZOLOFT) 100 MG tablet Take 100 mg by mouth daily. Takes with 50mg tablet for a total dose of 150mg daily 4/29/2025 at  8:00 AM    sertraline (ZOLOFT) 50 MG tablet Take 50 mg by mouth daily. Takes with 100mg tablet for a total dose of 150mg daily 4/29/2025 at  8:00 AM    trolamine salicylate (ASPERCREME) 10 % cream Apply topically as needed for moderate pain To affected areas Taking As Needed    vitamin D3 (CHOLECALCIFEROL) 50 mcg (2000 units) tablet Take 1 tablet by mouth daily. 4/29/2025 at  8:00 AM

## 2025-04-29 NOTE — H&P
[facility]    History and Physical - Hospitalist Service       Date of Admission:  4/29/2025    Assessment & Plan      Amy Bryan is a 69 year old female with history of TBI in 2012, CAD s/p PCI in 2018 and 2021, suspected takotsubo cardiomyopathy now recovered, presenting for vague complaints of fatigue for the last 2 days. Within the ED, hypoxic to the 80s on room air and BP notably soft into the 90s. Lab revealed troponin of 804, Cr of 3.23, K of 5.7    NSTEMI  CAD s/p PCI in 2018 and 2021  EKG reviewed with new T wave inversions, initial troponin 814>905. No chest pain or cardiac complaints in the ED. bedside echo performed by ED provider grossly normal    - start heparin gtt  - will continue asa but hold plavix with heparin  - cardiology consult  - obtain new TTE  - follow another troponin and clinically for signs of chest pain, repeat EKG as needed    Elevated D-dimer  In conjunction with fall at home and new hypoxia there is concern for PE. Reassuringly dopplers negative for DVT. Unable to obtain CT to rule out PE given her renal function.   - initiate heparin  - V/Q study tomorrow    Elevated LFTs  - elevated LFTs not in obstructive pattern with some mild RUQ pain on exam. US with equivocal findings of acute tiffanie  - given cardiac concerns above will plan to trend LFTs in AM and monitor clinically for any signs of developing cholecystitis  - consider HIDA scan if remain concerned  - hold tylenol  - pain may also be related to R sided effusion    GIOVANNI  Cr baseline is normal 0.9  - Cr on admit 2.23 and despite her elevated BNP she looks very dry on exam  - will continue to hydrate with IVF  - check CK> returned normal  - hold lisinopril and lasix    Hyperkalemia  Trending down with fluids 5.9>5.7  - hold PTA potassium supplement  - IVF  - repeat another BMP over the night    Acute hypoxic respiratory failure  Pleural effusions  - follow echo  - follow results of v/q  - consider  "thoracentesis this admission  - despite BNP hold and diuresis until more work up    pAfib  - per chart review there is report of Afib however patient not aware and no documentation of anticoagulation  - NSR on tele  - monitor on tele    Cognitive disorder with history of TBI in 2012  - currently living in group home  - continue PTA Keppra    GERD  - resume PTA PPI            Diet:  regular, NPO midnight  DVT Prophylaxis: Heparin   Chance Catheter: Not present  Lines: None     Cardiac Monitoring: None  Code Status:  FULL    Clinically Significant Risk Factors Present on Admission        # Hyperkalemia: Highest K = 5.9 mmol/L in last 2 days, will monitor as appropriate          # Drug Induced Platelet Defect: home medication list includes an antiplatelet medication   # Hypertension: Noted on problem list           # Obesity: Estimated body mass index is 37.44 kg/m  as calculated from the following:    Height as of this encounter: 1.651 m (5' 5\").    Weight as of this encounter: 102.1 kg (225 lb).              Disposition Plan     Medically Ready for Discharge: Anticipated in 5+ Days           Dannielle Gunn DO  Hospitalist Service  [facility]  Securely message with zhiwo (more info)  Text page via [employer] Paging/Directory     ______________________________________________________________________    Chief Complaint   fatigue    History is obtained from the patient    History of Present Illness   Amy Bryan is a 69 year old female with history of TBI in 2012, CAD s/p PCI in 2018 and 2021, suspected takotsubo cardiomyopathy now recovered, presenting for vague complaints of fatigue for the last 2 days.    Patient lives in a group home and had a slow slide down her bed yesterday due to fatigue. She had ongoing fatigue today and was brought ot the ER for her symptoms    On first visit with the patient she is lying very comfortably in ER bed. Friend and POA is in the room. She reports rather " suddenly over the weekend she started feel really fatigued with nausea like she wanted to vomit. She never actually vomited but just spent the next 2 days in bed not eating or drinking. No chest pain or pressure. No real SOB but she felt winded with activity. No passing out. No fever or chills. No dysuria. She does have mild RUQ pain near her ribs. No leg swelling. No headaches and no sore throat. No other really history. She does have previous TBI and seems to be a bit unreliable historian. She tells me during his MI in 2018 she passed out but denied chest pain at that time.      Past Medical History    Past Medical History:   Diagnosis Date    Cardiogenic shock (H) 07/09/2021    Coma (H) 05/2012    CHT after a fall    Coronary artery disease involving native coronary artery of native heart 07/09/2021    3 vessel    Developmental delay     DVT (deep vein thrombosis) in pregnancy     post trauma    Fall 05/2012    multiple fracture    Hypertension     Menopause     age 52    Pelvic fracture (H) 5-201`2    Rib fracture 05/2012    STEMI (ST elevation myocardial infarction) (H) 01/25/2018       Past Surgical History   Past Surgical History:   Procedure Laterality Date    COLONOSCOPY  01/01/2010    CV CORONARY LITHOTRIPSY PCI N/A 8/3/2021    Procedure: CV Coronary Lithotripsy PCI;  Surgeon: Kamran Singleton MD;  Location: [facility] CARDIAC CATH LAB    CV HEART CATHETERIZATION WITH POSSIBLE INTERVENTION N/A 8/3/2021    Procedure: Heart Catheterization with Possible Intervention;  Surgeon: Kamran Singleton MD;  Location:  HEART CARDIAC CATH LAB    CV INTRAVASULAR ULTRASOUND N/A 8/3/2021    Procedure: Intravascular Ultrasound;  Surgeon: Kamran Singleton MD;  Location:  HEART CARDIAC CATH LAB    CV PCI STENT DRUG ELUTING N/A 8/3/2021    Procedure: Percutaneous Coronary Intervention Stent Drug Eluting;  Surgeon: Kamran Singleton MD;  Location:  HEART CARDIAC CATH LAB    HEART CATH DRUG  ELUTING STENT PLACEMENT N/A 01/25/2018    LASER YAG CAPSULOTOMY Left 05/29/2018    Procedure: LASER YAG CAPSULOTOMY;  LEFT YAG LASER CAPSULOTOMY ;  Surgeon: Tabby Guillaume MD;  Location:  [facility]    LASER YAG CAPSULOTOMY Right 06/05/2018    Procedure: LASER YAG CAPSULOTOMY;  RIGHT EYE YAG LASER CAPSULOTOMY ;  Surgeon: Tabby Guillaume MD;  Location: [facility]       Prior to Admission Medications   Prior to Admission Medications   Prescriptions Last Dose Informant Patient Reported? Taking?   Alum Hydroxide-Mag Carbonate (GAVISCON PO)   Yes No   Sig: Take 1 tablet by mouth every 4 hours as needed (indigestion)   Patient not taking: Reported on 3/15/2023   Ca Carbonate-Mag Hydroxide (ROLAIDS PO)   Yes No   FIBER- MG tablet   Yes No   Sig: Take 1 tablet by mouth daily   GABAPENTIN PO   Yes No   Sig: Take 100 mg by mouth 3 times daily   LORAZEPAM PO   Yes No   Sig: Take 0.5 mg by mouth 3 times daily as needed for anxiety   LevETIRAcetam (KEPPRA PO)   Yes No   Sig: Take 500 mg by mouth 2 times daily   MIRTAZAPINE PO   Yes No   Sig: Take 30 mg by mouth At Bedtime   NYSTATIN PO   Yes No   Sig: Take by mouth as needed   OMEPRAZOLE PO   Yes No   Sig: Take 20 mg by mouth every other day   SM PAIN RELIEVER EX  MG tablet   Yes No   Sig: Take 500 mg by mouth as needed   acetaminophen (TYLENOL) 325 MG tablet   Yes No   Sig: Take 1,000 mg by mouth 2 times daily And at bedtime as needed for pain   ammonium lactate (AMLACTIN) 12 % external cream   No No   Sig: Apply topically 2 times daily To the hands and feet   aspirin (ASA) 81 MG EC tablet   No No   Sig: Take 1 tablet (81 mg) by mouth daily   benzoyl peroxide 5 % LOTN lotion   Yes No   Sig: Apply topically 2 times daily   clopidogrel (PLAVIX) 75 MG tablet   No No   Sig: Take 1 tablet (75 mg) by mouth daily   emollient (VANICREAM) external cream   No No   Sig: APPLY FROM HEAD TO TOE DAILY AFTER SHOWERS *CALL TO REORDER*   furosemide (LASIX) 20 MG tablet   Yes No   Sig:  Take 20 mg by mouth daily   lactase (LACTAID) 9000 units TABS tablet   Yes No   Sig: Take 9,000 Units by mouth as needed   levothyroxine (SYNTHROID/LEVOTHROID) 25 MCG tablet   No No   Sig: Take 1 tablet (25 mcg) by mouth daily   lisinopril (ZESTRIL) 10 MG tablet   No No   Sig: Take 1 tablet (10 mg) by mouth daily   lisinopril (ZESTRIL) 10 MG tablet   No No   Sig: Take 0.5 tablets (5 mg) by mouth daily   loperamide (IMODIUM) 2 MG capsule   Yes No   Sig: Take 2 mg by mouth 4 times daily as needed for diarrhea   melatonin 3 MG CAPS   Yes No   metoprolol tartrate (LOPRESSOR) 25 MG tablet   Yes No   Sig: Take 75 mg by mouth 2 times daily   metroNIDAZOLE (METROGEL) 0.75 % external gel   No No   Sig: Apply topically 2 times daily To the cheeks and nose for rosacea, as needed.   metroNIDAZOLE (METROGEL) 0.75 % external gel   Yes No   Sig: metronidazole 0.75 % topical gel   mirtazapine (REMERON) 30 MG tablet   Yes No   Sig: Take 30 mg by mouth At Bedtime   nitroGLYcerin (NITROSTAT) 0.4 MG sublingual tablet   No No   Sig: For chest pain place 1 tablet under the tongue every 5 minutes for 3 doses. If symptoms persist 5 minutes after 1st dose call 911.   ondansetron (ZOFRAN-ODT) 4 MG ODT tab   Yes No   Sig: TAKE ONE TABLET BY MOUTH TWICE A DAY AS NEEDED FOR NAUSEA & VOMITING   potassium chloride (KLOR-CON) 20 MEQ Packet   Yes No   Sig: Take 20 mEq by mouth 2 times daily   potassium chloride ER (KLOR-CON M) 20 MEQ CR tablet   Yes No   Sig: Take 1 tablet by mouth daily at 2 pm   psyllium (METAMUCIL) 58.6 % POWD   Yes No   Sig: Take 1 teaspoonful by mouth daily Natural Fiber Pow Therapy   Patient not taking: Reported on 3/15/2023   rosuvastatin (CRESTOR) 20 MG tablet   No No   Sig: Take 0.5 tablets (10 mg) by mouth daily   sertraline (ZOLOFT) 50 MG tablet   Yes No   Sig: Take 50 mg by mouth daily   traZODone (DESYREL) 50 MG tablet   Yes No   Sig: Take 75 mg by mouth nightly as needed for sleep    Patient not taking: Reported on  3/15/2023   triamcinolone (ARISTOCORT HP) 0.5 % external cream   No No   Sig: Apply topically 2 times daily as needed (elbows and hands for rashes of psoriasis until clear). Follow up for annual visit.   trolamine salicylate (ASPERCREME) 10 % cream   Yes No   Sig: Apply topically as needed for moderate pain To affected areas   vitamin D3 (CHOLECALCIFEROL) 1.25 MG (83030 UT) capsule   No No   Sig: Take 1 capsule (50,000 Units) by mouth every 7 days      Facility-Administered Medications: None        Review of Systems    The 10 point Review of Systems is negative other than noted in the HPI or here.      Physical Exam   Vital Signs: Temp: 97.9  F (36.6  C) Temp src: Temporal BP: 111/61 Pulse: 57   Resp: 20 SpO2: 99 % O2 Device: Nasal cannula Oxygen Delivery: 2 LPM  Weight: 225 lbs 0 oz    Constitutional: Awake, alert, cooperative, no apparent distress.  Eyes: Conjunctiva and pupils examined and normal.  HEENT: Moist mucous membranes, normal dentition.  Respiratory: no obvious crackles decreased effort on R  Cardiovascular:  no murmur noted, no JVD  GI: soft with mild TTP in RUQ  Skin: No rashes, no cyanosis, no edema.  Musculoskeletal: No joint swelling, erythema or tenderness.  Neurologic: Cranial nerves 2-12 intact, normal strength and sensation.  Psychiatric: Alert, oriented to person, place and time, no obvious anxiety or depression.     Medical Decision Making       85 MINUTES SPENT BY ME on the date of service doing chart review, history, exam, documentation & further activities per the note.      Data     I have personally reviewed the following data over the past 24 hrs:    9.7  \   13.2   / 227     142 103 48.4 (H) /  126 (H)   5.7 (H) 25 3.23 (H) \     ALT: 416 (H) AST: 518 (HH) AP: 335 (H) TBILI: 1.5 (H)   ALB: 3.3 (L) TOT PROTEIN: 5.9 (L) LIPASE: 14     Trop: 905 (HH) BNP: 57,315 (H)     INR:  N/A PTT:  N/A   D-dimer:  4.42 (H) Fibrinogen:  N/A       Imaging results reviewed over the past 24 hrs:   Recent  Results (from the past 24 hours)   Chest XR,  PA & LAT    Narrative    EXAM: XR CHEST 2 VIEWS  LOCATION: [facility]  DATE: 4/29/2025    INDICATION: SOB, hypoxia  COMPARISON: Chest radiograph 1/26/2018      Impression    IMPRESSION: Moderate to large right pleural effusion with adjacent presumed atelectasis. Trace left pleural effusion. Interstitial pulmonary edema. Enlarged cardiac silhouette. Coronary stent. Aortic calcification. Chronic deformity of the left humerus.   US Abdomen Limited    Narrative    EXAM: US ABDOMEN LIMITED  LOCATION: [facility]  DATE: 4/29/2025    INDICATION: Right upper quadrant pain, evaluate for biliary pathology  COMPARISON: None.  TECHNIQUE: Limited abdominal ultrasound.    FINDINGS:    GALLBLADDER: Multiple gallstones in the gallbladder. Moderate gallbladder wall thickening measuring up to 1 cm. Negative sonographic Damon's sign.    BILE DUCTS: No biliary dilatation. The common duct measures 3 mm.    LIVER: Normal parenchyma with smooth contour. No focal mass. The portal vein is patent with flow in the normal direction.    RIGHT KIDNEY: No hydronephrosis.    PANCREAS: The visualized portions are normal.    No ascites.      Impression    IMPRESSION:  1.  Cholelithiasis. Nonspecific gallbladder wall thickening could represent cholecystitis although there is a negative sonographic Damon sign.       US Lower Extremity Venous Duplex Bilateral    Narrative    EXAM: US LOWER EXTREMITY VENOUS DUPLEX BILATERAL  LOCATION: [facility]  DATE: 4/29/2025    INDICATION: Lower extremity pain, elevated dimer, rule out DVT  COMPARISON: None.  TECHNIQUE: Venous Duplex ultrasound of bilateral lower extremities with and without compression, augmentation and duplex. Color flow and spectral Doppler with waveform analysis performed.    FINDINGS: Exam includes the common femoral, femoral, popliteal veins as well as segmentally  visualized deep calf veins and greater saphenous vein.     RIGHT: No deep vein thrombosis. No superficial thrombophlebitis. No popliteal cyst.    LEFT: No deep vein thrombosis. No superficial thrombophlebitis. No popliteal cyst.      Impression    IMPRESSION:  1.  No deep venous thrombosis in the bilateral lower extremities.

## 2025-04-29 NOTE — ED TRIAGE NOTES
Per EMS, patient coming from group home. Had a fall at home yesterday, seen by EMS. Stayed at home. Another fall today. Did not hit head was able to be brought to the ground. Hypotensive, pale, diaphoretic, and hypoxic in the 80's. Hx TBI and significant cardiac history. Blood glucose 126.     500 ml given en route

## 2025-04-30 ENCOUNTER — APPOINTMENT (OUTPATIENT)
Dept: CARDIOLOGY | Facility: CLINIC | Age: 70
DRG: 280 | End: 2025-04-30
Attending: STUDENT IN AN ORGANIZED HEALTH CARE EDUCATION/TRAINING PROGRAM
Payer: COMMERCIAL

## 2025-04-30 ENCOUNTER — APPOINTMENT (OUTPATIENT)
Dept: NUCLEAR MEDICINE | Facility: CLINIC | Age: 70
DRG: 280 | End: 2025-04-30
Attending: STUDENT IN AN ORGANIZED HEALTH CARE EDUCATION/TRAINING PROGRAM
Payer: COMMERCIAL

## 2025-04-30 ENCOUNTER — APPOINTMENT (OUTPATIENT)
Dept: CT IMAGING | Facility: CLINIC | Age: 70
DRG: 280 | End: 2025-04-30
Attending: INTERNAL MEDICINE
Payer: COMMERCIAL

## 2025-04-30 ENCOUNTER — APPOINTMENT (OUTPATIENT)
Dept: GENERAL RADIOLOGY | Facility: CLINIC | Age: 70
DRG: 280 | End: 2025-04-30
Attending: INTERNAL MEDICINE
Payer: COMMERCIAL

## 2025-04-30 LAB
ALBUMIN SERPL BCG-MCNC: 3.2 G/DL (ref 3.5–5.2)
ALBUMIN UR-MCNC: 50 MG/DL
ALP SERPL-CCNC: 301 U/L (ref 40–150)
ALT SERPL W P-5'-P-CCNC: 323 U/L (ref 0–50)
ANION GAP SERPL CALCULATED.3IONS-SCNC: 15 MMOL/L (ref 7–15)
APPEARANCE FLD: ABNORMAL
APPEARANCE UR: ABNORMAL
AST SERPL W P-5'-P-CCNC: 307 U/L (ref 0–45)
BACTERIA #/AREA URNS HPF: ABNORMAL /HPF
BASE EXCESS BLDV CALC-SCNC: -2 MMOL/L (ref -3–3)
BASE EXCESS BLDV CALC-SCNC: -3 MMOL/L (ref -3–3)
BASOPHILS # BLD AUTO: 0 10E3/UL (ref 0–0.2)
BASOPHILS NFR BLD AUTO: 0 %
BI-PLANE LVEF ECHO: NORMAL
BILIRUB DIRECT SERPL-MCNC: 0.57 MG/DL (ref 0–0.3)
BILIRUB SERPL-MCNC: 0.8 MG/DL
BILIRUB UR QL STRIP: NEGATIVE
BUN SERPL-MCNC: 60.7 MG/DL (ref 8–23)
CALCIUM SERPL-MCNC: 8.1 MG/DL (ref 8.8–10.4)
CHLORIDE SERPL-SCNC: 103 MMOL/L (ref 98–107)
COLOR FLD: ABNORMAL
COLOR UR AUTO: YELLOW
CREAT SERPL-MCNC: 3.69 MG/DL (ref 0.51–0.95)
CREAT UR-MCNC: 91.6 MG/DL
EGFRCR SERPLBLD CKD-EPI 2021: 13 ML/MIN/1.73M2
EOSINOPHIL # BLD AUTO: 0.1 10E3/UL (ref 0–0.7)
EOSINOPHIL NFR BLD AUTO: 1 %
ERYTHROCYTE [DISTWIDTH] IN BLOOD BY AUTOMATED COUNT: 14.4 % (ref 10–15)
FRACT EXCRET NA UR+SERPL-RTO: 0.8 %
GLUCOSE BLDC GLUCOMTR-MCNC: 144 MG/DL (ref 70–99)
GLUCOSE BLDC GLUCOMTR-MCNC: 84 MG/DL (ref 70–99)
GLUCOSE BODY FLUID SOURCE: NORMAL
GLUCOSE FLD-MCNC: 97 MG/DL
GLUCOSE SERPL-MCNC: 92 MG/DL (ref 70–99)
GLUCOSE SERPL-MCNC: 96 MG/DL (ref 70–99)
GLUCOSE UR STRIP-MCNC: NEGATIVE MG/DL
HCO3 BLDV-SCNC: 25 MMOL/L (ref 21–28)
HCO3 BLDV-SCNC: 28 MMOL/L (ref 21–28)
HCO3 SERPL-SCNC: 24 MMOL/L (ref 22–29)
HCT VFR BLD AUTO: 40.8 % (ref 35–47)
HGB BLD-MCNC: 11.9 G/DL (ref 11.7–15.7)
HGB UR QL STRIP: ABNORMAL
IMM GRANULOCYTES # BLD: 0.1 10E3/UL
IMM GRANULOCYTES NFR BLD: 1 %
KETONES UR STRIP-MCNC: NEGATIVE MG/DL
LACTATE SERPL-SCNC: 1 MMOL/L (ref 0.7–2)
LD BODY BODY FLUID SOURCE: NORMAL
LDH FLD L TO P-CCNC: 127 U/L
LEUKOCYTE ESTERASE UR QL STRIP: ABNORMAL
LVEF ECHO: NORMAL
LYMPHOCYTES # BLD AUTO: 0.9 10E3/UL (ref 0.8–5.3)
LYMPHOCYTES NFR BLD AUTO: 11 %
MCH RBC QN AUTO: 29 PG (ref 26.5–33)
MCHC RBC AUTO-ENTMCNC: 29.2 G/DL (ref 31.5–36.5)
MCV RBC AUTO: 100 FL (ref 78–100)
MONOCYTES # BLD AUTO: 0.4 10E3/UL (ref 0–1.3)
MONOCYTES NFR BLD AUTO: 5 %
MUCOUS THREADS #/AREA URNS LPF: PRESENT /LPF
NEUTROPHILS # BLD AUTO: 6.8 10E3/UL (ref 1.6–8.3)
NEUTROPHILS NFR BLD AUTO: 82 %
NITRATE UR QL: NEGATIVE
NRBC # BLD AUTO: 0 10E3/UL
NRBC BLD AUTO-RTO: 0 /100
O2/TOTAL GAS SETTING VFR VENT: 2 %
O2/TOTAL GAS SETTING VFR VENT: 40 %
OXYHGB MFR BLDV: 33 % (ref 70–75)
OXYHGB MFR BLDV: 77 % (ref 70–75)
PCO2 BLDV: 59 MM HG (ref 40–50)
PCO2 BLDV: 77 MM HG (ref 40–50)
PH BLDV: 7.17 [PH] (ref 7.32–7.43)
PH BLDV: 7.24 [PH] (ref 7.32–7.43)
PH UR STRIP: 5 [PH] (ref 5–7)
PLATELET # BLD AUTO: 196 10E3/UL (ref 150–450)
PO2 BLDV: 25 MM HG (ref 25–47)
PO2 BLDV: 48 MM HG (ref 25–47)
POTASSIUM SERPL-SCNC: 5.3 MMOL/L (ref 3.4–5.3)
PROCALCITONIN SERPL IA-MCNC: 1.41 NG/ML
PROT FLD-MCNC: 3.1 G/DL
PROT SERPL-MCNC: 5.7 G/DL (ref 6.4–8.3)
PROT SERPL-MCNC: 5.7 G/DL (ref 6.4–8.3)
PROTEIN BODY FLUID SOURCE: NORMAL
RBC # BLD AUTO: 4.1 10E6/UL (ref 3.8–5.2)
RBC URINE: 2 /HPF
SAO2 % BLDV: 34 % (ref 70–75)
SAO2 % BLDV: 79.1 % (ref 70–75)
SODIUM SERPL-SCNC: 142 MMOL/L (ref 135–145)
SODIUM UR-SCNC: 29 MMOL/L
SP GR UR STRIP: 1.02 (ref 1–1.03)
SPECIMEN SOURCE FLD: ABNORMAL
TSH SERPL DL<=0.005 MIU/L-ACNC: 2.03 UIU/ML (ref 0.3–4.2)
UFH PPP CHRO-ACNC: 0.63 IU/ML
UFH PPP CHRO-ACNC: 0.74 IU/ML
UROBILINOGEN UR STRIP-MCNC: 2 MG/DL
WBC # BLD AUTO: 8.3 10E3/UL (ref 4–11)
WBC # FLD AUTO: 991 /UL
WBC URINE: 17 /HPF

## 2025-04-30 PROCEDURE — 82947 ASSAY GLUCOSE BLOOD QUANT: CPT

## 2025-04-30 PROCEDURE — 82805 BLOOD GASES W/O2 SATURATION: CPT

## 2025-04-30 PROCEDURE — 85520 HEPARIN ASSAY: CPT | Performed by: STUDENT IN AN ORGANIZED HEALTH CARE EDUCATION/TRAINING PROGRAM

## 2025-04-30 PROCEDURE — 999N000065 XR CHEST PORT 1 VIEW

## 2025-04-30 PROCEDURE — 36556 INSERT NON-TUNNEL CV CATH: CPT | Mod: GC | Performed by: INTERNAL MEDICINE

## 2025-04-30 PROCEDURE — 80076 HEPATIC FUNCTION PANEL: CPT | Performed by: STUDENT IN AN ORGANIZED HEALTH CARE EDUCATION/TRAINING PROGRAM

## 2025-04-30 PROCEDURE — 78582 LUNG VENTILAT&PERFUS IMAGING: CPT

## 2025-04-30 PROCEDURE — 94660 CPAP INITIATION&MGMT: CPT

## 2025-04-30 PROCEDURE — 89050 BODY FLUID CELL COUNT: CPT | Performed by: INTERNAL MEDICINE

## 2025-04-30 PROCEDURE — 96365 THER/PROPH/DIAG IV INF INIT: CPT

## 2025-04-30 PROCEDURE — 99291 CRITICAL CARE FIRST HOUR: CPT | Mod: 25 | Performed by: INTERNAL MEDICINE

## 2025-04-30 PROCEDURE — 250N000009 HC RX 250: Performed by: STUDENT IN AN ORGANIZED HEALTH CARE EDUCATION/TRAINING PROGRAM

## 2025-04-30 PROCEDURE — 81001 URINALYSIS AUTO W/SCOPE: CPT | Performed by: STUDENT IN AN ORGANIZED HEALTH CARE EDUCATION/TRAINING PROGRAM

## 2025-04-30 PROCEDURE — 82945 GLUCOSE OTHER FLUID: CPT | Performed by: INTERNAL MEDICINE

## 2025-04-30 PROCEDURE — 84300 ASSAY OF URINE SODIUM: CPT | Performed by: STUDENT IN AN ORGANIZED HEALTH CARE EDUCATION/TRAINING PROGRAM

## 2025-04-30 PROCEDURE — 36415 COLL VENOUS BLD VENIPUNCTURE: CPT | Performed by: STUDENT IN AN ORGANIZED HEALTH CARE EDUCATION/TRAINING PROGRAM

## 2025-04-30 PROCEDURE — 250N000011 HC RX IP 250 OP 636: Performed by: STUDENT IN AN ORGANIZED HEALTH CARE EDUCATION/TRAINING PROGRAM

## 2025-04-30 PROCEDURE — 258N000003 HC RX IP 258 OP 636: Performed by: STUDENT IN AN ORGANIZED HEALTH CARE EDUCATION/TRAINING PROGRAM

## 2025-04-30 PROCEDURE — 82565 ASSAY OF CREATININE: CPT | Performed by: STUDENT IN AN ORGANIZED HEALTH CARE EDUCATION/TRAINING PROGRAM

## 2025-04-30 PROCEDURE — 99222 1ST HOSP IP/OBS MODERATE 55: CPT | Performed by: INTERNAL MEDICINE

## 2025-04-30 PROCEDURE — 84157 ASSAY OF PROTEIN OTHER: CPT | Performed by: INTERNAL MEDICINE

## 2025-04-30 PROCEDURE — 74176 CT ABD & PELVIS W/O CONTRAST: CPT

## 2025-04-30 PROCEDURE — 99207 PR NO BILLABLE SERVICE THIS VISIT: CPT | Performed by: STUDENT IN AN ORGANIZED HEALTH CARE EDUCATION/TRAINING PROGRAM

## 2025-04-30 PROCEDURE — 84443 ASSAY THYROID STIM HORMONE: CPT | Performed by: INTERNAL MEDICINE

## 2025-04-30 PROCEDURE — 250N000009 HC RX 250

## 2025-04-30 PROCEDURE — 200N000001 HC R&B ICU

## 2025-04-30 PROCEDURE — 84155 ASSAY OF PROTEIN SERUM: CPT | Performed by: INTERNAL MEDICINE

## 2025-04-30 PROCEDURE — 255N000002 HC RX 255 OP 636: Performed by: STUDENT IN AN ORGANIZED HEALTH CARE EDUCATION/TRAINING PROGRAM

## 2025-04-30 PROCEDURE — 3E043XZ INTRODUCTION OF VASOPRESSOR INTO CENTRAL VEIN, PERCUTANEOUS APPROACH: ICD-10-PCS | Performed by: HOSPITALIST

## 2025-04-30 PROCEDURE — A9540 TC99M MAA: HCPCS | Performed by: STUDENT IN AN ORGANIZED HEALTH CARE EDUCATION/TRAINING PROGRAM

## 2025-04-30 PROCEDURE — 99292 CRITICAL CARE ADDL 30 MIN: CPT

## 2025-04-30 PROCEDURE — 96366 THER/PROPH/DIAG IV INF ADDON: CPT

## 2025-04-30 PROCEDURE — 87070 CULTURE OTHR SPECIMN AEROBIC: CPT | Performed by: INTERNAL MEDICINE

## 2025-04-30 PROCEDURE — 87040 BLOOD CULTURE FOR BACTERIA: CPT

## 2025-04-30 PROCEDURE — 87186 SC STD MICRODIL/AGAR DIL: CPT | Performed by: STUDENT IN AN ORGANIZED HEALTH CARE EDUCATION/TRAINING PROGRAM

## 2025-04-30 PROCEDURE — 258N000003 HC RX IP 258 OP 636: Performed by: INTERNAL MEDICINE

## 2025-04-30 PROCEDURE — 36415 COLL VENOUS BLD VENIPUNCTURE: CPT

## 2025-04-30 PROCEDURE — 84145 PROCALCITONIN (PCT): CPT

## 2025-04-30 PROCEDURE — 999N000157 HC STATISTIC RCP TIME EA 10 MIN

## 2025-04-30 PROCEDURE — 0W993ZZ DRAINAGE OF RIGHT PLEURAL CAVITY, PERCUTANEOUS APPROACH: ICD-10-PCS | Performed by: INTERNAL MEDICINE

## 2025-04-30 PROCEDURE — 250N000011 HC RX IP 250 OP 636: Performed by: INTERNAL MEDICINE

## 2025-04-30 PROCEDURE — 96368 THER/DIAG CONCURRENT INF: CPT

## 2025-04-30 PROCEDURE — 85025 COMPLETE CBC W/AUTO DIFF WBC: CPT | Performed by: STUDENT IN AN ORGANIZED HEALTH CARE EDUCATION/TRAINING PROGRAM

## 2025-04-30 PROCEDURE — 5A09357 ASSISTANCE WITH RESPIRATORY VENTILATION, LESS THAN 24 CONSECUTIVE HOURS, CONTINUOUS POSITIVE AIRWAY PRESSURE: ICD-10-PCS | Performed by: HOSPITALIST

## 2025-04-30 PROCEDURE — 32555 ASPIRATE PLEURA W/ IMAGING: CPT | Performed by: INTERNAL MEDICINE

## 2025-04-30 PROCEDURE — 343N000001 HC RX 343 MED OP 636: Performed by: STUDENT IN AN ORGANIZED HEALTH CARE EDUCATION/TRAINING PROGRAM

## 2025-04-30 PROCEDURE — 99291 CRITICAL CARE FIRST HOUR: CPT | Performed by: STUDENT IN AN ORGANIZED HEALTH CARE EDUCATION/TRAINING PROGRAM

## 2025-04-30 PROCEDURE — C8929 TTE W OR WO FOL WCON,DOPPLER: HCPCS

## 2025-04-30 PROCEDURE — 93306 TTE W/DOPPLER COMPLETE: CPT | Mod: 26 | Performed by: INTERNAL MEDICINE

## 2025-04-30 PROCEDURE — 83605 ASSAY OF LACTIC ACID: CPT

## 2025-04-30 PROCEDURE — A9567 TECHNETIUM TC-99M AEROSOL: HCPCS | Performed by: STUDENT IN AN ORGANIZED HEALTH CARE EDUCATION/TRAINING PROGRAM

## 2025-04-30 PROCEDURE — 83615 LACTATE (LD) (LDH) ENZYME: CPT | Performed by: INTERNAL MEDICINE

## 2025-04-30 PROCEDURE — 82805 BLOOD GASES W/O2 SATURATION: CPT | Performed by: INTERNAL MEDICINE

## 2025-04-30 PROCEDURE — 250N000013 HC RX MED GY IP 250 OP 250 PS 637: Performed by: STUDENT IN AN ORGANIZED HEALTH CARE EDUCATION/TRAINING PROGRAM

## 2025-04-30 RX ORDER — CARBOXYMETHYLCELLULOSE SODIUM 5 MG/ML
1 SOLUTION/ DROPS OPHTHALMIC
Status: DISCONTINUED | OUTPATIENT
Start: 2025-04-30 | End: 2025-05-07 | Stop reason: HOSPADM

## 2025-04-30 RX ORDER — NOREPINEPHRINE BITARTRATE 0.02 MG/ML
.01-.6 INJECTION, SOLUTION INTRAVENOUS CONTINUOUS
Status: DISCONTINUED | OUTPATIENT
Start: 2025-04-30 | End: 2025-04-30

## 2025-04-30 RX ORDER — ROPIVACAINE IN 0.9% SOD CHL/PF 0.1 %
.01-.2 PLASTIC BAG, INJECTION (ML) EPIDURAL CONTINUOUS
Status: DISCONTINUED | OUTPATIENT
Start: 2025-04-30 | End: 2025-05-01

## 2025-04-30 RX ORDER — PIPERACILLIN SODIUM, TAZOBACTAM SODIUM 2; .25 G/10ML; G/10ML
2.25 INJECTION, POWDER, LYOPHILIZED, FOR SOLUTION INTRAVENOUS EVERY 6 HOURS
Status: DISCONTINUED | OUTPATIENT
Start: 2025-04-30 | End: 2025-05-02 | Stop reason: ALTCHOICE

## 2025-04-30 RX ORDER — NICOTINE POLACRILEX 4 MG
15-30 LOZENGE BUCCAL
Status: DISCONTINUED | OUTPATIENT
Start: 2025-04-30 | End: 2025-05-07 | Stop reason: HOSPADM

## 2025-04-30 RX ORDER — HEPARIN SODIUM 10000 [USP'U]/100ML
0-5000 INJECTION, SOLUTION INTRAVENOUS CONTINUOUS
Status: DISCONTINUED | OUTPATIENT
Start: 2025-04-30 | End: 2025-05-05

## 2025-04-30 RX ORDER — CEFTRIAXONE 1 G/1
1 INJECTION, POWDER, FOR SOLUTION INTRAMUSCULAR; INTRAVENOUS EVERY 24 HOURS
Status: DISCONTINUED | OUTPATIENT
Start: 2025-04-30 | End: 2025-04-30

## 2025-04-30 RX ORDER — DEXTROSE MONOHYDRATE 25 G/50ML
25-50 INJECTION, SOLUTION INTRAVENOUS
Status: DISCONTINUED | OUTPATIENT
Start: 2025-04-30 | End: 2025-05-07 | Stop reason: HOSPADM

## 2025-04-30 RX ORDER — LIDOCAINE 40 MG/G
CREAM TOPICAL
Status: DISCONTINUED | OUTPATIENT
Start: 2025-04-30 | End: 2025-04-30

## 2025-04-30 RX ORDER — LIDOCAINE 40 MG/G
CREAM TOPICAL
Status: DISCONTINUED | OUTPATIENT
Start: 2025-04-30 | End: 2025-05-07 | Stop reason: HOSPADM

## 2025-04-30 RX ADMIN — HEPARIN SODIUM 1800 UNITS/HR: 10000 INJECTION, SOLUTION INTRAVENOUS at 05:33

## 2025-04-30 RX ADMIN — PERFLUTREN 10 ML: 6.52 INJECTION, SUSPENSION INTRAVENOUS at 10:42

## 2025-04-30 RX ADMIN — PIPERACILLIN AND TAZOBACTAM 2.25 G: 2; .25 INJECTION, POWDER, FOR SOLUTION INTRAVENOUS at 22:29

## 2025-04-30 RX ADMIN — EPINEPHRINE 0.03 MCG/KG/MIN: 1 INJECTION INTRAMUSCULAR; INTRAVENOUS; SUBCUTANEOUS at 13:18

## 2025-04-30 RX ADMIN — GABAPENTIN 200 MG: 100 CAPSULE ORAL at 22:29

## 2025-04-30 RX ADMIN — LEVETIRACETAM 500 MG: 500 TABLET, FILM COATED ORAL at 20:35

## 2025-04-30 RX ADMIN — LACTASE TAB 3000 UNIT 9000 UNITS: 3000 TAB at 20:35

## 2025-04-30 RX ADMIN — PIPERACILLIN AND TAZOBACTAM 2.25 G: 2; .25 INJECTION, POWDER, FOR SOLUTION INTRAVENOUS at 17:55

## 2025-04-30 RX ADMIN — LEVOTHYROXINE SODIUM 75 MCG: 75 TABLET ORAL at 07:52

## 2025-04-30 RX ADMIN — NOREPINEPHRINE BITARTRATE 0.03 MCG/KG/MIN: 0.02 INJECTION, SOLUTION INTRAVENOUS at 12:20

## 2025-04-30 RX ADMIN — LACTASE TAB 3000 UNIT 9000 UNITS: 3000 TAB at 07:50

## 2025-04-30 RX ADMIN — ASPIRIN 81 MG CHEWABLE TABLET 81 MG: 81 TABLET CHEWABLE at 07:48

## 2025-04-30 RX ADMIN — PIPERACILLIN AND TAZOBACTAM 2.25 G: 2; .25 INJECTION, POWDER, FOR SOLUTION INTRAVENOUS at 11:06

## 2025-04-30 RX ADMIN — KIT FOR THE PREPARATION OF TECHNETIUM TC 99M ALBUMIN AGGREGATED 6.2 MILLICURIE: 2.5 INJECTION, POWDER, FOR SOLUTION INTRAVENOUS at 08:31

## 2025-04-30 RX ADMIN — SERTRALINE HYDROCHLORIDE 150 MG: 100 TABLET ORAL at 07:51

## 2025-04-30 RX ADMIN — QUETIAPINE 25 MG: 25 TABLET, FILM COATED ORAL at 07:48

## 2025-04-30 RX ADMIN — HEPARIN SODIUM 1200 UNITS/HR: 10000 INJECTION, SOLUTION INTRAVENOUS at 17:29

## 2025-04-30 RX ADMIN — NOREPINEPHRINE BITARTRATE 0.03 MCG/KG/MIN: 0.02 INJECTION, SOLUTION INTRAVENOUS at 12:42

## 2025-04-30 RX ADMIN — SODIUM CHLORIDE: 0.9 INJECTION, SOLUTION INTRAVENOUS at 06:00

## 2025-04-30 RX ADMIN — LEVETIRACETAM 500 MG: 500 TABLET, FILM COATED ORAL at 07:48

## 2025-04-30 RX ADMIN — KIT FOR THE PREPARATION OF TECHNETIUM TC 99M PENTETATE 55 MILLICURIE: 20 INJECTION, POWDER, LYOPHILIZED, FOR SOLUTION INTRAVENOUS; RESPIRATORY (INHALATION) at 08:30

## 2025-04-30 RX ADMIN — MIRTAZAPINE 30 MG: 30 TABLET, FILM COATED ORAL at 22:29

## 2025-04-30 RX ADMIN — PANTOPRAZOLE SODIUM 40 MG: 40 TABLET, DELAYED RELEASE ORAL at 07:48

## 2025-04-30 ASSESSMENT — ACTIVITIES OF DAILY LIVING (ADL)
ADLS_ACUITY_SCORE: 65
ADLS_ACUITY_SCORE: 46
ADLS_ACUITY_SCORE: 65
ADLS_ACUITY_SCORE: 46
ADLS_ACUITY_SCORE: 65
ADLS_ACUITY_SCORE: 65

## 2025-04-30 NOTE — H&P
Critical Care Progress Note      04/30/2025    Name: Amy Bryan MRN#: [id number]   Age: 69 year old YOB: 1955     Hospital Day# 1        Code status: Full code         Problem List:   Active Problems:    Hyperkalemia    Pleural effusion    Elevated troponin    GIOVANNI (acute kidney injury)    Acute respiratory failure with hypoxia (H)    Multiple falls           Summary/Hospital Course:     Amy Bryan is a 69 year old female with history of TBI in 2012, CAD s/p PCI in 2018 and 2021, suspected takotsubo cardiomyopathy now recovered, presenting for vague complaints of fatigue for the last 2 days. Within the ED, hypoxic to the 80s on room air and BP notably soft into the 90s. Lab revealed troponin of 804, Cr of 3.23, K of 5.7. Admitted to the ICU given need for pressors for non-fluid responsive hypotension. Work up thus far showing bilateral pleural effusions, elevated troponins and EKG changes 2/2 likely stress cardiomyopathy, likely urosepsis and elevated LFTs.           Interim History:     Got 2.7L of fluid in ED, continued to be hypotensive leading to initiation of pressors, requiring minimal amount of epinephrine. Plan to place central line, arterial line and perform thoracentesis this afternoon. Stable on Bipap.       Assessment and plan :     Amy Bryan is a 69 year old female admitted on 4/29/2025 for EKG changes, elevated troponins, requiring pressor support for hemodynamic instability.   I have personally reviewed the daily labs, imaging studies, cultures and discussed the case with referring physician and consulting physicians.     My assessment and plan by system for this patient is as follows:    Neurology/Psychiatry:  # Cognitive disorder with history of TBI in 2012    # Anxiety/MDD  Currently living in group home  - Continue PTA Keppra  - PRN lorazepam      Cardiovascular:  # Stress cardiomyopathy versus NSTEMI  # CAD s/p PCI in 2018, 2021   # Hypotension  EKG reviewed  with new T wave inversions, sinus bradycardia, initial troponin 814>905. ECHO 4/30 showing EF 45-50%, increased LV filling pressures, dilated RV with decreased systolic function. In ED, got 2.7L without improvement in BP.  - Continue heparin gtt, aspirin  - Epinephrine for hypotension  - Trend troponins  - No coronary angiogram indicated at time given lack of anginal symptoms and acute renal failure  - Cardiology consulted, following      Pulmonary:  # Acute hypoxic/hypercapnic respiratory failure  # Pleural effusion, large  V/Q negative for pulmonary embolism.  - BiPap, wean as tolerated  - Plan for thoracentesis       GI and Nutrition:  # Elevated LFTs  # GERD  US showing possible acute cholecystitis, negative Damon sign. CT abdomen pelvis showing cholelithiasis with edema but no fat stranding.  - Trend LFTs, down trending  - PPI        Renal/Fluids/Electrolytes:  # GIOVANNI, likely ischemic ATN in setting of shock  # Hyperkalemia- resolved  Cr on admit 2.23, now 3.69, elevated BNP, dry mucous membranes.  - Check CK, renal panel daily  - Hold lisinopril, lasix, PTA potassium supplement  - Electrolyte replacement per ICU protocols  - Avoid nephrotoxic agents and adjust medications as needed for renal clearance  - Follow I/O  - NICOM assessment: initial CO 4.5 CI 2.2 SVI -11% indicating NOT fluid responsive, leave leads in place   - Nephrology consulted, following      Infectious Disease:  # Abnormal UA, likely UTI  # Concern for sepsis  MRSA nares negative.  - Zosyn  - Urine, blood culture pending      Endocrine:  # Hypothyroidism   - T4 pending  - PTA levothyroxine 75mcg      Hematology:  # Elevated D-dimer   - Initiate heparin  - Dopplers negative for DVT      MSKL/Rheum:  None currently.      IV/Access:   1. Venous access   2. Arterial access    3. Central access      ICU Prophylaxis:   1. DVT: Hep, mechanical  2. VAP: HOB 30 degrees, chlorhexidine rinse  3. Stress Ulcer: PPI  4. Restraints: none  5. Wound care -  per unit routine   6. Feeding - NPO, plan to resume diet soon  7. Family Update: CINDY German, at bedside  8. Disposition - likely back to group home             Key Medications:     Current Facility-Administered Medications   Medication Dose Route Frequency Provider Last Rate Last Admin    aspirin (ASA) chewable tablet 81 mg  81 mg Oral Daily Dannielle Gunn, DO   81 mg at 04/30/25 0748    gabapentin (NEURONTIN) capsule 200 mg  200 mg Oral At Bedtime Dannielle Gunn, DO   200 mg at 04/29/25 2240    lactase (LACTAID) tablet 9,000 Units  9,000 Units Oral TID w/meals Dannielle Gunn, DO   9,000 Units at 04/30/25 0750    levETIRAcetam (KEPPRA) tablet 500 mg  500 mg Oral BID Dannielle Gunn, DO   500 mg at 04/30/25 0748    levothyroxine (SYNTHROID/LEVOTHROID) tablet 75 mcg  75 mcg Oral QAM AC Dannielle Gunn, DO   75 mcg at 04/30/25 0752    [Held by provider] metoprolol tartrate (LOPRESSOR) tablet 75 mg  75 mg Oral BID Dannielle Gunn DO        mirtazapine (REMERON) tablet 30 mg  30 mg Oral At Bedtime Dannielle Gunn DO   30 mg at 04/29/25 2240    pantoprazole (PROTONIX) EC tablet 40 mg  40 mg Oral QAM Dannielle Gunn, DO   40 mg at 04/30/25 0748    piperacillin-tazobactam (ZOSYN) 2.25 g vial to attach to  ml bag  2.25 g Intravenous Q6H Dannielle Gunn, DO   2.25 g at 04/30/25 1755    [Held by provider] QUEtiapine (SEROquel) tablet 25 mg  25 mg Oral 4x Daily Dannielle Gunn DO   25 mg at 04/30/25 0748    sertraline (ZOLOFT) tablet 150 mg  150 mg Oral Daily Dannielle Gunn, DO   150 mg at 04/30/25 0751    sodium chloride (PF) 0.9% PF flush 3 mL  3 mL Intracatheter Q8H Niki Mayer, NP        sodium chloride (PF) 0.9% PF flush 3 mL  3 mL Intracatheter Q8H Dannielle Rivero E, DO         Current Facility-Administered Medications   Medication Dose Route Frequency Provider Last Rate Last Admin    Continuing beta blocker from home medication list OR beta blocker order  already placed during this visit   Does not apply DOES NOT GO TO Dannielle Sousa DO        Continuing statin from home medication list OR statin order already placed during this visit   Does not apply DOES NOT GO TO Dannielle Sousa DO        EPINEPHrine 5 mg in 250 mL NS (ADRENALIN) PERIPHERAL infusion  0.03-0.125 mcg/kg/min Intravenous Continuous Noel Nash MD 9.2 mL/hr at 04/30/25 1318 0.03 mcg/kg/min at 04/30/25 1318    heparin 25,000 units in 0.45% NaCl 250 mL ANTICOAGULANT infusion  0-5,000 Units/hr Intravenous Continuous Noel Nash MD 12 mL/hr at 04/30/25 1729 1,200 Units/hr at 04/30/25 1729    No lozenges or gum should be given while patient on BIPAP/AVAPS/AVAPS AE   Does not apply Continuous PRN Niki Montoya NP        norepinephrine (LEVOPHED) 4 mg in  mL PERIPHERAL infusion  0.01-0.2 mcg/kg/min Intravenous Continuous Dannielle Gunn DO   Stopped at 04/30/25 1318    Patient is already receiving anticoagulation with heparin, enoxaparin (LOVENOX), warfarin (COUMADIN)  or other anticoagulant medication   Does not apply Continuous PRN Dannielle Gunn DO        Patient may continue current oral medications   Does not apply Continuous PRN Niki Montoya NP        Reason ACE/ARB/ARNI order not selected   Other DOES NOT GO TO Dannielle Sousa DO                   Physical Examination:   Temp:  [96.3  F (35.7  C)-97.5  F (36.4  C)] 97  F (36.1  C)  Pulse:  [49-69] 69  Resp:  [9-59] 23  BP: ()/(36-82) 95/45  Cuff Mean (mmHg):  [73] 73  SpO2:  [94 %-100 %] 97 %    Intake/Output Summary (Last 24 hours) at 4/30/2025 1315  Last data filed at 4/30/2025 0351  Gross per 24 hour   Intake 2659.6 ml   Output --   Net 2659.6 ml     Wt Readings from Last 4 Encounters:   04/30/25 92.9 kg (204 lb 14.4 oz)   03/15/23 102.2 kg (225 lb 4.8 oz)   02/28/22 97.3 kg (214 lb 8 oz)   08/18/21 91.6 kg (202 lb)     BP - Mean:  [51-90] 62  Resp: 23  Recent Labs   Lab 04/30/25  9427  "04/30/25  0934   O2PER 40 2       GEN: no acute distress, alert and orientated x4   HEENT: head ncat, sclera anicteric, OP patent, trachea midline, dry mucous membranes   PULM: faint wheeze over right upper lung field, otherwise clear lung sounds, bipap in place    CV/COR: RRR S1S2, murmur noted   ABD: soft, nontender, no mass  EXT: warm extremities, no edema  NEURO: grossly intact  SKIN: no obvious rash  LINES: clean, dry intact         Data:   All data and imaging reviewed     ROUTINE ICU LABS (Last four results)  CMP  Recent Labs   Lab 04/30/25  1356 04/30/25  0934 04/30/25  0558 04/29/25  2239 04/29/25  1533 04/29/25  1348   NA  --   --  142 141  --  142   POTASSIUM  --   --  5.3 5.5* 5.7* 5.9*   CHLORIDE  --   --  103 102  --  103   CO2  --   --  24 27  --  25   ANIONGAP  --   --  15 12  --  14   GLC 84 92 96 122*  --  126*   BUN  --   --  60.7* 55.6*  --  48.4*   CR  --   --  3.69* 3.59*  --  3.23*   GFRESTIMATED  --   --  13* 13*  --  15*   HECTOR  --   --  8.1* 8.3*  --  8.6*   PROTTOTAL  --  5.7* 5.7*  --  5.9*  --    ALBUMIN  --   --  3.2*  --  3.3*  --    BILITOTAL  --   --  0.8  --  1.5*  --    ALKPHOS  --   --  301*  --  335*  --    AST  --   --  307*  --  518*  --    ALT  --   --  323*  --  416*  --      CBC  Recent Labs   Lab 04/30/25  0934 04/29/25  2102 04/29/25  1348   WBC 8.3 12.0* 9.7   RBC 4.10 3.79* 4.53   HGB 11.9 11.1* 13.2   HCT 40.8 36.5 43.1    96 95   MCH 29.0 29.3 29.1   MCHC 29.2* 30.4* 30.6*   RDW 14.4 14.3 14.2    194 227     INRNo lab results found in last 7 days.  Arterial Blood Gas  Recent Labs   Lab 04/30/25  1811 04/30/25  0934   O2PER 40 2       All cultures:  No results for input(s): \"CULT\" in the last 168 hours.  Recent Results (from the past 24 hours)   NM Lung Scan Ventilation and Perfusion    Narrative    EXAM: NM LUNG SCAN VENTILATION AND PERFUSION  LOCATION: [facility]  DATE: 4/30/2025    INDICATION: Shortness of breath, " hypoxic  COMPARISON: Chest x-ray 2025  TECHNIQUE: 55 mCi Tc-99m DTPA inhaled. 6.2 mCi Tc-99m MAA, IV. Standard planar imaging during perfusion and ventilation portions of exam.    FINDINGS: Decreased ventilation and perfusion within the right lower hemithorax, likely related to the known pleural effusion. No mismatched segmental perfusion defect.      Impression    IMPRESSION:    No evidence of pulmonary embolism.   Echocardiogram Complete   Result Value    LVEF  45-50%    Biplane LVEF 49%    [facility]    [id number]  [id number]  2011^LUKAS^IRWIN^JULIA     [facility]  Echocardiography Laboratory  [address] [place], [place] [address]     Name: ANGELA HENRY  MRN: [id number]  : 1955  Study Date: 2025 09:58 AM  Age: 69 yrs  Gender: Female  Patient Location: [facility]  Reason For Study: Chest Pain, Chest Tightness, Chest Pressure  Ordering Physician: IRWIN GAYTAN  Performed By: Khris Armas     BSA: 2.1 m2  Height: 65 in  Weight: 225 lb  HR: 55  BP: 104/58 mmHg  ______________________________________________________________________________  Procedure  Echocardiogram with two-dimensional, color and spectral Doppler. Definity (NDC  #[id number]) given intravenously.  ______________________________________________________________________________  Interpretation Summary     Left ventricular systolic function is mildly reduced.The visual ejection  fraction is 45-50%.Biplane LVEF is 49%.Mid lateral and mid anterior wall  hypokinesia.  Moderately reduced RV systolic function with preserved RV apex function.The  right ventricle is moderately dilated.  There is mild to moderate (1-2+) tricuspid regurgitation.  Pulmonary hypertension- RVSP 40 mm hg +RA.  IVC diameter >2.1 cm collapsing <50% with sniff suggests a high RA pressure  estimated at 15 mmHg or greater.     Echo dated 07/15/2021 normal LV and RV systolic  functions.  ______________________________________________________________________________  Left Ventricle  The left ventricle is normal in size. There is normal left ventricular wall  thickness. Diastolic Doppler findings (E/E' ratio and/or other parameters)  suggest left ventricular filling pressures are increased. Left ventricular  systolic function is mildly reduced. The visual ejection fraction is 45-50%.  Biplane LVEF is 49%. Mid lateral and mid anterior wall hypokinesia.     Right Ventricle  The right ventricle is moderately dilated. Moderately reduced RV systolic  function with preserved RV apex function.     Atria  The left atrium is mildly dilated. The right atrium is mildly dilated. There  is no color Doppler evidence of an atrial shunt.     Mitral Valve  There is mild mitral annular calcification. There is mild (1+) mitral  regurgitation.     Tricuspid Valve  There is mild to moderate (1-2+) tricuspid regurgitation. The right  ventricular systolic pressure is approximated at 39.9 mmHg plus the right  atrial pressure. Pulmonary hypertension.     Aortic Valve  The aortic valve is trileaflet. No aortic regurgitation is present. No aortic  stenosis is present.     Pulmonic Valve  There is trace pulmonic valvular regurgitation. There is no pulmonic valvular  stenosis.     Vessels  The aortic root is normal size. Normal size ascending aorta. IVC diameter >2.1  cm collapsing <50% with sniff suggests a high RA pressure estimated at 15 mmHg  or greater.     Pericardium  There is no pericardial effusion.     Rhythm  The rhythm was sinus bradycardia.  ______________________________________________________________________________  MMode/2D Measurements & Calculations     IVSd: 1.1 cm  LVIDd: 4.8 cm  LVIDs: 3.6 cm  LVPWd: 1.0 cm  LVPWs: 0.64 cm  FS: 25.0 %  LV mass(C)d: 193.7 grams  LV mass(C)dI: 93.2 grams/m2  Ao root diam: 3.4 cm  asc Aorta Diam: 3.2 cm  LVOT diam: 2.0 cm  LVOT area: 3.2 cm2  Ao root diam index  Ht(cm/m): 2.0  Ao root diam index BSA (cm/m2): 1.6  Asc Ao diam index BSA (cm/m2): 1.5  Asc Ao diam index Ht(cm/m): 1.9  EF Biplane: 48.7 %  LA Volume (BP): 76.5 ml     LA Volume Index (BP): 36.8 ml/m2  RWT: 0.43  TAPSE: 2.4 cm     Doppler Measurements & Calculations  MV E max mustapha: 103.0 cm/sec  MV A max mustapha: 72.8 cm/sec  MV E/A: 1.4  MV dec slope: 514.5 cm/sec2  MV dec time: 0.20 sec  TR max mustapha: 315.7 cm/sec  TR max P.9 mmHg  E/E' av.7  Lateral E/e': 12.8  Medial E/e': 22.6     ______________________________________________________________________________  Report approved by: Alphonse Jarquin MD on 2025 10:51 AM         CT Chest Abdomen Pelvis w/o Contrast    Narrative    EXAM: CT CHEST ABDOMEN PELVIS W/O CONTRAST  LOCATION: [facility]  DATE: 2025    INDICATION: pleural effusion, Asses for Renal abscess, hydronephrosis.  COMPARISON: Right upper quadrant ultrasound and chest radiograph 2025  TECHNIQUE: CT scan of the chest, abdomen, and pelvis was performed without IV contrast. Multiplanar reformats were obtained. Dose reduction techniques were used.   CONTRAST: None.    FINDINGS:   LUNGS AND PLEURA: Bilateral pleural effusions with associated compressive atelectasis, moderate on the right and small on the left. Likely round atelectasis in the right lower lobe. No definite airspace consolidation or pneumothorax.    MEDIASTINUM/AXILLAE: Borderline enlarged mediastinal lymph nodes with right paratracheal node measuring 1.1 cm in short axis (series 3 image 41), most likely reactive, no specific follow-up recommended. Heart is mildly enlarged. No pericardial effusion.    CORONARY ARTERY CALCIFICATION: Previous intervention (stents).    HEPATOBILIARY: Cholelithiasis with mild gallbladder wall edema, not significantly changed from recent right upper quadrant ultrasound. No significant surrounding fat stranding. No evidence of biliary obstruction.    PANCREAS: No ductal  dilation or surrounding inflammation.    SPLEEN: Normal.    ADRENAL GLANDS: Normal.    KIDNEYS/BLADDER: Left renal cyst, no specific follow-up recommended. No definite radiographic evidence of renal abscess. No hydronephrosis. Urinary bladder demonstrates a single focus of internal gas without wall thickening or surrounding inflammation,   correlate with recent catheter placement.    BOWEL: No obstruction or inflammatory change. Normal appendix.    LYMPH NODES: No suspicious lymphadenopathy. Small volume abdominopelvic free fluid.    VASCULATURE: Moderate calcified atherosclerosis. No abdominal aortic aneurysm.    PELVIC ORGANS: Unremarkable.    MUSCULOSKELETAL: No acute bony abnormality. Multiple healed pelvic fractures. Mild body wall edema.      Impression    IMPRESSION:  1.  Bilateral pleural effusions with associated atelectasis, right greater than left, as described above.  2.  Cholelithiasis with gallbladder wall edema but no significant surrounding fat stranding, similar to recent right upper quadrant ultrasound given differences in modality.  3.  No definite evidence of renal abscess.  4.  No nephroureterolithiasis or hydronephrosis.   XR Chest Port 1 View    Narrative    EXAM: XR CHEST PORT 1 VIEW  LOCATION: [facility]  DATE: 4/30/2025    INDICATION: Post right-sided thoracentesis.  COMPARISON: None.      Impression    IMPRESSION: No significant pleural effusion visible. No pneumothorax. Right IJ venous catheter. Cardiac enlargement with coronary arterial stent. Small left pleural effusion. Mild infiltrates lung bases.       Martina Walker, MS4  [employer]    [facility]  CRITICAL CARE STAFF NOTE    I am managing these acute and ongoing critical issues resulting in critical condition that impairs one or more vital organ systems, incur a high probability of imminent or life-threatening deterioration in the patient's condition and providing  "frequent personal assessment and manipulation of medications and life support equipment.     1. Cardiogenic shock.     ICU Interventions: parenteral medications necessitating continuous monitoring including vasoactive medications, medication for heart rhythm control, insulin infusion, and/or sedative/opiates  and interpretation of lab values, cardiac output, cxr, pulse oximetry, blood gases, and/or information/data stored in computers    The patient was seen and examined with the resident/fellow/EVAN and/or medical student.  We have discussed the patient in detail and I agree with the findings, assessment, and plan as documented when this note was cosigned on this day. The plan was formulated in conjunction with pharmacy, ICU nurses, and respiratory therapist. I have evaluated all laboratory values and imaging studies for the past 24 hours. I have reviewed all the consults that have been ordered and are active for this patient.      Critical Care Time: 40 min.  I spent this time (excluding procedures) personally providing and directing critical care services at the bedside and on the critical care unit.      Noel HAMMER MPH   of Medicine  Pager: [phone]    Clinically Significant Risk Factors        # Hyperkalemia: Highest K = 5.9 mmol/L in last 2 days, will monitor as appropriate    # Hypocalcemia: Lowest Ca = 8.1 mg/dL in last 2 days, will monitor and replace as appropriate     # Hypoalbuminemia: Lowest albumin = 3.2 g/dL at 4/30/2025  5:58 AM, will monitor as appropriate     # Hypertension: Noted on problem list     # Acute Hypercapnic Respiratory Failure: based on venous blood gas results.  Continue supplemental oxygen and ventilatory support as indicated.         # Obesity: Estimated body mass index is 34.1 kg/m  as calculated from the following:    Height as of this encounter: 1.651 m (5' 5\").    Weight as of this encounter: 92.9 kg (204 lb 14.4 oz)., PRESENT ON ADMISSION     # " Financial/Environmental Concerns: none

## 2025-04-30 NOTE — PROCEDURES
[facility]    Thoracentesis at Bedside    Date/Time: 4/30/2025 5:17 PM    Performed by: Noel Nash MD  Authorized by: Noel Nash MD      UNIVERSAL PROTOCOL   Site Marked: Yes  Prior Images Obtained and Reviewed:  Yes  Required items: Required blood products, implants, devices and special equipment available    Patient identity confirmed:  Provided demographic data, hospital-assigned identification number, arm band and anonymous protocol, patient vented/unresponsive  Patient was reevaluated immediately before administering moderate or deep sedation or anesthesia  Confirmation Checklist:  Patient's identity using two indicators  Time out: Immediately prior to the procedure a time out was called    Universal Protocol: the Joint Commission Universal Protocol was followed    Preparation: Patient was prepped and draped in usual sterile fashion      Procedure purpose: therapeutic and diagnostic  Indications: pleural effusion       ANESTHESIA    Anesthesia:  Local infiltration  Local Anesthetic:  Lidocaine 1% without epinephrine      SEDATION    Patient Sedated: No      PROCEDURE DETAILS   Preparation: skin prepped with ChloraPrep  Patient position: sitting  Ultrasound guidance: yes  Location: right posterior  Intercostal space: 6th  Puncture method: over-the-needle catheter  Number of attempts: 1  Drainage characteristics: serosanguinous  Chest x-ray performed: no      PROCEDURE  Describe Procedure: Thoracentesis done to manage pleural effusion.   Patient Tolerance:  Patient tolerated the procedure well with no immediate complications  Length of time physician/provider present for 1:1 monitoring during sedation: 0

## 2025-04-30 NOTE — CONSULTS
[facility]    Cardiology Consultation     Date of Admission:  4/29/2025    Assessment & Plan   Amy Bryan is a 69 year old female who was admitted on 4/29/2025.      EKG changes and elevated troponin.  Also on preliminary view echocardiogram showing mid lateral and anterior wall motion abnormality.  No chest discomfort.  Together this could represent non-ST elevation myocardial infarction versus stress cardiomyopathy, possibly later given lack of chest discomfort and wall motion abnormalities involving mid walls.  Acute renal failure with worsening creatinine, risk benefits do not favor coronary angiogram at this time discussed with patient.  Agree with continuing aspirin, heparin  Acute renal failure  History of stress cardiomyopathy in the past and CAD with history of LAD and diagonal PCI.  There is residual small/first diagonal 80% disease and small size ostial to proximal left circumflex 70% disease.  Moderately decreased RV systolic function with preserved RV apical function, elevated D-dimer, VQ scan negative for PE, could not undergo CT with contrast due to acute renal failure  Elevated liver enzymes, etiology not clear, lactic acid normal, blood pressure on the low side.  Possible sepsis, procalcitonin elevated    Recommendations  Continue aspirin, heparin  Agree with holding beta-blocker and statin  Risk benefits do not favor coronary angiogram at this time given lack of anginal symptoms and atypical wall motion mobility on echocardiogram which could be seen in atypical stress cardiomyopathy.  Non-STEMI is still a possibility but would recommend medical therapy for same with aspirin and heparin given acute renal failure and lack of anginal symptoms    45 minutes of critical care time spent.  Bedside echo images were reviewed.      Alphonse Jarquin MD, MD    Primary Care Physician   [facility]    Reason for Consult   Reason for consult: I was asked to evaluate  this patient for non-STEMI.    History of Present Illness   Amy Bryan is a 69 year old female who presents with not feeling well poor appetite poor p.o. intake weakness for past 2 days.  She was found to have acute renal failure.  Cardiology now consulted because of EKG changes and elevated troponin 900s.  Remarkably patient denies any chest discomfort.  She has history of stress cardiomyopathy and history of CAD with history of LAD and diagonal 2 PCI, she has residual small size diagonal 1 and small size circumflex disease which has been managed medically.  She was also noted to have acute renal failure with blood pressure on the borderline low side, lactic acid normal.  Procalcitonin elevated.  She denies any shortness of breath or chest discomfort.  EKG shows a precordial T wave inversions.  NT-proBNP is elevated in 50,000, high central troponin elevated at 900, downtrending..  Liver enzymes are significantly elevated.  Creatinine 3.23 and rising, baseline creatinine normal.    Past Medical History   Past Medical History:   Diagnosis Date    Cardiogenic shock (H) 07/09/2021    Coma (H) 05/2012    CHT after a fall    Coronary artery disease involving native coronary artery of native heart 07/09/2021    3 vessel    Developmental delay     DVT (deep vein thrombosis) in pregnancy     post trauma    Fall 05/2012    multiple fracture    Hypertension     Menopause     age 52    Pelvic fracture (H) 5-201`2    Rib fracture 05/2012    STEMI (ST elevation myocardial infarction) (H) 01/25/2018         Past Surgical History   Past Surgical History:   Procedure Laterality Date    COLONOSCOPY  01/01/2010    CV CORONARY LITHOTRIPSY PCI N/A 8/3/2021    Procedure: CV Coronary Lithotripsy PCI;  Surgeon: Kamran Singleton MD;  Location: [facility] CARDIAC CATH LAB    CV HEART CATHETERIZATION WITH POSSIBLE INTERVENTION N/A 8/3/2021    Procedure: Heart Catheterization with Possible Intervention;  Surgeon: Kamran Singleton  MD Willie;  Location:  HEART CARDIAC CATH LAB    CV INTRAVASULAR ULTRASOUND N/A 8/3/2021    Procedure: Intravascular Ultrasound;  Surgeon: Kamran Singleton MD;  Location:  HEART CARDIAC CATH LAB    CV PCI STENT DRUG ELUTING N/A 8/3/2021    Procedure: Percutaneous Coronary Intervention Stent Drug Eluting;  Surgeon: Kamran Singleton MD;  Location:  HEART CARDIAC CATH LAB    HEART CATH DRUG ELUTING STENT PLACEMENT N/A 01/25/2018    LASER YAG CAPSULOTOMY Left 05/29/2018    Procedure: LASER YAG CAPSULOTOMY;  LEFT YAG LASER CAPSULOTOMY ;  Surgeon: Tabby Guillaume MD;  Location:  [facility]    LASER YAG CAPSULOTOMY Right 06/05/2018    Procedure: LASER YAG CAPSULOTOMY;  RIGHT EYE YAG LASER CAPSULOTOMY ;  Surgeon: Tabby Guillaume MD;  Location: [facility]         Prior to Admission Medications   Prior to Admission Medications   Prescriptions Last Dose Informant Patient Reported? Taking?   Dextromethorphan-guaiFENesin  MG/5ML syrup  Nursing Home Yes Yes   Sig: Take 10 mLs by mouth every 4 hours as needed for cough.   Dihydroxyaluminum Sod Carb (ROLAIDS PO)  Nursing Home Yes Yes   Sig: Take 2 tablets by mouth 4 times daily as needed.   FIBER- MG tablet  Nursing Home Yes Yes   Sig: Take 1 tablet by mouth daily as needed.   LORazepam (ATIVAN) 0.5 MG tablet  Nursing Home Yes Yes   Sig: Take 0.5 mg by mouth 3 times daily as needed for anxiety.   QUEtiapine (SEROQUEL) 25 MG tablet 4/29/2025 at 12:00 PM Nursing Home Yes Yes   Sig: Take 25 mg by mouth 4 times daily. At 0800, 1200, 1700, 2000   acetaminophen (TYLENOL) 500 MG tablet  Nursing Home Yes Yes   Sig: Take 1,000 mg by mouth 2 times daily as needed.   aspirin (ASA) 81 MG EC tablet 4/29/2025 at  8:00 AM Nursing Home No Yes   Sig: Take 1 tablet (81 mg) by mouth daily   clopidogrel (PLAVIX) 75 MG tablet 4/29/2025 at  8:00 AM Nursing Home No Yes   Sig: Take 1 tablet (75 mg) by mouth daily   emollient (VANICREAM) external cream  Nursing Home No Yes   Sig:  APPLY FROM HEAD TO TOE DAILY AFTER SHOWERS *CALL TO REORDER*   furosemide (LASIX) 20 MG tablet 4/29/2025 at  8:00 AM Nursing Home Yes Yes   Sig: Take 20 mg by mouth daily   gabapentin (NEURONTIN) 100 MG tablet 4/29/2025 at 12:00 PM Nursing Home Yes Yes   Sig: Take 100 mg by mouth 2 times daily. Takes at 0800 and 1200   gabapentin (NEURONTIN) 100 MG tablet 4/28/2025 at  8:00 PM Nursing Home Yes Yes   Sig: Take 200 mg by mouth at bedtime.   lactase (LACTAID) 9000 units TABS tablet 4/29/2025 at 12:00 PM Nursing Home Yes Yes   Sig: Take 9,000 Units by mouth 3 times daily (with meals).   levETIRAcetam (KEPPRA) 500 MG tablet 4/29/2025 at  8:00 AM Nursing Home Yes Yes   Sig: Take 500 mg by mouth 2 times daily.   levothyroxine (SYNTHROID/LEVOTHROID) 75 MCG tablet 4/29/2025 at  8:00 AM Nursing Home Yes Yes   Sig: Take 75 mcg by mouth every morning (before breakfast).   lisinopril (ZESTRIL) 10 MG tablet 4/28/2025 at  8:00 PM Nursing Home No Yes   Sig: Take 0.5 tablets (5 mg) by mouth daily   loperamide (IMODIUM) 2 MG capsule  Nursing Home Yes Yes   Sig: Take 2 mg by mouth 4 times daily as needed for diarrhea   melatonin 3 MG tablet 4/28/2025 Bedtime Nursing Home Yes Yes   Sig: Take 1 tablet by mouth at bedtime.   metoprolol tartrate (LOPRESSOR) 25 MG tablet 4/29/2025 at  8:00 AM Nursing Home Yes Yes   Sig: Take 75 mg by mouth 2 times daily   metroNIDAZOLE (METROGEL) 0.75 % external gel  Nursing Home Yes Yes   Sig: Apply topically 2 times daily as needed.   mirtazapine (REMERON) 30 MG tablet 4/28/2025 at  8:00 PM Nursing Home Yes Yes   Sig: Take 30 mg by mouth At Bedtime   nitroGLYcerin (NITROSTAT) 0.4 MG sublingual tablet  Nursing Home No Yes   Sig: For chest pain place 1 tablet under the tongue every 5 minutes for 3 doses. If symptoms persist 5 minutes after 1st dose call 911.   nystatin (MYCOSTATIN) 205210 UNIT/GM external powder  Nursing Home Yes Yes   Sig: Apply topically 2 times daily as needed for dry skin.   omeprazole  (PRILOSEC) 20 MG DR capsule 4/29/2025 at  8:00 AM Nursing Home Yes Yes   Sig: Take 20 mg by mouth every morning.   ondansetron (ZOFRAN-ODT) 4 MG ODT tab  Nursing Home Yes Yes   Sig: TAKE ONE TABLET BY MOUTH TWICE A DAY AS NEEDED FOR NAUSEA & VOMITING   potassium chloride ER (KLOR-CON M) 20 MEQ CR tablet 4/29/2025 at  8:00 AM Nursing Home Yes Yes   Sig: Take 1 tablet by mouth daily.   rosuvastatin (CRESTOR) 20 MG tablet 4/29/2025 at  8:00 AM Nursing Home No Yes   Sig: Take 0.5 tablets (10 mg) by mouth daily   sertraline (ZOLOFT) 100 MG tablet 4/29/2025 at  8:00 AM Nursing Home Yes Yes   Sig: Take 100 mg by mouth daily. Takes with 50mg tablet for a total dose of 150mg daily   sertraline (ZOLOFT) 50 MG tablet 4/29/2025 at  8:00 AM Nursing Home Yes Yes   Sig: Take 50 mg by mouth daily. Takes with 100mg tablet for a total dose of 150mg daily   trolamine salicylate (ASPERCREME) 10 % cream  Nursing Home Yes Yes   Sig: Apply topically as needed for moderate pain To affected areas   vitamin D3 (CHOLECALCIFEROL) 50 mcg (2000 units) tablet 4/29/2025 at  8:00 AM Nursing Home Yes Yes   Sig: Take 1 tablet by mouth daily.      Facility-Administered Medications: None     Current Facility-Administered Medications   Medication Dose Route Frequency Provider Last Rate Last Admin    aspirin (ASA) chewable tablet 81 mg  81 mg Oral Daily Dannielle Gunn DO   81 mg at 04/30/25 0748    calcium carbonate (TUMS) chewable tablet 1,000 mg  1,000 mg Oral 4x Daily PRN Dannielle Gunn DO        Continuing beta blocker from home medication list OR beta blocker order already placed during this visit   Does not apply DOES NOT GO TO Dannielle Sousa DO        Continuing statin from home medication list OR statin order already placed during this visit   Does not apply DOES NOT GO TO Dannielle Sousa DO        gabapentin (NEURONTIN) capsule 200 mg  200 mg Oral At Bedtime Dannielle Gunn DO   200 mg at 04/29/25 2240    heparin  25,000 units in 0.45% NaCl 250 mL ANTICOAGULANT infusion  0-5,000 Units/hr Intravenous Continuous Dannielle Gunn DO 18 mL/hr at 04/30/25 0533 1,800 Units/hr at 04/30/25 0533    HOLD: nitroGLYcerin IF   Does not apply HOLD Dannielle Gunn DO        lactase (LACTAID) tablet 9,000 Units  9,000 Units Oral TID w/meals Dannielle Gunn, DO   9,000 Units at 04/30/25 0750    levETIRAcetam (KEPPRA) tablet 500 mg  500 mg Oral BID Dannielle Gunn, DO   500 mg at 04/30/25 0748    levothyroxine (SYNTHROID/LEVOTHROID) tablet 75 mcg  75 mcg Oral QAM AC Dannielle Gunn DO   75 mcg at 04/30/25 0752    lidocaine (LMX4) cream   Topical Q1H PRN Niki Montoya, NP        lidocaine (LMX4) cream   Topical Q1H PRN Dannielle Gunn DO        lidocaine 1 % 0.1-1 mL  0.1-1 mL Other Q1H PRN Niki Montoya, NP        lidocaine 1 % 0.1-1 mL  0.1-1 mL Other Q1H PRN Dannielle Gunn DO        LORazepam (ATIVAN) tablet 0.5 mg  0.5 mg Oral TID PRN Dannielle Gunn DO        [Held by provider] metoprolol tartrate (LOPRESSOR) tablet 75 mg  75 mg Oral BID Dannielle Gunn,         mirtazapine (REMERON) tablet 30 mg  30 mg Oral At Bedtime Dannielle Gunn DO   30 mg at 04/29/25 2240    nitroGLYcerin (NITROSTAT) sublingual tablet 0.4 mg  0.4 mg Sublingual Q5 Min PRN Dannielle Gunn DO        pantoprazole (PROTONIX) EC tablet 40 mg  40 mg Oral QAM Dannielle Gunn, DO   40 mg at 04/30/25 0748    Patient is already receiving anticoagulation with heparin, enoxaparin (LOVENOX), warfarin (COUMADIN)  or other anticoagulant medication   Does not apply Continuous PRN Dannielle Gunn DO        [Held by provider] QUEtiapine (SEROquel) half-tab 25 mg  25 mg Oral 4x Daily Dannielle Gunn DO   25 mg at 04/30/25 0748    Reason ACE/ARB/ARNI order not selected   Other DOES NOT GO TO Dannielle Sousa DO        senna-docusate (SENOKOT-S/PERICOLACE) 8.6-50 MG per tablet 1 tablet  1 tablet Oral BID PRN Velia  Dannielle DUMONT DO        Or    senna-docusate (SENOKOT-S/PERICOLACE) 8.6-50 MG per tablet 2 tablet  2 tablet Oral BID PRN Dannielle Gunn DO        sertraline (ZOLOFT) tablet 150 mg  150 mg Oral Daily Dannielle Gunn DO   150 mg at 04/30/25 0751    sodium chloride (PF) 0.9% PF flush 3 mL  3 mL Intracatheter Q8H ABHI Niki Montoya, NP        sodium chloride (PF) 0.9% PF flush 3 mL  3 mL Intracatheter q1 min prn Niki Montoya, NP        sodium chloride (PF) 0.9% PF flush 3 mL  3 mL Intracatheter Q8H ABHI Dannielle Gunn DO        sodium chloride (PF) 0.9% PF flush 3 mL  3 mL Intracatheter q1 min prn Dannielle Gunn DO         Current Outpatient Medications   Medication Sig Dispense Refill    acetaminophen (TYLENOL) 500 MG tablet Take 1,000 mg by mouth 2 times daily as needed.      aspirin (ASA) 81 MG EC tablet Take 1 tablet (81 mg) by mouth daily 90 tablet 3    clopidogrel (PLAVIX) 75 MG tablet Take 1 tablet (75 mg) by mouth daily 90 tablet 3    Dextromethorphan-guaiFENesin  MG/5ML syrup Take 10 mLs by mouth every 4 hours as needed for cough.      Dihydroxyaluminum Sod Carb (ROLAIDS PO) Take 2 tablets by mouth 4 times daily as needed.      emollient (VANICREAM) external cream APPLY FROM HEAD TO TOE DAILY AFTER SHOWERS *CALL TO REORDER* 453 g 11    FIBER- MG tablet Take 1 tablet by mouth daily as needed.      furosemide (LASIX) 20 MG tablet Take 20 mg by mouth daily      gabapentin (NEURONTIN) 100 MG tablet Take 200 mg by mouth at bedtime.      gabapentin (NEURONTIN) 100 MG tablet Take 100 mg by mouth 2 times daily. Takes at 0800 and 1200      lactase (LACTAID) 9000 units TABS tablet Take 9,000 Units by mouth 3 times daily (with meals).      levETIRAcetam (KEPPRA) 500 MG tablet Take 500 mg by mouth 2 times daily.      levothyroxine (SYNTHROID/LEVOTHROID) 75 MCG tablet Take 75 mcg by mouth every morning (before breakfast).      lisinopril (ZESTRIL) 10 MG tablet Take 0.5 tablets (5 mg) by  mouth daily 45 tablet 3    loperamide (IMODIUM) 2 MG capsule Take 2 mg by mouth 4 times daily as needed for diarrhea      LORazepam (ATIVAN) 0.5 MG tablet Take 0.5 mg by mouth 3 times daily as needed for anxiety.      melatonin 3 MG tablet Take 1 tablet by mouth at bedtime.      metoprolol tartrate (LOPRESSOR) 25 MG tablet Take 75 mg by mouth 2 times daily      metroNIDAZOLE (METROGEL) 0.75 % external gel Apply topically 2 times daily as needed.      mirtazapine (REMERON) 30 MG tablet Take 30 mg by mouth At Bedtime      nitroGLYcerin (NITROSTAT) 0.4 MG sublingual tablet For chest pain place 1 tablet under the tongue every 5 minutes for 3 doses. If symptoms persist 5 minutes after 1st dose call 911. 25 tablet 0    nystatin (MYCOSTATIN) 824810 UNIT/GM external powder Apply topically 2 times daily as needed for dry skin.      omeprazole (PRILOSEC) 20 MG DR capsule Take 20 mg by mouth every morning.      ondansetron (ZOFRAN-ODT) 4 MG ODT tab TAKE ONE TABLET BY MOUTH TWICE A DAY AS NEEDED FOR NAUSEA & VOMITING      potassium chloride ER (KLOR-CON M) 20 MEQ CR tablet Take 1 tablet by mouth daily.      QUEtiapine (SEROQUEL) 25 MG tablet Take 25 mg by mouth 4 times daily. At 0800, 1200, 1700, 2000      rosuvastatin (CRESTOR) 20 MG tablet Take 0.5 tablets (10 mg) by mouth daily 90 tablet 3    sertraline (ZOLOFT) 100 MG tablet Take 100 mg by mouth daily. Takes with 50mg tablet for a total dose of 150mg daily      sertraline (ZOLOFT) 50 MG tablet Take 50 mg by mouth daily. Takes with 100mg tablet for a total dose of 150mg daily      trolamine salicylate (ASPERCREME) 10 % cream Apply topically as needed for moderate pain To affected areas      vitamin D3 (CHOLECALCIFEROL) 50 mcg (2000 units) tablet Take 1 tablet by mouth daily.       Current Facility-Administered Medications   Medication Dose Route Frequency Provider Last Rate Last Admin    aspirin (ASA) chewable tablet 81 mg  81 mg Oral Daily Dannielle Gunn DO   81 mg at  04/30/25 0748    calcium carbonate (TUMS) chewable tablet 1,000 mg  1,000 mg Oral 4x Daily PRN Dannielle Gunn DO        Continuing beta blocker from home medication list OR beta blocker order already placed during this visit   Does not apply DOES NOT GO TO Dannielle Sousa DO        Continuing statin from home medication list OR statin order already placed during this visit   Does not apply DOES NOT GO TO Dannielle Sousa DO        gabapentin (NEURONTIN) capsule 200 mg  200 mg Oral At Bedtime Dannielle Gunn DO   200 mg at 04/29/25 2240    heparin 25,000 units in 0.45% NaCl 250 mL ANTICOAGULANT infusion  0-5,000 Units/hr Intravenous Continuous Dannielle Gunn DO 18 mL/hr at 04/30/25 0533 1,800 Units/hr at 04/30/25 0533    HOLD: nitroGLYcerin IF   Does not apply HOLD Dannielle Gunn DO        lactase (LACTAID) tablet 9,000 Units  9,000 Units Oral TID w/meals Dannielle Gunn DO   9,000 Units at 04/30/25 0750    levETIRAcetam (KEPPRA) tablet 500 mg  500 mg Oral BID Dannielle Gunn DO   500 mg at 04/30/25 0748    levothyroxine (SYNTHROID/LEVOTHROID) tablet 75 mcg  75 mcg Oral QAM AC Dannielle Gunn DO   75 mcg at 04/30/25 0752    lidocaine (LMX4) cream   Topical Q1H PRN Niki Montoya NP        lidocaine (LMX4) cream   Topical Q1H PRN Dannielle Gunn DO        lidocaine 1 % 0.1-1 mL  0.1-1 mL Other Q1H PRN Niki Montoya, NP        lidocaine 1 % 0.1-1 mL  0.1-1 mL Other Q1H PRN Dannielle Gunn,         LORazepam (ATIVAN) tablet 0.5 mg  0.5 mg Oral TID PRN Dannielle Gunn DO        [Held by provider] metoprolol tartrate (LOPRESSOR) tablet 75 mg  75 mg Oral BID Dannielle Gunn DO        mirtazapine (REMERON) tablet 30 mg  30 mg Oral At Bedtime Dannielle Gunn DO   30 mg at 04/29/25 2240    nitroGLYcerin (NITROSTAT) sublingual tablet 0.4 mg  0.4 mg Sublingual Q5 Min PRN Dannielle Gunn DO        pantoprazole (PROTONIX) EC tablet 40 mg  40 mg Oral  QAM Dannielle Gunn DO   40 mg at 04/30/25 0748    Patient is already receiving anticoagulation with heparin, enoxaparin (LOVENOX), warfarin (COUMADIN)  or other anticoagulant medication   Does not apply Continuous PRN Dannielle Gunn DO        [Held by provider] QUEtiapine (SEROquel) half-tab 25 mg  25 mg Oral 4x Daily Dannielle Gunn DO   25 mg at 04/30/25 0748    Reason ACE/ARB/ARNI order not selected   Other DOES NOT GO TO MAR Dannielle Gunn DO        senna-docusate (SENOKOT-S/PERICOLACE) 8.6-50 MG per tablet 1 tablet  1 tablet Oral BID PRN Dannielle Gunn DO        Or    senna-docusate (SENOKOT-S/PERICOLACE) 8.6-50 MG per tablet 2 tablet  2 tablet Oral BID PRN Dannielle Gunn DO        sertraline (ZOLOFT) tablet 150 mg  150 mg Oral Daily Dannielle Gunn DO   150 mg at 04/30/25 0751    sodium chloride (PF) 0.9% PF flush 3 mL  3 mL Intracatheter Q8H ABHI Niki Montoya, NP        sodium chloride (PF) 0.9% PF flush 3 mL  3 mL Intracatheter q1 min prn Niki Montoya, NP        sodium chloride (PF) 0.9% PF flush 3 mL  3 mL Intracatheter Q8H ABHI Dannielle Gunn,         sodium chloride (PF) 0.9% PF flush 3 mL  3 mL Intracatheter q1 min prn Dannielle Gunn DO         Current Outpatient Medications   Medication Sig Dispense Refill    acetaminophen (TYLENOL) 500 MG tablet Take 1,000 mg by mouth 2 times daily as needed.      aspirin (ASA) 81 MG EC tablet Take 1 tablet (81 mg) by mouth daily 90 tablet 3    clopidogrel (PLAVIX) 75 MG tablet Take 1 tablet (75 mg) by mouth daily 90 tablet 3    Dextromethorphan-guaiFENesin  MG/5ML syrup Take 10 mLs by mouth every 4 hours as needed for cough.      Dihydroxyaluminum Sod Carb (ROLAIDS PO) Take 2 tablets by mouth 4 times daily as needed.      emollient (VANICREAM) external cream APPLY FROM HEAD TO TOE DAILY AFTER SHOWERS *CALL TO REORDER* 453 g 11    FIBER- MG tablet Take 1 tablet by mouth daily as needed.      furosemide  (LASIX) 20 MG tablet Take 20 mg by mouth daily      gabapentin (NEURONTIN) 100 MG tablet Take 200 mg by mouth at bedtime.      gabapentin (NEURONTIN) 100 MG tablet Take 100 mg by mouth 2 times daily. Takes at 0800 and 1200      lactase (LACTAID) 9000 units TABS tablet Take 9,000 Units by mouth 3 times daily (with meals).      levETIRAcetam (KEPPRA) 500 MG tablet Take 500 mg by mouth 2 times daily.      levothyroxine (SYNTHROID/LEVOTHROID) 75 MCG tablet Take 75 mcg by mouth every morning (before breakfast).      lisinopril (ZESTRIL) 10 MG tablet Take 0.5 tablets (5 mg) by mouth daily 45 tablet 3    loperamide (IMODIUM) 2 MG capsule Take 2 mg by mouth 4 times daily as needed for diarrhea      LORazepam (ATIVAN) 0.5 MG tablet Take 0.5 mg by mouth 3 times daily as needed for anxiety.      melatonin 3 MG tablet Take 1 tablet by mouth at bedtime.      metoprolol tartrate (LOPRESSOR) 25 MG tablet Take 75 mg by mouth 2 times daily      metroNIDAZOLE (METROGEL) 0.75 % external gel Apply topically 2 times daily as needed.      mirtazapine (REMERON) 30 MG tablet Take 30 mg by mouth At Bedtime      nitroGLYcerin (NITROSTAT) 0.4 MG sublingual tablet For chest pain place 1 tablet under the tongue every 5 minutes for 3 doses. If symptoms persist 5 minutes after 1st dose call 911. 25 tablet 0    nystatin (MYCOSTATIN) 556206 UNIT/GM external powder Apply topically 2 times daily as needed for dry skin.      omeprazole (PRILOSEC) 20 MG DR capsule Take 20 mg by mouth every morning.      ondansetron (ZOFRAN-ODT) 4 MG ODT tab TAKE ONE TABLET BY MOUTH TWICE A DAY AS NEEDED FOR NAUSEA & VOMITING      potassium chloride ER (KLOR-CON M) 20 MEQ CR tablet Take 1 tablet by mouth daily.      QUEtiapine (SEROQUEL) 25 MG tablet Take 25 mg by mouth 4 times daily. At 0800, 1200, 1700, 2000      rosuvastatin (CRESTOR) 20 MG tablet Take 0.5 tablets (10 mg) by mouth daily 90 tablet 3    sertraline (ZOLOFT) 100 MG tablet Take 100 mg by mouth daily.  "Takes with 50mg tablet for a total dose of 150mg daily      sertraline (ZOLOFT) 50 MG tablet Take 50 mg by mouth daily. Takes with 100mg tablet for a total dose of 150mg daily      trolamine salicylate (ASPERCREME) 10 % cream Apply topically as needed for moderate pain To affected areas      vitamin D3 (CHOLECALCIFEROL) 50 mcg (2000 units) tablet Take 1 tablet by mouth daily.       Allergies   Allergies   Allergen Reactions    Penicillins      dizziness       Social History    reports that she has never smoked. She has never used smokeless tobacco. She reports that she does not currently use alcohol after a past usage of about 1.7 standard drinks of alcohol per week.      Family History   I have reviewed this patient's family history and updated it with pertinent information if needed.  Family History   Problem Relation Age of Onset    Other - See Comments Father         alpha 1 antitrypsin deficiency     Cancer - colorectal Mother     Diabetes Sister           Review of Systems   A comprehensive review of system was performed and is negative other than that noted in the HPI or here.     Physical Exam   Vital Signs with Ranges  Temp:  [97.9  F (36.6  C)] 97.9  F (36.6  C)  Pulse:  [51-64] 55  Resp:  [10-28] 24  BP: ()/(36-85) 97/52  SpO2:  [83 %-100 %] 100 %  Wt Readings from Last 4 Encounters:   04/29/25 102.1 kg (225 lb)   03/15/23 102.2 kg (225 lb 4.8 oz)   02/28/22 97.3 kg (214 lb 8 oz)   08/18/21 91.6 kg (202 lb)     I/O last 3 completed shifts:  In: 2659.6 [I.V.:2159.6; IV Piggyback:500]  Out: -       Vitals: BP 97/52   Pulse 55   Temp 97.9  F (36.6  C) (Temporal)   Resp 24   Ht 1.651 m (5' 5\")   Wt 102.1 kg (225 lb)   SpO2 100%   BMI 37.44 kg/m      Physical Exam:   General patient appears fatigued but not in acute distress  Cardiovascular system S1-S2 normal grade 2 x 6 systolic murmur at the left lower sternal border no S3-S4 rub or gallop  Respiratory system clear to auscultation  Extremities " "cold, no significant edema    No lab results found in last 7 days.    Invalid input(s): \"TROPONINIES\"    Recent Labs   Lab 04/30/25  0934 04/30/25  0558 04/29/25  2239 04/29/25 2102 04/29/25  1533 04/29/25  1348   WBC 8.3  --   --  12.0*  --  9.7   HGB 11.9  --   --  11.1*  --  13.2     --   --  96  --  95     --   --  194  --  227   NA  --  142 141  --   --  142   POTASSIUM  --  5.3 5.5*  --  5.7* 5.9*   CHLORIDE  --  103 102  --   --  103   CO2  --  24 27  --   --  25   BUN  --  60.7* 55.6*  --   --  48.4*   CR  --  3.69* 3.59*  --   --  3.23*   GFRESTIMATED  --  13* 13*  --   --  15*   ANIONGAP  --  15 12  --   --  14   HECTOR  --  8.1* 8.3*  --   --  8.6*   GLC 92 96 122*  --   --  126*   ALBUMIN  --  3.2*  --   --  3.3*  --    PROTTOTAL  --  5.7*  --   --  5.9*  --    BILITOTAL  --  0.8  --   --  1.5*  --    ALKPHOS  --  301*  --   --  335*  --    ALT  --  323*  --   --  416*  --    AST  --  307*  --   --  518*  --    LIPASE  --   --   --   --  14  --      Recent Labs   Lab Test 11/22/23  1010 03/15/23  1555 02/28/22  0841 05/19/21  0827   CHOL  --   --  113 145   HDL  --   --  31* 27*   LDL  --   --  31 51   TRIG 256* 289* 254* 336*     Recent Labs   Lab 04/30/25  0934 04/29/25 2102 04/29/25  1348   WBC 8.3 12.0* 9.7   HGB 11.9 11.1* 13.2   HCT 40.8 36.5 43.1    96 95    194 227     Recent Labs   Lab 04/30/25  0934   PHV 7.17*   PO2V 25   PCO2V 77*   HCO3V 28     Recent Labs   Lab 04/29/25  1348   NTBNPI 57,315*     Recent Labs   Lab 04/29/25  1348   DD 4.42*     No results for input(s): \"SED\", \"CRP\" in the last 168 hours.  Recent Labs   Lab 04/30/25  0934 04/29/25  2102 04/29/25  1348    194 227     No results for input(s): \"TSH\" in the last 168 hours.  Recent Labs   Lab 04/30/25  0936   COLOR Yellow   APPEARANCE Cloudy*   URINEGLC Negative   URINEBILI Negative   URINEKETONE Negative   SG 1.016   UBLD Small*   URINEPH 5.0   PROTEIN 50*   NITRITE Negative   LEUKEST Large* "   RBCU 2   WBCU 17*       Imaging:  Recent Results (from the past 48 hours)   Chest XR,  PA & LAT    Narrative    EXAM: XR CHEST 2 VIEWS  LOCATION: [facility]  DATE: 4/29/2025    INDICATION: SOB, hypoxia  COMPARISON: Chest radiograph 1/26/2018      Impression    IMPRESSION: Moderate to large right pleural effusion with adjacent presumed atelectasis. Trace left pleural effusion. Interstitial pulmonary edema. Enlarged cardiac silhouette. Coronary stent. Aortic calcification. Chronic deformity of the left humerus.   US Abdomen Limited    Narrative    EXAM: US ABDOMEN LIMITED  LOCATION: [facility]  DATE: 4/29/2025    INDICATION: Right upper quadrant pain, evaluate for biliary pathology  COMPARISON: None.  TECHNIQUE: Limited abdominal ultrasound.    FINDINGS:    GALLBLADDER: Multiple gallstones in the gallbladder. Moderate gallbladder wall thickening measuring up to 1 cm. Negative sonographic Damon's sign.    BILE DUCTS: No biliary dilatation. The common duct measures 3 mm.    LIVER: Normal parenchyma with smooth contour. No focal mass. The portal vein is patent with flow in the normal direction.    RIGHT KIDNEY: No hydronephrosis.    PANCREAS: The visualized portions are normal.    No ascites.      Impression    IMPRESSION:  1.  Cholelithiasis. Nonspecific gallbladder wall thickening could represent cholecystitis although there is a negative sonographic Damon sign.       US Lower Extremity Venous Duplex Bilateral    Narrative    EXAM: US LOWER EXTREMITY VENOUS DUPLEX BILATERAL  LOCATION: [facility]  DATE: 4/29/2025    INDICATION: Lower extremity pain, elevated dimer, rule out DVT  COMPARISON: None.  TECHNIQUE: Venous Duplex ultrasound of bilateral lower extremities with and without compression, augmentation and duplex. Color flow and spectral Doppler with waveform analysis performed.    FINDINGS: Exam includes the common femoral, femoral,  "popliteal veins as well as segmentally visualized deep calf veins and greater saphenous vein.     RIGHT: No deep vein thrombosis. No superficial thrombophlebitis. No popliteal cyst.    LEFT: No deep vein thrombosis. No superficial thrombophlebitis. No popliteal cyst.      Impression    IMPRESSION:  1.  No deep venous thrombosis in the bilateral lower extremities.   NM Lung Scan Ventilation and Perfusion    Narrative    EXAM: NM LUNG SCAN VENTILATION AND PERFUSION  LOCATION: [facility]  DATE: 4/30/2025    INDICATION: Shortness of breath, hypoxic  COMPARISON: Chest x-ray 4/29/2025  TECHNIQUE: 55 mCi Tc-99m DTPA inhaled. 6.2 mCi Tc-99m MAA, IV. Standard planar imaging during perfusion and ventilation portions of exam.    FINDINGS: Decreased ventilation and perfusion within the right lower hemithorax, likely related to the known pleural effusion. No mismatched segmental perfusion defect.      Impression    IMPRESSION:    No evidence of pulmonary embolism.       Echo:  No results found for this or any previous visit (from the past 4320 hours).    Clinically Significant Risk Factors Present on Admission        # Hyperkalemia: Highest K = 5.9 mmol/L in last 2 days, will monitor as appropriate    # Hypocalcemia: Lowest Ca = 8.1 mg/dL in last 2 days, will monitor and replace as appropriate     # Hypoalbuminemia: Lowest albumin = 3.2 g/dL at 4/30/2025  5:58 AM, will monitor as appropriate   # Drug Induced Platelet Defect: home medication list includes an antiplatelet medication   # Hypertension: Noted on problem list     # Acute Hypercapnic Respiratory Failure: based on venous blood gas results.  Continue supplemental oxygen and ventilatory support as indicated.        # Obesity: Estimated body mass index is 37.44 kg/m  as calculated from the following:    Height as of this encounter: 1.651 m (5' 5\").    Weight as of this encounter: 102.1 kg (225 lb).       # Financial/Environmental Concerns: none  "

## 2025-04-30 NOTE — H&P
Critical Care Progress Note      05/01/2025    Name: Amy Bryan MRN#: [id number]   Age: 69 year old YOB: 1955     Hospital Day# 2        Code status: Full code         Problem List:   Active Problems:    Hyperkalemia    Pleural effusion    Elevated troponin    GIOVANNI (acute kidney injury)    Acute respiratory failure with hypoxia (H)    Multiple falls           Summary/Hospital Course:     Amy Bryan is a 69 year old female with history of TBI in 2012, CAD s/p PCI in 2018 and 2021, suspected takotsubo cardiomyopathy now recovered, presenting for vague complaints of fatigue for the last 2 days. Within the ED, hypoxic to the 80s on room air and BP notably soft into the 90s. Lab revealed troponin of 804, Cr of 3.23, K of 5.7. Admitted to the ICU given need for pressors for non-fluid responsive hypotension. Work up thus far showing bilateral pleural effusions, elevated troponins and EKG changes 2/2 likely stress cardiomyopathy, likely urosepsis and elevated LFTs.           Interim History:     Arterial line, central line and thoracentesis yesterday. BiPap overnight, otherwise NC 4L. Feels that breathing is improving. Put back on Bipap 2/2 acidosis.       Assessment and plan :     Amy Bryan is a 69 year old female admitted on 4/29/2025 for EKG changes, elevated troponins, requiring pressor support for hemodynamic instability.   I have personally reviewed the daily labs, imaging studies, cultures and discussed the case with referring physician and consulting physicians.     My assessment and plan by system for this patient is as follows:    Neurology/Psychiatry:  # Cognitive disorder with history of TBI in 2012    # Anxiety/MDD  Currently living in group home  - Continue PTA Keppra  - PRN lorazepam      Cardiovascular:  # Stress cardiomyopathy versus NSTEMI  # CAD s/p PCI in 2018, 2021   # Hypotension  EKG reviewed with new T wave inversions, sinus bradycardia, initial troponin 814>905.  ECHO 4/30 showing EF 45-50%, increased LV filling pressures, dilated RV with decreased systolic function. In ED, got 2.7L without improvement in BP.  - Continue heparin gtt, aspirin  - Epinephrine for hypotension, decrease as able  - Start dobutamine  - Trend troponins  - No coronary angiogram indicated at time given lack of anginal symptoms and acute renal failure  - Cardiology consulted, following      Pulmonary:  # Acute hypoxic/hypercapnic respiratory failure  # Pleural effusion, large- improved  V/Q negative for pulmonary embolism. S/p bronch (5/1), negative culture, likely exudate. Repeat xray shows improved effusion.  - BiPap, wean as tolerated      GI and Nutrition:  # Elevated LFTs  # GERD  US showing possible acute cholecystitis, negative Damon sign. CT abdomen pelvis showing cholelithiasis with edema but no fat stranding, surgery consulted, no HIDA indicated currently.   - Trend LFTs, down trending  - PPI      Renal/Fluids/Electrolytes:  # GIOVANNI, likely ischemic ATN in setting of shock  # Hyperkalemia- resolved  Cr on admit 2.23, now 3.69, elevated BNP, dry mucous membranes.  - Check CK, renal panel daily  - Hold lisinopril, lasix, PTA potassium supplement  - Electrolyte replacement per ICU protocols  - Diuresis if BP allows  - Follow I/O, bender  - Nephrology consulted, following      Infectious Disease:  # Abnormal UA, likely UTI  # Concern for sepsis  MRSA nares negative. Blood culture negative (5/1).  - Zosyn  - Urine culture positive for E. Coli      Endocrine:  # Hypothyroidism   TSH WNL.  - PTA levothyroxine 75mcg      Hematology:  # Elevated D-dimer   Dopplers negative for DVT  - Heparin      MSKL/Rheum:  None currently.      IV/Access:   1. Venous access   2. Arterial access    3. Central access      ICU Prophylaxis:   1. DVT: Hep, mechanical  2. VAP: HOB 30 degrees, chlorhexidine rinse  3. Stress Ulcer: PPI  4. Restraints: none  5. Wound care - per unit routine   6. Feeding - NPO, plan to resume  diet soon  7. Family Update: CINDY German, at bedside  8. Disposition - likely back to group home             Key Medications:     Current Facility-Administered Medications   Medication Dose Route Frequency Provider Last Rate Last Admin    aspirin (ASA) chewable tablet 81 mg  81 mg Oral Daily Dannielle Gunn, DO   81 mg at 05/01/25 0903    gabapentin (NEURONTIN) capsule 200 mg  200 mg Oral At Bedtime Dannielle Gunn, DO   200 mg at 04/30/25 2229    lactase (LACTAID) tablet 9,000 Units  9,000 Units Oral TID w/meals Dannielle Gunn, DO   9,000 Units at 05/01/25 0903    levETIRAcetam (KEPPRA) tablet 500 mg  500 mg Oral BID Dannielle Gunn, DO   500 mg at 05/01/25 0903    levothyroxine (SYNTHROID/LEVOTHROID) tablet 75 mcg  75 mcg Oral QAM AC Dannielle Gunn, DO   75 mcg at 05/01/25 0903    [Held by provider] metoprolol tartrate (LOPRESSOR) tablet 75 mg  75 mg Oral BID Dannielle Gunn, DO        mirtazapine (REMERON) tablet 30 mg  30 mg Oral At Bedtime Dannielle Gunn, DO   30 mg at 04/30/25 2229    pantoprazole (PROTONIX) EC tablet 40 mg  40 mg Oral QAM Dannielle Gunn, DO   40 mg at 05/01/25 0903    piperacillin-tazobactam (ZOSYN) 2.25 g vial to attach to  ml bag  2.25 g Intravenous Q6H Dannielle Gunn, DO   2.25 g at 05/01/25 0531    [Held by provider] QUEtiapine (SEROquel) tablet 25 mg  25 mg Oral 4x Daily Dannielle Gunn, DO   25 mg at 04/30/25 0748    sertraline (ZOLOFT) tablet 150 mg  150 mg Oral Daily Dannielle Gunn, DO   150 mg at 05/01/25 0903    sodium chloride (PF) 0.9% PF flush 3 mL  3 mL Intracatheter Q8H ABHI Niki Montoya, NP   3 mL at 05/01/25 0539    sodium chloride (PF) 0.9% PF flush 3 mL  3 mL Intracatheter Q8H Dannielle Rivero,          Current Facility-Administered Medications   Medication Dose Route Frequency Provider Last Rate Last Admin    Continuing beta blocker from home medication list OR beta blocker order already placed during this visit    Does not apply DOES NOT GO TO Dannielle Sousa DO        Continuing statin from home medication list OR statin order already placed during this visit   Does not apply DOES NOT GO TO Dannielle Sousa DO        DOBUTamine (DOBUTREX) 500 mg in D5W 250 mL infusion (adult std conc)  5 mcg/kg/min Intravenous Continuous Noel Nash MD 13.6 mL/hr at 05/01/25 0948 5 mcg/kg/min at 05/01/25 0948    EPINEPHrine 5 mg in 250 mL NS (ADRENALIN) PERIPHERAL infusion  0.03-0.125 mcg/kg/min Intravenous Continuous Noel Nash MD 15.3 mL/hr at 05/01/25 0600 0.05 mcg/kg/min at 05/01/25 0600    heparin 25,000 units in 0.45% NaCl 250 mL ANTICOAGULANT infusion  0-5,000 Units/hr Intravenous Continuous Noel Nash MD 13.5 mL/hr at 05/01/25 0118 1,350 Units/hr at 05/01/25 0118    No lozenges or gum should be given while patient on BIPAP/AVAPS/AVAPS AE   Does not apply Continuous PRN Niki Montoya NP        norepinephrine (LEVOPHED) 4 mg in  mL PERIPHERAL infusion  0.01-0.2 mcg/kg/min Intravenous Continuous Dannielle Gunn DO   Stopped at 04/30/25 1318    Patient is already receiving anticoagulation with heparin, enoxaparin (LOVENOX), warfarin (COUMADIN)  or other anticoagulant medication   Does not apply Continuous PRN Dannielle Gunn DO        Patient may continue current oral medications   Does not apply Continuous PRN Niki Montoya NP        Reason ACE/ARB/ARNI order not selected   Other DOES NOT GO TO Dannielle Sousa DO                   Physical Examination:   Temp:  [97  F (36.1  C)-99.5  F (37.5  C)] 97.8  F (36.6  C)  Pulse:  [49-80] 73  Resp:  [9-59] 21  BP: ()/(41-82) 107/53  Arterial Line BP: (-50)/(-50) -50/-50  FiO2 (%):  [40 %] 40 %  SpO2:  [87 %-100 %] 96 %    Intake/Output Summary (Last 24 hours) at 4/30/2025 1315  Last data filed at 4/30/2025 0351  Gross per 24 hour   Intake 2659.6 ml   Output --   Net 2659.6 ml     Wt Readings from Last 4 Encounters:   05/01/25 90.5 kg (199  lb 8 oz)   03/15/23 102.2 kg (225 lb 4.8 oz)   02/28/22 97.3 kg (214 lb 8 oz)   08/18/21 91.6 kg (202 lb)     Arterial Line BP: (-50)/(-50) -50/-50  BP - Mean:  [53-93] 71  FiO2 (%): 40 %, Resp: 21  Recent Labs   Lab 05/01/25  0830 04/30/25  1811 04/30/25  0934   O2PER 55 40 2       GEN: no acute distress, alert and orientated x4   HEENT: head ncat, sclera anicteric, OP patent, trachea midline   PULM: clear lung sounds, bipap in place    CV/COR: RRR S1S2, murmur noted   ABD: soft, nontender, no mass  EXT: warm extremities, lower extremity edema  NEURO: grossly intact  SKIN: no obvious rash  LINES: clean, dry intact         Data:   All data and imaging reviewed     ROUTINE ICU LABS (Last four results)  CMP  Recent Labs   Lab 05/01/25  0830 05/01/25  0523 05/01/25  0013 04/30/25  2044 04/30/25  1356 04/30/25  0934 04/30/25  0558 04/29/25  2239 04/29/25  1533 04/29/25  1348     --   --   --   --   --  142 141  --  142   POTASSIUM 4.2  --   --   --   --   --  5.3 5.5* 5.7* 5.9*   CHLORIDE 107  --   --   --   --   --  103 102  --  103   CO2 24  --   --   --   --   --  24 27  --  25   ANIONGAP 13  --   --   --   --   --  15 12  --  14   * 138* 137* 144*   < > 92 96 122*  --  126*   BUN 63.1*  --   --   --   --   --  60.7* 55.6*  --  48.4*   CR 3.57*  --   --   --   --   --  3.69* 3.59*  --  3.23*   GFRESTIMATED 13*  --   --   --   --   --  13* 13*  --  15*   HECTOR 8.5*  --   --   --   --   --  8.1* 8.3*  --  8.6*   MAG 2.5*  --   --   --   --   --   --   --   --   --    PHOS 5.5*  --   --   --   --   --   --   --   --   --    PROTTOTAL 5.9*  --   --   --   --  5.7* 5.7*  --  5.9*  --    ALBUMIN 3.3*  --   --   --   --   --  3.2*  --  3.3*  --    BILITOTAL 0.9  --   --   --   --   --  0.8  --  1.5*  --    ALKPHOS 252*  --   --   --   --   --  301*  --  335*  --    *  --   --   --   --   --  307*  --  518*  --    *  --   --   --   --   --  323*  --  416*  --     < > = values in this interval not  "displayed.     CBC  Recent Labs   Lab 25  0830 25  0934 25  2102 25  1348   WBC 10.6 8.3 12.0* 9.7   RBC 3.93 4.10 3.79* 4.53   HGB 11.6* 11.9 11.1* 13.2   HCT 38.1 40.8 36.5 43.1   MCV 97 100 96 95   MCH 29.5 29.0 29.3 29.1   MCHC 30.4* 29.2* 30.4* 30.6*   RDW 14.4 14.4 14.3 14.2    196 194 227     INRNo lab results found in last 7 days.  Arterial Blood Gas  Recent Labs   Lab 25  0830 25  1811 25  0934   O2PER 55 40 2       All cultures:  No results for input(s): \"CULT\" in the last 168 hours.  Recent Results (from the past 24 hours)   Echocardiogram Complete   Result Value    LVEF  45-50%    Biplane LVEF 49%    Narrative    [id number]  [facility]  [id number]  2011^LUKAS^IRWIN^JULIA     [facility]  Echocardiography Laboratory  [address] [place] [address]     Name: ANGELA HENRY  MRN: [id number]  : 1955  Study Date: 2025 09:58 AM  Age: 69 yrs  Gender: Female  Patient Location: [facility]  Reason For Study: Chest Pain, Chest Tightness, Chest Pressure  Ordering Physician: IRWIN GAYTAN  Performed By: Khris Armas     BSA: 2.1 m2  Height: 65 in  Weight: 225 lb  HR: 55  BP: 104/58 mmHg  ______________________________________________________________________________  Procedure  Echocardiogram with two-dimensional, color and spectral Doppler. Definity (NDC  #[id number]) given intravenously.  ______________________________________________________________________________  Interpretation Summary     Left ventricular systolic function is mildly reduced.The visual ejection  fraction is 45-50%.Biplane LVEF is 49%.Mid lateral and mid anterior wall  hypokinesia.  Moderately reduced RV systolic function with preserved RV apex function.The  right ventricle is moderately dilated.  There is mild to moderate (1-2+) tricuspid regurgitation.  Pulmonary hypertension- RVSP 40 mm hg +RA.  IVC diameter >2.1 cm collapsing <50% with sniff suggests a high " RA pressure  estimated at 15 mmHg or greater.     Echo dated 07/15/2021 normal LV and RV systolic functions.  ______________________________________________________________________________  Left Ventricle  The left ventricle is normal in size. There is normal left ventricular wall  thickness. Diastolic Doppler findings (E/E' ratio and/or other parameters)  suggest left ventricular filling pressures are increased. Left ventricular  systolic function is mildly reduced. The visual ejection fraction is 45-50%.  Biplane LVEF is 49%. Mid lateral and mid anterior wall hypokinesia.     Right Ventricle  The right ventricle is moderately dilated. Moderately reduced RV systolic  function with preserved RV apex function.     Atria  The left atrium is mildly dilated. The right atrium is mildly dilated. There  is no color Doppler evidence of an atrial shunt.     Mitral Valve  There is mild mitral annular calcification. There is mild (1+) mitral  regurgitation.     Tricuspid Valve  There is mild to moderate (1-2+) tricuspid regurgitation. The right  ventricular systolic pressure is approximated at 39.9 mmHg plus the right  atrial pressure. Pulmonary hypertension.     Aortic Valve  The aortic valve is trileaflet. No aortic regurgitation is present. No aortic  stenosis is present.     Pulmonic Valve  There is trace pulmonic valvular regurgitation. There is no pulmonic valvular  stenosis.     Vessels  The aortic root is normal size. Normal size ascending aorta. IVC diameter >2.1  cm collapsing <50% with sniff suggests a high RA pressure estimated at 15 mmHg  or greater.     Pericardium  There is no pericardial effusion.     Rhythm  The rhythm was sinus bradycardia.  ______________________________________________________________________________  MMode/2D Measurements & Calculations     IVSd: 1.1 cm  LVIDd: 4.8 cm  LVIDs: 3.6 cm  LVPWd: 1.0 cm  LVPWs: 0.64 cm  FS: 25.0 %  LV mass(C)d: 193.7 grams  LV mass(C)dI: 93.2 grams/m2  Ao root  diam: 3.4 cm  asc Aorta Diam: 3.2 cm  LVOT diam: 2.0 cm  LVOT area: 3.2 cm2  Ao root diam index Ht(cm/m): 2.0  Ao root diam index BSA (cm/m2): 1.6  Asc Ao diam index BSA (cm/m2): 1.5  Asc Ao diam index Ht(cm/m): 1.9  EF Biplane: 48.7 %  LA Volume (BP): 76.5 ml     LA Volume Index (BP): 36.8 ml/m2  RWT: 0.43  TAPSE: 2.4 cm     Doppler Measurements & Calculations  MV E max mustapha: 103.0 cm/sec  MV A max mustapha: 72.8 cm/sec  MV E/A: 1.4  MV dec slope: 514.5 cm/sec2  MV dec time: 0.20 sec  TR max mustapha: 315.7 cm/sec  TR max P.9 mmHg  E/E' av.7  Lateral E/e': 12.8  Medial E/e': 22.6     ______________________________________________________________________________  Report approved by: Alphonse Jarquin MD on 2025 10:51 AM         CT Chest Abdomen Pelvis w/o Contrast    Narrative    EXAM: CT CHEST ABDOMEN PELVIS W/O CONTRAST  LOCATION: [facility]  DATE: 2025    INDICATION: pleural effusion, Asses for Renal abscess, hydronephrosis.  COMPARISON: Right upper quadrant ultrasound and chest radiograph 2025  TECHNIQUE: CT scan of the chest, abdomen, and pelvis was performed without IV contrast. Multiplanar reformats were obtained. Dose reduction techniques were used.   CONTRAST: None.    FINDINGS:   LUNGS AND PLEURA: Bilateral pleural effusions with associated compressive atelectasis, moderate on the right and small on the left. Likely round atelectasis in the right lower lobe. No definite airspace consolidation or pneumothorax.    MEDIASTINUM/AXILLAE: Borderline enlarged mediastinal lymph nodes with right paratracheal node measuring 1.1 cm in short axis (series 3 image 41), most likely reactive, no specific follow-up recommended. Heart is mildly enlarged. No pericardial effusion.    CORONARY ARTERY CALCIFICATION: Previous intervention (stents).    HEPATOBILIARY: Cholelithiasis with mild gallbladder wall edema, not significantly changed from recent right upper quadrant ultrasound. No  significant surrounding fat stranding. No evidence of biliary obstruction.    PANCREAS: No ductal dilation or surrounding inflammation.    SPLEEN: Normal.    ADRENAL GLANDS: Normal.    KIDNEYS/BLADDER: Left renal cyst, no specific follow-up recommended. No definite radiographic evidence of renal abscess. No hydronephrosis. Urinary bladder demonstrates a single focus of internal gas without wall thickening or surrounding inflammation,   correlate with recent catheter placement.    BOWEL: No obstruction or inflammatory change. Normal appendix.    LYMPH NODES: No suspicious lymphadenopathy. Small volume abdominopelvic free fluid.    VASCULATURE: Moderate calcified atherosclerosis. No abdominal aortic aneurysm.    PELVIC ORGANS: Unremarkable.    MUSCULOSKELETAL: No acute bony abnormality. Multiple healed pelvic fractures. Mild body wall edema.      Impression    IMPRESSION:  1.  Bilateral pleural effusions with associated atelectasis, right greater than left, as described above.  2.  Cholelithiasis with gallbladder wall edema but no significant surrounding fat stranding, similar to recent right upper quadrant ultrasound given differences in modality.  3.  No definite evidence of renal abscess.  4.  No nephroureterolithiasis or hydronephrosis.   XR Chest Port 1 View    Narrative    EXAM: XR CHEST PORT 1 VIEW  LOCATION: [facility]  DATE: 4/30/2025    INDICATION: Post right-sided thoracentesis.  COMPARISON: None.      Impression    IMPRESSION: No significant pleural effusion visible. No pneumothorax. Right IJ venous catheter. Cardiac enlargement with coronary arterial stent. Small left pleural effusion. Mild infiltrates lung bases.       Martina Walker, MS4  [employer] [facility] [employer]      [facility]  CRITICAL CARE STAFF NOTE    I am managing these acute and ongoing critical issues resulting in critical condition that impairs one or more vital organ systems, incur a high  probability of imminent or life-threatening deterioration in the patient's condition and providing frequent personal assessment and manipulation of medications and life support equipment.     1. Cardiogenic shock   2.  Uti     ICU Interventions: parenteral medications necessitating continuous monitoring including vasoactive medications, medication for heart rhythm control, insulin infusion, and/or sedative/opiates  and interpretation of lab values, cardiac output, cxr, pulse oximetry, blood gases, and/or information/data stored in computers    The patient was seen and examined with the resident/fellow/EVAN and/or medical student.  We have discussed the patient in detail and I agree with the findings, assessment, and plan as documented when this note was cosigned on this day. The plan was formulated in conjunction with pharmacy, ICU nurses, and respiratory therapist. I have evaluated all laboratory values and imaging studies for the past 24 hours. I have reviewed all the consults that have been ordered and are active for this patient.      Critical Care Time: 40 min.  I spent this time (excluding procedures) personally providing and directing critical care services at the bedside and on the critical care unit.      Noel STROUDBS MPH   of Medicine  Pager: [phone]    Clinically Significant Risk Factors        # Hyperkalemia: Highest K = 5.9 mmol/L in last 2 days, will monitor as appropriate    # Hypocalcemia: Lowest Ca = 8.1 mg/dL in last 2 days, will monitor and replace as appropriate     # Hypoalbuminemia: Lowest albumin = 3.2 g/dL at 4/30/2025  5:58 AM, will monitor as appropriate     # Hypertension: Noted on problem list     # Acute Hypercapnic Respiratory Failure: based on venous blood gas results.  Continue supplemental oxygen and ventilatory support as indicated.         # Obesity: Estimated body mass index is 33.2 kg/m  as calculated from the following:    Height as of this encounter:  "1.651 m (5' 5\").    Weight as of this encounter: 90.5 kg (199 lb 8 oz)., PRESENT ON ADMISSION     # Financial/Environmental Concerns: none              "

## 2025-04-30 NOTE — PROCEDURES
[facility]    Central line    Date/Time: 4/30/2025 4:49 PM    Performed by: Emmie Gillis MD  Authorized by: Noel Nash MD  Indications: vascular access      UNIVERSAL PROTOCOL   Site Marked: NA  Prior Images Obtained and Reviewed:  NA  Required items: Required blood products, implants, devices and special equipment available    Patient identity confirmed:  Verbally with patient  Patient was reevaluated immediately before administering moderate or deep sedation or anesthesia  Confirmation Checklist:  Patient's identity using two indicators, relevant allergies, procedure was appropriate and matched the consent or emergent situation and correct equipment/implants were available  Time out: Immediately prior to the procedure a time out was called    Universal Protocol: the Joint Commission Universal Protocol was followed    Preparation: Patient was prepped and draped in usual sterile fashion    ESBL (mL):  5     ANESTHESIA    Anesthesia:  Local infiltration  Local Anesthetic:  Lidocaine 1% without epinephrine  Anesthetic Total (mL):  7      SEDATION    Patient Sedated: No      Preparation: skin prepped with ChloraPrep  Skin prep agent dried: skin prep agent completely dried prior to procedure  Sterile barriers: all five maximum sterile barriers used - cap, mask, sterile gown, sterile gloves, and large sterile sheet  Hand hygiene: hand hygiene performed prior to central venous catheter insertion  Patient position: flat  Catheter type: triple lumen  Catheter size: 7 Fr  Pre-procedure: landmarks identified  Number of attempts: 1  Successful placement: yes  Post-procedure: line sutured and dressing applied  Assessment: free fluid flow and blood return through all ports      PROCEDURE  Describe Procedure: The patient was laid flat and the left groin was prepped and draped in the usual fashion. The right internal jugular vein was visualized under ultrasound guidance and a needle was introduced  into the vein with venous blood return. The wire passed with ease and the needle was removed. A knick was made at the skin and a dilator was passed. The triple lumen central line as then passed over the wire. The wire was removed. All lumens had blood return and were flushed with sterile saline. The line was sutured in place and a dressing was placed.    CXR pending at the time of this note.    Patient Tolerance:  Patient tolerated the procedure well with no immediate complications  Length of time physician/provider present for 1:1 monitoring during sedation: 0

## 2025-04-30 NOTE — CONSULTS
Nephrology Initial Consult  April 30, 2025      Amy Bryan MRN:[id number] YOB: 1955  Date of Admission:4/29/2025  Primary care provider: Rylan, Jaclyn Physician  Requesting physician: No att. providers found    ASSESSMENT AND RECOMMENDATIONS:   1 shock-septic versus cardiogenic. Normal WBC, high Pro-Jean Carlos, pyuria.  Empirically on antibiotics.  - Presented with NSTEMI , ?  Stress cardiomyopathy.  EF 45-50%, mildly reduced RV function, pulmonary hypertension-RVSP 40 mmHg plus RA  Dilated IVC with abnormal respiratory variability  - Received 2.6 L IV fluid without significant improvement in blood pressure    2 GIOVANNI-presumed GIOVANNI.  Last known creatinine of 0.8 in 2023.  Presenting creatinine of 3.2.  Likely ischemic ATN in setting of shock. FeNA is 0.8 but did not respond to IV fluid. ?  Component of CRS  UA relatively bland-1+ proteinuria, 2 RBC per high-power field.  no casts  -Making urine    3 elevated LFT- -?  Shock liver.  Improving .  Ultrasound equivocal for acute Alice.  Clinically not consistent with cholecystitis    4 acute hypoxic/hypercapnic respiratory failure, now on BiPAP.  Large pleural effusion right side.  High proBNP    Other issues-  Coronary disorder with TBI -currently in a group home.  On [facility]    Recommendation-  Continue supportive manage  Sepsis workup and treatment-on Zosyn  Hold further IV fluid  Can consider IV Lasix 40-60 mg if blood pressure picks up.  Hold for now  Hold PTA Lasix and lisinopril for now  Daily renal panel      Thank you for the consult. Will continue to follow along with you .          Yadira Grayson MD  [facility] - Nephrology   [phone]        REASON FOR CONSULT: GIOVANNI    HISTORY OF PRESENT ILLNESS:  Amy Bryan is a 69 year old female with history of CAD status post PCI in 2021, TBI who presented with complaints of fatigue for last 2-3 days  Noted to be hypoxic with saturation in 80s on presentation, hypotensive  Labs showed  severe GIOVANNI with creatinine of 3.2.  Last known creatinine of 0.8 in 2021  Mild leukocytosis-which is improved  Normal lactate.  High procalcitonin  EKG with precordial T wave inversions.  Troponin peaked at 994 and now trending down.  Preliminary echo findings with wall motion abnormalities.  Possible NSTEMI.    Elevated LFT with direct hyperbilirubinemia-> improving on recheck.  Ultrasound with nonspecific gallbladder wall thickening, negative Damon  Chest x-ray-moderate to large right pleural effusion with adjacent atelectasis.  Interstitial pulm edema.  Cardiomegaly.  VQ scan negative for PE.  Lower extremity Doppler negative for DVT  VBG -pH of 7.17 with high pCO2.    Empirically on Zosyn  Received 1 L saline bolus followed by maintenance fluid which was now stopped.  Total 2.6 L of IV fluid    On eval, she is on BiPAP secondary hypercapnia.  Blood pressure 90/50      PAST MEDICAL HISTORY:  Reviewed with patient on 04/30/2025  and is as listed in HPI.       MEDICATIONS:  PTA Meds  Prior to Admission medications    Medication Sig Last Dose Taking? Auth Provider Long Term End Date   acetaminophen (TYLENOL) 500 MG tablet Take 1,000 mg by mouth 2 times daily as needed.  Yes Reported, Patient     aspirin (ASA) 81 MG EC tablet Take 1 tablet (81 mg) by mouth daily 4/29/2025 at  8:00 AM Yes Los Maxwell MD     clopidogrel (PLAVIX) 75 MG tablet Take 1 tablet (75 mg) by mouth daily 4/29/2025 at  8:00 AM Yes Los Maxwell MD Yes    Dextromethorphan-guaiFENesin  MG/5ML syrup Take 10 mLs by mouth every 4 hours as needed for cough.  Yes Unknown, Entered By History     Dihydroxyaluminum Sod Carb (ROLAIDS PO) Take 2 tablets by mouth 4 times daily as needed.  Yes Reported, Patient     emollient (VANICREAM) external cream APPLY FROM HEAD TO TOE DAILY AFTER SHOWERS *CALL TO REORDER*  Yes Mandi Mathews PA-C     FIBER- MG tablet Take 1 tablet by mouth daily as needed.  Yes Reported, Patient      furosemide (LASIX) 20 MG tablet Take 20 mg by mouth daily 4/29/2025 at  8:00 AM Yes Reported, Patient     gabapentin (NEURONTIN) 100 MG tablet Take 200 mg by mouth at bedtime. 4/28/2025 at  8:00 PM Yes Unknown, Entered By History Yes    gabapentin (NEURONTIN) 100 MG tablet Take 100 mg by mouth 2 times daily. Takes at 0800 and 1200 4/29/2025 at 12:00 PM Yes Unknown, Entered By History Yes    lactase (LACTAID) 9000 units TABS tablet Take 9,000 Units by mouth 3 times daily (with meals). 4/29/2025 at 12:00 PM Yes Reported, Patient     levETIRAcetam (KEPPRA) 500 MG tablet Take 500 mg by mouth 2 times daily. 4/29/2025 at  8:00 AM Yes Unknown, Entered By History Yes    levothyroxine (SYNTHROID/LEVOTHROID) 75 MCG tablet Take 75 mcg by mouth every morning (before breakfast). 4/29/2025 at  8:00 AM Yes Unknown, Entered By History No    lisinopril (ZESTRIL) 10 MG tablet Take 0.5 tablets (5 mg) by mouth daily 4/28/2025 at  8:00 PM Yes Darren Wren MD Yes    loperamide (IMODIUM) 2 MG capsule Take 2 mg by mouth 4 times daily as needed for diarrhea  Yes Unknown, Entered By History     LORazepam (ATIVAN) 0.5 MG tablet Take 0.5 mg by mouth 3 times daily as needed for anxiety.  Yes Unknown, Entered By History     melatonin 3 MG tablet Take 1 tablet by mouth at bedtime. 4/28/2025 Bedtime Yes Reported, Patient     metoprolol tartrate (LOPRESSOR) 25 MG tablet Take 75 mg by mouth 2 times daily 4/29/2025 at  8:00 AM Yes Reported, Patient No    metroNIDAZOLE (METROGEL) 0.75 % external gel Apply topically 2 times daily as needed.  Yes Reported, Patient     mirtazapine (REMERON) 30 MG tablet Take 30 mg by mouth At Bedtime 4/28/2025 at  8:00 PM Yes Reported, Patient Yes    nitroGLYcerin (NITROSTAT) 0.4 MG sublingual tablet For chest pain place 1 tablet under the tongue every 5 minutes for 3 doses. If symptoms persist 5 minutes after 1st dose call 911.  Yes Kharel, Tirtha R, MD Yes    nystatin (MYCOSTATIN) 452670 UNIT/GM external powder  Apply topically 2 times daily as needed for dry skin.  Yes Unknown, Entered By History     omeprazole (PRILOSEC) 20 MG DR capsule Take 20 mg by mouth every morning. 4/29/2025 at  8:00 AM Yes Unknown, Entered By History     ondansetron (ZOFRAN-ODT) 4 MG ODT tab TAKE ONE TABLET BY MOUTH TWICE A DAY AS NEEDED FOR NAUSEA & VOMITING  Yes Reported, Patient     potassium chloride ER (KLOR-CON M) 20 MEQ CR tablet Take 1 tablet by mouth daily. 4/29/2025 at  8:00 AM Yes Reported, Patient     QUEtiapine (SEROQUEL) 25 MG tablet Take 25 mg by mouth 4 times daily. At 0800, 1200, 1700, 2000 4/29/2025 at 12:00 PM Yes Unknown, Entered By History Yes    rosuvastatin (CRESTOR) 20 MG tablet Take 0.5 tablets (10 mg) by mouth daily 4/29/2025 at  8:00 AM Yes Los Maxwell MD Yes    sertraline (ZOLOFT) 100 MG tablet Take 100 mg by mouth daily. Takes with 50mg tablet for a total dose of 150mg daily 4/29/2025 at  8:00 AM Yes Unknown, Entered By History     sertraline (ZOLOFT) 50 MG tablet Take 50 mg by mouth daily. Takes with 100mg tablet for a total dose of 150mg daily 4/29/2025 at  8:00 AM Yes Reported, Patient     trolamine salicylate (ASPERCREME) 10 % cream Apply topically as needed for moderate pain To affected areas  Yes Unknown, Entered By History     vitamin D3 (CHOLECALCIFEROL) 50 mcg (2000 units) tablet Take 1 tablet by mouth daily. 4/29/2025 at  8:00 AM Yes Unknown, Entered By History        Current Meds  Current Facility-Administered Medications   Medication Dose Route Frequency Provider Last Rate Last Admin    aspirin (ASA) chewable tablet 81 mg  81 mg Oral Daily Dannielle Gunn DO   81 mg at 04/30/25 0748    gabapentin (NEURONTIN) capsule 200 mg  200 mg Oral At Bedtime Dannielle Gunn DO   200 mg at 04/29/25 2240    lactase (LACTAID) tablet 9,000 Units  9,000 Units Oral TID w/meals Dannielle Gunn DO   9,000 Units at 04/30/25 0750    levETIRAcetam (KEPPRA) tablet 500 mg  500 mg Oral BID Dannielle Gunn, DO    500 mg at 04/30/25 0748    levothyroxine (SYNTHROID/LEVOTHROID) tablet 75 mcg  75 mcg Oral QAM AC Dannielle Gunn DO   75 mcg at 04/30/25 0752    [Held by provider] metoprolol tartrate (LOPRESSOR) tablet 75 mg  75 mg Oral BID Dannielle Gunn DO        mirtazapine (REMERON) tablet 30 mg  30 mg Oral At Bedtime Dannielle Gunn DO   30 mg at 04/29/25 2240    pantoprazole (PROTONIX) EC tablet 40 mg  40 mg Oral QAM Dannielle Gunn DO   40 mg at 04/30/25 0748    piperacillin-tazobactam (ZOSYN) 2.25 g vial to attach to  ml bag  2.25 g Intravenous Q6H Dannielle Gunn DO        [Held by provider] QUEtiapine (SEROquel) half-tab 25 mg  25 mg Oral 4x Daily Dannielle Gunn DO   25 mg at 04/30/25 0748    sertraline (ZOLOFT) tablet 150 mg  150 mg Oral Daily Dannielle Gunn DO   150 mg at 04/30/25 0751    sodium chloride (PF) 0.9% PF flush 3 mL  3 mL Intracatheter Q8H Niki Mayer NP        sodium chloride (PF) 0.9% PF flush 3 mL  3 mL Intracatheter Q8H Dannielle Rivero, DO         Infusion Meds  Current Facility-Administered Medications   Medication Dose Route Frequency Provider Last Rate Last Admin    Continuing beta blocker from home medication list OR beta blocker order already placed during this visit   Does not apply DOES NOT GO TO Dannielle Sousa DO        Continuing statin from home medication list OR statin order already placed during this visit   Does not apply DOES NOT GO TO Dannielle Sousa, DO        heparin 25,000 units in 0.45% NaCl 250 mL ANTICOAGULANT infusion  0-5,000 Units/hr Intravenous Continuous Dannielle Gunn DO 18 mL/hr at 04/30/25 0533 1,800 Units/hr at 04/30/25 0533    No lozenges or gum should be given while patient on BIPAP/AVAPS/AVAPS AE   Does not apply Continuous PRN Wolfgram, Niki M, NP        Patient is already receiving anticoagulation with heparin, enoxaparin (LOVENOX), warfarin (COUMADIN)  or other anticoagulant medication    "Does not apply Continuous PRN Dannielle Gunn DO        Patient may continue current oral medications   Does not apply Continuous PRN Niki Montoya NP        Reason ACE/ARB/ARNI order not selected   Other DOES NOT GO TO Dannielle Sousa DO           ALLERGIES:    Allergies   Allergen Reactions    Penicillins      dizziness       REVIEW OF SYSTEMS:  A comprehensive of systems was negative except as noted above.    SOCIAL HISTORY:   Reviewed with patient on 2025     FAMILY MEDICAL HISTORY:   Family History   Problem Relation Age of Onset    Other - See Comments Father         alpha 1 antitrypsin deficiency     Cancer - colorectal Mother     Diabetes Sister      Reviewed with patient on 2025     PHYSICAL EXAM:   Temp  Av.9  F (36.6  C)  Min: 97.9  F (36.6  C)  Max: 97.9  F (36.6  C)      Pulse  Av.8  Min: 51  Max: 64 Resp  Av  Min: 10  Max: 28  SpO2  Av.3 %  Min: 83 %  Max: 100 %       BP 97/52   Pulse 55   Temp 97.9  F (36.6  C) (Temporal)   Resp 24   Ht 1.651 m (5' 5\")   Wt 102.1 kg (225 lb)   SpO2 100%   BMI 37.44 kg/m        Admit Weight: 102.1 kg (225 lb)     GENERAL APPEARANCE: on bipap   EYES: no scleral icterus, pupils equal  HENT: NC/AT,  mouth  without ulcers or lesions  Lymphatics: no cervical or supraclavicular LAD  Endo: no moon facies, no goiter  Pulmonary: Diminished at bases  CV: Tachycardic, regular   - Edema-trace  GI: soft, nontender,   MS: no evidence of inflammation in joints  SKIN: no rash, warm, dry, no cyanosis  NEURO: face symmetric, no asterixis     LABS:   CMP  Recent Labs   Lab 25  0934 25  0558 25  2239 25  1533 25  1348   NA  --  142 141  --  142   POTASSIUM  --  5.3 5.5* 5.7* 5.9*   CHLORIDE  --  103 102  --  103   CO2  --  24 27  --  25   ANIONGAP  --  15 12  --  14   GLC 92 96 122*  --  126*   BUN  --  60.7* 55.6*  --  48.4*   CR  --  3.69* 3.59*  --  3.23*   GFRESTIMATED  --  13* 13*  --  15*   HECTOR  " --  8.1* 8.3*  --  8.6*   PROTTOTAL  --  5.7*  --  5.9*  --    ALBUMIN  --  3.2*  --  3.3*  --    BILITOTAL  --  0.8  --  1.5*  --    ALKPHOS  --  301*  --  335*  --    AST  --  307*  --  518*  --    ALT  --  323*  --  416*  --      CBC  Recent Labs   Lab 04/30/25  0934 04/29/25  2102 04/29/25  1348   HGB 11.9 11.1* 13.2   WBC 8.3 12.0* 9.7   RBC 4.10 3.79* 4.53   HCT 40.8 36.5 43.1    96 95   MCH 29.0 29.3 29.1   MCHC 29.2* 30.4* 30.6*   RDW 14.4 14.3 14.2    194 227     INRNo lab results found in last 7 days.  ABG  Recent Labs   Lab 04/30/25  0934   O2PER 2      URINE STUDIES  Recent Labs   Lab Test 04/30/25  0936   COLOR Yellow   APPEARANCE Cloudy*   URINEGLC Negative   URINEBILI Negative   URINEKETONE Negative   SG 1.016   UBLD Small*   URINEPH 5.0   PROTEIN 50*   NITRITE Negative   LEUKEST Large*   RBCU 2   WBCU 17*     No lab results found.  PTH  No lab results found.  IRON STUDIES  No lab results found.    IMAGING:  Personally reviewed the images and findings are as listed in HPI     Yadira Grayson MD    s

## 2025-04-30 NOTE — ED NOTES
Patient resting in bed with eyes closed. Patient was turned right lateral with assist of 2 to offload pressure. Remains on monitors. Denies any needs at this time.

## 2025-04-30 NOTE — CODE/RAPID RESPONSE
"[facility]    House EVAN RRT Note  4/30/2025   Time Called: 0911 am     RRT called for: hypotension 84/44 (58)     Assessment & Plan   Hypotension concerning for cardiogenic shock vs sepsis  Alternatively concerned obstructive etiology, PE negative via VQ scan     On my arrival pt is lying on ED cart with eyes closed, arouses to voice. She denies headache, dizziness or chest pain. No SOB.  Denies dysuria.  Denies abnormal bowel movements.  Reports that she felt unwell in the days leading up to hospitalization.  She says her stomach \"just felt off\".  However she denies any decreased intake.  Nursing staff also reports patient has not urinated since prior to midnight.    On exam  She is lethargic, pale with purple appearing lips. Hands and feet are cool to touch, warm from calves/forearms to trunk, dry. Temp 96.7 axillary, HR 55, BP 97/52, RR 20, SPO2 98 % on 2L NC.  She is able to move all extremities to command, weakly.  Bradycardic with normal S1-S2.  Posterior lung fields with inspiratory wheezing and rales.  No significant bilateral peripheral edema.  Scattered scabbing with no obvious open wounds.    INTERVENTIONS:  -Noted EKG changes from 4/29 shows new ST depressions in anterior leads and new R BBB when compared to Aug 2021  -cath aug 2021 with x3 ELLIOT to LAD   -CBC as previously ordered   -lactic acid, pro lizzie, VBG, blood cultures   -obtain UA as previously ordered; straight cath for 500 mLs   -NICOM assessment; initial CO 4.5 CI 2.2 SVI -11% indicating NOT fluid responsive   -Has received 500mL bolus and 75 mL/hr NS overnight, Fena pending and Neph consulted   -Hold TID Seroquel given lethargy  -PTA metoprolol has been on hold     Working diagnosis: sepsis     At the end of the RRT pt remains hemodynamically stable. Pain free. Still lethargic but agreeable to plan of care.     Disposition CCU pending bed availability     Discussed with and defer further cares to hospitalist attending " physician Dr. Gunn.    Interval History     Amy Bryan is a 69 year old female with history of TBI in 2012, CAD s/p PCI in 2018 and 2021, suspected takotsubo cardiomyopathy now recovered, presenting for vague complaints of fatigue for the last 2 days. Within the ED, hypoxic to the 80s on room air and BP notably soft into the 90s. Lab revealed troponin of 804, Cr of 3.23, K of 5.7     Code Status: Full Code    Allergies   Allergies   Allergen Reactions    Penicillins      dizziness       Physical Exam   Vital Signs with Ranges:  Temp:  [97.9  F (36.6  C)] 97.9  F (36.6  C)  Pulse:  [51-64] 55  Resp:  [10-28] 17  BP: ()/(36-85) 84/44  SpO2:  [83 %-100 %] 98 %  I/O last 3 completed shifts:  In: 2659.6 [I.V.:2159.6; IV Piggyback:500]  Out: -     Physical Exam  Constitutional:       General: She is not in acute distress.     Appearance: She is obese. She is ill-appearing. She is not diaphoretic.   HENT:      Mouth/Throat:      Mouth: Mucous membranes are dry.   Eyes:      Extraocular Movements: Extraocular movements intact.      Pupils: Pupils are equal, round, and reactive to light.   Cardiovascular:      Rate and Rhythm: Regular rhythm. Bradycardia present.      Pulses: Normal pulses.      Heart sounds: Normal heart sounds.   Pulmonary:      Effort: Pulmonary effort is normal. No respiratory distress.      Breath sounds: Wheezing and rales present.   Abdominal:      General: Bowel sounds are normal. There is no distension.      Palpations: Abdomen is soft.      Tenderness: There is no abdominal tenderness. There is no guarding.   Musculoskeletal:         General: Normal range of motion.      Right lower leg: No edema.      Left lower leg: No edema.   Skin:     Capillary Refill: Capillary refill takes less than 2 seconds.   Neurological:      Mental Status: She is oriented to person, place, and time. She is lethargic.   Psychiatric:         Mood and Affect: Affect is flat.         Behavior: Behavior is  cooperative.           Data     ABG:  Recent Labs   Lab 04/30/25  0934   PHV 7.17*   PO2V 25   PCO2V 77*   HCO3V 28         Troponin:    905-> 692    IMAGING: (X-ray/CT/MRI)   Recent Results (from the past 24 hours)   Chest XR,  PA & LAT    Narrative    EXAM: XR CHEST 2 VIEWS  LOCATION: [facility]  DATE: 4/29/2025    INDICATION: SOB, hypoxia  COMPARISON: Chest radiograph 1/26/2018      Impression    IMPRESSION: Moderate to large right pleural effusion with adjacent presumed atelectasis. Trace left pleural effusion. Interstitial pulmonary edema. Enlarged cardiac silhouette. Coronary stent. Aortic calcification. Chronic deformity of the left humerus.   US Abdomen Limited    Narrative    EXAM: US ABDOMEN LIMITED  LOCATION: [facility]  DATE: 4/29/2025    INDICATION: Right upper quadrant pain, evaluate for biliary pathology  COMPARISON: None.  TECHNIQUE: Limited abdominal ultrasound.    FINDINGS:    GALLBLADDER: Multiple gallstones in the gallbladder. Moderate gallbladder wall thickening measuring up to 1 cm. Negative sonographic Damon's sign.    BILE DUCTS: No biliary dilatation. The common duct measures 3 mm.    LIVER: Normal parenchyma with smooth contour. No focal mass. The portal vein is patent with flow in the normal direction.    RIGHT KIDNEY: No hydronephrosis.    PANCREAS: The visualized portions are normal.    No ascites.      Impression    IMPRESSION:  1.  Cholelithiasis. Nonspecific gallbladder wall thickening could represent cholecystitis although there is a negative sonographic Damon sign.       US Lower Extremity Venous Duplex Bilateral    Narrative    EXAM: US LOWER EXTREMITY VENOUS DUPLEX BILATERAL  LOCATION: [facility]  DATE: 4/29/2025    INDICATION: Lower extremity pain, elevated dimer, rule out DVT  COMPARISON: None.  TECHNIQUE: Venous Duplex ultrasound of bilateral lower extremities with and without compression,  augmentation and duplex. Color flow and spectral Doppler with waveform analysis performed.    FINDINGS: Exam includes the common femoral, femoral, popliteal veins as well as segmentally visualized deep calf veins and greater saphenous vein.     RIGHT: No deep vein thrombosis. No superficial thrombophlebitis. No popliteal cyst.    LEFT: No deep vein thrombosis. No superficial thrombophlebitis. No popliteal cyst.      Impression    IMPRESSION:  1.  No deep venous thrombosis in the bilateral lower extremities.   NM Lung Scan Ventilation and Perfusion    Narrative    EXAM: NM LUNG SCAN VENTILATION AND PERFUSION  LOCATION: [facility]  DATE: 4/30/2025    INDICATION: Shortness of breath, hypoxic  COMPARISON: Chest x-ray 4/29/2025  TECHNIQUE: 55 mCi Tc-99m DTPA inhaled. 6.2 mCi Tc-99m MAA, IV. Standard planar imaging during perfusion and ventilation portions of exam.    FINDINGS: Decreased ventilation and perfusion within the right lower hemithorax, likely related to the known pleural effusion. No mismatched segmental perfusion defect.      Impression    IMPRESSION:    No evidence of pulmonary embolism.       CBC with Diff:  Recent Labs   Lab Test 04/30/25  0934 04/29/25  1348 08/03/21  0712   WBC 8.3   < > 6.2   HGB 11.9   < > 12.9      < > 91      < > 254   INR  --   --  1.14    < > = values in this interval not displayed.        Lactic Acid:    Recent Labs   Lab 04/30/25  0934 04/29/25  1948   LACT 1.0 1.1       Comprehensive Metabolic Panel:  Recent Labs   Lab 04/30/25  0558      POTASSIUM 5.3   CHLORIDE 103   CO2 24   ANIONGAP 15   GLC 96   BUN 60.7*   CR 3.69*   GFRESTIMATED 13*   HECTOR 8.1*   PROTTOTAL 5.7*   ALBUMIN 3.2*   BILITOTAL 0.8   ALKPHOS 301*   *   *         BNP:  57,315    UA:  Recent Labs   Lab 04/30/25  0936   COLOR Yellow   APPEARANCE Cloudy*   URINEGLC Negative   URINEBILI Negative   URINEKETONE Negative   SG 1.016   UBLD Small*   URINEPH 5.0    PROTEIN 50*   NITRITE Negative   LEUKEST Large*   RBCU 2   WBCU 17*           Time Spent on this Encounter   I spent 60 minutes (2894 - 8558) of critical care time on the unit/floor managing the care of Amy Bryan. Upon evaluation, this patient had a high probability of imminent or life-threatening deterioration due to severe sepsis, which required my direct attention, intervention, and personal management. 100% of my time was spent at the bedside counseling the patient and/or coordinating care regarding services listed in this note.    Niki Montoya NP  [facility]  Securely message with the Vocera Web Console (learn more here)  Text page via Clink Paging/Directory

## 2025-04-30 NOTE — CONSULTS
Care Management Initial Consult    General Information  Assessment completed with: Care Team MemberMaddi  Type of CM/SW Visit: Initial Assessment    Primary Care Provider verified and updated as needed: Yes   Readmission within the last 30 days: no previous admission in last 30 days      Reason for Consult: discharge planning  Advance Care Planning: Advance Care Planning Reviewed: present on chart          Communication Assessment  Patient's communication style: spoken language (English or Bilingual)             Cognitive  Cognitive/Neuro/Behavioral: WDL                      Living Environment:   People in home: facility resident     Current living Arrangements: group home      Able to return to prior arrangements: yes       Family/Social Support:  Care provided by: self, other (see comments) ( staff)  Provides care for: no one  Marital Status:   Support system: Facility resident(s)/Staff          Description of Support System: Supportive, Involved    Support Assessment: Adequate family and caregiver support    Current Resources:   Patient receiving home care services: Yes  Skilled Home Care Services: Home Health Aid     Community Resources: Group Home, [facility]  Equipment currently used at home: cane, quad  Supplies currently used at home: None    Employment/Financial:  Employment Status: disabled        Financial Concerns: none   Referral to Financial Worker: No       Does the patient's insurance plan have a 3 day qualifying hospital stay waiver?  No    Lifestyle & Psychosocial Needs:  Social Drivers of Health     Food Insecurity: Not on file   Depression: At risk (5/19/2021)    PHQ-2     PHQ-2 Score: 4   Housing Stability: Not on file   Tobacco Use: Low Risk  (3/15/2023)    Patient History     Smoking Tobacco Use: Never     Smokeless Tobacco Use: Never     Passive Exposure: Not on file   Financial Resource Strain: Not on file   Alcohol Use: Not on file   Transportation Needs: Not on file  "  Physical Activity: Not on file   Interpersonal Safety: Not on file   Stress: Not on file   Social Connections: Not on file   Health Literacy: Not on file       Functional Status:  Prior to admission patient needed assistance:   Dependent ADLs:: Independent  Dependent IADLs:: Cleaning, Laundry, Cooking, Shopping, Medication Management, Meal Preparation, Transportation  Assesssment of Functional Status: At functional baseline    Mental Health Status:          Chemical Dependency Status:                Values/Beliefs:  Spiritual, Cultural Beliefs, Church Practices, Values that affect care: no               Discussed  Partnership in Safe Discharge Planning  document with patient/family: No    Additional Information:  Per care management consult, chart was reviewed. H&P states pt is a \"69 year old female with history of TBI in 2012, CAD s/p PCI in 2018 and 2021, suspected takotsubo cardiomyopathy now recovered, presenting for vague complaints of fatigue for the last 2 days.\"    PHILIPPE called pt's Group Home for consult.     CM team called [facility] Cristino, phone [phone] and Spoke with Maddi who provided the following information:     In what kind of facility does pt reside?  Facility Type: Group Home - \"home discharge\"    Any steps to get in (#)? 2                2.   Best number to reach facility nursing? Phone [phone]         Evening/weekend number? Same as above     3.  What is the preferred return time for the resident?  anytime  Can patient return on weekend? yes       Do you know how they typically transport? Monserrat or Maddi    4.   Does facility manage medications?   Yes    5.   What is pt's baseline cognition and mobility? TBI, pt is independent with ADLs and needs assistance with IADLs         What level of cognition/mobility is required for safe return? baseline     6.  Is resident enrolled in any \"services?\"  skilled nursing, IADL help         8.   Is the resident seen by PCP: on-site   Name: Jaclyn" Physicans    9.   Does pt have any personal DME (describe)? Yes, a can used outside of house    Next Steps: Follow for discharge planning. CINDY German or Maddi (RN) can transport pt home.     Brooke Lara [facility]  Inpatient Care Management

## 2025-04-30 NOTE — PROGRESS NOTES
[facility]    Medicine Progress Note - Hospitalist Service    Date of Admission:  4/29/2025    Assessment & Plan   Amy Bryan is a 69 year old female with history of TBI in 2012, CAD s/p PCI in 2018 and 2021, suspected takotsubo cardiomyopathy now recovered, presenting for vague complaints of fatigue for the last 2 days. Within the ED, hypoxic to the 80s on room air and BP notably soft into the 90s. Lab revealed troponin of 804, Cr of 3.23, K of 5.7     NSTEMI  CAD s/p PCI in 2018 and 2021  Hypotension  EKG reviewed with new T wave inversions, initial troponin 814>905. No chest pain or cardiac complaints in the ED. Bedside echo performed by ED provider grossly normal     - cardiology evaluation  - remains on heparin  - becoming more hypotensive today with cold extremities. Denies change in symptoms but most concerning could be developing cardiogenic shock. RRT also called for similar symptoms by nurse  - obtain STAT echo, RRT provider will check NICOM, obtain new lactic acid  - IMC now possible need for ICU if ongoing hypotension    Addendum  Etiology of hypotension still unclear  Lactic acid remains normal and white count is normal however UA and procal elevated. Will start treatment with zosyn for possible sepsis. Reviewed results of NICOM and cardiology assessment. Patient not fluid responsive, may need to consider transfer to the ICU if ongoing hypotension. Cardiology planning to medically manage right now.     Vitals becoming more unstable. Now on norepi and accepted to ICU. Working diagnosis is cardiogenic shock with shock liver and GIOVANNI. CINDY German updated bedside     Elevated D-dimer  In conjunction with fall at home and new hypoxia there is concern for PE. Reassuringly dopplers negative for DVT. Unable to obtain CT to rule out PE given her renal function.   - V/Q negative for PE  - continue heparin for NSTEMI     Elevated LFTs  - elevated LFTs not in obstructive pattern with  some mild RUQ pain on exam. US with equivocal findings of acute tiffanie    - LFTs improving and pain improving  - no signs of developing acute tiffanie  - continue to monitor consider HIDA scan if remain concerned  - pain may also be related to R sided effusion     GIOVANNI  Cr baseline is normal 0.9  - Cr worsening despite fluids  - check UA and FeNa  - stop fluids right now but follow NICOM     Hyperkalemia-resolved  - resolved with IVF and holding potassium supplement     Acute hypoxic respiratory failure  Pleural effusions  V/Q negative for PE  - follow echo  - consider thoracentesis this admission  - despite BNP hold diuresis until more work up     pAfib  - per chart review there is report of Afib however patient not aware and no documentation of anticoagulation  - NSR on tele  - monitor on tele     Cognitive disorder with history of TBI in 2012  - currently living in group home  - continue PTA Keppra     GERD  - resume PTA PPI          Diet: NPO for Procedure/Surgery per Anesthesia Guidelines Except for: Meds; Clear liquids before procedure/surgery: ADULT (Age GREATER than or Equal to 18 years) - Clear liquids 2 hours before procedure/surgery    DVT Prophylaxis: Heparin  Chance Catheter: Not present  Lines: None     Cardiac Monitoring: ACTIVE order. Indication: AMI (NSTEMI/ STEMI) (48 hours)  Code Status: Full Code      Clinically Significant Risk Factors Present on Admission        # Hyperkalemia: Highest K = 5.9 mmol/L in last 2 days, will monitor as appropriate    # Hypocalcemia: Lowest Ca = 8.1 mg/dL in last 2 days, will monitor and replace as appropriate     # Hypoalbuminemia: Lowest albumin = 3.2 g/dL at 4/30/2025  5:58 AM, will monitor as appropriate   # Drug Induced Platelet Defect: home medication list includes an antiplatelet medication   # Hypertension: Noted on problem list           # Obesity: Estimated body mass index is 37.44 kg/m  as calculated from the following:    Height as of this encounter: 1.651 m  "(5' 5\").    Weight as of this encounter: 102.1 kg (225 lb).       # Financial/Environmental Concerns: none         Social Drivers of Health    Depression: At risk (5/19/2021)    PHQ-2     PHQ-2 Score: 4          Disposition Plan     Medically Ready for Discharge: Anticipated in 5+ Days             Dannielle Gunn DO  Hospitalist Service  [facility]  Securely message with [employer] (more info)  Text page via [email or web address] Paging/Directory   ______________________________________________________________________    Interval History   Noted more hypotensive over the night. Visited this morning and lethargic but remains very comfortable. Denies new pain. Denies new symptoms. Noted to be more cyanotic with colder extremities and edema.     Physical Exam   Vital Signs: Temp: 97.9  F (36.6  C) Temp src: Temporal BP: (!) 84/44 Pulse: 55   Resp: 17 SpO2: 98 % O2 Device: Nasal cannula Oxygen Delivery: 2 LPM  Weight: 225 lbs 0 oz    Constitutional: Awake, alert, cooperative, no apparent distress, purple lips  Respiratory: crackles  Cardiovascular: Regular rate and rhythm, normal S1 and S2, and no murmur noted +1 edema in LE, colder extremities with cyanosis to lips  GI: Normal bowel sounds, soft, non-distended, non-tender  Skin/Integumen: No rashes  Other:      Medical Decision Making       60 MINUTES SPENT BY ME on the date of service doing chart review, history, exam, documentation & further activities per the note.      Time Spent on this Encounter   Amy was seen and evaluated by me on 04/30/25.  She was in critical condition as the result of cardiogenic vs septic shock.  Her condition is now Critical but stable transferring to the ICU.      The acute issues and interventions managed by me today include  hypotension and managed noriepi dosing and MAP goal of >65, ordered stat ECHO, stopped IVF, ordered IV antibiotics, consulted nephrology, spoke with RRT provider. Called ICU for transfer, updated family " bedside.     Total Critical Care time spent by me, excluding procedures, was 35 minutes.    Dannielle Gunn, DO   Data     I have personally reviewed the following data over the past 24 hrs:    12.0 (H)  \   11.1 (L)   / 194     142 103 60.7 (H) /  96   5.3 24 3.69 (H) \     ALT: 323 (H) AST: 307 (H) AP: 301 (H) TBILI: 0.8   ALB: 3.2 (L) TOT PROTEIN: 5.7 (L) LIPASE: 14     Trop: 692 (HH) BNP: 57,315 (H)     Procal: N/A CRP: N/A Lactic Acid: 1.1       INR:  N/A PTT:  N/A   D-dimer:  4.42 (H) Fibrinogen:  N/A       Imaging results reviewed over the past 24 hrs:   Recent Results (from the past 24 hours)   Chest XR,  PA & LAT    Narrative    EXAM: XR CHEST 2 VIEWS  LOCATION: [facility]  DATE: 4/29/2025    INDICATION: SOB, hypoxia  COMPARISON: Chest radiograph 1/26/2018      Impression    IMPRESSION: Moderate to large right pleural effusion with adjacent presumed atelectasis. Trace left pleural effusion. Interstitial pulmonary edema. Enlarged cardiac silhouette. Coronary stent. Aortic calcification. Chronic deformity of the left humerus.   US Abdomen Limited    Narrative    EXAM: US ABDOMEN LIMITED  LOCATION: [facility]  DATE: 4/29/2025    INDICATION: Right upper quadrant pain, evaluate for biliary pathology  COMPARISON: None.  TECHNIQUE: Limited abdominal ultrasound.    FINDINGS:    GALLBLADDER: Multiple gallstones in the gallbladder. Moderate gallbladder wall thickening measuring up to 1 cm. Negative sonographic Damon's sign.    BILE DUCTS: No biliary dilatation. The common duct measures 3 mm.    LIVER: Normal parenchyma with smooth contour. No focal mass. The portal vein is patent with flow in the normal direction.    RIGHT KIDNEY: No hydronephrosis.    PANCREAS: The visualized portions are normal.    No ascites.      Impression    IMPRESSION:  1.  Cholelithiasis. Nonspecific gallbladder wall thickening could represent cholecystitis although there is a negative  sonographic Damon sign.       US Lower Extremity Venous Duplex Bilateral    Narrative    EXAM: US LOWER EXTREMITY VENOUS DUPLEX BILATERAL  LOCATION: [facility]  DATE: 4/29/2025    INDICATION: Lower extremity pain, elevated dimer, rule out DVT  COMPARISON: None.  TECHNIQUE: Venous Duplex ultrasound of bilateral lower extremities with and without compression, augmentation and duplex. Color flow and spectral Doppler with waveform analysis performed.    FINDINGS: Exam includes the common femoral, femoral, popliteal veins as well as segmentally visualized deep calf veins and greater saphenous vein.     RIGHT: No deep vein thrombosis. No superficial thrombophlebitis. No popliteal cyst.    LEFT: No deep vein thrombosis. No superficial thrombophlebitis. No popliteal cyst.      Impression    IMPRESSION:  1.  No deep venous thrombosis in the bilateral lower extremities.   NM Lung Scan Ventilation and Perfusion    Narrative    EXAM: NM LUNG SCAN VENTILATION AND PERFUSION  LOCATION: [facility]  DATE: 4/30/2025    INDICATION: Shortness of breath, hypoxic  COMPARISON: Chest x-ray 4/29/2025  TECHNIQUE: 55 mCi Tc-99m DTPA inhaled. 6.2 mCi Tc-99m MAA, IV. Standard planar imaging during perfusion and ventilation portions of exam.    FINDINGS: Decreased ventilation and perfusion within the right lower hemithorax, likely related to the known pleural effusion. No mismatched segmental perfusion defect.      Impression    IMPRESSION:    No evidence of pulmonary embolism.

## 2025-04-30 NOTE — PROGRESS NOTES
Full note to follow.    Vitals, labs and imaging reviewed.  No convincing set of findings to support cholecystitis.

## 2025-04-30 NOTE — PROGRESS NOTES
Patient was placed on BiPAP 14/6 Oxygen  30  %; Alarm Volume 10. Skin protective barriers were applied.   Skin Assessment: Slight redness from the O2/NC.      Patient tolerating well.  RT will continue to monitor.     Daphnie Richmond, RT,   4/30/2025 1:57 PM14//8

## 2025-04-30 NOTE — ED NOTES
Patient incontinent of urine and formed stool. Pt was changed, assist of 2. Able to turn self in bed with minimal assistance. Pt declined to change into a gown at this time. New brief, blankets, and linens provided. Pt's IV was uncomfortable in the AC, so a new IV was placed in the forearm per pt request. Patient denies any needs at this time.

## 2025-04-30 NOTE — ED NOTES
Patient observed resting in bed with eyes closed. Respirations remain even, non-labored on 2L NC. Cardiac, BP, and SpO2 monitoring remain in place. Patient appears in no acute distress.

## 2025-04-30 NOTE — ED NOTES
Patient became hypotensive, RRT was called per charge RN. RRT team came down and assessed patient. Labs done since with lactic acid. Straight cath done for urine sample, stat echo cardiogram also done. Patient currently under sepsis care. BIPAP ordered for ph of 7.17. Respiratory care team paged.

## 2025-04-30 NOTE — CODE/RAPID RESPONSE
RRT Flying [employer]:    NICOM initialization values 09:54AM:  /58  HR 54  SVI 40  CI 2.2  CO 4.5    Dynamic assessment for fluid responsiveness on hold 2/2 stat ECHO at bedside.    10:30 Start HOB 45':  HR 55  BP 95/56(69)  SVI 45  CI 2.5  CO 5.2  TPRI 2842    Passive Leg Raise:  HR 57  BP 97/58(71)  SVI 38  CI 2.1  CO 4.5  TPRI 2664    Result:   Change SVI =-11.0%.  NOT FLUID RESPONSIVE.    Plan for IMC admit.  NICOM leads to stay on for 24hrs further assessments if needed.

## 2025-04-30 NOTE — ED NOTES
Patient resting in bed with eyes closed. Able to do microturns independently on the stretcher. Pt continues on all monitoring and appears in no acute distress.

## 2025-05-01 ENCOUNTER — APPOINTMENT (OUTPATIENT)
Dept: OCCUPATIONAL THERAPY | Facility: CLINIC | Age: 70
DRG: 280 | End: 2025-05-01
Attending: STUDENT IN AN ORGANIZED HEALTH CARE EDUCATION/TRAINING PROGRAM
Payer: COMMERCIAL

## 2025-05-01 LAB
% LINING CELLS, BODY FLUID: 2 %
ALBUMIN SERPL BCG-MCNC: 3.3 G/DL (ref 3.5–5.2)
ALP SERPL-CCNC: 252 U/L (ref 40–150)
ALT SERPL W P-5'-P-CCNC: 197 U/L (ref 0–50)
ANION GAP SERPL CALCULATED.3IONS-SCNC: 12 MMOL/L (ref 7–15)
ANION GAP SERPL CALCULATED.3IONS-SCNC: 13 MMOL/L (ref 7–15)
AST SERPL W P-5'-P-CCNC: 106 U/L (ref 0–45)
ATRIAL RATE - MUSE: 87 BPM
BACTERIA BLD CULT: NORMAL
BACTERIA BLD CULT: NORMAL
BACTERIA PLR CULT: NORMAL
BACTERIA UR CULT: ABNORMAL
BASE EXCESS BLDV CALC-SCNC: -0.8 MMOL/L (ref -3–3)
BASE EXCESS BLDV CALC-SCNC: -2 MMOL/L (ref -3–3)
BASE EXCESS BLDV CALC-SCNC: 1.1 MMOL/L (ref -3–3)
BILIRUB SERPL-MCNC: 0.9 MG/DL
BUN SERPL-MCNC: 62 MG/DL (ref 8–23)
BUN SERPL-MCNC: 63.1 MG/DL (ref 8–23)
CALCIUM SERPL-MCNC: 8.5 MG/DL (ref 8.8–10.4)
CALCIUM SERPL-MCNC: 8.7 MG/DL (ref 8.8–10.4)
CHLORIDE SERPL-SCNC: 106 MMOL/L (ref 98–107)
CHLORIDE SERPL-SCNC: 107 MMOL/L (ref 98–107)
CREAT SERPL-MCNC: 3.25 MG/DL (ref 0.51–0.95)
CREAT SERPL-MCNC: 3.57 MG/DL (ref 0.51–0.95)
DIASTOLIC BLOOD PRESSURE - MUSE: NORMAL MMHG
EGFRCR SERPLBLD CKD-EPI 2021: 13 ML/MIN/1.73M2
EGFRCR SERPLBLD CKD-EPI 2021: 15 ML/MIN/1.73M2
ERYTHROCYTE [DISTWIDTH] IN BLOOD BY AUTOMATED COUNT: 14.4 % (ref 10–15)
GLUCOSE BLDC GLUCOMTR-MCNC: 137 MG/DL (ref 70–99)
GLUCOSE BLDC GLUCOMTR-MCNC: 138 MG/DL (ref 70–99)
GLUCOSE BLDC GLUCOMTR-MCNC: 161 MG/DL (ref 70–99)
GLUCOSE SERPL-MCNC: 132 MG/DL (ref 70–99)
GLUCOSE SERPL-MCNC: 148 MG/DL (ref 70–99)
GRAM STAIN RESULT: NORMAL
GRAM STAIN RESULT: NORMAL
HCO3 BLDV-SCNC: 27 MMOL/L (ref 21–28)
HCO3 BLDV-SCNC: 27 MMOL/L (ref 21–28)
HCO3 BLDV-SCNC: 28 MMOL/L (ref 21–28)
HCO3 SERPL-SCNC: 24 MMOL/L (ref 22–29)
HCO3 SERPL-SCNC: 25 MMOL/L (ref 22–29)
HCT VFR BLD AUTO: 38.1 % (ref 35–47)
HGB BLD-MCNC: 11.6 G/DL (ref 11.7–15.7)
INTERPRETATION ECG - MUSE: NORMAL
LDH SERPL L TO P-CCNC: 249 U/L (ref 0–250)
LYMPHOCYTES NFR FLD MANUAL: 80 %
MAGNESIUM SERPL-MCNC: 2.2 MG/DL (ref 1.7–2.3)
MAGNESIUM SERPL-MCNC: 2.5 MG/DL (ref 1.7–2.3)
MCH RBC QN AUTO: 29.5 PG (ref 26.5–33)
MCHC RBC AUTO-ENTMCNC: 30.4 G/DL (ref 31.5–36.5)
MCV RBC AUTO: 97 FL (ref 78–100)
MONOS+MACROS NFR FLD MANUAL: 10 %
NEUTS BAND NFR FLD MANUAL: 7 %
O2/TOTAL GAS SETTING VFR VENT: 30 %
O2/TOTAL GAS SETTING VFR VENT: 30 %
O2/TOTAL GAS SETTING VFR VENT: 55 %
OTHER CELLS FLD MANUAL: 1 %
OXYHGB MFR BLDV: 79 % (ref 70–75)
OXYHGB MFR BLDV: 82 % (ref 70–75)
OXYHGB MFR BLDV: 84 % (ref 70–75)
P AXIS - MUSE: NORMAL DEGREES
PATH REV: NORMAL
PCO2 BLDV: 56 MM HG (ref 40–50)
PCO2 BLDV: 57 MM HG (ref 40–50)
PCO2 BLDV: 63 MM HG (ref 40–50)
PH BLDV: 7.23 [PH] (ref 7.32–7.43)
PH BLDV: 7.28 [PH] (ref 7.32–7.43)
PH BLDV: 7.31 [PH] (ref 7.32–7.43)
PHOSPHATE SERPL-MCNC: 5.5 MG/DL (ref 2.5–4.5)
PLATELET # BLD AUTO: 212 10E3/UL (ref 150–450)
PO2 BLDV: 50 MM HG (ref 25–47)
PO2 BLDV: 51 MM HG (ref 25–47)
PO2 BLDV: 54 MM HG (ref 25–47)
POTASSIUM SERPL-SCNC: 3.8 MMOL/L (ref 3.4–5.3)
POTASSIUM SERPL-SCNC: 4.2 MMOL/L (ref 3.4–5.3)
PR INTERVAL - MUSE: NORMAL MS
PROT SERPL-MCNC: 5.9 G/DL (ref 6.4–8.3)
QRS DURATION - MUSE: 92 MS
QT - MUSE: 270 MS
QTC - MUSE: 433 MS
R AXIS - MUSE: 246 DEGREES
RBC # BLD AUTO: 3.93 10E6/UL (ref 3.8–5.2)
SAO2 % BLDV: 79.6 % (ref 70–75)
SAO2 % BLDV: 83 % (ref 70–75)
SAO2 % BLDV: 86 % (ref 70–75)
SODIUM SERPL-SCNC: 143 MMOL/L (ref 135–145)
SODIUM SERPL-SCNC: 144 MMOL/L (ref 135–145)
SYSTOLIC BLOOD PRESSURE - MUSE: NORMAL MMHG
T AXIS - MUSE: -87 DEGREES
UFH PPP CHRO-ACNC: 0.12 IU/ML
UFH PPP CHRO-ACNC: 0.25 IU/ML
UFH PPP CHRO-ACNC: 0.31 IU/ML
VENTRICULAR RATE- MUSE: 155 BPM
WBC # BLD AUTO: 10.6 10E3/UL (ref 4–11)

## 2025-05-01 PROCEDURE — 258N000003 HC RX IP 258 OP 636: Performed by: STUDENT IN AN ORGANIZED HEALTH CARE EDUCATION/TRAINING PROGRAM

## 2025-05-01 PROCEDURE — 83735 ASSAY OF MAGNESIUM: CPT | Performed by: INTERNAL MEDICINE

## 2025-05-01 PROCEDURE — 93005 ELECTROCARDIOGRAM TRACING: CPT

## 2025-05-01 PROCEDURE — 200N000001 HC R&B ICU

## 2025-05-01 PROCEDURE — 99291 CRITICAL CARE FIRST HOUR: CPT | Performed by: INTERNAL MEDICINE

## 2025-05-01 PROCEDURE — 250N000011 HC RX IP 250 OP 636: Performed by: INTERNAL MEDICINE

## 2025-05-01 PROCEDURE — 99233 SBSQ HOSP IP/OBS HIGH 50: CPT | Performed by: INTERNAL MEDICINE

## 2025-05-01 PROCEDURE — 258N000003 HC RX IP 258 OP 636: Performed by: INTERNAL MEDICINE

## 2025-05-01 PROCEDURE — 85041 AUTOMATED RBC COUNT: CPT | Performed by: INTERNAL MEDICINE

## 2025-05-01 PROCEDURE — 250N000011 HC RX IP 250 OP 636: Performed by: STUDENT IN AN ORGANIZED HEALTH CARE EDUCATION/TRAINING PROGRAM

## 2025-05-01 PROCEDURE — 250N000011 HC RX IP 250 OP 636

## 2025-05-01 PROCEDURE — 250N000009 HC RX 250: Performed by: INTERNAL MEDICINE

## 2025-05-01 PROCEDURE — 85520 HEPARIN ASSAY: CPT | Performed by: INTERNAL MEDICINE

## 2025-05-01 PROCEDURE — 94660 CPAP INITIATION&MGMT: CPT

## 2025-05-01 PROCEDURE — 82805 BLOOD GASES W/O2 SATURATION: CPT | Performed by: INTERNAL MEDICINE

## 2025-05-01 PROCEDURE — 85018 HEMOGLOBIN: CPT | Performed by: INTERNAL MEDICINE

## 2025-05-01 PROCEDURE — 84100 ASSAY OF PHOSPHORUS: CPT | Performed by: INTERNAL MEDICINE

## 2025-05-01 PROCEDURE — 99291 CRITICAL CARE FIRST HOUR: CPT | Mod: FS | Performed by: PHYSICIAN ASSISTANT

## 2025-05-01 PROCEDURE — 83615 LACTATE (LD) (LDH) ENZYME: CPT | Performed by: INTERNAL MEDICINE

## 2025-05-01 PROCEDURE — 80053 COMPREHEN METABOLIC PANEL: CPT | Performed by: INTERNAL MEDICINE

## 2025-05-01 PROCEDURE — 97530 THERAPEUTIC ACTIVITIES: CPT | Mod: GO | Performed by: OCCUPATIONAL THERAPIST

## 2025-05-01 PROCEDURE — 97166 OT EVAL MOD COMPLEX 45 MIN: CPT | Mod: GO | Performed by: OCCUPATIONAL THERAPIST

## 2025-05-01 PROCEDURE — 999N000157 HC STATISTIC RCP TIME EA 10 MIN

## 2025-05-01 PROCEDURE — 85520 HEPARIN ASSAY: CPT | Performed by: STUDENT IN AN ORGANIZED HEALTH CARE EDUCATION/TRAINING PROGRAM

## 2025-05-01 PROCEDURE — 250N000009 HC RX 250: Performed by: STUDENT IN AN ORGANIZED HEALTH CARE EDUCATION/TRAINING PROGRAM

## 2025-05-01 PROCEDURE — 83735 ASSAY OF MAGNESIUM: CPT

## 2025-05-01 PROCEDURE — 250N000013 HC RX MED GY IP 250 OP 250 PS 637: Performed by: STUDENT IN AN ORGANIZED HEALTH CARE EDUCATION/TRAINING PROGRAM

## 2025-05-01 RX ORDER — NOREPINEPHRINE BITARTRATE 0.02 MG/ML
.01-.6 INJECTION, SOLUTION INTRAVENOUS CONTINUOUS
Status: DISCONTINUED | OUTPATIENT
Start: 2025-05-01 | End: 2025-05-03

## 2025-05-01 RX ORDER — EPINEPHRINE IN 0.9 % SOD CHLOR 5 MG/250ML
.01-.3 PLASTIC BAG, INJECTION (ML) INTRAVENOUS CONTINUOUS
Status: DISCONTINUED | OUTPATIENT
Start: 2025-05-01 | End: 2025-05-02

## 2025-05-01 RX ORDER — FUROSEMIDE 10 MG/ML
40 INJECTION INTRAMUSCULAR; INTRAVENOUS ONCE
Status: COMPLETED | OUTPATIENT
Start: 2025-05-01 | End: 2025-05-01

## 2025-05-01 RX ORDER — DOBUTAMINE HYDROCHLORIDE 200 MG/100ML
5 INJECTION INTRAVENOUS CONTINUOUS
Status: DISCONTINUED | OUTPATIENT
Start: 2025-05-01 | End: 2025-05-02

## 2025-05-01 RX ADMIN — HEPARIN SODIUM 1350 UNITS/HR: 10000 INJECTION, SOLUTION INTRAVENOUS at 16:22

## 2025-05-01 RX ADMIN — HEPARIN SODIUM 1200 UNITS/HR: 10000 INJECTION, SOLUTION INTRAVENOUS at 01:15

## 2025-05-01 RX ADMIN — PIPERACILLIN AND TAZOBACTAM 2.25 G: 2; .25 INJECTION, POWDER, FOR SOLUTION INTRAVENOUS at 17:18

## 2025-05-01 RX ADMIN — PIPERACILLIN AND TAZOBACTAM 2.25 G: 2; .25 INJECTION, POWDER, FOR SOLUTION INTRAVENOUS at 05:31

## 2025-05-01 RX ADMIN — FUROSEMIDE 40 MG: 10 INJECTION, SOLUTION INTRAMUSCULAR; INTRAVENOUS at 14:22

## 2025-05-01 RX ADMIN — PIPERACILLIN AND TAZOBACTAM 2.25 G: 2; .25 INJECTION, POWDER, FOR SOLUTION INTRAVENOUS at 11:15

## 2025-05-01 RX ADMIN — AMIODARONE HYDROCHLORIDE 0.5 MG/MIN: 1.8 INJECTION, SOLUTION INTRAVENOUS at 23:32

## 2025-05-01 RX ADMIN — NOREPINEPHRINE BITARTRATE 0.03 MCG/KG/MIN: 0.02 INJECTION, SOLUTION INTRAVENOUS at 18:52

## 2025-05-01 RX ADMIN — LEVOTHYROXINE SODIUM 75 MCG: 75 TABLET ORAL at 09:03

## 2025-05-01 RX ADMIN — ASPIRIN 81 MG CHEWABLE TABLET 81 MG: 81 TABLET CHEWABLE at 09:03

## 2025-05-01 RX ADMIN — MIRTAZAPINE 30 MG: 30 TABLET, FILM COATED ORAL at 22:00

## 2025-05-01 RX ADMIN — EPINEPHRINE 0.05 MCG/KG/MIN: 1 INJECTION INTRAMUSCULAR; INTRAVENOUS; SUBCUTANEOUS at 18:12

## 2025-05-01 RX ADMIN — LEVETIRACETAM 500 MG: 500 TABLET, FILM COATED ORAL at 09:03

## 2025-05-01 RX ADMIN — SERTRALINE HYDROCHLORIDE 150 MG: 100 TABLET ORAL at 09:03

## 2025-05-01 RX ADMIN — AMIODARONE HYDROCHLORIDE 1 MG/MIN: 1.8 INJECTION, SOLUTION INTRAVENOUS at 17:17

## 2025-05-01 RX ADMIN — LACTASE TAB 3000 UNIT 9000 UNITS: 3000 TAB at 09:03

## 2025-05-01 RX ADMIN — PIPERACILLIN AND TAZOBACTAM 2.25 G: 2; .25 INJECTION, POWDER, FOR SOLUTION INTRAVENOUS at 22:14

## 2025-05-01 RX ADMIN — AMIODARONE HYDROCHLORIDE 150 MG: 1.5 INJECTION, SOLUTION INTRAVENOUS at 21:54

## 2025-05-01 RX ADMIN — EPINEPHRINE 0.06 MCG/KG/MIN: 1 INJECTION INTRAMUSCULAR; INTRAVENOUS; SUBCUTANEOUS at 04:09

## 2025-05-01 RX ADMIN — GABAPENTIN 200 MG: 100 CAPSULE ORAL at 22:00

## 2025-05-01 RX ADMIN — LEVETIRACETAM 500 MG: 500 TABLET, FILM COATED ORAL at 21:59

## 2025-05-01 RX ADMIN — PANTOPRAZOLE SODIUM 40 MG: 40 TABLET, DELAYED RELEASE ORAL at 09:03

## 2025-05-01 RX ADMIN — FUROSEMIDE 5 MG/HR: 10 INJECTION, SOLUTION INTRAVENOUS at 13:57

## 2025-05-01 RX ADMIN — AMIODARONE HYDROCHLORIDE 150 MG: 1.5 INJECTION, SOLUTION INTRAVENOUS at 16:56

## 2025-05-01 RX ADMIN — DOBUTAMINE HYDROCHLORIDE 5 MCG/KG/MIN: 200 INJECTION INTRAVENOUS at 09:48

## 2025-05-01 ASSESSMENT — ACTIVITIES OF DAILY LIVING (ADL)
ADLS_ACUITY_SCORE: 69
ADLS_ACUITY_SCORE: 64
ADLS_ACUITY_SCORE: 69
PREVIOUS_RESPONSIBILITIES: MEAL PREP
ADLS_ACUITY_SCORE: 69
ADLS_ACUITY_SCORE: 65
ADLS_ACUITY_SCORE: 69
ADLS_ACUITY_SCORE: 64
ADLS_ACUITY_SCORE: 69
ADLS_ACUITY_SCORE: 69
ADLS_ACUITY_SCORE: 64
ADLS_ACUITY_SCORE: 64
ADLS_ACUITY_SCORE: 69
ADLS_ACUITY_SCORE: 64
ADLS_ACUITY_SCORE: 69
ADLS_ACUITY_SCORE: 64

## 2025-05-01 NOTE — PLAN OF CARE
Problem: Adult Inpatient Plan of Care  Goal: Absence of Hospital-Acquired Illness or Injury  Intervention: Identify and Manage Fall Risk  Recent Flowsheet Documentation  Taken 5/1/2025 0600 by Winnie Melgar RN  Safety Promotion/Fall Prevention: safety round/check completed  Taken 5/1/2025 0400 by Winnie Melgar RN  Safety Promotion/Fall Prevention:   activity supervised   lighting adjusted   increase visualization of patient   increased rounding and observation   clutter free environment maintained   room door open   room near nurse's station   supervised activity   room organization consistent   treat reversible contributory factors   treat underlying cause   safety round/check completed  Taken 5/1/2025 0000 by Winnie Melgar RN  Safety Promotion/Fall Prevention:   activity supervised   lighting adjusted   increase visualization of patient   increased rounding and observation   clutter free environment maintained   room door open   room near nurse's station   supervised activity   room organization consistent   treat reversible contributory factors   treat underlying cause   safety round/check completed  Taken 4/30/2025 2200 by Winnie Melgar RN  Safety Promotion/Fall Prevention: safety round/check completed  Taken 4/30/2025 2000 by Winnie Melgar RN  Safety Promotion/Fall Prevention:   activity supervised   lighting adjusted   increase visualization of patient   increased rounding and observation   clutter free environment maintained   room door open   room near nurse's station   supervised activity   room organization consistent   treat reversible contributory factors   treat underlying cause   safety round/check completed     Problem: Adult Inpatient Plan of Care  Goal: Absence of Hospital-Acquired Illness or Injury  Intervention: Prevent Skin Injury  Recent Flowsheet Documentation  Taken 5/1/2025 0500 by Winnie Melgar RN  Body Position:   turned   side-lying   heels elevated   neutral head  position   upper extremity elevated  Taken 5/1/2025 0400 by Winnie Melgar RN  Body Position:   turned   side-lying   heels elevated   neutral head position   upper extremity elevated  Taken 5/1/2025 0200 by Winnie Melgar RN  Body Position:   turned   side-lying   heels elevated   neutral head position   upper extremity elevated  Taken 5/1/2025 0000 by Winnie Melgar RN  Body Position:   turned   side-lying   heels elevated   neutral head position   upper extremity elevated  Taken 4/30/2025 2200 by Winnie Melgar RN  Body Position:   turned   side-lying   heels elevated   neutral head position   upper extremity elevated  Taken 4/30/2025 2000 by Winnie Melgar RN  Body Position:   turned   side-lying   heels elevated   neutral head position   upper extremity elevated     Problem: Delirium  Goal: Improved Sleep  Intervention: Promote Sleep  Recent Flowsheet Documentation  Taken 5/1/2025 0400 by Winnie Melgar RN  Sleep/Rest Enhancement:   regular sleep/rest pattern promoted   noise level reduced   natural light exposure provided   family presence promoted  Taken 5/1/2025 0000 by Winnie Melgar RN  Sleep/Rest Enhancement:   regular sleep/rest pattern promoted   noise level reduced   natural light exposure provided   family presence promoted  Taken 4/30/2025 2000 by Winnie Melgar RN  Sleep/Rest Enhancement:   regular sleep/rest pattern promoted   noise level reduced   natural light exposure provided   family presence promoted   Goal Outcome Evaluation:                 Outcome Evaluation: Pt alert, oriented x4.  Had slight pain to the R-neck/CVC site - warm pack relieved symptoms.  4 L NC keeps sats 95% or better.  Bipap on for approx 5 hours, pt tolerated well and slept soundly.  Heparin gtt infusing; epinephrine gtt weaned from 0.07 to 0.05 overnight.  Pt unable to void until 0500, post void residual 369 ml.  Will have dayshift RN check BS as needed.  70-80s NSR, Tmax 99.8 in the evening, but  afebrile since midnoc.

## 2025-05-01 NOTE — PLAN OF CARE
Goal Outcome Evaluation:      Plan of Care Reviewed With: patient, durable power of , friend    Overall Patient Progress: improvingOverall Patient Progress: improving    Outcome Evaluation: Patient up from ER. Rt IJ placed by MDs. Thoracentesis done by MDs. Patient alert and oriented. Epi drip to keep MAP>65. Heparin drip resumed after thoracentesis. Lab recheck was retimed accordingly. Patient denies pain. Clear liquid diet. 4L nasal cannula vs BiPAP 40%. Patient has not voided yet. Night RN to do bladder scan. No BM.    Nj Winston, RN 8:00 PM 04/30/25

## 2025-05-01 NOTE — PROGRESS NOTES
Nephrology Progress Note  05/01/2025         Assessment & Recommendations:   1 shock-septic versus cardiogenic. Normal WBC, high Pro-Jean Carlos, pyuria.  Empirically on antibiotics.  - Presented with NSTEMI , ?  Stress cardiomyopathy.  EF 45-50%, mildly reduced RV function, pulmonary hypertension-RVSP 40 mmHg plus RA  Dilated IVC with abnormal respiratory variability  - Received 2.6 L IV fluid without significant improvement in blood pressure     2 GIOVANNI-presumed GIOVANNI.  Last known creatinine of 0.8 in 2023.  Presenting creatinine of 3.2.  Likely ischemic ATN in setting of shock. FeNA is 0.8 but did not respond to IV fluid. ?  Component of CRS  UA relatively bland-1+ proteinuria, 2 RBC per high-power field.  no casts  -Making urine.  Noted plans for Chance catheter.  Creatinine stable     3 elevated LFT- -?  Shock liver.  Improving .  Ultrasound equivocal for acute Alice.  Clinically not consistent with cholecystitis     4 acute hypoxic/hypercapnic respiratory failure, now on BiPAP.  Large pleural effusion right side.  High proBNP  -Status post thoracentesis.     Other issues-  Coronary disorder with TBI -currently in a group home.  On Kera     Recommendation-  Continue supportive manage  Agree with plans for dobutamine.  If blood pressure allows, can consider diuresing with Lasix later today.  Daily renal panel    Critically ill patient with high risk of decompensation.  No indication for dialysis yet but will need close monitoring    Recommendations were communicated to primary team in person    Yadira Grayson MD  [facility] - Nephrology   [phone]      Interval History :   Seen / examined.   Overnight events noted.  Transferred to ICU with persistent hypotension.  On epinephrine drip.  On BiPAP with persistent hypercapnia.  Thoracentesis done yesterday  Creatinine stable      Review of Systems:   Unable.  Patient on BiPAP    Physical Exam:   I/O last 3 completed shifts:  In: 952.53 [P.O.:220; I.V.:432.53; IV  "Piggyback:300]  Out: 369 [Urine:369]  /53 (BP Location: Right arm)   Pulse 73   Temp 97.8  F (36.6  C) (Axillary)   Resp 21   Ht 1.651 m (5' 5\")   Wt 90.5 kg (199 lb 8 oz)   SpO2 96%   BMI 33.20 kg/m      GENERAL APPEARANCE: On BiPAP  Pulmonary: Diminished at bases  CV: Tachycardic, regular   - Edema-trace  GI: soft, nontender,   MS: no evidence of inflammation in joints  SKIN: no rash, warm, dry, no cyanosis  NEURO: face symmetric, no asterixis     Labs:   All labs reviewed by me  Electrolytes/Renal -   Recent Labs   Lab Test 05/01/25  0830 05/01/25  0523 05/01/25  0013 04/30/25  0934 04/30/25  0558 04/29/25  2239 05/19/21  0827 01/30/18  0620 01/29/18  0624 01/27/18  0840 01/27/18  0400     --   --   --  142 141   < > 139 141   < >  --    POTASSIUM 4.2  --   --   --  5.3 5.5*   < > 3.9 3.9   < > 3.8   CHLORIDE 107  --   --   --  103 102   < > 107 109   < >  --    CO2 24  --   --   --  24 27   < > 24 25   < >  --    BUN 63.1*  --   --   --  60.7* 55.6*   < > 13 13   < >  --    CR 3.57*  --   --   --  3.69* 3.59*   < > 0.69 0.70   < >  --    * 138* 137*   < > 96 122*   < > 95 80   < >  --    HECTOR 8.5*  --   --   --  8.1* 8.3*   < > 8.4* 8.1*   < >  --    MAG 2.5*  --   --   --   --   --   --  2.2  --   --  2.5*   PHOS 5.5*  --   --   --   --   --   --  3.5 2.8  --  2.1*    < > = values in this interval not displayed.       CBC -   Recent Labs   Lab Test 05/01/25  0830 04/30/25  0934 04/29/25  2102   WBC 10.6 8.3 12.0*   HGB 11.6* 11.9 11.1*    196 194       LFTs -   Recent Labs   Lab Test 05/01/25  0830 04/30/25  0934 04/30/25  0558 04/29/25  1533   ALKPHOS 252*  --  301* 335*   BILITOTAL 0.9  --  0.8 1.5*   *  --  323* 416*   *  --  307* 518*   PROTTOTAL 5.9* 5.7* 5.7* 5.9*   ALBUMIN 3.3*  --  3.2* 3.3*           Current Medications:  Current Facility-Administered Medications   Medication Dose Route Frequency Provider Last Rate Last Admin    aspirin (ASA) chewable " tablet 81 mg  81 mg Oral Daily Dannielle Gunn, DO   81 mg at 05/01/25 0903    gabapentin (NEURONTIN) capsule 200 mg  200 mg Oral At Bedtime Dannielle Gunn, DO   200 mg at 04/30/25 2229    lactase (LACTAID) tablet 9,000 Units  9,000 Units Oral TID w/meals Dannielle Gunn, DO   9,000 Units at 05/01/25 0903    levETIRAcetam (KEPPRA) tablet 500 mg  500 mg Oral BID Dannielle Gunn, DO   500 mg at 05/01/25 0903    levothyroxine (SYNTHROID/LEVOTHROID) tablet 75 mcg  75 mcg Oral QAM AC Dannielle Gunn DO   75 mcg at 05/01/25 0903    [Held by provider] metoprolol tartrate (LOPRESSOR) tablet 75 mg  75 mg Oral BID Dannielle Gunn DO        mirtazapine (REMERON) tablet 30 mg  30 mg Oral At Bedtime Dannielle Gunn, DO   30 mg at 04/30/25 2229    pantoprazole (PROTONIX) EC tablet 40 mg  40 mg Oral QAM Dannielle Gunn, DO   40 mg at 05/01/25 0903    piperacillin-tazobactam (ZOSYN) 2.25 g vial to attach to  ml bag  2.25 g Intravenous Q6H Dannielle Gunn, DO   2.25 g at 05/01/25 0531    [Held by provider] QUEtiapine (SEROquel) tablet 25 mg  25 mg Oral 4x Daily Dannielle Gunn DO   25 mg at 04/30/25 0748    sertraline (ZOLOFT) tablet 150 mg  150 mg Oral Daily Dannielle Gunn, DO   150 mg at 05/01/25 0903    sodium chloride (PF) 0.9% PF flush 3 mL  3 mL Intracatheter Q8H [facility] Niki Montoya NP   3 mL at 05/01/25 0539    sodium chloride (PF) 0.9% PF flush 3 mL  3 mL Intracatheter Q8H [facility] Dannielle Gunn, DO         Current Facility-Administered Medications   Medication Dose Route Frequency Provider Last Rate Last Admin    Continuing beta blocker from home medication list OR beta blocker order already placed during this visit   Does not apply DOES NOT GO TO Dannielle Sousa, DO        Continuing statin from home medication list OR statin order already placed during this visit   Does not apply DOES NOT GO TO Dannielle Sousa,         DOBUTamine (DOBUTREX) 500 mg in D5W 250  mL infusion (adult std conc)  5 mcg/kg/min Intravenous Continuous Noel Nash MD 13.6 mL/hr at 05/01/25 0948 5 mcg/kg/min at 05/01/25 0948    EPINEPHrine 5 mg in 250 mL NS (ADRENALIN) PERIPHERAL infusion  0.03-0.125 mcg/kg/min Intravenous Continuous Noel Nash MD 15.3 mL/hr at 05/01/25 0600 0.05 mcg/kg/min at 05/01/25 0600    heparin 25,000 units in 0.45% NaCl 250 mL ANTICOAGULANT infusion  0-5,000 Units/hr Intravenous Continuous Noel Nash MD 13.5 mL/hr at 05/01/25 0118 1,350 Units/hr at 05/01/25 0118    No lozenges or gum should be given while patient on BIPAP/AVAPS/AVAPS AE   Does not apply Continuous PRN Niki Montoya NP        norepinephrine (LEVOPHED) 4 mg in  mL PERIPHERAL infusion  0.01-0.2 mcg/kg/min Intravenous Continuous Dannielle Gunn DO   Stopped at 04/30/25 1318    Patient is already receiving anticoagulation with heparin, enoxaparin (LOVENOX), warfarin (COUMADIN)  or other anticoagulant medication   Does not apply Continuous PRN Dannielle Gunn DO        Patient may continue current oral medications   Does not apply Continuous PRN Niki Montoya NP        Reason ACE/ARB/ARNI order not selected   Other DOES NOT GO TO Dannielle Sousa DO Jiwan Thapa, MD

## 2025-05-01 NOTE — PROGRESS NOTES
Care Management Follow Up    Length of Stay (days): 2    Expected Discharge Date: 05/05/2025     Concerns to be Addressed: discharge planning     Patient plan of care discussed at interdisciplinary rounds:     Anticipated Discharge Disposition: Group Home   Anticipated Discharge Services: None  Anticipated Discharge DME: None    Patient/family educated on Medicare website which has current facility and service quality ratings: no  Education Provided on the Discharge Plan: No  Patient/Family in Agreement with the Plan: yes    Referrals Placed by CM/SW:    Private pay costs discussed: Not applicable    Discussed  Partnership in Safe Discharge Planning  document with patient/family: Yes:     Handoff Completed: No, handoff not indicated or clinically appropriate    Additional Information:  Writer reached out to Monserrat and left a  [phone] - for documentation of a Health Care directive or power of  paper work.     Next Steps: waiting for paper work documentation and conversation regarding discharge planning     Monserrat left a voice mail stating -     Yes, my POA papers for Amy Bryan are on file. I verified with them yesterday, and it is still in your system.   No, she doesn't have a health care directive, but told them yesterday she wants to be recessitated. Her and I have discussed if she wasn't able to make a decision I know her wishes and I do have POA.  Hope this answers your questions.    Writer will try and verify POA information, but there is not a health care directive.     ADD - writer met with patients POA and best friend Monserrat. Monserrat price patients POA in 2012 after an accident.   Monserrat will work in finding a copy of the POA and HCD paper work. Monserrat is going out of town and wanted to be sure we know that patient has a TBI and that she would not be able to return to the group home unless she is a stand by assist.     Monserrat prefers patients goes to a TCU in the [facility]. Patient  agreeable Writer discussed transport and cost of transport and Monserrat and patient both approved of using wheelchair transport to TCU if needed. Patient may require a level 2 assist       ROSIE Mendez

## 2025-05-01 NOTE — CONSULTS
[facility] Surgery Consultation    Amy Bryan MRN# [id number]     Age: 69 year old   YOB: 1955       Date of Admission: 4/29/2025  1:21 PM      Reason for consult: Evaluate for cholecystitis       Requesting physician: Dannielle Gunn       Level of consult: Consult, follow and place orders      Assessment/Plan/Recommendations:    This 69 year old year old female has elevated LFTs, no abdominal pain or discomfort.  She has gallstones and wall thickening but no stranding on non-contrast CT.  There is currently no evidence to support cholecystitis.  Will sign off.            Chief Complaint:     69 year old female presented with fatigue of 2 days.  She was hypoxic, with elevated troponin.  She was started on a heparin gtt.  LFTs were elevated prompting ultrasound which showed cholelithiasis, nonspecific gallbladder wall thickening.  Non-contrast CT showed Cholelithiasis with gallbladder wall edema but no significant fat stranding.    She has a TBI and developmental delay, but is able to discuss pain.  She reports no history of RUQ/epigastric pain/discomfort.  She has none currently, also.             Past Medical History:     Past Medical History:   Diagnosis Date    Cardiogenic shock (H) 07/09/2021    Coma (H) 05/2012    CHT after a fall    Coronary artery disease involving native coronary artery of native heart 07/09/2021    3 vessel    Developmental delay     DVT (deep vein thrombosis) in pregnancy     post trauma    Fall 05/2012    multiple fracture    Hypertension     Menopause     age 52    Pelvic fracture (H) 5-201`2    Rib fracture 05/2012    STEMI (ST elevation myocardial infarction) (H) 01/25/2018             Past Surgical History:     Past Surgical History:   Procedure Laterality Date    COLONOSCOPY  01/01/2010    CV CORONARY LITHOTRIPSY PCI N/A 8/3/2021    Procedure: CV Coronary Lithotripsy PCI;  Surgeon: Kamran Singleton MD;  [facility] CARDIAC CATH LAB    CV  HEART CATHETERIZATION WITH POSSIBLE INTERVENTION N/A 8/3/2021    Procedure: Heart Catheterization with Possible Intervention;  Surgeon: Kamran Singleton MD;  Location:  HEART CARDIAC CATH LAB    CV INTRAVASULAR ULTRASOUND N/A 8/3/2021    Procedure: Intravascular Ultrasound;  Surgeon: Kamran Singleton MD;  Location:  HEART CARDIAC CATH LAB    CV PCI STENT DRUG ELUTING N/A 8/3/2021    Procedure: Percutaneous Coronary Intervention Stent Drug Eluting;  Surgeon: Kamran Singleton MD;  Location:  HEART CARDIAC CATH LAB    HEART CATH DRUG ELUTING STENT PLACEMENT N/A 01/25/2018    LASER YAG CAPSULOTOMY Left 05/29/2018    Procedure: LASER YAG CAPSULOTOMY;  LEFT YAG LASER CAPSULOTOMY ;  Surgeon: Tabby Guillaume MD;  Location:  [facility]    LASER YAG CAPSULOTOMY Right 06/05/2018    Procedure: LASER YAG CAPSULOTOMY;  RIGHT EYE YAG LASER CAPSULOTOMY ;  Surgeon: Tabby Guillaume MD;  Location: [facility]                Family History:     Family History   Problem Relation Age of Onset    Other - See Comments Father         alpha 1 antitrypsin deficiency     Cancer - colorectal Mother     Diabetes Sister                 Medications:     Current Facility-Administered Medications   Medication Dose Route Frequency Provider Last Rate Last Admin    aspirin (ASA) chewable tablet 81 mg  81 mg Oral Daily Dannielle Gunn DO   81 mg at 04/30/25 0748    calcium carbonate (TUMS) chewable tablet 1,000 mg  1,000 mg Oral 4x Daily PRN Dannielle Gunn, DO        carboxymethylcellulose PF (REFRESH PLUS) 0.5 % ophthalmic solution 1 drop  1 drop Both Eyes Q1H PRN Niki Montoya, NP        Continuing beta blocker from home medication list OR beta blocker order already placed during this visit   Does not apply DOES NOT GO TO Dannielle Sousa DO        Continuing statin from home medication list OR statin order already placed during this visit   Does not apply DOES NOT GO TO Dannielle Sousa DO        glucose  gel 15-30 g  15-30 g Oral Q15 Min PRN Emmie Gillis MD        Or    dextrose 50 % injection 25-50 mL  25-50 mL Intravenous Q15 Min PRN Emmie Gillis MD        Or    glucagon injection 1 mg  1 mg Subcutaneous Q15 Min PRN Emmie Gillis MD        EPINEPHrine 5 mg in 250 mL NS (ADRENALIN) PERIPHERAL infusion  0.03-0.125 mcg/kg/min Intravenous Continuous Noel Nash MD 15.3 mL/hr at 04/30/25 2110 0.05 mcg/kg/min at 04/30/25 2110    gabapentin (NEURONTIN) capsule 200 mg  200 mg Oral At Bedtime Dannielle Gunn DO   200 mg at 04/29/25 2240    heparin 25,000 units in 0.45% NaCl 250 mL ANTICOAGULANT infusion  0-5,000 Units/hr Intravenous Continuous Noel Nash MD 12 mL/hr at 04/30/25 1729 1,200 Units/hr at 04/30/25 1729    HOLD: nitroGLYcerin IF   Does not apply HOLD Dannielle Gunn DO        lactase (LACTAID) tablet 9,000 Units  9,000 Units Oral TID w/meals Dannielle Gunn DO   9,000 Units at 04/30/25 2035    levETIRAcetam (KEPPRA) tablet 500 mg  500 mg Oral BID Dannielle Gunn DO   500 mg at 04/30/25 2035    levothyroxine (SYNTHROID/LEVOTHROID) tablet 75 mcg  75 mcg Oral QAM AC Dannielle Gunn, DO   75 mcg at 04/30/25 0752    lidocaine (LMX4) cream   Topical Q1H PRN Niki Montoya NP        lidocaine (LMX4) cream   Topical Q1H PRN Dannielle Gunn, DO        lidocaine 1 % 0.1-1 mL  0.1-1 mL Other Q1H PRN Niki Montoya NP        lidocaine 1 % 0.1-1 mL  0.1-1 mL Other Q1H PRN Dannielle Gunn, DO        LORazepam (ATIVAN) tablet 0.5 mg  0.5 mg Oral TID PRN Dannielle Gunn DO        [Held by provider] metoprolol tartrate (LOPRESSOR) tablet 75 mg  75 mg Oral BID Dannielle Gunn,         mirtazapine (REMERON) tablet 30 mg  30 mg Oral At Bedtime Dannielle Gunn DO   30 mg at 04/29/25 2240    nitroGLYcerin (NITROSTAT) sublingual tablet 0.4 mg  0.4 mg Sublingual Q5 Min PRN Dannielle Gunn DO        No lozenges or gum should be given while patient on BIPAP/AVAPS/AVAPS AE    Does not apply Continuous PRN Niki Montoya NP        norepinephrine (LEVOPHED) 4 mg in  mL PERIPHERAL infusion  0.01-0.2 mcg/kg/min Intravenous Continuous Dannielle Gunn, DO   Stopped at 04/30/25 1318    pantoprazole (PROTONIX) EC tablet 40 mg  40 mg Oral QAM Dannielle Gunn, DO   40 mg at 04/30/25 0748    Patient is already receiving anticoagulation with heparin, enoxaparin (LOVENOX), warfarin (COUMADIN)  or other anticoagulant medication   Does not apply Continuous PRN Dannielle Gunn, DO        Patient may continue current oral medications   Does not apply Continuous PRN Niki Montoya NP        piperacillin-tazobactam (ZOSYN) 2.25 g vial to attach to  ml bag  2.25 g Intravenous Q6H Dannielle Gunn, DO   2.25 g at 04/30/25 1755    [Held by provider] QUEtiapine (SEROquel) tablet 25 mg  25 mg Oral 4x Daily Dannielle Gunn, DO   25 mg at 04/30/25 0748    Reason ACE/ARB/ARNI order not selected   Other DOES NOT GO TO MAR Dannielle Gunn,         senna-docusate (SENOKOT-S/PERICOLACE) 8.6-50 MG per tablet 1 tablet  1 tablet Oral BID PRN Dannielle Gunn,         Or    senna-docusate (SENOKOT-S/PERICOLACE) 8.6-50 MG per tablet 2 tablet  2 tablet Oral BID PRN Dannielle Gunn, DO        sertraline (ZOLOFT) tablet 150 mg  150 mg Oral Daily Dannielle Gunn, DO   150 mg at 04/30/25 0751    sodium chloride (PF) 0.9% PF flush 3 mL  3 mL Intracatheter Q8H Niki Mayer NP        sodium chloride (PF) 0.9% PF flush 3 mL  3 mL Intracatheter q1 min prn Niki Montoya NP        sodium chloride (PF) 0.9% PF flush 3 mL  3 mL Intracatheter Q8H [facility] Dannielle Gunn, DO        sodium chloride (PF) 0.9% PF flush 3 mL  3 mL Intracatheter q1 min prn Dannielle Gunn E, DO                 Review of Systems:     Review of system is negative but for HPI.    Abdomen is soft, non-distended; she has no Damon's sign or abd ttp.          Data:     Reviewed pertinent labs and  imaging.       Lenny Molina MD

## 2025-05-01 NOTE — PROGRESS NOTES
05/01/25 0842   Appointment Info   Signing Clinician's Name / Credentials (OT) Robyn Loco OTR/L   Rehab Comments (OT) Initial Evaluation   Living Environment   People in Home facility resident  (Pt reports has 6 other women who live in the group home.)   Current Living Arrangements group home   Home Accessibility no concerns;stairs to enter home;stairs within home  (reports has a chair lift downstairs to bedroom, reports she does use the stairs.)   Number of Stairs, Within Home, Primary greater than 10 stairs  (15 stairs to downstairs bedroom.)   Transportation Anticipated family or friend will provide   Self-Care   Regular Exercise No   Equipment Currently Used at Home cane, quad   Fall history within last six months no   Activity/Exercise/Self-Care Comment walk in shower with chair, reports staff is nearby when pt showers.   Instrumental Activities of Daily Living (IADL)   Previous Responsibilities meal prep   IADL Comments pt is on a rotation to cook and bakes   General Information   Onset of Illness/Injury or Date of Surgery 04/30/25   Referring Physician Emmie Gillis MD   Patient/Family Therapy Goal Statement (OT) home   Additional Occupational Profile Info/Pertinent History of Current Problem Amy Bryan is a 69 year old female with history of TBI in 2012, CAD s/p PCI in 2018 and 2021, suspected takotsubo cardiomyopathy now recovered, presenting for vague complaints of fatigue for the last 2 days. Admitted to the ICU given need for pressors for non-fluid responsive hypotension. Work up thus far showing bilateral pleural effusions, elevated troponins and EKG changes 2/2 likely stress cardiomyopathy, likely urosepsis and elevated LFTs. Thoracentesis yesterday,   Existing Precautions/Restrictions fall;oxygen therapy device and L/min  (6 L O2 oxymask, needing to put on bipap due to labs off.)   Cognitive Status Examination   Orientation Status orientation to person, place and time    Affect/Mental Status (Cognitive) WFL   Follows Commands WFL  (needing cues to keep eyes open, but still responding. once in chair more alert.)   Cognitive Status Comments Per chart, pt has cognitive disorder due to TBI in 2012.   Visual Perception   Impact of Vision Impairment on Function (Vision) monitor   Sensory   Sensory Comments denies   Pain Assessment   Patient Currently in Pain No   Range of Motion Comprehensive   Comment, General Range of Motion B UE AROM WFL   Strength Comprehensive (MMT)   Comment, General Manual Muscle Testing (MMT) Assessment overall impaired strength   Bed Mobility   Scooting/Bridging [place] (Bed Mobility) moderate assist (50% patient effort)   Supine-Sit [place] (Bed Mobility) minimum assist (75% patient effort)   Transfer Skill: Bed to Chair/Chair to Bed   Bed-Chair [place] (Transfers) minimum assist (75% patient effort)   Assistive Device (Bed-Chair Transfers) rolling walker   Sit-Stand Transfer   Sit-Stand [place] (Transfers) minimum assist (75% patient effort)   Assistive Device (Sit-Stand Transfers) walker, front-wheeled   Lower Body Dressing Assessment/Training   [place] Level (Lower Body Dressing) moderate assist (50% patient effort)   Grooming Assessment/Training   [place] Level (Grooming) minimum assist (75% patient effort)   Clinical Impression   Criteria for Skilled Therapeutic Interventions Met (OT) Yes, treatment indicated   OT Diagnosis impaired I with ADL's and functional mobility.   OT Problem List-Impairments impacting ADL problems related to;activity tolerance impaired;balance;cognition;strength   Assessment of Occupational Performance 3-5 Performance Deficits   Identified Performance Deficits impaired I with dressing, toileitng, showering, cooking,e tc   Planned Therapy Interventions (OT) ADL retraining;cognition;transfer training;home program guidelines;progressive activity/exercise   Clinical Decision Making Complexity (OT)  detailed assessment/moderate complexity   Risk & Benefits of therapy have been explained evaluation/treatment results reviewed;care plan/treatment goals reviewed;risks/benefits reviewed;current/potential barriers reviewed;participants voiced agreement with care plan;participants included;patient   OT Total Evaluation Time   OT Eval, Moderate Complexity Minutes (46532) 10   OT Goals   Therapy Frequency (OT) 5 times/week   OT Predicted Duration/Target Date for Goal Attainment 05/09/25   OT Goals Hygiene/Grooming;Upper Body Dressing;Lower Body Dressing;Toilet Transfer/Toileting   OT: Hygiene/Grooming supervision/stand-by assist;while standing   OT: Upper Body Dressing Modified independent   OT: Lower Body Dressing Supervision/stand-by assist;using adaptive equipment   OT: Toilet Transfer/Toileting Supervision/stand-by assist;toilet transfer;cleaning and garment management;using adaptive equipment   Therapeutic Activities   Therapeutic Activity Minutes (14747) 26   Symptoms noted during/after treatment fatigue   Treatment Detail/Skilled Intervention OT: pt in bed agreed to get up into chair, pts eyes closed most of session, cues needed to keep eyes open and then more alert when moving, pt able log roll to R side with cues to reach for bed rail with MIN A, supine to sit with min/mod A with cues for moving B LE's and UE use. pt able to scoot to EOB with MIN A/CGA. sit <> stand CGA/MIN A and cues for hand placement with FWW and transferred to recliner with CGA/ miN A, pt had 1 LOB when starting to move but with CGAmin A able to regain balance with FWW. pt able to wash her face with SBA/min A and cues for thoroughness as just washed part of her face initiially.   OT Discharge Planning   OT Plan OT Plan; g/h try standing or EOB, BSC or toilet transfer with FWW, monitor vs, etc   OT Discharge Recommendation (DC Rec) Transitional Care Facility   OT Rationale for DC Rec pt lives in a group home and reports I with ADLs and  fucntional moblity with a cane, pt helps with cooking and other IADL's provided by staff. Pt currently limited due to impaired balance, strength, activity tolerance. Recommend TCU at discharge to increase ADL and functional mobility I and safety to PLOF. However, pending progress and if staff able to A pt with dressing and transfer then pt may be able to return home with home PT and OT.   OT Brief overview of current status Goals of therapy will be to address safe mobility and make recs for d/c to next level of care. Pt and RN will continue to follow all falls risk precautions as documented by RN staff while hospitalized. min A sit <> stand and transfer to chair. Mod A LE ADL's and CGA/min A for grooming.   OT Total Distance Amb During Session (feet) 8   Total Session Time   Timed Code Treatment Minutes 26   Total Session Time (sum of timed and untimed services) 36

## 2025-05-01 NOTE — PROGRESS NOTES
[facility]  Cardiology Progress Note  Date of Service: 05/01/2025  Primary Cardiologist: Dr. Wren    Assessment & Plan    Amy Bryan is a 69 year old female with past medical history significant for TBI in 2012, CAD s/p PCI in 2018 and 2021, hx of takotsubo admitted on 4/29/2025 with fatigue, we were consulted for + troponin and EF 45%      Assessment:  1.  NSTEMI, trop 994 with EF 45-50%, hx of takotsubo cardiomyopathy and CAD.     - given GIOVANNI with Cr 3.7 and lack of anginal sx will treat medically, unclear whether ACS, demand infarct or takotsubo cm    2.  GIOVANNI, baseline 0.9? ( No recent labs), 3.2 on admission, trending up    3.  Acute respiratory failure- likely secondary to acute systolic heart failure and GIOVANNI   - neg VQ scan  -large right effusion on cxr, trace left, s/p thoracentesis 4/30 by ICU, repeat cxr shows effusion resolved  - pleural fluid suggests exudate  Light's Criteria for pleural effusion.  Exudate if 1 of the following 3 are true:  1.  Pleural fluid protein/serum protein ratio greater than 0.5, or  2.  Pleural fluid LDH/serum LDH ratio greater than 0.6, or  3.  Pleural fluid LDH greater than two-thirds the upper limits of the laboratory's normal serum LDH    This pt:  1.  3.1/ 5.7= 0.54 suggests exudative  2.  127/ 249= 0.51 suggests trasudative  3.  127/ 232 (our labs normal serum) = 54% suggests transudative    4. UTI, with likely urosepsis, per ICU    5.  CAD with history of thrombectomy to the PL 3 branch and POBA to the D1 in 2018, drug-eluting stent x 3 the left main into the proximal LAD, drug-eluting stent x 1 to the D2 with ongoing small 80% D1 lesion, 70% circumflex lesion and minimal disease in the RCA in 2021.      6.  Dyslipidemia, on crestor PTA, on hold given elev LFTS    7.  Elevated LFTs, improving    Plan:   Volume management per nephrology  Continue asa and heparin, will stop heparin after 48 hours tomorrow.  Continue to hold bb and  statin  Likely repeat echo in next week depending on clinical course to determine if this Takotsubo versus ischemic.     We will continue to follow along with you.    Christine Alexander PA-C  [facility]  Pager: [phone]     Interval History   Seen with BiPAP in place.  Patient denies chest pain now or previously.  States she feels okay.    Physical Exam   Temp: 97.8  F (36.6  C) Temp src: Axillary BP: 107/53 Pulse: 73   Resp: 21 SpO2: 96 % O2 Device: Oxymask Oxygen Delivery: 4 LPM  Vitals:    04/29/25 1350 04/30/25 1400 05/01/25 0500   Weight: 102.1 kg (225 lb) 92.9 kg (204 lb 14.4 oz) 90.5 kg (199 lb 8 oz)     Ill-appearing woman in no acute distress.  Normocephalic atraumatic.  Heart is regular I do not appreciate murmur rub or gallop.  Lungs are slightly diminished but without wheezes rales or rhonchi.  Extremities with trace peripheral edema.  Skin is warm and dry.    Medications   Current Facility-Administered Medications   Medication Dose Route Frequency Provider Last Rate Last Admin    Continuing beta blocker from home medication list OR beta blocker order already placed during this visit   Does not apply DOES NOT GO TO Dannielle Sousa,         Continuing statin from home medication list OR statin order already placed during this visit   Does not apply DOES NOT GO TO Dannielle Sousa, DO        DOBUTamine (DOBUTREX) 500 mg in D5W 250 mL infusion (adult std conc)  5 mcg/kg/min Intravenous Continuous Noel Nash MD 13.6 mL/hr at 05/01/25 0948 5 mcg/kg/min at 05/01/25 0948    EPINEPHrine 5 mg in 250 mL NS (ADRENALIN) PERIPHERAL infusion  0.03-0.125 mcg/kg/min Intravenous Continuous Noel Nash MD 15.3 mL/hr at 05/01/25 0600 0.05 mcg/kg/min at 05/01/25 0600    heparin 25,000 units in 0.45% NaCl 250 mL ANTICOAGULANT infusion  0-5,000 Units/hr Intravenous Continuous Noel Nash MD 13.5 mL/hr at 05/01/25 0118 1,350 Units/hr at 05/01/25 0118    No lozenges or gum should be given while patient on  BIPAP/AVAPS/AVAPS AE   Does not apply Continuous PRN Niki Montoya NP        norepinephrine (LEVOPHED) 4 mg in  mL PERIPHERAL infusion  0.01-0.2 mcg/kg/min Intravenous Continuous Dannielle Gunn DO   Stopped at 04/30/25 1318    Patient is already receiving anticoagulation with heparin, enoxaparin (LOVENOX), warfarin (COUMADIN)  or other anticoagulant medication   Does not apply Continuous PRN Dannielle Gunn DO        Patient may continue current oral medications   Does not apply Continuous PRN Niki Montoya NP        Reason ACE/ARB/ARNI order not selected   Other DOES NOT GO TO MAR Dannielle Gunn DO         Current Facility-Administered Medications   Medication Dose Route Frequency Provider Last Rate Last Admin    aspirin (ASA) chewable tablet 81 mg  81 mg Oral Daily Dannielle uGnn DO   81 mg at 05/01/25 0903    gabapentin (NEURONTIN) capsule 200 mg  200 mg Oral At Bedtime Dannielle Gunn DO   200 mg at 04/30/25 2229    lactase (LACTAID) tablet 9,000 Units  9,000 Units Oral TID w/meals Dannielle Gunn DO   9,000 Units at 05/01/25 0903    levETIRAcetam (KEPPRA) tablet 500 mg  500 mg Oral BID Dannielle Gunn DO   500 mg at 05/01/25 0903    levothyroxine (SYNTHROID/LEVOTHROID) tablet 75 mcg  75 mcg Oral QAM AC Dannielle Gunn DO   75 mcg at 05/01/25 0903    [Held by provider] metoprolol tartrate (LOPRESSOR) tablet 75 mg  75 mg Oral BID Dannielle Gunn DO        mirtazapine (REMERON) tablet 30 mg  30 mg Oral At Bedtime Dannielle Gunn DO   30 mg at 04/30/25 2229    pantoprazole (PROTONIX) EC tablet 40 mg  40 mg Oral QAM Dannielle Gunn DO   40 mg at 05/01/25 0903    piperacillin-tazobactam (ZOSYN) 2.25 g vial to attach to  ml bag  2.25 g Intravenous Q6H Dannielle Gunn DO   2.25 g at 05/01/25 0531    [Held by provider] QUEtiapine (SEROquel) tablet 25 mg  25 mg Oral 4x Daily Dannielle Gunn DO   25 mg at 04/30/25 0748    sertraline (ZOLOFT)  tablet 150 mg  150 mg Oral Daily Scharber, Dannielle E, DO   150 mg at 05/01/25 0903    sodium chloride (PF) 0.9% PF flush 3 mL  3 mL Intracatheter Q8H ABHI Niki Montoya NP   3 mL at 05/01/25 0539    sodium chloride (PF) 0.9% PF flush 3 mL  3 mL Intracatheter Q8H [facility] Velia Dannielle E, DO           Data   Last 24 hours labs reviewed     Tele: sinus     Echo:   Left ventricular systolic function is mildly reduced.The visual ejection  fraction is 45-50%.Biplane LVEF is 49%.Mid lateral and mid anterior wall  hypokinesia.  Moderately reduced RV systolic function with preserved RV apex function.The  right ventricle is moderately dilated.  There is mild to moderate (1-2+) tricuspid regurgitation.  Pulmonary hypertension- RVSP 40 mm hg +RA.  IVC diameter >2.1 cm collapsing <50% with sniff suggests a high RA pressure  estimated at 15 mmHg or greater.

## 2025-05-02 ENCOUNTER — APPOINTMENT (OUTPATIENT)
Dept: OCCUPATIONAL THERAPY | Facility: CLINIC | Age: 70
DRG: 280 | End: 2025-05-02
Payer: COMMERCIAL

## 2025-05-02 VITALS
HEIGHT: 65 IN | TEMPERATURE: 99 F | DIASTOLIC BLOOD PRESSURE: 53 MMHG | BODY MASS INDEX: 33.24 KG/M2 | HEART RATE: 81 BPM | WEIGHT: 199.5 LBS | OXYGEN SATURATION: 95 % | RESPIRATION RATE: 21 BRPM | SYSTOLIC BLOOD PRESSURE: 114 MMHG

## 2025-05-02 LAB
ALBUMIN SERPL BCG-MCNC: 3.1 G/DL (ref 3.5–5.2)
ALBUMIN SERPL BCG-MCNC: 3.3 G/DL (ref 3.5–5.2)
ALP SERPL-CCNC: 231 U/L (ref 40–150)
ALP SERPL-CCNC: 242 U/L (ref 40–150)
ALT SERPL W P-5'-P-CCNC: 163 U/L (ref 0–50)
ALT SERPL W P-5'-P-CCNC: 190 U/L (ref 0–50)
ANION GAP SERPL CALCULATED.3IONS-SCNC: 12 MMOL/L (ref 7–15)
ANION GAP SERPL CALCULATED.3IONS-SCNC: 14 MMOL/L (ref 7–15)
AST SERPL W P-5'-P-CCNC: 125 U/L (ref 0–45)
AST SERPL W P-5'-P-CCNC: 289 U/L (ref 0–45)
ATRIAL RATE - MUSE: 87 BPM
BACTERIA UR CULT: ABNORMAL
BASE EXCESS BLDV CALC-SCNC: 4.7 MMOL/L (ref -3–3)
BASE EXCESS BLDV CALC-SCNC: 6 MMOL/L (ref -3–3)
BASE EXCESS BLDV CALC-SCNC: 6.6 MMOL/L (ref -3–3)
BASE EXCESS BLDV CALC-SCNC: 7.6 MMOL/L (ref -3–3)
BILIRUB SERPL-MCNC: 0.6 MG/DL
BILIRUB SERPL-MCNC: 0.6 MG/DL
BUN SERPL-MCNC: 45.4 MG/DL (ref 8–23)
BUN SERPL-MCNC: 52.2 MG/DL (ref 8–23)
CALCIUM SERPL-MCNC: 8.9 MG/DL (ref 8.8–10.4)
CALCIUM SERPL-MCNC: 9.3 MG/DL (ref 8.8–10.4)
CHLORIDE SERPL-SCNC: 102 MMOL/L (ref 98–107)
CHLORIDE SERPL-SCNC: 103 MMOL/L (ref 98–107)
CREAT SERPL-MCNC: 2.41 MG/DL (ref 0.51–0.95)
CREAT SERPL-MCNC: 2.8 MG/DL (ref 0.51–0.95)
DIASTOLIC BLOOD PRESSURE - MUSE: NORMAL MMHG
EGFRCR SERPLBLD CKD-EPI 2021: 18 ML/MIN/1.73M2
EGFRCR SERPLBLD CKD-EPI 2021: 21 ML/MIN/1.73M2
ERYTHROCYTE [DISTWIDTH] IN BLOOD BY AUTOMATED COUNT: 14.6 % (ref 10–15)
GLUCOSE BLDC GLUCOMTR-MCNC: 114 MG/DL (ref 70–99)
GLUCOSE BLDC GLUCOMTR-MCNC: 155 MG/DL (ref 70–99)
GLUCOSE BLDC GLUCOMTR-MCNC: 90 MG/DL (ref 70–99)
GLUCOSE SERPL-MCNC: 144 MG/DL (ref 70–99)
GLUCOSE SERPL-MCNC: 96 MG/DL (ref 70–99)
HCO3 BLDV-SCNC: 32 MMOL/L (ref 21–28)
HCO3 BLDV-SCNC: 33 MMOL/L (ref 21–28)
HCO3 BLDV-SCNC: 33 MMOL/L (ref 21–28)
HCO3 BLDV-SCNC: 34 MMOL/L (ref 21–28)
HCO3 SERPL-SCNC: 28 MMOL/L (ref 22–29)
HCO3 SERPL-SCNC: 31 MMOL/L (ref 22–29)
HCT VFR BLD AUTO: 34.9 % (ref 35–47)
HGB BLD-MCNC: 11.2 G/DL (ref 11.7–15.7)
INTERPRETATION ECG - MUSE: NORMAL
MAGNESIUM SERPL-MCNC: 1.8 MG/DL (ref 1.7–2.3)
MAGNESIUM SERPL-MCNC: 2 MG/DL (ref 1.7–2.3)
MCH RBC QN AUTO: 29.5 PG (ref 26.5–33)
MCHC RBC AUTO-ENTMCNC: 32.1 G/DL (ref 31.5–36.5)
MCV RBC AUTO: 92 FL (ref 78–100)
O2/TOTAL GAS SETTING VFR VENT: 30 %
O2/TOTAL GAS SETTING VFR VENT: 45 %
O2/TOTAL GAS SETTING VFR VENT: 45 %
O2/TOTAL GAS SETTING VFR VENT: 55 %
OXYHGB MFR BLDV: 67 % (ref 70–75)
OXYHGB MFR BLDV: 76 % (ref 70–75)
OXYHGB MFR BLDV: 79 % (ref 70–75)
OXYHGB MFR BLDV: 85 % (ref 70–75)
P AXIS - MUSE: NORMAL DEGREES
PCO2 BLDV: 53 MM HG (ref 40–50)
PCO2 BLDV: 56 MM HG (ref 40–50)
PCO2 BLDV: 58 MM HG (ref 40–50)
PCO2 BLDV: 59 MM HG (ref 40–50)
PH BLDV: 7.34 [PH] (ref 7.32–7.43)
PH BLDV: 7.37 [PH] (ref 7.32–7.43)
PH BLDV: 7.39 [PH] (ref 7.32–7.43)
PH BLDV: 7.41 [PH] (ref 7.32–7.43)
PHOSPHATE SERPL-MCNC: 3.2 MG/DL (ref 2.5–4.5)
PHOSPHATE SERPL-MCNC: 3.6 MG/DL (ref 2.5–4.5)
PLATELET # BLD AUTO: 165 10E3/UL (ref 150–450)
PO2 BLDV: 36 MM HG (ref 25–47)
PO2 BLDV: 44 MM HG (ref 25–47)
PO2 BLDV: 47 MM HG (ref 25–47)
PO2 BLDV: 53 MM HG (ref 25–47)
POTASSIUM SERPL-SCNC: 3.4 MMOL/L (ref 3.4–5.3)
POTASSIUM SERPL-SCNC: 4.1 MMOL/L (ref 3.4–5.3)
PR INTERVAL - MUSE: NORMAL MS
PROT SERPL-MCNC: 5.8 G/DL (ref 6.4–8.3)
PROT SERPL-MCNC: 6.2 G/DL (ref 6.4–8.3)
QRS DURATION - MUSE: 92 MS
QT - MUSE: 270 MS
QTC - MUSE: 433 MS
R AXIS - MUSE: 246 DEGREES
RBC # BLD AUTO: 3.8 10E6/UL (ref 3.8–5.2)
SAO2 % BLDV: 68 % (ref 70–75)
SAO2 % BLDV: 77.4 % (ref 70–75)
SAO2 % BLDV: 80.3 % (ref 70–75)
SAO2 % BLDV: 86.5 % (ref 70–75)
SODIUM SERPL-SCNC: 144 MMOL/L (ref 135–145)
SODIUM SERPL-SCNC: 146 MMOL/L (ref 135–145)
SYSTOLIC BLOOD PRESSURE - MUSE: NORMAL MMHG
T AXIS - MUSE: -87 DEGREES
UFH PPP CHRO-ACNC: 0.23 IU/ML
UFH PPP CHRO-ACNC: 0.31 IU/ML
UFH PPP CHRO-ACNC: 0.43 IU/ML
VENTRICULAR RATE- MUSE: 155 BPM
WBC # BLD AUTO: 7.3 10E3/UL (ref 4–11)

## 2025-05-02 PROCEDURE — 82805 BLOOD GASES W/O2 SATURATION: CPT | Performed by: INTERNAL MEDICINE

## 2025-05-02 PROCEDURE — 250N000011 HC RX IP 250 OP 636

## 2025-05-02 PROCEDURE — 83735 ASSAY OF MAGNESIUM: CPT | Performed by: INTERNAL MEDICINE

## 2025-05-02 PROCEDURE — 82805 BLOOD GASES W/O2 SATURATION: CPT

## 2025-05-02 PROCEDURE — 99232 SBSQ HOSP IP/OBS MODERATE 35: CPT | Performed by: INTERNAL MEDICINE

## 2025-05-02 PROCEDURE — 258N000003 HC RX IP 258 OP 636: Performed by: INTERNAL MEDICINE

## 2025-05-02 PROCEDURE — 250N000011 HC RX IP 250 OP 636: Mod: JZ

## 2025-05-02 PROCEDURE — 84460 ALANINE AMINO (ALT) (SGPT): CPT | Performed by: INTERNAL MEDICINE

## 2025-05-02 PROCEDURE — 94660 CPAP INITIATION&MGMT: CPT

## 2025-05-02 PROCEDURE — 250N000011 HC RX IP 250 OP 636: Performed by: STUDENT IN AN ORGANIZED HEALTH CARE EDUCATION/TRAINING PROGRAM

## 2025-05-02 PROCEDURE — 99291 CRITICAL CARE FIRST HOUR: CPT | Performed by: INTERNAL MEDICINE

## 2025-05-02 PROCEDURE — 84100 ASSAY OF PHOSPHORUS: CPT | Performed by: INTERNAL MEDICINE

## 2025-05-02 PROCEDURE — 250N000013 HC RX MED GY IP 250 OP 250 PS 637

## 2025-05-02 PROCEDURE — 84450 TRANSFERASE (AST) (SGOT): CPT | Performed by: INTERNAL MEDICINE

## 2025-05-02 PROCEDURE — 200N000001 HC R&B ICU

## 2025-05-02 PROCEDURE — 97530 THERAPEUTIC ACTIVITIES: CPT | Mod: GO | Performed by: OCCUPATIONAL THERAPIST

## 2025-05-02 PROCEDURE — 258N000003 HC RX IP 258 OP 636: Performed by: STUDENT IN AN ORGANIZED HEALTH CARE EDUCATION/TRAINING PROGRAM

## 2025-05-02 PROCEDURE — 250N000011 HC RX IP 250 OP 636: Performed by: INTERNAL MEDICINE

## 2025-05-02 PROCEDURE — 250N000013 HC RX MED GY IP 250 OP 250 PS 637: Performed by: STUDENT IN AN ORGANIZED HEALTH CARE EDUCATION/TRAINING PROGRAM

## 2025-05-02 PROCEDURE — 85520 HEPARIN ASSAY: CPT | Performed by: INTERNAL MEDICINE

## 2025-05-02 PROCEDURE — 85027 COMPLETE CBC AUTOMATED: CPT | Performed by: INTERNAL MEDICINE

## 2025-05-02 PROCEDURE — 250N000013 HC RX MED GY IP 250 OP 250 PS 637: Performed by: INTERNAL MEDICINE

## 2025-05-02 PROCEDURE — 999N000157 HC STATISTIC RCP TIME EA 10 MIN

## 2025-05-02 PROCEDURE — 85520 HEPARIN ASSAY: CPT | Performed by: STUDENT IN AN ORGANIZED HEALTH CARE EDUCATION/TRAINING PROGRAM

## 2025-05-02 RX ORDER — LOPERAMIDE HYDROCHLORIDE 2 MG/1
2 CAPSULE ORAL 4 TIMES DAILY PRN
Status: DISCONTINUED | OUTPATIENT
Start: 2025-05-02 | End: 2025-05-07 | Stop reason: HOSPADM

## 2025-05-02 RX ORDER — POTASSIUM CHLORIDE 1.5 G/1.58G
40 POWDER, FOR SOLUTION ORAL 2 TIMES DAILY
Status: COMPLETED | OUTPATIENT
Start: 2025-05-02 | End: 2025-05-02

## 2025-05-02 RX ORDER — CEFTRIAXONE 1 G/1
1 INJECTION, POWDER, FOR SOLUTION INTRAMUSCULAR; INTRAVENOUS EVERY 24 HOURS
Status: DISCONTINUED | OUTPATIENT
Start: 2025-05-02 | End: 2025-05-04

## 2025-05-02 RX ORDER — DOBUTAMINE HYDROCHLORIDE 200 MG/100ML
2.5 INJECTION INTRAVENOUS CONTINUOUS
Status: DISCONTINUED | OUTPATIENT
Start: 2025-05-02 | End: 2025-05-02

## 2025-05-02 RX ADMIN — AMIODARONE HYDROCHLORIDE 0.5 MG/MIN: 1.8 INJECTION, SOLUTION INTRAVENOUS at 19:02

## 2025-05-02 RX ADMIN — POTASSIUM CHLORIDE 40 MEQ: 1.5 POWDER, FOR SOLUTION ORAL at 21:15

## 2025-05-02 RX ADMIN — AMIODARONE HYDROCHLORIDE 0.5 MG/MIN: 1.8 INJECTION, SOLUTION INTRAVENOUS at 11:50

## 2025-05-02 RX ADMIN — LOPERAMIDE HYDROCHLORIDE 2 MG: 2 CAPSULE ORAL at 20:57

## 2025-05-02 RX ADMIN — PIPERACILLIN AND TAZOBACTAM 2.25 G: 2; .25 INJECTION, POWDER, FOR SOLUTION INTRAVENOUS at 04:55

## 2025-05-02 RX ADMIN — POTASSIUM CHLORIDE 40 MEQ: 1.5 POWDER, FOR SOLUTION ORAL at 14:03

## 2025-05-02 RX ADMIN — EPINEPHRINE 0.06 MCG/KG/MIN: 1 INJECTION INTRAMUSCULAR; INTRAVENOUS; SUBCUTANEOUS at 05:56

## 2025-05-02 RX ADMIN — PIPERACILLIN AND TAZOBACTAM 2.25 G: 2; .25 INJECTION, POWDER, FOR SOLUTION INTRAVENOUS at 10:37

## 2025-05-02 RX ADMIN — HEPARIN SODIUM 1500 UNITS/HR: 10000 INJECTION, SOLUTION INTRAVENOUS at 10:03

## 2025-05-02 RX ADMIN — FUROSEMIDE 5 MG/HR: 10 INJECTION, SOLUTION INTRAVENOUS at 14:03

## 2025-05-02 RX ADMIN — CEFTRIAXONE SODIUM 1 G: 1 INJECTION, POWDER, FOR SOLUTION INTRAMUSCULAR; INTRAVENOUS at 16:06

## 2025-05-02 RX ADMIN — GABAPENTIN 200 MG: 100 CAPSULE ORAL at 21:15

## 2025-05-02 RX ADMIN — LACTASE TAB 3000 UNIT 9000 UNITS: 3000 TAB at 12:33

## 2025-05-02 RX ADMIN — LEVETIRACETAM 500 MG: 500 TABLET, FILM COATED ORAL at 21:15

## 2025-05-02 RX ADMIN — MIRTAZAPINE 30 MG: 30 TABLET, FILM COATED ORAL at 21:15

## 2025-05-02 RX ADMIN — DOBUTAMINE HYDROCHLORIDE 5 MCG/KG/MIN: 200 INJECTION INTRAVENOUS at 01:58

## 2025-05-02 RX ADMIN — LACTASE TAB 3000 UNIT 9000 UNITS: 3000 TAB at 17:58

## 2025-05-02 ASSESSMENT — ACTIVITIES OF DAILY LIVING (ADL)
ADLS_ACUITY_SCORE: 66
ADLS_ACUITY_SCORE: 61
ADLS_ACUITY_SCORE: 66
ADLS_ACUITY_SCORE: 66
ADLS_ACUITY_SCORE: 63
ADLS_ACUITY_SCORE: 66
ADLS_ACUITY_SCORE: 64
ADLS_ACUITY_SCORE: 66
ADLS_ACUITY_SCORE: 61
ADLS_ACUITY_SCORE: 66
ADLS_ACUITY_SCORE: 63
ADLS_ACUITY_SCORE: 66
ADLS_ACUITY_SCORE: 66
ADLS_ACUITY_SCORE: 63
ADLS_ACUITY_SCORE: 64
ADLS_ACUITY_SCORE: 66
ADLS_ACUITY_SCORE: 66

## 2025-05-02 NOTE — PROGRESS NOTES
Critical Care Progress Note      05/02/2025    Name: Amy Bryan MRN#: [id number]   Age: 69 year old YOB: 1955     Hospital Day# 3        Code status: Full code         Problem List:   Active Problems:    Hyperkalemia    Pleural effusion    Elevated troponin    GIOVANNI (acute kidney injury)    Acute respiratory failure with hypoxia (H)    Multiple falls           Summary/Hospital Course:     Amy Bryan is a 69 year old female with history of TBI in 2012, CAD s/p PCI in 2018 and 2021, suspected takotsubo cardiomyopathy now recovered, presenting for vague complaints of fatigue for the last 2 days. Within the ED, hypoxic to the 80s on room air and BP notably soft into the 90s. Lab revealed troponin of 804, Cr of 3.23, K of 5.7. Admitted to the ICU given need for pressors for non-fluid responsive hypotension. Work up thus far showing bilateral pleural effusions, elevated troponins and EKG changes 2/2 likely stress cardiomyopathy, likely urosepsis and elevated LFTs.           Interim History:     Got 2.7L of fluid in ED, continued to be hypotensive leading to initiation of pressors, requiring minimal amount of epinephrine. Plan to place central line, arterial line and perform thoracentesis this afternoon. Stable on Bipap.       Assessment and plan :     Amy Bryan is a 69 year old female admitted on 4/29/2025 for EKG changes, elevated troponins, requiring pressor support for hemodynamic instability.   I have personally reviewed the daily labs, imaging studies, cultures and discussed the case with referring physician and consulting physicians.     My assessment and plan by system for this patient is as follows:    Neurology/Psychiatry:  # Cognitive disorder with history of TBI in 2012    # Anxiety/MDD  Currently living in group home  - Continue PTA Keppra  - PRN lorazepam      Cardiovascular:  # Stress cardiomyopathy versus NSTEMI  # CAD s/p PCI in 2018, 2021   # Hypotension  EKG reviewed  with new T wave inversions, sinus bradycardia, initial troponin 814>905. ECHO 4/30 showing EF 45-50%, increased LV filling pressures, dilated RV with decreased systolic function. In ED, got 2.7L without improvement in BP.  - Continue heparin gtt, aspirin  - Epinephrine for hypotension  - Trend troponins  - No coronary angiogram indicated at time given lack of anginal symptoms and acute renal failure  - Cardiology consulted, following      Pulmonary:  # Acute hypoxic/hypercapnic respiratory failure  # Pleural effusion, large  V/Q negative for pulmonary embolism.  - BiPap, wean as tolerated  - Plan for thoracentesis       GI and Nutrition:  # Elevated LFTs  # GERD  US showing possible acute cholecystitis, negative Damon sign. CT abdomen pelvis showing cholelithiasis with edema but no fat stranding.  - Trend LFTs, down trending  - PPI        Renal/Fluids/Electrolytes:  # GIOVANNI, likely ischemic ATN in setting of shock  # Hyperkalemia- resolved  Cr on admit 2.23, now 3.69, elevated BNP, dry mucous membranes.  - Check CK, renal panel daily  - Hold lisinopril, lasix, PTA potassium supplement  - Electrolyte replacement per ICU protocols  - Avoid nephrotoxic agents and adjust medications as needed for renal clearance  - Follow I/O  - NICOM assessment: initial CO 4.5 CI 2.2 SVI -11% indicating NOT fluid responsive, leave leads in place   - Nephrology consulted, following      Infectious Disease:  # Abnormal UA, likely UTI  # Concern for sepsis  MRSA nares negative.  - Zosyn  - Urine, blood culture pending      Endocrine:  # Hypothyroidism   - T4 pending  - PTA levothyroxine 75mcg      Hematology:  # Elevated D-dimer   - Initiate heparin  - Dopplers negative for DVT      MSKL/Rheum:  None currently.      IV/Access:   1. Venous access   2. Arterial access    3. Central access      ICU Prophylaxis:   1. DVT: Hep, mechanical  2. VAP: HOB 30 degrees, chlorhexidine rinse  3. Stress Ulcer: PPI  4. Restraints: none  5. Wound care -  per unit routine   6. Feeding - NPO, plan to resume diet soon  7. Family Update: ICNDY German, at bedside  8. Disposition - likely back to group home             Key Medications:     Current Facility-Administered Medications   Medication Dose Route Frequency Provider Last Rate Last Admin    aspirin (ASA) chewable tablet 81 mg  81 mg Oral Daily Dannielle Gunn DO   81 mg at 05/01/25 0903    gabapentin (NEURONTIN) capsule 200 mg  200 mg Oral At Bedtime Dannielle Gunn DO   200 mg at 05/01/25 2200    lactase (LACTAID) tablet 9,000 Units  9,000 Units Oral TID w/meals Dannielle Gunn DO   9,000 Units at 05/01/25 0903    levETIRAcetam (KEPPRA) tablet 500 mg  500 mg Oral BID Dannielle Gunn DO   500 mg at 05/01/25 2159    levothyroxine (SYNTHROID/LEVOTHROID) tablet 75 mcg  75 mcg Oral QAM AC Dannielle Gunn DO   75 mcg at 05/01/25 0903    [Held by provider] metoprolol tartrate (LOPRESSOR) tablet 75 mg  75 mg Oral BID Dannielle Gunn DO        mirtazapine (REMERON) tablet 30 mg  30 mg Oral At Bedtime Dannielle Gunn DO   30 mg at 05/01/25 2200    pantoprazole (PROTONIX) EC tablet 40 mg  40 mg Oral QAM Dannielle Gunn DO   40 mg at 05/01/25 0903    piperacillin-tazobactam (ZOSYN) 2.25 g vial to attach to  ml bag  2.25 g Intravenous Q6H Dannielle Gunn DO   2.25 g at 05/01/25 2214    [Held by provider] QUEtiapine (SEROquel) tablet 25 mg  25 mg Oral 4x Daily Dannielle Gunn DO   25 mg at 04/30/25 0748    sertraline (ZOLOFT) tablet 150 mg  150 mg Oral Daily Dannielle Gunn DO   150 mg at 05/01/25 0903    sodium chloride (PF) 0.9% PF flush 3 mL  3 mL Intracatheter Q8H Niki Mayer NP   3 mL at 05/01/25 1422    sodium chloride (PF) 0.9% PF flush 3 mL  3 mL Intracatheter Q8H Dannielle Rivero DO   3 mL at 05/01/25 1422     Current Facility-Administered Medications   Medication Dose Route Frequency Provider Last Rate Last Admin    amiodarone (NEXTERONE) 1.8 mg/mL in  dextrose 5% 200 mL ADULT STANDARD infusion  0.5 mg/min Intravenous Continuous Maddi Brenner APRN CNP 16.7 mL/hr at 05/01/25 2332 0.5 mg/min at 05/01/25 2332    Continuing beta blocker from home medication list OR beta blocker order already placed during this visit   Does not apply DOES NOT GO TO Dannielle Sousa DO        Continuing statin from home medication list OR statin order already placed during this visit   Does not apply DOES NOT GO TO Dannielle Sousa DO        DOBUTamine (DOBUTREX) 500 mg in D5W 250 mL infusion (adult std conc)  5 mcg/kg/min Intravenous Continuous Maddi Brenner APRN CNP 13.6 mL/hr at 05/01/25 2000 5 mcg/kg/min at 05/01/25 2000    EPINEPHrine (ADRENALIN) 5 mg in  mL infusion  0.01-0.3 mcg/kg/min Intravenous Continuous Dannielle Gunn, DO 16.3 mL/hr at 05/01/25 2100 0.06 mcg/kg/min at 05/01/25 2100    furosemide (LASIX) 500 mg/50mL infusion ADULT MAX CONC  5 mg/hr Intravenous Continuous Noel Nash MD 0.5 mL/hr at 05/01/25 2000 5 mg/hr at 05/01/25 2000    heparin 25,000 units in 0.45% NaCl 250 mL ANTICOAGULANT infusion  0-5,000 Units/hr Intravenous Continuous Noel Nash MD 13.5 mL/hr at 05/01/25 2000 1,350 Units/hr at 05/01/25 2000    No lozenges or gum should be given while patient on BIPAP/AVAPS/AVAPS AE   Does not apply Continuous PRN Niki Montoya NP        norepinephrine (LEVOPHED) 4 mg in  mL infusion PREMIX  0.01-0.6 mcg/kg/min Intravenous Continuous Dannielle Gunn, DO 17 mL/hr at 05/01/25 2038 0.05 mcg/kg/min at 05/01/25 2038    Patient is already receiving anticoagulation with heparin, enoxaparin (LOVENOX), warfarin (COUMADIN)  or other anticoagulant medication   Does not apply Continuous PRN Dannielle Gunn DO        Patient may continue current oral medications   Does not apply Continuous PRN Maddi Brenner APRN CNP        Reason ACE/ARB/ARNI order not selected   Other DOES NOT GO TO Dannielle Sousa,                     Physical Examination:   Temp:  [97.8  F (36.6  C)-99.9  F (37.7  C)] 99  F (37.2  C)  Pulse:  [] 81  Resp:  [] 21  BP: ()/(41-82) 114/53  FiO2 (%):  [30 %-40 %] 30 %  SpO2:  [79 %-100 %] 95 %    Intake/Output Summary (Last 24 hours) at 4/30/2025 1315  Last data filed at 4/30/2025 0351  Gross per 24 hour   Intake 2659.6 ml   Output --   Net 2659.6 ml     Wt Readings from Last 4 Encounters:   05/01/25 90.5 kg (199 lb 8 oz)   03/15/23 102.2 kg (225 lb 4.8 oz)   02/28/22 97.3 kg (214 lb 8 oz)   08/18/21 91.6 kg (202 lb)     BP - Mean:  [51-94] 65  FiO2 (%): 30 %, Resp: 21  Recent Labs   Lab 05/01/25  2114 05/01/25  1456 05/01/25  0830 04/30/25  1811   O2PER 30 30 55 40       GEN: no acute distress, alert and orientated x4   HEENT: head ncat, sclera anicteric, OP patent, trachea midline, dry mucous membranes   PULM: faint wheeze over right upper lung field, otherwise clear lung sounds, bipap in place    CV/COR: RRR S1S2, murmur noted   ABD: soft, nontender, no mass  EXT: warm extremities, no edema  NEURO: grossly intact  SKIN: no obvious rash  LINES: clean, dry intact         Data:   All data and imaging reviewed     ROUTINE ICU LABS (Last four results)  CMP  Recent Labs   Lab 05/01/25  2047 05/01/25  1653 05/01/25  0830 05/01/25  0523 04/30/25  1356 04/30/25  0934 04/30/25  0558 04/29/25  2239 04/29/25  1533   NA  --  143 144  --   --   --  142 141  --    POTASSIUM  --  3.8 4.2  --   --   --  5.3 5.5* 5.7*   CHLORIDE  --  106 107  --   --   --  103 102  --    CO2  --  25 24  --   --   --  24 27  --    ANIONGAP  --  12 13  --   --   --  15 12  --    * 132* 148* 138*   < > 92 96 122*  --    BUN  --  62.0* 63.1*  --   --   --  60.7* 55.6*  --    CR  --  3.25* 3.57*  --   --   --  3.69* 3.59*  --    GFRESTIMATED  --  15* 13*  --   --   --  13* 13*  --    HECTOR  --  8.7* 8.5*  --   --   --  8.1* 8.3*  --    MAG  --  2.2 2.5*  --   --   --   --   --   --    PHOS  --   --  5.5*  --   --   --   --   --    "--    PROTTOTAL  --   --  5.9*  --   --  5.7* 5.7*  --  5.9*   ALBUMIN  --   --  3.3*  --   --   --  3.2*  --  3.3*   BILITOTAL  --   --  0.9  --   --   --  0.8  --  1.5*   ALKPHOS  --   --  252*  --   --   --  301*  --  335*   AST  --   --  106*  --   --   --  307*  --  518*   ALT  --   --  197*  --   --   --  323*  --  416*    < > = values in this interval not displayed.     CBC  Recent Labs   Lab 05/01/25  0830 04/30/25  0934 04/29/25  2102 04/29/25  1348   WBC 10.6 8.3 12.0* 9.7   RBC 3.93 4.10 3.79* 4.53   HGB 11.6* 11.9 11.1* 13.2   HCT 38.1 40.8 36.5 43.1   MCV 97 100 96 95   MCH 29.5 29.0 29.3 29.1   MCHC 30.4* 29.2* 30.4* 30.6*   RDW 14.4 14.4 14.3 14.2    196 194 227     INRNo lab results found in last 7 days.  Arterial Blood Gas  Recent Labs   Lab 05/01/25  2114 05/01/25  1456 05/01/25  0830 04/30/25  1811   O2PER 30 30 55 40       All cultures:  No results for input(s): \"CULT\" in the last 168 hours.  No results found for this or any previous visit (from the past 24 hours).      Martina Walker, MS4  [employer] [facility] [facility] [employer]    [facility]  CRITICAL CARE STAFF NOTE    I am managing these acute and ongoing critical issues resulting in critical condition that impairs one or more vital organ systems, incur a high probability of imminent or life-threatening deterioration in the patient's condition and providing frequent personal assessment and manipulation of medications and life support equipment.     1. Cardiogenic shock.     ICU Interventions: parenteral medications necessitating continuous monitoring including vasoactive medications, medication for heart rhythm control, insulin infusion, and/or sedative/opiates  and interpretation of lab values, cardiac output, cxr, pulse oximetry, blood gases, and/or information/data stored in computers    The patient was seen and examined with the resident/fellow/EVAN and/or medical student.  We have discussed the patient in detail and I agree " "with the findings, assessment, and plan as documented when this note was cosigned on this day. The plan was formulated in conjunction with pharmacy, ICU nurses, and respiratory therapist. I have evaluated all laboratory values and imaging studies for the past 24 hours. I have reviewed all the consults that have been ordered and are active for this patient.      Critical Care Time: 40 min.  I spent this time (excluding procedures) personally providing and directing critical care services at the bedside and on the critical care unit.      Noel HAMMER MPH   of Medicine  Pager: [phone]    Clinically Significant Risk Factors           # Hypocalcemia: Lowest Ca = 8.1 mg/dL in last 2 days, will monitor and replace as appropriate     # Hypoalbuminemia: Lowest albumin = 3.2 g/dL at 4/30/2025  5:58 AM, will monitor as appropriate     # Hypertension: Noted on problem list     # Acute Hypercapnic Respiratory Failure: based on venous blood gas results.  Continue supplemental oxygen and ventilatory support as indicated.         # Obesity: Estimated body mass index is 33.2 kg/m  as calculated from the following:    Height as of this encounter: 1.651 m (5' 5\").    Weight as of this encounter: 90.5 kg (199 lb 8 oz)., PRESENT ON ADMISSION       # Financial/Environmental Concerns: none                "

## 2025-05-02 NOTE — PROGRESS NOTES
"Nephrology Progress Note  05/02/2025         Assessment & Recommendations:   1 shock-septic versus cardiogenic. Normal WBC, high Pro-Jean Carlos, pyuria.  Empirically on antibiotics.  - Presented with NSTEMI , ?  Stress cardiomyopathy.  EF 45-50%, mildly reduced RV function, pulmonary hypertension-RVSP 40 mmHg plus RA  Dilated IVC with abnormal respiratory variability  - Received 2.6 L IV fluid without significant improvement in blood pressure    Improved hemodynamics on dobutamine .  Excellent urine output and improving creatinine with diuresis consistent with CRS     2 GIOVANNI-presumed GIOVANNI.  Last known creatinine of 0.8 in 2023.  Presenting creatinine of 3.2.  Likely ischemic ATN in setting of shock. FeNA is 0.8 but did not respond to IV fluid. ?  Component of CRS  UA relatively bland-1+ proteinuria, 2 RBC per high-power field.  no casts    - Improving.  Good urine output     3 elevated LFT- -?  Shock liver.  Improving .  Ultrasound equivocal for acute Alice.  Clinically not consistent with cholecystitis     4 acute hypoxic/hypercapnic respiratory failure, now on BiPAP.  Large pleural effusion right side.  High proBNP  -Status post thoracentesis.     Other issues-  Coronary disorder with TBI -currently in a group home.  On Kera     Recommendation-  Continue supportive manage  Continue dobutamine and Lasix drip today.  Likely switch to intermittent dosing or oral tomorrow.  Daily renal panel    Yadira Grayson MD  [facility] - Nephrology   [phone]      Interval History :   Seen / examined.   On BiPAP.  On dobutamine drip and Lasix drip.  Excellent diuresis.  More than 6 L.  Creatinine improving.      Physical Exam:   I/O last 3 completed shifts:  In: 2602.6 [P.O.:540; I.V.:1662.6; IV Piggyback:400]  Out: 6085 [Urine:6085]  /65   Pulse 81   Temp 98.6  F (37  C)   Resp 20   Ht 1.651 m (5' 5\")   Wt 78.4 kg (172 lb 12.8 oz)   SpO2 (!) 90%   BMI 28.76 kg/m      GENERAL APPEARANCE: On BiPAP  Pulmonary: " Diminished at bases  CV: Tachycardic, regular   - Edema-trace  GI: soft, nontender,   MS: no evidence of inflammation in joints  SKIN: no rash, warm, dry, no cyanosis  NEURO: face symmetric, no asterixis     Labs:   All labs reviewed by me  Electrolytes/Renal -   Recent Labs   Lab Test 05/02/25  0808 05/02/25  0513 05/01/25  2047 05/01/25  1653 05/01/25  0830 05/19/21  0827 01/30/18  0620   NA  --  144  --  143 144   < > 139   POTASSIUM  --  3.4  --  3.8 4.2   < > 3.9   CHLORIDE  --  102  --  106 107   < > 107   CO2  --  28  --  25 24   < > 24   BUN  --  52.2*  --  62.0* 63.1*   < > 13   CR  --  2.80*  --  3.25* 3.57*   < > 0.69   * 144* 161* 132* 148*   < > 95   HECTOR  --  8.9  --  8.7* 8.5*   < > 8.4*   MAG  --  2.0  --  2.2 2.5*  --  2.2   PHOS  --  3.6  --   --  5.5*  --  3.5    < > = values in this interval not displayed.       CBC -   Recent Labs   Lab Test 05/02/25  0513 05/01/25  0830 04/30/25  0934   WBC 7.3 10.6 8.3   HGB 11.2* 11.6* 11.9    212 196       LFTs -   Recent Labs   Lab Test 05/02/25  0513 05/01/25  0830 04/30/25  0934 04/30/25  0558   ALKPHOS 231* 252*  --  301*   BILITOTAL 0.6 0.9  --  0.8   * 197*  --  323*   * 106*  --  307*   PROTTOTAL 5.8* 5.9* 5.7* 5.7*   ALBUMIN 3.1* 3.3*  --  3.2*           Current Medications:  Current Facility-Administered Medications   Medication Dose Route Frequency Provider Last Rate Last Admin    aspirin (ASA) chewable tablet 81 mg  81 mg Oral Daily Dannielle Gunn DO   81 mg at 05/01/25 0903    gabapentin (NEURONTIN) capsule 200 mg  200 mg Oral At Bedtime Dannielle Gunn DO   200 mg at 05/01/25 2200    lactase (LACTAID) tablet 9,000 Units  9,000 Units Oral TID w/meals Dannielle Gunn DO   9,000 Units at 05/01/25 0903    levETIRAcetam (KEPPRA) tablet 500 mg  500 mg Oral BID Dannielle Gunn DO   500 mg at 05/01/25 2159    levothyroxine (SYNTHROID/LEVOTHROID) tablet 75 mcg  75 mcg Oral QAM AC Dannielle Gunn DO   75 mcg  at 05/01/25 0903    [Held by provider] metoprolol tartrate (LOPRESSOR) tablet 75 mg  75 mg Oral BID Dannielle Gunn DO        mirtazapine (REMERON) tablet 30 mg  30 mg Oral At Bedtime Dannielle Gunn DO   30 mg at 05/01/25 2200    pantoprazole (PROTONIX) EC tablet 40 mg  40 mg Oral QAM Dannielle Gunn DO   40 mg at 05/01/25 0903    piperacillin-tazobactam (ZOSYN) 2.25 g vial to attach to  ml bag  2.25 g Intravenous Q6H Dannielle Gunn DO   2.25 g at 05/02/25 1037    [Held by provider] QUEtiapine (SEROquel) tablet 25 mg  25 mg Oral 4x Daily Dannielle Gunn DO   25 mg at 04/30/25 0748    sertraline (ZOLOFT) tablet 150 mg  150 mg Oral Daily Dannielle Gunn DO   150 mg at 05/01/25 0903    sodium chloride (PF) 0.9% PF flush 3 mL  3 mL Intracatheter Q8H [facility] Niki Montoya, NP   3 mL at 05/01/25 1422    sodium chloride (PF) 0.9% PF flush 3 mL  3 mL Intracatheter Q8H [facility] Dannielle Gunn DO   3 mL at 05/01/25 1422     Current Facility-Administered Medications   Medication Dose Route Frequency Provider Last Rate Last Admin    amiodarone (NEXTERONE) 1.8 mg/mL in dextrose 5% 200 mL ADULT STANDARD infusion  0.5 mg/min Intravenous Continuous Maddi Brenner APRN CNP 16.7 mL/hr at 05/02/25 0800 0.5 mg/min at 05/02/25 0800    Continuing beta blocker from home medication list OR beta blocker order already placed during this visit   Does not apply DOES NOT GO TO Dannielle Sousa DO        Continuing statin from home medication list OR statin order already placed during this visit   Does not apply DOES NOT GO TO Dannielle Sousa DO        DOBUTamine (DOBUTREX) 500 mg in D5W 250 mL infusion (adult std conc)  5 mcg/kg/min Intravenous Continuous Jordin, Maddi, APRN CNP 13.6 mL/hr at 05/02/25 0800 5 mcg/kg/min at 05/02/25 0800    EPINEPHrine (ADRENALIN) 5 mg in  mL infusion  0.01-0.3 mcg/kg/min Intravenous Continuous Dannielle Gunn DO   Stopped at 05/02/25 0953     furosemide (LASIX) 500 mg/50mL infusion ADULT MAX CONC  5 mg/hr Intravenous Continuous Noel Nash MD 0.5 mL/hr at 05/02/25 0800 5 mg/hr at 05/02/25 0800    heparin 25,000 units in 0.45% NaCl 250 mL ANTICOAGULANT infusion  0-5,000 Units/hr Intravenous Continuous Noel Nash MD 15 mL/hr at 05/02/25 1003 1,500 Units/hr at 05/02/25 1003    No lozenges or gum should be given while patient on BIPAP/AVAPS/AVAPS AE   Does not apply Continuous PRN Niki Montoya NP        norepinephrine (LEVOPHED) 4 mg in  mL infusion PREMIX  0.01-0.6 mcg/kg/min Intravenous Continuous Dannielle Gunn DO   Stopped at 05/02/25 0200    Patient is already receiving anticoagulation with heparin, enoxaparin (LOVENOX), warfarin (COUMADIN)  or other anticoagulant medication   Does not apply Continuous PRN Dannielel Gunn DO        Patient may continue current oral medications   Does not apply Continuous PRN Maddi Brenner APRN CNP        Reason ACE/ARB/ARNI order not selected   Other DOES NOT GO TO Dannielle Sousa DO Jiwan Thapa, MD

## 2025-05-02 NOTE — PLAN OF CARE
Problem: Adult Inpatient Plan of Care  Goal: Optimal Comfort and Wellbeing  Outcome: Progressing  Intervention: Monitor Pain and Promote Comfort  Recent Flowsheet Documentation  Taken 5/2/2025 0000 by Winnie Melgar RN  Pain Management Interventions:   care clustered   emotional support   environmental changes   pillow support provided   quiet environment facilitated   repositioned   rest  Taken 5/1/2025 2000 by Winnie Melgar RN  Pain Management Interventions:   care clustered   emotional support   environmental changes   pillow support provided   quiet environment facilitated   relaxation techniques promoted   repositioned   rest  Intervention: Provide Person-Centered Care  Recent Flowsheet Documentation  Taken 5/2/2025 0400 by Winnie Melgar RN  Trust Relationship/Rapport:   care explained   choices provided   emotional support provided   empathic listening provided   questions answered   reassurance provided   thoughts/feelings acknowledged   questions encouraged  Taken 5/2/2025 0000 by Winnie Melgar RN  Trust Relationship/Rapport:   care explained   choices provided   emotional support provided   empathic listening provided   questions answered   reassurance provided   thoughts/feelings acknowledged   questions encouraged  Taken 5/1/2025 2000 by Winnie Melgar RN  Trust Relationship/Rapport:   care explained   choices provided   emotional support provided   empathic listening provided   questions answered   reassurance provided   thoughts/feelings acknowledged   questions encouraged     Problem: Delirium  Goal: Improved Sleep  Outcome: Progressing  Intervention: Promote Sleep  Recent Flowsheet Documentation  Taken 5/2/2025 0400 by Winnie Melgar RN  Sleep/Rest Enhancement:   regular sleep/rest pattern promoted   noise level reduced   natural light exposure provided   family presence promoted  Taken 5/2/2025 0000 by Winnie Melgar RN  Sleep/Rest Enhancement:   regular sleep/rest pattern  promoted   noise level reduced   natural light exposure provided   family presence promoted  Taken 5/1/2025 2000 by Winnie Melgar, RN  Sleep/Rest Enhancement:   regular sleep/rest pattern promoted   noise level reduced   natural light exposure provided   family presence promoted   Goal Outcome Evaluation:                 Outcome Evaluation: Pt is alert and oriented x 4, able to COLIN.  Pt given 2nd amiodarone bolus after 2000 and converted to NSR 80s @ 22:20.  Pt on Lasix gtt at 5mg/hr and is having output of at least 300 ml/hr.  Epi infusing at 0.06, Dobut at 5, norepi OFF at 0200, amio at 0.5, and heparin at 1350.  Tmax last evening of 37.6 by bladder, afebrile this am.  Pt was incontinent of BM - loose/watery/brown this am.

## 2025-05-02 NOTE — PROGRESS NOTES
Care Management Follow Up    Length of Stay (days): 3    Expected Discharge Date: 05/05/2025     Concerns to be Addressed: discharge planning     Patient plan of care discussed at interdisciplinary rounds: Yes    Anticipated Discharge Disposition: Group Home/TCU      Anticipated Discharge Services: None  Anticipated Discharge DME: None    Patient/family educated on Medicare website which has current facility and service quality ratings: no  Education Provided on the Discharge Plan: No  Patient/Family in Agreement with the Plan: yes    Referrals Placed by CM/SW:    Private pay costs discussed: Not applicable    Discussed  Partnership in Safe Discharge Planning  document with patient/family: Yes:     Handoff Completed: No, handoff not indicated or clinically appropriate    Additional Information:  Writer received patient durable power of  for health documentation and sent to [employer].      CINDY German wrote the following -     Attached you will find a copy of my POA papers. Again just a reminder that I did get  so my last name is now Samm, but my maiden name is Marisela. When it scanned it scanned as three separate pages, not sure why.    The only people at this time who should be able to get information about Amy if they call asking for information are the following:    Myself: Monserrat (Marisela) Samm (POA) [phone]  Her Aunt: Peggy Mott (Also on POA paperwork) [phone]  RN at Amy's Group home: Maddi Perez (But ONLY information NO decision making) [phone]    I will be out of state but I will be reachable by phone ([phone]) from May 3rd through May 17th. I will be checking in daily on her progress. If her status changes at all (decline) please let me know as soon as possible.    Concerns about her discharge when the time comes I know we discussed but just wanted to mention again:   She lives in the basement - they do have a chair lift but no staff down in that area.    She does have grab bars in her bathroom.   There are no available rooms upstairs so if she is unsteady in any way I would really like to see her go to Transitional Care until she is strong enough to be able to be alone in the lower level. They usually only have one staff on, and they are busy upstairs with the other five ladies. I know when she doesn't feel good she will just stay in bed and with her being dehydrated when she came in on Tuesday, it makes me nervous if she went home and was weak she wouldn't make it a point to go upstairs to eat and drink. I would hate to have her get dehydrated again because she didn't have the strength to go upstairs.   If she does go into Transitional care I would really like her to be in the [place] area or third and fourth choices would be [facility] or [facility]. I live in [place] so that would be ideal if we could make that happen so I could check in on her easily every day.    Next Steps: follow for discharge planning likely to TCU     ROSIE Mendez

## 2025-05-02 NOTE — PROGRESS NOTES
"[facility] Progress Note     Alphonse Jarquin MD  05/02/2025         Interval History:      Went into A-fib with rapid ventricular rate yesterday.  Started on amiodarone.       Assessment and Plan:      Septic shock, on pressors on dobutamine and norepinephrine.  Non-STEMI versus stress cardiomyopathy.  Echocardiogram this admission shows LVEF of 45 to 50% with mid lateral and mid anterior wall hypokinesia.  These wall motion abnormalities are new compared to previous echocardiogram in 2021.  On medical therapy due to acute renal failure.  No chest pain.  Continue aspirin, heparin.  Acute decompensated congestive heart failure.  Etiology #2.  Significantly elevated NT-proBNP at admission.  Overall evidence of volume overload but complicated by acute renal failure.  Significant diuresis with the Lasix drip, also on low-dose dobutamine.  Highly appreciate nephrology assistance.  Newly diagnosed paroxysmal atrial fibrillation-this happened in the context of patient having septic shock and on pressors including dobutamine.  CHADS2 Vascor of 4.  She has been on amiodarone since last night.  On heparin.  Elevated LFTs.  Statin on hold.  Acute renal failure, creatinine improving, significant diuresis on Lasix drip.  Nephrology following.  Known history of CAD.    Primary cardiologist Dr. Wren    Recommendations  Continue aspirin, heparin  Not an ideal candidate for long-term amiodarone given abnormal liver enzymes.  Can discontinue amiodarone after completing 24 hours of infusion.  Recommend repeating echocardiogram next week to reevaluate LV function and wall motion abnormalities.  If significant improvement this is more in favor of this being stress cardiomyopathy.    45 minutes of critical care time spent.         Physical Exam:       , Blood pressure 117/82, pulse 76, temperature 99  F (37.2  C), resp. rate 18, height 1.651 m (5' 5\"), weight 78.4 kg (172 lb 12.8 oz), SpO2 99%, not currently breastfeeding.  Vitals:    " 04/30/25 1400 05/01/25 0500 05/02/25 0700   Weight: 92.9 kg (204 lb 14.4 oz) 90.5 kg (199 lb 8 oz) 78.4 kg (172 lb 12.8 oz)     Vital Signs with Ranges  Temp:  [98.4  F (36.9  C)-99.9  F (37.7  C)] 99  F (37.2  C)  Pulse:  [] 76  Resp:  [] 18  BP: ()/(41-82) 117/82  FiO2 (%):  [30 %-55 %] 55 %  SpO2:  [90 %-100 %] 99 %  I/O's Last 24 hours  I/O last 3 completed shifts:  In: 2602.6 [P.O.:540; I.V.:1662.6; IV Piggyback:400]  Out: 6085 [Urine:6085]  General Patient appears tired but not in distress  Neck JVP appears elevated to around 10 cm  Cardiovascular system regular normal rate no murmur rub or gallop  Respiratory system slightly decreased air entry at the bases  Extremities 1+ pitting pedal edema       Medications:        Current Facility-Administered Medications   Medication Dose Route Frequency Provider Last Rate Last Admin    aspirin (ASA) chewable tablet 81 mg  81 mg Oral Daily Dannielle Gunn DO   81 mg at 05/01/25 0903    cefTRIAXone (ROCEPHIN) 1 g vial to attach to  mL bag for ADULTS or NS 50 mL bag for PEDS  1 g Intravenous Q24H Noel Nash MD        gabapentin (NEURONTIN) capsule 200 mg  200 mg Oral At Bedtime Dannielle Gunn DO   200 mg at 05/01/25 2200    lactase (LACTAID) tablet 9,000 Units  9,000 Units Oral TID w/meals Dannielle Gunn DO   9,000 Units at 05/01/25 0903    levETIRAcetam (KEPPRA) tablet 500 mg  500 mg Oral BID Dannielle Gunn DO   500 mg at 05/01/25 2159    levothyroxine (SYNTHROID/LEVOTHROID) tablet 75 mcg  75 mcg Oral QAM AC Dannielle Gunn DO   75 mcg at 05/01/25 0903    [Held by provider] metoprolol tartrate (LOPRESSOR) tablet 75 mg  75 mg Oral BID Dannielle Gunn,         mirtazapine (REMERON) tablet 30 mg  30 mg Oral At Bedtime Dannielle Gunn,    30 mg at 05/01/25 2200    pantoprazole (PROTONIX) EC tablet 40 mg  40 mg Oral QAM Dannielle Gunn DO   40 mg at 05/01/25 0903    potassium chloride (KLOR-CON) Packet 40 mEq  40  mEq Oral BID Noel Nash MD        [Held by provider] QUEtiapine (SEROquel) tablet 25 mg  25 mg Oral 4x Daily Dannielle Gunn DO   25 mg at 04/30/25 0748    sertraline (ZOLOFT) tablet 150 mg  150 mg Oral Daily Dannielle Gunn DO   150 mg at 05/01/25 0903    sodium chloride (PF) 0.9% PF flush 3 mL  3 mL Intracatheter Q8H Niki Mayer NP   3 mL at 05/01/25 1422    sodium chloride (PF) 0.9% PF flush 3 mL  3 mL Intracatheter Q8H [facility] Dannielle Gunn DO   3 mL at 05/01/25 1422     PRN Meds:   Current Facility-Administered Medications   Medication Dose Route Frequency Provider Last Rate Last Admin    calcium carbonate (TUMS) chewable tablet 1,000 mg  1,000 mg Oral 4x Daily PRN Dannielle Gunn DO        carboxymethylcellulose PF (REFRESH PLUS) 0.5 % ophthalmic solution 1 drop  1 drop Both Eyes Q1H PRN Niki Montoya NP        Continuing beta blocker from home medication list OR beta blocker order already placed during this visit   Does not apply DOES NOT GO TO Dannielle Sousa DO        Continuing statin from home medication list OR statin order already placed during this visit   Does not apply DOES NOT GO TO Dannielle Sousa DO        glucose gel 15-30 g  15-30 g Oral Q15 Min PRN Emmie Gillis MD        Or    dextrose 50 % injection 25-50 mL  25-50 mL Intravenous Q15 Min PRN Emmie Gillis MD        Or    glucagon injection 1 mg  1 mg Subcutaneous Q15 Min PRN Emmie Gillis MD        lidocaine (LMX4) cream   Topical Q1H PRN Niki Montoya NP        lidocaine (LMX4) cream   Topical Q1H PRN Dannielle Gunn,         lidocaine 1 % 0.1-1 mL  0.1-1 mL Other Q1H PRN Niki Montoya NP        lidocaine 1 % 0.1-1 mL  0.1-1 mL Other Q1H PRN Dannielle Gunn DO        LORazepam (ATIVAN) tablet 0.5 mg  0.5 mg Oral TID PRN Scharber, Dannielle E, DO        nitroGLYcerin (NITROSTAT) sublingual tablet 0.4 mg  0.4 mg Sublingual Q5 Min PRN Dannielle Gunn, DO        No  lozenges or gum should be given while patient on BIPAP/AVAPS/AVAPS AE   Does not apply Continuous PRN Niki Montoya NP        Patient is already receiving anticoagulation with heparin, enoxaparin (LOVENOX), warfarin (COUMADIN)  or other anticoagulant medication   Does not apply Continuous PRN Dannielle Gunn,         Patient may continue current oral medications   Does not apply Continuous PRN Maddi Brenner APRN CNP        Reason ACE/ARB/ARNI order not selected   Other DOES NOT GO TO Dannielle Sousa,         senna-docusate (SENOKOT-S/PERICOLACE) 8.6-50 MG per tablet 1 tablet  1 tablet Oral BID PRN Dannielle Gunn, DO        Or    senna-docusate (SENOKOT-S/PERICOLACE) 8.6-50 MG per tablet 2 tablet  2 tablet Oral BID PRN Dannielle Gunn, DO        sodium chloride (PF) 0.9% PF flush 3 mL  3 mL Intracatheter q1 min prn Niki Montoya NP        sodium chloride (PF) 0.9% PF flush 3 mL  3 mL Intracatheter q1 min prn Dannielle Gunn,                 Data:      All new lab and imaging data was reviewed.   Recent Labs   Lab Test 05/02/25  0513 05/01/25  0830 04/30/25  0934 04/29/25  1348 08/03/21  0712   WBC 7.3 10.6 8.3   < > 6.2   HGB 11.2* 11.6* 11.9   < > 12.9   MCV 92 97 100   < > 91    212 196   < > 254   INR  --   --   --   --  1.14    < > = values in this interval not displayed.      Recent Labs   Lab Test 05/02/25  0808 05/02/25  0513 05/01/25  2047 05/01/25  1653 05/01/25  0830   NA  --  144  --  143 144   POTASSIUM  --  3.4  --  3.8 4.2   CHLORIDE  --  102  --  106 107   CO2  --  28  --  25 24   BUN  --  52.2*  --  62.0* 63.1*   CR  --  2.80*  --  3.25* 3.57*   ANIONGAP  --  14  --  12 13   HECTOR  --  8.9  --  8.7* 8.5*   * 144* 161* 132* 148*     Recent Labs   Lab Test 01/26/18  1715 01/26/18  1115 01/26/18  0500 01/25/18  2300 01/25/18  1945   TROPI 6.343* 6.180* 5.253*   < > 3.934*   TROPONIN  --   --   --   --  3.84*    < > = values in this interval not  displayed.        Alphonse Jarquin MD  5/2/2025  Pager:  [phone]

## 2025-05-02 NOTE — PROGRESS NOTES
Critical Care Progress Note      05/02/2025    Name: Amy Bryan MRN#: [id number]   Age: 69 year old YOB: 1955     Hospital Day# 3        Code status: Full code         Problem List:   Active Problems:    Hyperkalemia    Pleural effusion    Elevated troponin    GIOVANNI (acute kidney injury)    Acute respiratory failure with hypoxia (H)    Multiple falls           Summary/Hospital Course:     Amy Bryan is a 69 year old female with history of TBI in 2012, CAD s/p PCI in 2018 and 2021, suspected takotsubo cardiomyopathy now recovered, presenting for vague complaints of fatigue for the last 2 days. Within the ED, hypoxic to the 80s on room air and BP notably soft into the 90s. Lab revealed troponin of 804, Cr of 3.23, K of 5.7. Admitted to the ICU given need for pressors for non-fluid responsive hypotension. Work up thus far showing bilateral pleural effusions, elevated troponins and EKG changes 2/2 likely stress cardiomyopathy, likely urosepsis and elevated LFTs.           Interim History:     Got 2.7L of fluid in ED, continued to be hypotensive leading to initiation of pressors, requiring minimal amount of epinephrine. Plan to place central line, arterial line and perform thoracentesis this afternoon. Stable on Bipap.       Assessment and plan :     Amy Bryan is a 69 year old female admitted on 4/29/2025 for EKG changes, elevated troponins, requiring pressor support for hemodynamic instability.   I have personally reviewed the daily labs, imaging studies, cultures and discussed the case with referring physician and consulting physicians.     My assessment and plan by system for this patient is as follows:    Neurology/Psychiatry:  # Cognitive disorder with history of TBI in 2012    # Anxiety/MDD  Currently living in group home  - Continue PTA Keppra  - PRN lorazepam      Cardiovascular:  # Stress cardiomyopathy versus NSTEMI  # CAD s/p PCI in 2018, 2021   # Hypotension  EKG reviewed  with new T wave inversions, sinus bradycardia, initial troponin 814>905. ECHO 4/30 showing EF 45-50%, increased LV filling pressures, dilated RV with decreased systolic function. In ED, got 2.7L without improvement in BP.  - Continue heparin gtt, aspirin  - Epinephrine for hypotension  - Trend troponins  - No coronary angiogram indicated at time given lack of anginal symptoms and acute renal failure  - Cardiology consulted, following      Pulmonary:  # Acute hypoxic/hypercapnic respiratory failure  # Pleural effusion, large  V/Q negative for pulmonary embolism.  - BiPap, wean as tolerated  - Plan for thoracentesis       GI and Nutrition:  # Elevated LFTs  # GERD  US showing possible acute cholecystitis, negative Damon sign. CT abdomen pelvis showing cholelithiasis with edema but no fat stranding.  - Trend LFTs, down trending  - PPI        Renal/Fluids/Electrolytes:  # GIOVANNI, likely ischemic ATN in setting of shock  # Hyperkalemia- resolved  Cr on admit 2.23, now 3.69, elevated BNP, dry mucous membranes.  - Check CK, renal panel daily  - Hold lisinopril, lasix, PTA potassium supplement  - Electrolyte replacement per ICU protocols  - Avoid nephrotoxic agents and adjust medications as needed for renal clearance  - Follow I/O  - NICOM assessment: initial CO 4.5 CI 2.2 SVI -11% indicating NOT fluid responsive, leave leads in place   - Nephrology consulted, following      Infectious Disease:  # Abnormal UA, likely UTI  # Concern for sepsis  MRSA nares negative.  - Zosyn  - Urine, blood culture pending      Endocrine:  # Hypothyroidism   - T4 pending  - PTA levothyroxine 75mcg      Hematology:  # Elevated D-dimer   - Initiate heparin  - Dopplers negative for DVT      MSKL/Rheum:  None currently.      IV/Access:   1. Venous access   2. Arterial access    3. Central access      ICU Prophylaxis:   1. DVT: Hep, mechanical  2. VAP: HOB 30 degrees, chlorhexidine rinse  3. Stress Ulcer: PPI  4. Restraints: none  5. Wound care -  per unit routine   6. Feeding - NPO, plan to resume diet soon  7. Family Update: CINDY German, at bedside  8. Disposition - likely back to group home             Key Medications:     Current Facility-Administered Medications   Medication Dose Route Frequency Provider Last Rate Last Admin    aspirin (ASA) chewable tablet 81 mg  81 mg Oral Daily Dannielle Gunn DO   81 mg at 05/01/25 0903    cefTRIAXone (ROCEPHIN) 1 g vial to attach to  mL bag for ADULTS or NS 50 mL bag for PEDS  1 g Intravenous Q24H Noel Nash MD        gabapentin (NEURONTIN) capsule 200 mg  200 mg Oral At Bedtime Dannielle Gunn DO   200 mg at 05/01/25 2200    lactase (LACTAID) tablet 9,000 Units  9,000 Units Oral TID w/meals Dannielle Gunn DO   9,000 Units at 05/02/25 1233    levETIRAcetam (KEPPRA) tablet 500 mg  500 mg Oral BID Dannielle Gunn DO   500 mg at 05/01/25 2159    levothyroxine (SYNTHROID/LEVOTHROID) tablet 75 mcg  75 mcg Oral QAM AC Dannielle Gunn DO   75 mcg at 05/01/25 0903    [Held by provider] metoprolol tartrate (LOPRESSOR) tablet 75 mg  75 mg Oral BID Dannielle Gunn DO        mirtazapine (REMERON) tablet 30 mg  30 mg Oral At Bedtime Dannielle Gunn DO   30 mg at 05/01/25 2200    pantoprazole (PROTONIX) EC tablet 40 mg  40 mg Oral QAM Dannielle Gunn DO   40 mg at 05/01/25 0903    potassium chloride (KLOR-CON) Packet 40 mEq  40 mEq Oral BID Noel Nash MD        [Held by provider] QUEtiapine (SEROquel) tablet 25 mg  25 mg Oral 4x Daily Dannielle Gunn DO   25 mg at 04/30/25 0748    sertraline (ZOLOFT) tablet 150 mg  150 mg Oral Daily Dannielle Gunn DO   150 mg at 05/01/25 0903    sodium chloride (PF) 0.9% PF flush 3 mL  3 mL Intracatheter Q8H Niki Mayer NP   3 mL at 05/01/25 1422    sodium chloride (PF) 0.9% PF flush 3 mL  3 mL Intracatheter Q8H ABHI Dannielle Gunn DO   3 mL at 05/01/25 1422     Current Facility-Administered Medications   Medication Dose Route  Frequency Provider Last Rate Last Admin    amiodarone (NEXTERONE) 1.8 mg/mL in dextrose 5% 200 mL ADULT STANDARD infusion  0.5 mg/min Intravenous Continuous Maddi Brenner APRN CNP 16.7 mL/hr at 05/02/25 1150 0.5 mg/min at 05/02/25 1150    Continuing beta blocker from home medication list OR beta blocker order already placed during this visit   Does not apply DOES NOT GO TO Dannielle Sousa DO        Continuing statin from home medication list OR statin order already placed during this visit   Does not apply DOES NOT GO TO Dannielle Sousa DO        DOBUTamine (DOBUTREX) 500 mg in D5W 250 mL infusion (adult std conc)  2.5 mcg/kg/min Intravenous Continuous Noel Nash MD 5.9 mL/hr at 05/02/25 1149 2.5 mcg/kg/min at 05/02/25 1149    EPINEPHrine (ADRENALIN) 5 mg in  mL infusion  0.01-0.3 mcg/kg/min Intravenous Continuous Dannielle Gunn DO   Stopped at 05/02/25 0953    furosemide (LASIX) 100 mg in sodium chloride 0.9 % 100 mL infusion  5 mg/hr Intravenous Continuous Noel Nash MD        heparin 25,000 units in 0.45% NaCl 250 mL ANTICOAGULANT infusion  0-5,000 Units/hr Intravenous Continuous Noel Nash MD 15 mL/hr at 05/02/25 1003 1,500 Units/hr at 05/02/25 1003    No lozenges or gum should be given while patient on BIPAP/AVAPS/AVAPS AE   Does not apply Continuous PRN Niki Montoya NP        norepinephrine (LEVOPHED) 4 mg in  mL infusion PREMIX  0.01-0.6 mcg/kg/min Intravenous Continuous Dannielle Gunn DO   Stopped at 05/02/25 0200    Patient is already receiving anticoagulation with heparin, enoxaparin (LOVENOX), warfarin (COUMADIN)  or other anticoagulant medication   Does not apply Continuous PRN Dannielle Gunn DO        Patient may continue current oral medications   Does not apply Continuous PRN Maddi Brenner APRN CNP        Reason ACE/ARB/ARNI order not selected   Other DOES NOT GO TO Dannielle Sousa DO                   Physical Examination:   Temp:   [98.4  F (36.9  C)-99.9  F (37.7  C)] 99  F (37.2  C)  Pulse:  [] 76  Resp:  [] 18  BP: ()/(41-82) 117/82  FiO2 (%):  [30 %-55 %] 55 %  SpO2:  [90 %-100 %] 99 %    Intake/Output Summary (Last 24 hours) at 4/30/2025 1315  Last data filed at 4/30/2025 0351  Gross per 24 hour   Intake 2659.6 ml   Output --   Net 2659.6 ml     Wt Readings from Last 4 Encounters:   05/02/25 78.4 kg (172 lb 12.8 oz)   03/15/23 102.2 kg (225 lb 4.8 oz)   02/28/22 97.3 kg (214 lb 8 oz)   08/18/21 91.6 kg (202 lb)     BP - Mean:  [51-98] 70  FiO2 (%): 55 %, Resp: 18  Recent Labs   Lab 05/02/25  1158 05/02/25  0513 05/01/25  2114 05/01/25  1456   O2PER 55 30 30 30       GEN: no acute distress, alert and orientated x4   HEENT: head ncat, sclera anicteric, OP patent, trachea midline, dry mucous membranes   PULM: faint wheeze over right upper lung field, otherwise clear lung sounds, bipap in place    CV/COR: RRR S1S2, murmur noted   ABD: soft, nontender, no mass  EXT: warm extremities, no edema  NEURO: grossly intact  SKIN: no obvious rash  LINES: clean, dry intact         Data:   All data and imaging reviewed     ROUTINE ICU LABS (Last four results)  CMP  Recent Labs   Lab 05/02/25  0808 05/02/25  0513 05/01/25  2047 05/01/25  1653 05/01/25  0830 04/30/25  1356 04/30/25  0934 04/30/25  0558 04/29/25  2239 04/29/25  1533   NA  --  144  --  143 144  --   --  142   < >  --    POTASSIUM  --  3.4  --  3.8 4.2  --   --  5.3   < > 5.7*   CHLORIDE  --  102  --  106 107  --   --  103   < >  --    CO2  --  28  --  25 24  --   --  24   < >  --    ANIONGAP  --  14  --  12 13  --   --  15   < >  --    * 144* 161* 132* 148*   < > 92 96   < >  --    BUN  --  52.2*  --  62.0* 63.1*  --   --  60.7*   < >  --    CR  --  2.80*  --  3.25* 3.57*  --   --  3.69*   < >  --    GFRESTIMATED  --  18*  --  15* 13*  --   --  13*   < >  --    HECTOR  --  8.9  --  8.7* 8.5*  --   --  8.1*   < >  --    MAG  --  2.0  --  2.2 2.5*  --   --   --   --   --   "  PHOS  --  3.6  --   --  5.5*  --   --   --   --   --    PROTTOTAL  --  5.8*  --   --  5.9*  --  5.7* 5.7*  --  5.9*   ALBUMIN  --  3.1*  --   --  3.3*  --   --  3.2*  --  3.3*   BILITOTAL  --  0.6  --   --  0.9  --   --  0.8  --  1.5*   ALKPHOS  --  231*  --   --  252*  --   --  301*  --  335*   AST  --  125*  --   --  106*  --   --  307*  --  518*   ALT  --  163*  --   --  197*  --   --  323*  --  416*    < > = values in this interval not displayed.     CBC  Recent Labs   Lab 05/02/25  0513 05/01/25  0830 04/30/25  0934 04/29/25  2102   WBC 7.3 10.6 8.3 12.0*   RBC 3.80 3.93 4.10 3.79*   HGB 11.2* 11.6* 11.9 11.1*   HCT 34.9* 38.1 40.8 36.5   MCV 92 97 100 96   MCH 29.5 29.5 29.0 29.3   MCHC 32.1 30.4* 29.2* 30.4*   RDW 14.6 14.4 14.4 14.3    212 196 194     INRNo lab results found in last 7 days.  Arterial Blood Gas  Recent Labs   Lab 05/02/25  1158 05/02/25  0513 05/01/25  2114 05/01/25  1456   O2PER 55 30 30 30       All cultures:  No results for input(s): \"CULT\" in the last 168 hours.  No results found for this or any previous visit (from the past 24 hours).      Martina Walker, MS4  [employer]    [facility]  CRITICAL CARE STAFF NOTE    I am managing these acute and ongoing critical issues resulting in critical condition that impairs one or more vital organ systems, incur a high probability of imminent or life-threatening deterioration in the patient's condition and providing frequent personal assessment and manipulation of medications and life support equipment.     1. Cardiogenic shock.     ICU Interventions: parenteral medications necessitating continuous monitoring including vasoactive medications, medication for heart rhythm control, insulin infusion, and/or sedative/opiates  and interpretation of lab values, cardiac output, cxr, pulse oximetry, blood gases, and/or information/data stored in computers    The patient was seen and examined with the " "resident/fellow/EVAN and/or medical student.  We have discussed the patient in detail and I agree with the findings, assessment, and plan as documented when this note was cosigned on this day. The plan was formulated in conjunction with pharmacy, ICU nurses, and respiratory therapist. I have evaluated all laboratory values and imaging studies for the past 24 hours. I have reviewed all the consults that have been ordered and are active for this patient.      Critical Care Time: 38 min.  I spent this time (excluding procedures) personally providing and directing critical care services at the bedside and on the critical care unit.      Noel HAMMER MPH   of Medicine  Pager: [phone]    Clinically Significant Risk Factors               # Hypoalbuminemia: Lowest albumin = 3.1 g/dL at 5/2/2025  5:13 AM, will monitor as appropriate     # Hypertension: Noted on problem list     # Acute Hypercapnic Respiratory Failure: based on venous blood gas results.  Continue supplemental oxygen and ventilatory support as indicated.         # Overweight: Estimated body mass index is 28.76 kg/m  as calculated from the following:    Height as of this encounter: 1.651 m (5' 5\").    Weight as of this encounter: 78.4 kg (172 lb 12.8 oz)., PRESENT ON ADMISSION       # Financial/Environmental Concerns: none                "

## 2025-05-03 ENCOUNTER — APPOINTMENT (OUTPATIENT)
Dept: CARDIOLOGY | Facility: CLINIC | Age: 70
DRG: 280 | End: 2025-05-03
Attending: INTERNAL MEDICINE
Payer: COMMERCIAL

## 2025-05-03 ENCOUNTER — APPOINTMENT (OUTPATIENT)
Dept: PHYSICAL THERAPY | Facility: CLINIC | Age: 70
DRG: 280 | End: 2025-05-03
Payer: COMMERCIAL

## 2025-05-03 LAB
ALBUMIN SERPL BCG-MCNC: 3.1 G/DL (ref 3.5–5.2)
ALP SERPL-CCNC: 205 U/L (ref 40–150)
ALT SERPL W P-5'-P-CCNC: 161 U/L (ref 0–50)
ANION GAP SERPL CALCULATED.3IONS-SCNC: 10 MMOL/L (ref 7–15)
AST SERPL W P-5'-P-CCNC: 225 U/L (ref 0–45)
ATRIAL RATE - MUSE: 59 BPM
BASE EXCESS BLDV CALC-SCNC: 6.4 MMOL/L (ref -3–3)
BASE EXCESS BLDV CALC-SCNC: 6.9 MMOL/L (ref -3–3)
BASE EXCESS BLDV CALC-SCNC: 7.7 MMOL/L (ref -3–3)
BILIRUB SERPL-MCNC: 0.6 MG/DL
BUN SERPL-MCNC: 42.9 MG/DL (ref 8–23)
CALCIUM SERPL-MCNC: 9 MG/DL (ref 8.8–10.4)
CHLORIDE SERPL-SCNC: 107 MMOL/L (ref 98–107)
CREAT SERPL-MCNC: 2.25 MG/DL (ref 0.51–0.95)
DIASTOLIC BLOOD PRESSURE - MUSE: NORMAL MMHG
EGFRCR SERPLBLD CKD-EPI 2021: 23 ML/MIN/1.73M2
ERYTHROCYTE [DISTWIDTH] IN BLOOD BY AUTOMATED COUNT: 14.6 % (ref 10–15)
GLUCOSE BLDC GLUCOMTR-MCNC: 103 MG/DL (ref 70–99)
GLUCOSE BLDC GLUCOMTR-MCNC: 147 MG/DL (ref 70–99)
GLUCOSE BLDC GLUCOMTR-MCNC: 95 MG/DL (ref 70–99)
GLUCOSE SERPL-MCNC: 94 MG/DL (ref 70–99)
HCO3 BLDV-SCNC: 34 MMOL/L (ref 21–28)
HCO3 BLDV-SCNC: 34 MMOL/L (ref 21–28)
HCO3 BLDV-SCNC: 35 MMOL/L (ref 21–28)
HCO3 SERPL-SCNC: 31 MMOL/L (ref 22–29)
HCT VFR BLD AUTO: 36.2 % (ref 35–47)
HGB BLD-MCNC: 11.2 G/DL (ref 11.7–15.7)
INTERPRETATION ECG - MUSE: NORMAL
LVEF ECHO: NORMAL
MAGNESIUM SERPL-MCNC: 1.8 MG/DL (ref 1.7–2.3)
MCH RBC QN AUTO: 29.2 PG (ref 26.5–33)
MCHC RBC AUTO-ENTMCNC: 30.9 G/DL (ref 31.5–36.5)
MCV RBC AUTO: 94 FL (ref 78–100)
O2/TOTAL GAS SETTING VFR VENT: 21 %
O2/TOTAL GAS SETTING VFR VENT: 28 %
O2/TOTAL GAS SETTING VFR VENT: 35 %
OXYHGB MFR BLDV: 67 % (ref 70–75)
OXYHGB MFR BLDV: 67 % (ref 70–75)
OXYHGB MFR BLDV: 75 % (ref 70–75)
P AXIS - MUSE: 29 DEGREES
PCO2 BLDV: 58 MM HG (ref 40–50)
PCO2 BLDV: 59 MM HG (ref 40–50)
PCO2 BLDV: 61 MM HG (ref 40–50)
PH BLDV: 7.35 [PH] (ref 7.32–7.43)
PH BLDV: 7.37 [PH] (ref 7.32–7.43)
PH BLDV: 7.38 [PH] (ref 7.32–7.43)
PHOSPHATE SERPL-MCNC: 3.4 MG/DL (ref 2.5–4.5)
PLATELET # BLD AUTO: 183 10E3/UL (ref 150–450)
PO2 BLDV: 38 MM HG (ref 25–47)
PO2 BLDV: 38 MM HG (ref 25–47)
PO2 BLDV: 45 MM HG (ref 25–47)
POTASSIUM SERPL-SCNC: 4.1 MMOL/L (ref 3.4–5.3)
PR INTERVAL - MUSE: 194 MS
PROT SERPL-MCNC: 5.4 G/DL (ref 6.4–8.3)
QRS DURATION - MUSE: 132 MS
QT - MUSE: 554 MS
QTC - MUSE: 548 MS
R AXIS - MUSE: -66 DEGREES
RBC # BLD AUTO: 3.84 10E6/UL (ref 3.8–5.2)
SAO2 % BLDV: 68.2 % (ref 70–75)
SAO2 % BLDV: 68.3 % (ref 70–75)
SAO2 % BLDV: 76.8 % (ref 70–75)
SODIUM SERPL-SCNC: 148 MMOL/L (ref 135–145)
SYSTOLIC BLOOD PRESSURE - MUSE: NORMAL MMHG
T AXIS - MUSE: 74 DEGREES
UFH PPP CHRO-ACNC: 0.27 IU/ML
VENTRICULAR RATE- MUSE: 59 BPM
WBC # BLD AUTO: 7.6 10E3/UL (ref 4–11)

## 2025-05-03 PROCEDURE — C8929 TTE W OR WO FOL WCON,DOPPLER: HCPCS

## 2025-05-03 PROCEDURE — 250N000013 HC RX MED GY IP 250 OP 250 PS 637: Performed by: INTERNAL MEDICINE

## 2025-05-03 PROCEDURE — 97116 GAIT TRAINING THERAPY: CPT | Mod: GP | Performed by: PHYSICAL THERAPIST

## 2025-05-03 PROCEDURE — 250N000011 HC RX IP 250 OP 636: Performed by: INTERNAL MEDICINE

## 2025-05-03 PROCEDURE — 255N000002 HC RX 255 OP 636: Performed by: INTERNAL MEDICINE

## 2025-05-03 PROCEDURE — 85041 AUTOMATED RBC COUNT: CPT | Performed by: INTERNAL MEDICINE

## 2025-05-03 PROCEDURE — 99291 CRITICAL CARE FIRST HOUR: CPT | Performed by: INTERNAL MEDICINE

## 2025-05-03 PROCEDURE — 83735 ASSAY OF MAGNESIUM: CPT | Performed by: INTERNAL MEDICINE

## 2025-05-03 PROCEDURE — 97530 THERAPEUTIC ACTIVITIES: CPT | Mod: GP | Performed by: PHYSICAL THERAPIST

## 2025-05-03 PROCEDURE — 200N000001 HC R&B ICU

## 2025-05-03 PROCEDURE — 84100 ASSAY OF PHOSPHORUS: CPT | Performed by: INTERNAL MEDICINE

## 2025-05-03 PROCEDURE — 82805 BLOOD GASES W/O2 SATURATION: CPT | Performed by: INTERNAL MEDICINE

## 2025-05-03 PROCEDURE — 250N000013 HC RX MED GY IP 250 OP 250 PS 637: Performed by: STUDENT IN AN ORGANIZED HEALTH CARE EDUCATION/TRAINING PROGRAM

## 2025-05-03 PROCEDURE — 85520 HEPARIN ASSAY: CPT | Performed by: INTERNAL MEDICINE

## 2025-05-03 PROCEDURE — 99232 SBSQ HOSP IP/OBS MODERATE 35: CPT | Performed by: INTERNAL MEDICINE

## 2025-05-03 PROCEDURE — 82565 ASSAY OF CREATININE: CPT | Performed by: INTERNAL MEDICINE

## 2025-05-03 PROCEDURE — 250N000009 HC RX 250: Performed by: INTERNAL MEDICINE

## 2025-05-03 PROCEDURE — 93306 TTE W/DOPPLER COMPLETE: CPT | Mod: 26 | Performed by: INTERNAL MEDICINE

## 2025-05-03 PROCEDURE — 97161 PT EVAL LOW COMPLEX 20 MIN: CPT | Mod: GP | Performed by: PHYSICAL THERAPIST

## 2025-05-03 RX ORDER — NOREPINEPHRINE BITARTRATE 0.02 MG/ML
.01-.6 INJECTION, SOLUTION INTRAVENOUS CONTINUOUS
Status: DISCONTINUED | OUTPATIENT
Start: 2025-05-03 | End: 2025-05-04

## 2025-05-03 RX ORDER — AMIODARONE HYDROCHLORIDE 200 MG/1
200 TABLET ORAL 2 TIMES DAILY
Status: DISCONTINUED | OUTPATIENT
Start: 2025-05-03 | End: 2025-05-05

## 2025-05-03 RX ORDER — FUROSEMIDE 10 MG/ML
20 INJECTION INTRAMUSCULAR; INTRAVENOUS EVERY 8 HOURS
Status: DISCONTINUED | OUTPATIENT
Start: 2025-05-03 | End: 2025-05-04

## 2025-05-03 RX ORDER — METOPROLOL TARTRATE 1 MG/ML
5 INJECTION, SOLUTION INTRAVENOUS
Status: DISCONTINUED | OUTPATIENT
Start: 2025-05-03 | End: 2025-05-07 | Stop reason: HOSPADM

## 2025-05-03 RX ORDER — CHLOROTHIAZIDE SODIUM 500 MG/1
500 INJECTION INTRAVENOUS ONCE
Status: COMPLETED | OUTPATIENT
Start: 2025-05-03 | End: 2025-05-03

## 2025-05-03 RX ADMIN — LEVETIRACETAM 500 MG: 500 TABLET, FILM COATED ORAL at 20:07

## 2025-05-03 RX ADMIN — GABAPENTIN 200 MG: 100 CAPSULE ORAL at 20:07

## 2025-05-03 RX ADMIN — LEVETIRACETAM 500 MG: 500 TABLET, FILM COATED ORAL at 09:10

## 2025-05-03 RX ADMIN — LACTASE TAB 3000 UNIT 9000 UNITS: 3000 TAB at 17:50

## 2025-05-03 RX ADMIN — AMIODARONE HYDROCHLORIDE 200 MG: 200 TABLET ORAL at 09:10

## 2025-05-03 RX ADMIN — CHLOROTHIAZIDE SODIUM 500 MG: 500 INJECTION, POWDER, LYOPHILIZED, FOR SOLUTION INTRAVENOUS at 11:50

## 2025-05-03 RX ADMIN — LACTASE TAB 3000 UNIT 9000 UNITS: 3000 TAB at 11:50

## 2025-05-03 RX ADMIN — AMIODARONE HYDROCHLORIDE 0.5 MG/MIN: 1.8 INJECTION, SOLUTION INTRAVENOUS at 05:39

## 2025-05-03 RX ADMIN — LEVOTHYROXINE SODIUM 75 MCG: 75 TABLET ORAL at 07:32

## 2025-05-03 RX ADMIN — SERTRALINE HYDROCHLORIDE 150 MG: 100 TABLET ORAL at 09:10

## 2025-05-03 RX ADMIN — METOPROLOL TARTRATE 5 MG: 5 INJECTION INTRAVENOUS at 17:28

## 2025-05-03 RX ADMIN — FUROSEMIDE 20 MG: 10 INJECTION, SOLUTION INTRAVENOUS at 09:09

## 2025-05-03 RX ADMIN — HEPARIN SODIUM 1500 UNITS/HR: 10000 INJECTION, SOLUTION INTRAVENOUS at 03:27

## 2025-05-03 RX ADMIN — AMIODARONE HYDROCHLORIDE 200 MG: 200 TABLET ORAL at 20:07

## 2025-05-03 RX ADMIN — METOPROLOL TARTRATE 5 MG: 5 INJECTION INTRAVENOUS at 20:00

## 2025-05-03 RX ADMIN — PANTOPRAZOLE SODIUM 40 MG: 40 TABLET, DELAYED RELEASE ORAL at 09:10

## 2025-05-03 RX ADMIN — ASPIRIN 81 MG CHEWABLE TABLET 81 MG: 81 TABLET CHEWABLE at 09:10

## 2025-05-03 RX ADMIN — HEPARIN SODIUM 1500 UNITS/HR: 10000 INJECTION, SOLUTION INTRAVENOUS at 20:07

## 2025-05-03 RX ADMIN — LACTASE TAB 3000 UNIT 9000 UNITS: 3000 TAB at 09:09

## 2025-05-03 RX ADMIN — CEFTRIAXONE SODIUM 1 G: 1 INJECTION, POWDER, FOR SOLUTION INTRAMUSCULAR; INTRAVENOUS at 16:12

## 2025-05-03 RX ADMIN — MIRTAZAPINE 30 MG: 30 TABLET, FILM COATED ORAL at 20:08

## 2025-05-03 RX ADMIN — PERFLUTREN 10 ML: 6.52 INJECTION, SUSPENSION INTRAVENOUS at 12:20

## 2025-05-03 RX ADMIN — FUROSEMIDE 20 MG: 10 INJECTION, SOLUTION INTRAVENOUS at 17:51

## 2025-05-03 RX ADMIN — METOPROLOL TARTRATE 5 MG: 5 INJECTION INTRAVENOUS at 20:23

## 2025-05-03 ASSESSMENT — ACTIVITIES OF DAILY LIVING (ADL)
ADLS_ACUITY_SCORE: 74
ADLS_ACUITY_SCORE: 70
ADLS_ACUITY_SCORE: 74

## 2025-05-03 NOTE — PROGRESS NOTES
Nephrology Progress Note  05/03/2025         Assessment & Recommendations:   1 shock-septic versus cardiogenic. Normal WBC, high Pro-Jean Carlos, pyuria.  Empirically on antibiotics.  - Presented with NSTEMI , ?  Stress cardiomyopathy.  EF 45-50%, mildly reduced RV function, pulmonary hypertension-RVSP 40 mmHg plus RA  Dilated IVC with abnormal respiratory variability  - Received 2.6 L IV fluid without significant improvement in blood pressure    Excellent response to dobutamine and IV Lasix with significant diuresis, improving creatinine.  Consistent with CRS     2 GIOVANNI-presumed GIOVANNI.  Last known creatinine of 0.8 in 2023.  Presenting creatinine of 3.2.  Likely ischemic ATN in setting of shock. FeNA is 0.8 but did not respond to IV fluid.  Likely component of CRS  UA relatively bland-1+ proteinuria, 2 RBC per high-power field.  no casts    - Improving.  Good urine output     3 elevated LFT- -?  Shock liver.  Improving .  Ultrasound equivocal for acute Alice.  Clinically not consistent with cholecystitis     4 acute hypoxic/hypercapnic respiratory failure,-  Large pleural effusion right side.  High proBNP  -Status post thoracentesis.  -Off BiPAP.  Improved     Other issues-  Coronary disorder with TBI -currently in a group home.  On [facility]     Recommendation-  Continue supportive manage  Agree with intermittent IV Lasix.  Monitor urine output and adjust accordingly  Now off BiPAP.  Sodium is 148.  Encourage oral free water intake.  If sodium continues rising, will need D5.    Yadira Grayson MD  [facility] - Nephrology   [phone]      Interval History :   Seen / examined.   Off dobutamine drip.  Off Lasix drip.  Off BiPAP.  3.6 L urine output yesterday.  Now on intermittent IV Lasix.  Blood pressure on soft side.      Physical Exam:   I/O last 3 completed shifts:  In: 1558.41 [P.O.:600; I.V.:858.41; IV Piggyback:100]  Out: 3650 [Urine:3650]  BP (!) 80/54   Pulse 73   Temp 99  F (37.2  C)   Resp 18   Ht 1.651 m  "(5' 5\")   Wt 74 kg (163 lb 1.6 oz)   SpO2 (!) 91%   BMI 27.14 kg/m      GENERAL APPEARANCE: On nasal cannula oxygen  Pulmonary: Diminished at bases  CV: Tachycardic, regular   - Edema-trace  GI: soft, nontender,   MS: no evidence of inflammation in joints  SKIN: no rash, warm, dry, no cyanosis  NEURO: face symmetric, no asterixis     Labs:   All labs reviewed by me  Electrolytes/Renal -   Recent Labs   Lab Test 05/03/25  1203 05/03/25  0803 05/03/25  0525 05/02/25  2343 05/02/25  1846 05/02/25  0808 05/02/25  0513   NA  --   --  148*  --  146*  --  144   POTASSIUM  --   --  4.1  --  4.1  --  3.4   CHLORIDE  --   --  107  --  103  --  102   CO2  --   --  31*  --  31*  --  28   BUN  --   --  42.9*  --  45.4*  --  52.2*   CR  --   --  2.25*  --  2.41*  --  2.80*   * 95 94   < > 96   < > 144*   HECTOR  --   --  9.0  --  9.3  --  8.9   MAG  --   --  1.8  --  1.8  --  2.0   PHOS  --   --  3.4  --  3.2  --  3.6    < > = values in this interval not displayed.       CBC -   Recent Labs   Lab Test 05/03/25  0525 05/02/25  0513 05/01/25  0830   WBC 7.6 7.3 10.6   HGB 11.2* 11.2* 11.6*    165 212       LFTs -   Recent Labs   Lab Test 05/03/25  0525 05/02/25  1846 05/02/25  0513   ALKPHOS 205* 242* 231*   BILITOTAL 0.6 0.6 0.6   * 190* 163*   * 289* 125*   PROTTOTAL 5.4* 6.2* 5.8*   ALBUMIN 3.1* 3.3* 3.1*           Current Medications:  Current Facility-Administered Medications   Medication Dose Route Frequency Provider Last Rate Last Admin    amiodarone (PACERONE) tablet 200 mg  200 mg Oral BID Noel Nash MD   200 mg at 05/03/25 0910    aspirin (ASA) chewable tablet 81 mg  81 mg Oral Daily Dannielle Gunn DO   81 mg at 05/03/25 0910    cefTRIAXone (ROCEPHIN) 1 g vial to attach to  mL bag for ADULTS or NS 50 mL bag for PEDS  1 g Intravenous Q24H Noel Nash MD   1 g at 05/02/25 1606    furosemide (LASIX) injection 20 mg  20 mg Intravenous Q8H Noel Nash MD   20 mg at 05/03/25 0909    " gabapentin (NEURONTIN) capsule 200 mg  200 mg Oral At Bedtime Dannielle Gunn, DO   200 mg at 05/02/25 2115    lactase (LACTAID) tablet 9,000 Units  9,000 Units Oral TID w/meals Dannielle Gunn, DO   9,000 Units at 05/03/25 1150    levETIRAcetam (KEPPRA) tablet 500 mg  500 mg Oral BID Dannielle Gunn, DO   500 mg at 05/03/25 0910    levothyroxine (SYNTHROID/LEVOTHROID) tablet 75 mcg  75 mcg Oral QAM AC Dannielle Gunn, DO   75 mcg at 05/03/25 0732    [Held by provider] metoprolol tartrate (LOPRESSOR) tablet 75 mg  75 mg Oral BID Dannielle Gunn, DO        mirtazapine (REMERON) tablet 30 mg  30 mg Oral At Bedtime Dannielle Gunn, DO   30 mg at 05/02/25 2115    pantoprazole (PROTONIX) EC tablet 40 mg  40 mg Oral QAM Dannielle Gunn, DO   40 mg at 05/03/25 0910    [Held by provider] QUEtiapine (SEROquel) tablet 25 mg  25 mg Oral 4x Daily Dannielle Gunn DO   25 mg at 04/30/25 0748    sertraline (ZOLOFT) tablet 150 mg  150 mg Oral Daily Dannielle Gunn, DO   150 mg at 05/03/25 0910    sodium chloride (PF) 0.9% PF flush 3 mL  3 mL Intracatheter Q8H ABHI Niki Montoya NP   3 mL at 05/03/25 1039    sodium chloride (PF) 0.9% PF flush 3 mL  3 mL Intracatheter Q8H ABHI Dannielle Gunn, DO   3 mL at 05/03/25 1040     Current Facility-Administered Medications   Medication Dose Route Frequency Provider Last Rate Last Admin    amiodarone (NEXTERONE) 1.8 mg/mL in dextrose 5% 200 mL ADULT STANDARD infusion  0.5 mg/min Intravenous Continuous Noel Nash MD 16.7 mL/hr at 05/03/25 1038 0.5 mg/min at 05/03/25 1038    Continuing beta blocker from home medication list OR beta blocker order already placed during this visit   Does not apply DOES NOT GO TO Dannielle Sousa,         Continuing statin from home medication list OR statin order already placed during this visit   Does not apply DOES NOT GO TO Dannielle Sousa,         heparin 25,000 units in 0.45% NaCl 250 mL ANTICOAGULANT  infusion  0-5,000 Units/hr Intravenous Continuous Noel Nash MD 15 mL/hr at 05/03/25 1000 1,500 Units/hr at 05/03/25 1000    No lozenges or gum should be given while patient on BIPAP/AVAPS/AVAPS AE   Does not apply Continuous PRN Niki Montoya, CODIE        norepinephrine (LEVOPHED) 4 mg in  mL infusion PREMIX  0.01-0.6 mcg/kg/min Intravenous Continuous Noel Nash MD        Patient is already receiving anticoagulation with heparin, enoxaparin (LOVENOX), warfarin (COUMADIN)  or other anticoagulant medication   Does not apply Continuous PRN Dannielle Gunn DO        Patient may continue current oral medications   Does not apply Continuous PRN Maddi Brenner APRN CNP        Reason ACE/ARB/ARNI order not selected   Other DOES NOT GO TO Dannielle Sousa DO Jiwan Thapa, MD

## 2025-05-03 NOTE — PLAN OF CARE
"Patient here due to urosepsis. Patient is alert and oriented x4. Tele shows normal sinus however at the end of this shift she converted to a-fib with RVR. Lung sounds clear but diminished in the bases. Patient was transitioned from BiPAP to HFNC 45% & 40LPM. Patient was weaned from epinephrine and dobutamine this shift. Swallow study performed and diet ordered; now regular. 2 large, loose BM this shift. CVC and 2PIV in place. Chance in place with good output. Amiodarone discontinued then restarted with onset of a-fib with RVR. Lasix held. Currently infusing amiodarone, heparin, and TKO. PoA at bedside this afternoon and updated.  Problem: Adult Inpatient Plan of Care  Goal: Plan of Care Review  Description: The Plan of Care Review/Shift note should be completed every shift.  The Outcome Evaluation is a brief statement about your assessment that the patient is improving, declining, or no change.  This information will be displayed automatically on your shiftnote.  Outcome: Progressing  Goal: Patient-Specific Goal (Individualized)  Description: You can add care plan individualizations to a care plan. Examples of Individualization might be:  \"Parent requests to be called daily at 9am for status\", \"I have a hard time hearing out of my right ear\", or \"Do not touch me to wake me up as it startlesme\".  Outcome: Progressing  Goal: Absence of Hospital-Acquired Illness or Injury  Outcome: Progressing  Intervention: Identify and Manage Fall Risk  Recent Flowsheet Documentation  Taken 5/2/2025 1600 by Jasvir Alvarenga RN  Safety Promotion/Fall Prevention: safety round/check completed  Taken 5/2/2025 1200 by Jasvir Alvarenga, RN  Safety Promotion/Fall Prevention: safety round/check completed  Taken 5/2/2025 0800 by Jasvir Alvarenga, RN  Safety Promotion/Fall Prevention: safety round/check completed  Intervention: Prevent Skin Injury  Recent Flowsheet Documentation  Taken 5/2/2025 1800 by Jasvir Alvarenga, RN  Body Position:   turned   heels " elevated   upper extremity elevated  Taken 5/2/2025 1600 by Jasvir Alvarenga RN  Body Position:   turned   heels elevated   upper extremity elevated  Taken 5/2/2025 1400 by Jasvir Alvarenga RN  Body Position:   turned   heels elevated   upper extremity elevated  Taken 5/2/2025 1200 by Jasvir Alvarenga RN  Body Position:   turned   heels elevated   upper extremity elevated  Taken 5/2/2025 0800 by Jasvir Alvarenga RN  Body Position:   turned   heels elevated   upper extremity elevated  Intervention: Prevent and Manage VTE (Venous Thromboembolism) Risk  Recent Flowsheet Documentation  Taken 5/2/2025 1600 by Jasvir Alvarenga RN  VTE Prevention/Management: SCDs on (sequential compression devices)  Taken 5/2/2025 1200 by Jasvir Alvarenga RN  VTE Prevention/Management: SCDs on (sequential compression devices)  Taken 5/2/2025 0800 by Jasvir Alvarenga RN  VTE Prevention/Management: SCDs on (sequential compression devices)  Goal: Optimal Comfort and Wellbeing  Outcome: Progressing  Intervention: Provide Person-Centered Care  Recent Flowsheet Documentation  Taken 5/2/2025 1600 by Jasvir Alvarenga RN  Trust Relationship/Rapport:   care explained   choices provided   emotional support provided   empathic listening provided   questions answered   reassurance provided   questions encouraged   thoughts/feelings acknowledged  Taken 5/2/2025 1200 by Jasvir Alvarenga RN  Trust Relationship/Rapport:   care explained   choices provided   emotional support provided   empathic listening provided   questions answered   reassurance provided   questions encouraged   thoughts/feelings acknowledged  Taken 5/2/2025 0800 by Jasvir Alvarenga RN  Trust Relationship/Rapport:   care explained   choices provided   emotional support provided   empathic listening provided   questions answered   reassurance provided   questions encouraged   thoughts/feelings acknowledged  Goal: Readiness for Transition of Care  Outcome: Progressing     Problem: Delirium  Goal: Optimal  Coping  Outcome: Progressing  Intervention: Optimize Psychosocial Adjustment to Delirium  Recent Flowsheet Documentation  Taken 5/2/2025 1600 by Jasvir Alvarenga RN  Supportive Measures:   positive reinforcement provided   relaxation techniques promoted   verbalization of feelings encouraged  Taken 5/2/2025 1200 by Jasvir Alvarenga RN  Supportive Measures:   positive reinforcement provided   relaxation techniques promoted   verbalization of feelings encouraged  Taken 5/2/2025 0800 by Jasvir Alvarenga RN  Supportive Measures:   positive reinforcement provided   relaxation techniques promoted   verbalization of feelings encouraged  Goal: Improved Behavioral Control  Outcome: Progressing  Intervention: Prevent and Manage Agitation  Recent Flowsheet Documentation  Taken 5/2/2025 1600 by Jasvir Alvarenga RN  Environment Familiarity/Consistency: daily routine followed  Taken 5/2/2025 1200 by Jasvir Alvarenga RN  Environment Familiarity/Consistency: daily routine followed  Taken 5/2/2025 0800 by Jasvir Alvarenga RN  Environment Familiarity/Consistency: daily routine followed  Intervention: Minimize Safety Risk  Recent Flowsheet Documentation  Taken 5/2/2025 1600 by Jasvir Alvarenga RN  Enhanced Safety Measures:   room near unit station   review medications for side effects with activity  Trust Relationship/Rapport:   care explained   choices provided   emotional support provided   empathic listening provided   questions answered   reassurance provided   questions encouraged   thoughts/feelings acknowledged  Taken 5/2/2025 1200 by Jasvir Alvarenga RN  Enhanced Safety Measures:   room near unit station   review medications for side effects with activity  Trust Relationship/Rapport:   care explained   choices provided   emotional support provided   empathic listening provided   questions answered   reassurance provided   questions encouraged   thoughts/feelings acknowledged  Taken 5/2/2025 0800 by Jasvir Alvarenga RN  Enhanced Safety  Measures:   room near unit station   review medications for side effects with activity  Trust Relationship/Rapport:   care explained   choices provided   emotional support provided   empathic listening provided   questions answered   reassurance provided   questions encouraged   thoughts/feelings acknowledged  Goal: Improved Attention and Thought Clarity  Outcome: Progressing  Intervention: Maximize Cognitive Function  Recent Flowsheet Documentation  Taken 5/2/2025 1600 by Jasvir Alvarenga RN  Sensory Stimulation Regulation:   auditory stimulation minimized   care clustered   quiet environment promoted   television on  Reorientation Measures:   calendar in view   clock in view  Taken 5/2/2025 1200 by Jasvir Alvarenga RN  Sensory Stimulation Regulation:   auditory stimulation minimized   care clustered   quiet environment promoted   television on  Reorientation Measures:   calendar in view   clock in view  Taken 5/2/2025 0800 by Jasvir Alvarenga RN  Sensory Stimulation Regulation:   auditory stimulation minimized   care clustered   quiet environment promoted   television on  Reorientation Measures:   calendar in view   clock in view  Goal: Improved Sleep  Outcome: Progressing  Intervention: Promote Sleep  Recent Flowsheet Documentation  Taken 5/2/2025 1600 by Jasvir Alvarenga RN  Sleep/Rest Enhancement:   awakenings minimized   natural light exposure provided   noise level reduced   regular sleep/rest pattern promoted  Taken 5/2/2025 1200 by Jasvir Alvarenga RN  Sleep/Rest Enhancement:   awakenings minimized   natural light exposure provided   noise level reduced   regular sleep/rest pattern promoted  Taken 5/2/2025 0800 by Jasvir Alvarenga RN  Sleep/Rest Enhancement:   awakenings minimized   natural light exposure provided   noise level reduced   regular sleep/rest pattern promoted     Problem: Risk for Delirium  Goal: Optimal Coping  Outcome: Progressing  Intervention: Optimize Psychosocial Adjustment to Delirium  Recent  Flowsheet Documentation  Taken 5/2/2025 1600 by Jasvir Alvarenga RN  Supportive Measures:   positive reinforcement provided   relaxation techniques promoted   verbalization of feelings encouraged  Taken 5/2/2025 1200 by Jasvir Alvarenga RN  Supportive Measures:   positive reinforcement provided   relaxation techniques promoted   verbalization of feelings encouraged  Taken 5/2/2025 0800 by Jasvir Alvarenga RN  Supportive Measures:   positive reinforcement provided   relaxation techniques promoted   verbalization of feelings encouraged  Goal: Improved Behavioral Control  Outcome: Progressing  Intervention: Prevent and Manage Agitation  Recent Flowsheet Documentation  Taken 5/2/2025 1600 by Jasvir Alvarenga RN  Environment Familiarity/Consistency: daily routine followed  Taken 5/2/2025 1200 by Jasvir Alvarenga RN  Environment Familiarity/Consistency: daily routine followed  Taken 5/2/2025 0800 by Jasvir Alvarenga RN  Environment Familiarity/Consistency: daily routine followed  Intervention: Minimize Safety Risk  Recent Flowsheet Documentation  Taken 5/2/2025 1600 by Jasvir Alvarenga RN  Enhanced Safety Measures:   room near unit station   review medications for side effects with activity  Trust Relationship/Rapport:   care explained   choices provided   emotional support provided   empathic listening provided   questions answered   reassurance provided   questions encouraged   thoughts/feelings acknowledged  Taken 5/2/2025 1200 by Jasvir Alvarenga RN  Enhanced Safety Measures:   room near unit station   review medications for side effects with activity  Trust Relationship/Rapport:   care explained   choices provided   emotional support provided   empathic listening provided   questions answered   reassurance provided   questions encouraged   thoughts/feelings acknowledged  Taken 5/2/2025 0800 by Jasvir Alvarenga RN  Enhanced Safety Measures:   room near unit station   review medications for side effects with activity  Trust  Relationship/Rapport:   care explained   choices provided   emotional support provided   empathic listening provided   questions answered   reassurance provided   questions encouraged   thoughts/feelings acknowledged  Goal: Improved Attention and Thought Clarity  Outcome: Progressing  Intervention: Maximize Cognitive Function  Recent Flowsheet Documentation  Taken 5/2/2025 1600 by Jasvir Alvarenga RN  Sensory Stimulation Regulation:   auditory stimulation minimized   care clustered   quiet environment promoted   television on  Reorientation Measures:   calendar in view   clock in view  Taken 5/2/2025 1200 by Jasvir Alvarenga RN  Sensory Stimulation Regulation:   auditory stimulation minimized   care clustered   quiet environment promoted   television on  Reorientation Measures:   calendar in view   clock in view  Taken 5/2/2025 0800 by Jasvir Alvarenga RN  Sensory Stimulation Regulation:   auditory stimulation minimized   care clustered   quiet environment promoted   television on  Reorientation Measures:   calendar in view   clock in view  Goal: Improved Sleep  Outcome: Progressing  Intervention: Promote Sleep  Recent Flowsheet Documentation  Taken 5/2/2025 1600 by Jasvir Alvarenga RN  Sleep/Rest Enhancement:   awakenings minimized   natural light exposure provided   noise level reduced   regular sleep/rest pattern promoted  Taken 5/2/2025 1200 by Jasvir Alvarenga RN  Sleep/Rest Enhancement:   awakenings minimized   natural light exposure provided   noise level reduced   regular sleep/rest pattern promoted  Taken 5/2/2025 0800 by Jasvir Alvarenga RN  Sleep/Rest Enhancement:   awakenings minimized   natural light exposure provided   noise level reduced   regular sleep/rest pattern promoted

## 2025-05-03 NOTE — PROGRESS NOTES
Critical Care Progress Note      05/03/2025    Name: Amy Bryan MRN#: [id number]   Age: 69 year old YOB: 1955     Hospital Day# 4        Code status: Full code         Problem List:   Active Problems:    Hyperkalemia    Pleural effusion    Elevated troponin    GIOVANNI (acute kidney injury)    Acute respiratory failure with hypoxia (H)    Multiple falls           Summary/Hospital Course:     Amy Bryan is a 69 year old female with history of TBI in 2012, CAD s/p PCI in 2018 and 2021, suspected takotsubo cardiomyopathy now recovered, presenting for vague complaints of fatigue for the last 2 days. Within the ED, hypoxic to the 80s on room air and BP notably soft into the 90s. Lab revealed troponin of 804, Cr of 3.23, K of 5.7. Admitted to the ICU given need for pressors for non-fluid responsive hypotension. Work up thus far showing bilateral pleural effusions, elevated troponins and EKG changes 2/2 likely stress cardiomyopathy, likely urosepsis and elevated LFTs.           Interim History:     Got 2.7L of fluid in ED, continued to be hypotensive leading to initiation of pressors, requiring minimal amount of epinephrine. Plan to place central line, arterial line and perform thoracentesis this afternoon. Stable on Bipap.     #5/3/25:  -Off lasix drip as well as amiodaroen drip     Assessment and plan :     Amy Bryan is a 69 year old female admitted on 4/29/2025 for EKG changes, elevated troponins, requiring pressor support for hemodynamic instability.   I have personally reviewed the daily labs, imaging studies, cultures and discussed the case with referring physician and consulting physicians.     My assessment and plan by system for this patient is as follows:    Neurology/Psychiatry:  # Cognitive disorder with history of TBI in 2012    # Anxiety/MDD  Currently living in group home  - Continue PTA Keppra  - PRN lorazepam      Cardiovascular:  # Stress cardiomyopathy versus NSTEMI  # CAD  s/p PCI in 2018, 2021   # Hypotension  EKG reviewed with new T wave inversions, sinus bradycardia, initial troponin 814>905. ECHO 4/30 showing EF 45-50%, increased LV filling pressures, dilated RV with decreased systolic function. In ED, got 2.7L without improvement in BP.  - Continue heparin gtt, aspirin  - Trend troponins  - No coronary angiogram indicated at time given lack of anginal symptoms and acute renal failure  - Cardiology consulted, following      Pulmonary:  # Acute hypoxic/hypercapnic respiratory failure  # Pleural effusion, large  V/Q negative for pulmonary embolism.  Improverd       GI and Nutrition:  # Elevated LFTs  # GERD  US showing possible acute cholecystitis, negative Damon sign. CT abdomen pelvis showing cholelithiasis with edema but no fat stranding.  - Trend LFTs, down trending  - PPI        Renal/Fluids/Electrolytes:  # GIOVANNI, likely ischemic ATN in setting of shock  # Hyperkalemia- resolved  Cr on admit 2.23, now 3.69, elevated BNP, dry mucous membranes.  - Check CK, renal panel daily  - Hold lisinopril, lasix, PTA potassium supplement  - Electrolyte replacement per ICU protocols  - Avoid nephrotoxic agents and adjust medications as needed for renal clearance  - Follow I/O  - NICOM assessment: initial CO 4.5 CI 2.2 SVI -11% indicating NOT fluid responsive, leave leads in place   - Nephrology consulted, following      Infectious Disease:  # Abnormal UA, likely UTI  # Concern for sepsis  MRSA nares negative.  - Zosyn  - Urine, blood culture pending      Endocrine:  # Hypothyroidism   - T4 pending  - PTA levothyroxine 75mcg      Hematology:  # Elevated D-dimer   - Initiate heparin  - Dopplers negative for DVT      MSKL/Rheum:  None currently.      IV/Access:   1. Venous access   2. Arterial access    3. Central access      ICU Prophylaxis:   1. DVT: Hep, mechanical  2. VAP: HOB 30 degrees, chlorhexidine rinse  3. Stress Ulcer: PPI  4. Restraints: none  5. Wound care - per unit routine   6.  Feeding - NPO, plan to resume diet soon  7. Family Update: CINDY German, at bedside  8. Disposition - likely back to group home             Key Medications:     Current Facility-Administered Medications   Medication Dose Route Frequency Provider Last Rate Last Admin    amiodarone (PACERONE) tablet 200 mg  200 mg Oral BID Noel Nash MD        aspirin (ASA) chewable tablet 81 mg  81 mg Oral Daily Dannielle Gunn, DO   81 mg at 05/01/25 0903    cefTRIAXone (ROCEPHIN) 1 g vial to attach to  mL bag for ADULTS or NS 50 mL bag for PEDS  1 g Intravenous Q24H Noel Nash MD   1 g at 05/02/25 1606    chlorothiazide (DIURIL) injection 500 mg  500 mg Intravenous Once Noel Nash MD        furosemide (LASIX) injection 20 mg  20 mg Intravenous Q8H Noel Nash MD        gabapentin (NEURONTIN) capsule 200 mg  200 mg Oral At Bedtime Dannielle Gunn, DO   200 mg at 05/02/25 2115    lactase (LACTAID) tablet 9,000 Units  9,000 Units Oral TID w/meals Dannielle Gunn DO   9,000 Units at 05/02/25 1758    levETIRAcetam (KEPPRA) tablet 500 mg  500 mg Oral BID Dannielle Gunn, DO   500 mg at 05/02/25 2115    levothyroxine (SYNTHROID/LEVOTHROID) tablet 75 mcg  75 mcg Oral QAM AC Dannielle Gunn, DO   75 mcg at 05/03/25 0732    [Held by provider] metoprolol tartrate (LOPRESSOR) tablet 75 mg  75 mg Oral BID Dannielle Gunn, DO        mirtazapine (REMERON) tablet 30 mg  30 mg Oral At Bedtime Dannielle Gunn, DO   30 mg at 05/02/25 2115    pantoprazole (PROTONIX) EC tablet 40 mg  40 mg Oral QAM Dannielle Gunn DO   40 mg at 05/01/25 0903    [Held by provider] QUEtiapine (SEROquel) tablet 25 mg  25 mg Oral 4x Daily Dannielle Gunn, DO   25 mg at 04/30/25 0748    sertraline (ZOLOFT) tablet 150 mg  150 mg Oral Daily Dannielle Gunn DO   150 mg at 05/01/25 0903    sodium chloride (PF) 0.9% PF flush 3 mL  3 mL Intracatheter Q8H [facility] Niki Montoya NP   3 mL at 05/02/25 2116    sodium chloride (PF) 0.9% PF flush  3 mL  3 mL Intracatheter Q8H [facility] Dannielle Gunn DO   3 mL at 05/02/25 2116     Current Facility-Administered Medications   Medication Dose Route Frequency Provider Last Rate Last Admin    Continuing beta blocker from home medication list OR beta blocker order already placed during this visit   Does not apply DOES NOT GO TO Dannielle Sousa DO        Continuing statin from home medication list OR statin order already placed during this visit   Does not apply DOES NOT GO TO Dannielle Sousa DO        heparin 25,000 units in 0.45% NaCl 250 mL ANTICOAGULANT infusion  0-5,000 Units/hr Intravenous Continuous Noel Nash MD 15 mL/hr at 05/03/25 0800 1,500 Units/hr at 05/03/25 0800    No lozenges or gum should be given while patient on BIPAP/AVAPS/AVAPS AE   Does not apply Continuous PRN Niki Montoya NP        norepinephrine (LEVOPHED) 4 mg in  mL infusion PREMIX  0.01-0.6 mcg/kg/min Intravenous Continuous Dannielle Gunn DO   Stopped at 05/03/25 0526    Patient is already receiving anticoagulation with heparin, enoxaparin (LOVENOX), warfarin (COUMADIN)  or other anticoagulant medication   Does not apply Continuous PRN Dannielle Gunn DO        Patient may continue current oral medications   Does not apply Continuous PRN Maddi Brenner APRN CNP        Reason ACE/ARB/ARNI order not selected   Other DOES NOT GO TO Dannielle Sousa DO                   Physical Examination:   Temp:  [98.1  F (36.7  C)-100.2  F (37.9  C)] 98.1  F (36.7  C)  Pulse:  [] 63  Resp:  [12-52] 16  BP: ()/(42-94) 106/52  FiO2 (%):  [35 %-55 %] 35 %  SpO2:  [89 %-100 %] 98 %    Intake/Output Summary (Last 24 hours) at 4/30/2025 1315  Last data filed at 4/30/2025 0351  Gross per 24 hour   Intake 2659.6 ml   Output --   Net 2659.6 ml     Wt Readings from Last 4 Encounters:   05/03/25 74 kg (163 lb 1.6 oz)   03/15/23 102.2 kg (225 lb 4.8 oz)   02/28/22 97.3 kg (214 lb 8 oz)   08/18/21 91.6 kg  (202 lb)     BP - Mean:  [] 71  FiO2 (%): 35 %, Resp: 16  Recent Labs   Lab 05/03/25  0543 05/02/25  2243 05/02/25  1423 05/02/25  1158   O2PER 35 45 45 55       GEN: no acute distress, alert and orientated x4   HEENT: head ncat, sclera anicteric, OP patent, trachea midline, dry mucous membranes   PULM: faint wheeze over right upper lung field, otherwise clear lung sounds, bipap in place    CV/COR: RRR S1S2, murmur noted   ABD: soft, nontender, no mass  EXT: warm extremities, no edema  NEURO: grossly intact  SKIN: no obvious rash  LINES: clean, dry intact         Data:   All data and imaging reviewed     ROUTINE ICU LABS (Last four results)  CMP  Recent Labs   Lab 05/03/25  0803 05/03/25  0525 05/02/25  2343 05/02/25  1846 05/02/25  0808 05/02/25  0513 05/01/25  2047 05/01/25  1653 05/01/25  0830   NA  --  148*  --  146*  --  144  --  143 144   POTASSIUM  --  4.1  --  4.1  --  3.4  --  3.8 4.2   CHLORIDE  --  107  --  103  --  102  --  106 107   CO2  --  31*  --  31*  --  28  --  25 24   ANIONGAP  --  10  --  12  --  14  --  12 13   GLC 95 94 114* 96   < > 144*   < > 132* 148*   BUN  --  42.9*  --  45.4*  --  52.2*  --  62.0* 63.1*   CR  --  2.25*  --  2.41*  --  2.80*  --  3.25* 3.57*   GFRESTIMATED  --  23*  --  21*  --  18*  --  15* 13*   HECTOR  --  9.0  --  9.3  --  8.9  --  8.7* 8.5*   MAG  --  1.8  --  1.8  --  2.0  --  2.2 2.5*   PHOS  --  3.4  --  3.2  --  3.6  --   --  5.5*   PROTTOTAL  --  5.4*  --  6.2*  --  5.8*  --   --  5.9*   ALBUMIN  --  3.1*  --  3.3*  --  3.1*  --   --  3.3*   BILITOTAL  --  0.6  --  0.6  --  0.6  --   --  0.9   ALKPHOS  --  205*  --  242*  --  231*  --   --  252*   AST  --  225*  --  289*  --  125*  --   --  106*   ALT  --  161*  --  190*  --  163*  --   --  197*    < > = values in this interval not displayed.     CBC  Recent Labs   Lab 05/03/25  0525 05/02/25  0513 05/01/25  0830 04/30/25  0934   WBC 7.6 7.3 10.6 8.3   RBC 3.84 3.80 3.93 4.10   HGB 11.2* 11.2* 11.6* 11.9  "  HCT 36.2 34.9* 38.1 40.8   MCV 94 92 97 100   MCH 29.2 29.5 29.5 29.0   MCHC 30.9* 32.1 30.4* 29.2*   RDW 14.6 14.6 14.4 14.4    165 212 196     INRNo lab results found in last 7 days.  Arterial Blood Gas  Recent Labs   Lab 05/03/25  0543 05/02/25  2243 05/02/25  1423 05/02/25  1158   O2PER 35 45 45 55       All cultures:  No results for input(s): \"CULT\" in the last 168 hours.  No results found for this or any previous visit (from the past 24 hours).      Martina Walker, MS4  [employer]    [facility]  CRITICAL CARE STAFF NOTE    I am managing these acute and ongoing critical issues resulting in critical condition that impairs one or more vital organ systems, incur a high probability of imminent or life-threatening deterioration in the patient's condition and providing frequent personal assessment and manipulation of medications and life support equipment.     1. Cardiogenic shock.     ICU Interventions: parenteral medications necessitating continuous monitoring including vasoactive medications, medication for heart rhythm control, insulin infusion, and/or sedative/opiates  and interpretation of lab values, cardiac output, cxr, pulse oximetry, blood gases, and/or information/data stored in computers    The patient was seen and examined with the resident/fellow/EVAN and/or medical student.  We have discussed the patient in detail and I agree with the findings, assessment, and plan as documented when this note was cosigned on this day. The plan was formulated in conjunction with pharmacy, ICU nurses, and respiratory therapist. I have evaluated all laboratory values and imaging studies for the past 24 hours. I have reviewed all the consults that have been ordered and are active for this patient.      Critical Care Time: 37 min.  I spent this time (excluding procedures) personally providing and directing critical care services at the bedside and on the critical care unit.  " "    Noel HAMMER MPH   of Medicine  Pager: [phone]    Clinically Significant Risk Factors         # Hypernatremia: Highest Na = 148 mmol/L in last 2 days, will monitor as appropriate       # Hypoalbuminemia: Lowest albumin = 3.1 g/dL at 5/3/2025  5:25 AM, will monitor as appropriate     # Hypertension: Noted on problem list     # Acute Hypercapnic Respiratory Failure: based on venous blood gas results.  Continue supplemental oxygen and ventilatory support as indicated.         # Overweight: Estimated body mass index is 27.14 kg/m  as calculated from the following:    Height as of this encounter: 1.651 m (5' 5\").    Weight as of this encounter: 74 kg (163 lb 1.6 oz)., PRESENT ON ADMISSION       # Financial/Environmental Concerns: none                "

## 2025-05-03 NOTE — PLAN OF CARE
"Neuro: A&Ox4, PERRL, COLIN, intact.    CV: SR, 60-70s. Converted from Afib rvr approx 1930 4/2. Amio infusing.    Resp: Lung sounds diminished bases. Weaned from HFNC to Oxymask 2L.    GI: Bowel sounds normoactive, last BM 5/2; multiple, watery stools, given Imodium x1. Diet regular.    : Chance in place, adequate UOP.    Skin: Perineum eythema, improved w/ decreased stools. R upper back puncture, bandaid.    Activity: Ax1 w/ gait belt.    Other: Heparin infusing.     Problem: Adult Inpatient Plan of Care  Goal: Plan of Care Review  Description: The Plan of Care Review/Shift note should be completed every shift.  The Outcome Evaluation is a brief statement about your assessment that the patient is improving, declining, or no change.  This information will be displayed automatically on your shiftnote.  Outcome: Progressing  Goal: Patient-Specific Goal (Individualized)  Description: You can add care plan individualizations to a care plan. Examples of Individualization might be:  \"Parent requests to be called daily at 9am for status\", \"I have a hard time hearing out of my right ear\", or \"Do not touch me to wake me up as it startlesme\".  Outcome: Progressing  Goal: Absence of Hospital-Acquired Illness or Injury  Outcome: Progressing  Intervention: Identify and Manage Fall Risk  Recent Flowsheet Documentation  Taken 5/3/2025 0000 by Jared Murillo RN  Safety Promotion/Fall Prevention:   activity supervised   assistive device/personal items within reach   clutter free environment maintained   lighting adjusted   nonskid shoes/slippers when out of bed   room door open   room near nurse's station   supervised activity   treat reversible contributory factors   treat underlying cause  Taken 5/2/2025 2000 by Jared Murillo RN  Safety Promotion/Fall Prevention:   activity supervised   assistive device/personal items within reach   clutter free environment maintained   lighting adjusted   nonskid shoes/slippers when out of " bed   room door open   room near nurse's station   supervised activity   treat reversible contributory factors   treat underlying cause  Intervention: Prevent Skin Injury  Recent Flowsheet Documentation  Taken 5/3/2025 0200 by Jared Murillo RN  Body Position:   turned   heels elevated  Taken 5/3/2025 0000 by Jared Murillo RN  Body Position:   turned   heels elevated  Taken 5/2/2025 2200 by Jared Murillo RN  Body Position:   turned   heels elevated  Taken 5/2/2025 2000 by Jared Murillo RN  Body Position:   turned   heels elevated  Intervention: Prevent and Manage VTE (Venous Thromboembolism) Risk  Recent Flowsheet Documentation  Taken 5/3/2025 0000 by Jared Murillo RN  VTE Prevention/Management: SCDs on (sequential compression devices)  Taken 5/2/2025 2000 by Jared Murillo RN  VTE Prevention/Management: SCDs on (sequential compression devices)  Goal: Optimal Comfort and Wellbeing  Outcome: Progressing  Intervention: Provide Person-Centered Care  Recent Flowsheet Documentation  Taken 5/3/2025 0000 by Jared Murillo RN  Trust Relationship/Rapport:   care explained   choices provided   emotional support provided   empathic listening provided   questions answered   questions encouraged   reassurance provided   thoughts/feelings acknowledged  Taken 5/2/2025 2000 by Jared Murillo RN  Trust Relationship/Rapport:   care explained   choices provided   emotional support provided   empathic listening provided   questions answered   questions encouraged   reassurance provided   thoughts/feelings acknowledged  Goal: Readiness for Transition of Care  Outcome: Progressing     Problem: Delirium  Goal: Optimal Coping  Outcome: Progressing  Intervention: Optimize Psychosocial Adjustment to Delirium  Recent Flowsheet Documentation  Taken 5/3/2025 0000 by Jared Murillo RN  Supportive Measures:   active listening utilized   decision-making supported   positive reinforcement provided   problem-solving  facilitated  Taken 5/2/2025 2000 by Jared Murillo RN  Supportive Measures:   active listening utilized   decision-making supported   positive reinforcement provided   problem-solving facilitated  Goal: Improved Behavioral Control  Outcome: Progressing  Intervention: Minimize Safety Risk  Recent Flowsheet Documentation  Taken 5/3/2025 0000 by Jared Murillo RN  Enhanced Safety Measures:   assistive devices when indicated   monitor patients coagulation values   review medications for side effects with activity   room near unit station  Trust Relationship/Rapport:   care explained   choices provided   emotional support provided   empathic listening provided   questions answered   questions encouraged   reassurance provided   thoughts/feelings acknowledged  Taken 5/2/2025 2000 by Jared Murillo RN  Enhanced Safety Measures:   assistive devices when indicated   monitor patients coagulation values   review medications for side effects with activity   room near unit station  Trust Relationship/Rapport:   care explained   choices provided   emotional support provided   empathic listening provided   questions answered   questions encouraged   reassurance provided   thoughts/feelings acknowledged  Goal: Improved Attention and Thought Clarity  Outcome: Progressing  Intervention: Maximize Cognitive Function  Recent Flowsheet Documentation  Taken 5/3/2025 0000 by Jared Murillo RN  Sensory Stimulation Regulation:   care clustered   lighting decreased   quiet environment promoted  Reorientation Measures:   calendar in view   clock in view  Taken 5/2/2025 2000 by Jared Murillo RN  Sensory Stimulation Regulation:   care clustered   lighting decreased   quiet environment promoted  Reorientation Measures:   calendar in view   clock in view  Goal: Improved Sleep  Outcome: Progressing  Intervention: Promote Sleep  Recent Flowsheet Documentation  Taken 5/3/2025 0000 by Jared Murillo RN  Sleep/Rest Enhancement:    awakenings minimized   natural light exposure provided   noise level reduced   regular sleep/rest pattern promoted   relaxation techniques promoted   room darkened  Taken 5/2/2025 2000 by Jared Murillo RN  Sleep/Rest Enhancement:   awakenings minimized   natural light exposure provided   noise level reduced   regular sleep/rest pattern promoted   relaxation techniques promoted   room darkened     Problem: Risk for Delirium  Goal: Optimal Coping  Outcome: Progressing  Intervention: Optimize Psychosocial Adjustment to Delirium  Recent Flowsheet Documentation  Taken 5/3/2025 0000 by Jared Murillo RN  Supportive Measures:   active listening utilized   decision-making supported   positive reinforcement provided   problem-solving facilitated  Taken 5/2/2025 2000 by Jared Murillo RN  Supportive Measures:   active listening utilized   decision-making supported   positive reinforcement provided   problem-solving facilitated  Goal: Improved Behavioral Control  Outcome: Progressing  Intervention: Minimize Safety Risk  Recent Flowsheet Documentation  Taken 5/3/2025 0000 by Jared Murillo RN  Enhanced Safety Measures:   assistive devices when indicated   monitor patients coagulation values   review medications for side effects with activity   room near unit station  Trust Relationship/Rapport:   care explained   choices provided   emotional support provided   empathic listening provided   questions answered   questions encouraged   reassurance provided   thoughts/feelings acknowledged  Taken 5/2/2025 2000 by Jared Murillo RN  Enhanced Safety Measures:   assistive devices when indicated   monitor patients coagulation values   review medications for side effects with activity   room near unit station  Trust Relationship/Rapport:   care explained   choices provided   emotional support provided   empathic listening provided   questions answered   questions encouraged   reassurance provided   thoughts/feelings  acknowledged  Goal: Improved Attention and Thought Clarity  Outcome: Progressing  Intervention: Maximize Cognitive Function  Recent Flowsheet Documentation  Taken 5/3/2025 0000 by Jared Murillo RN  Sensory Stimulation Regulation:   care clustered   lighting decreased   quiet environment promoted  Reorientation Measures:   calendar in view   clock in view  Taken 5/2/2025 2000 by Jared Murillo RN  Sensory Stimulation Regulation:   care clustered   lighting decreased   quiet environment promoted  Reorientation Measures:   calendar in view   clock in view  Goal: Improved Sleep  Outcome: Progressing  Intervention: Promote Sleep  Recent Flowsheet Documentation  Taken 5/3/2025 0000 by Jared Murillo RN  Sleep/Rest Enhancement:   awakenings minimized   natural light exposure provided   noise level reduced   regular sleep/rest pattern promoted   relaxation techniques promoted   room darkened  Taken 5/2/2025 2000 by Jared Murillo RN  Sleep/Rest Enhancement:   awakenings minimized   natural light exposure provided   noise level reduced   regular sleep/rest pattern promoted   relaxation techniques promoted   room darkened

## 2025-05-03 NOTE — PLAN OF CARE
"Pt here due to urosepsis. Patient is alert and oriented x4. Tele shows normal sinus with intermittent a-fib with RVR Lung sounds clear but diminished in the bases. On 2 LPM NC. Patient was transitioned to oral amiodarone this morning however the patient reverted to a-fib with RVR; MD ordered to restart amiodarone infusion. A-fib with RVR was noted in the afternoon. MD ordered metoprolol PRN and discontinued the amiodarone drip. CVC and 2PIV in place. Chance in place with good output. Heparin infusing. PoA updated via phone.   Problem: Adult Inpatient Plan of Care  Goal: Plan of Care Review  Description: The Plan of Care Review/Shift note should be completed every shift.  The Outcome Evaluation is a brief statement about your assessment that the patient is improving, declining, or no change.  This information will be displayed automatically on your shiftnote.  Outcome: Progressing  Goal: Patient-Specific Goal (Individualized)  Description: You can add care plan individualizations to a care plan. Examples of Individualization might be:  \"Parent requests to be called daily at 9am for status\", \"I have a hard time hearing out of my right ear\", or \"Do not touch me to wake me up as it startlesme\".  Outcome: Progressing  Goal: Absence of Hospital-Acquired Illness or Injury  Outcome: Progressing  Intervention: Identify and Manage Fall Risk  Recent Flowsheet Documentation  Taken 5/3/2025 1600 by Jasvir Alvarenga RN  Safety Promotion/Fall Prevention: safety round/check completed  Taken 5/3/2025 1200 by Jasvir Alvarenga RN  Safety Promotion/Fall Prevention: safety round/check completed  Taken 5/3/2025 0800 by Jasvir Alvarenga RN  Safety Promotion/Fall Prevention: safety round/check completed  Intervention: Prevent Skin Injury  Recent Flowsheet Documentation  Taken 5/3/2025 1400 by Jasvir Alvarenga RN  Body Position: weight shifting  Taken 5/3/2025 1300 by Jasvir Alvarenga RN  Body Position: weight shifting  Taken 5/3/2025 1200 by Colette" CHARLI Tay  Body Position:   turned   heels elevated   upper extremity elevated  Taken 5/3/2025 1000 by Jasvir Alvarenga RN  Body Position:   turned   heels elevated   upper extremity elevated  Taken 5/3/2025 0800 by Jasvir Alvarenga RN  Body Position:   turned   upper extremity elevated   heels elevated  Intervention: Prevent and Manage VTE (Venous Thromboembolism) Risk  Recent Flowsheet Documentation  Taken 5/3/2025 1600 by Jasvir Alvarenga RN  VTE Prevention/Management: SCDs on (sequential compression devices)  Taken 5/3/2025 1200 by Jasvir Alvarenga RN  VTE Prevention/Management: SCDs on (sequential compression devices)  Taken 5/3/2025 0800 by Jasvir Alvarenga RN  VTE Prevention/Management: SCDs on (sequential compression devices)  Goal: Optimal Comfort and Wellbeing  Outcome: Progressing  Intervention: Provide Person-Centered Care  Recent Flowsheet Documentation  Taken 5/3/2025 1600 by Jasvir Alvarenga RN  Trust Relationship/Rapport:   care explained   choices provided   emotional support provided   empathic listening provided   questions answered   reassurance provided   questions encouraged   thoughts/feelings acknowledged  Taken 5/3/2025 1200 by Jasvir Alvarenga RN  Trust Relationship/Rapport:   care explained   choices provided   emotional support provided   empathic listening provided   questions answered   reassurance provided   questions encouraged   thoughts/feelings acknowledged  Taken 5/3/2025 0800 by Jasvir Alvarenga RN  Trust Relationship/Rapport:   care explained   choices provided   emotional support provided   empathic listening provided   questions answered   reassurance provided   questions encouraged   thoughts/feelings acknowledged  Goal: Readiness for Transition of Care  Outcome: Progressing     Problem: Delirium  Goal: Optimal Coping  Outcome: Progressing  Intervention: Optimize Psychosocial Adjustment to Delirium  Recent Flowsheet Documentation  Taken 5/3/2025 1600 by Jasvir Alvarenga RN  Supportive  Measures:   positive reinforcement provided   relaxation techniques promoted   verbalization of feelings encouraged  Taken 5/3/2025 1200 by Jasvir Alvarenga RN  Supportive Measures:   positive reinforcement provided   relaxation techniques promoted   verbalization of feelings encouraged  Taken 5/3/2025 0800 by Jasvir Alvarenga RN  Supportive Measures:   positive reinforcement provided   relaxation techniques promoted   verbalization of feelings encouraged  Goal: Improved Behavioral Control  Outcome: Progressing  Intervention: Prevent and Manage Agitation  Recent Flowsheet Documentation  Taken 5/3/2025 1600 by Jasvir Alvarenga RN  Environment Familiarity/Consistency: daily routine followed  Taken 5/3/2025 1200 by Jasvir Alvarenga RN  Environment Familiarity/Consistency: daily routine followed  Taken 5/3/2025 0800 by Jasvir Alvarenga RN  Environment Familiarity/Consistency: daily routine followed  Intervention: Minimize Safety Risk  Recent Flowsheet Documentation  Taken 5/3/2025 1600 by Jasvir Alvarenga RN  Enhanced Safety Measures:   room near unit station   review medications for side effects with activity  Trust Relationship/Rapport:   care explained   choices provided   emotional support provided   empathic listening provided   questions answered   reassurance provided   questions encouraged   thoughts/feelings acknowledged  Taken 5/3/2025 1200 by Jasvir Alvarenga RN  Enhanced Safety Measures:   room near unit station   review medications for side effects with activity  Trust Relationship/Rapport:   care explained   choices provided   emotional support provided   empathic listening provided   questions answered   reassurance provided   questions encouraged   thoughts/feelings acknowledged  Taken 5/3/2025 0800 by Jasvir Alvarenga RN  Enhanced Safety Measures:   room near unit station   review medications for side effects with activity  Trust Relationship/Rapport:   care explained   choices provided   emotional support provided    empathic listening provided   questions answered   reassurance provided   questions encouraged   thoughts/feelings acknowledged  Goal: Improved Attention and Thought Clarity  Outcome: Progressing  Intervention: Maximize Cognitive Function  Recent Flowsheet Documentation  Taken 5/3/2025 1600 by Jasvir Alvarenga RN  Sensory Stimulation Regulation:   auditory stimulation minimized   care clustered   quiet environment promoted   television on  Reorientation Measures:   calendar in view   clock in view  Taken 5/3/2025 1200 by Jasvir Alvarenga RN  Sensory Stimulation Regulation:   auditory stimulation minimized   care clustered   quiet environment promoted   television on  Reorientation Measures:   calendar in view   clock in view  Taken 5/3/2025 0800 by Jasvir Alvarenga RN  Sensory Stimulation Regulation:   auditory stimulation minimized   care clustered   quiet environment promoted   television on  Reorientation Measures:   calendar in view   clock in view  Goal: Improved Sleep  Outcome: Progressing  Intervention: Promote Sleep  Recent Flowsheet Documentation  Taken 5/3/2025 1600 by Jasvir Alvarenga RN  Sleep/Rest Enhancement:   awakenings minimized   natural light exposure provided   noise level reduced   regular sleep/rest pattern promoted  Taken 5/3/2025 1200 by Jasvir Alvarenga RN  Sleep/Rest Enhancement:   awakenings minimized   natural light exposure provided   noise level reduced   regular sleep/rest pattern promoted  Taken 5/3/2025 0800 by Jasvir Alvarenga RN  Sleep/Rest Enhancement:   awakenings minimized   natural light exposure provided   noise level reduced   regular sleep/rest pattern promoted     Problem: Risk for Delirium  Goal: Optimal Coping  Outcome: Progressing  Intervention: Optimize Psychosocial Adjustment to Delirium  Recent Flowsheet Documentation  Taken 5/3/2025 1600 by Jasvir Alvarenga RN  Supportive Measures:   positive reinforcement provided   relaxation techniques promoted   verbalization of feelings  encouraged  Taken 5/3/2025 1200 by Jasvir Alvarenga RN  Supportive Measures:   positive reinforcement provided   relaxation techniques promoted   verbalization of feelings encouraged  Taken 5/3/2025 0800 by Jasvir Alvarenga RN  Supportive Measures:   positive reinforcement provided   relaxation techniques promoted   verbalization of feelings encouraged  Goal: Improved Behavioral Control  Outcome: Progressing  Intervention: Prevent and Manage Agitation  Recent Flowsheet Documentation  Taken 5/3/2025 1600 by Jasvir Alvarenga RN  Environment Familiarity/Consistency: daily routine followed  Taken 5/3/2025 1200 by Jasvir Alvarenga RN  Environment Familiarity/Consistency: daily routine followed  Taken 5/3/2025 0800 by Jasvir Alvarenga RN  Environment Familiarity/Consistency: daily routine followed  Intervention: Minimize Safety Risk  Recent Flowsheet Documentation  Taken 5/3/2025 1600 by Jasvir Alvarenga RN  Enhanced Safety Measures:   room near unit station   review medications for side effects with activity  Trust Relationship/Rapport:   care explained   choices provided   emotional support provided   empathic listening provided   questions answered   reassurance provided   questions encouraged   thoughts/feelings acknowledged  Taken 5/3/2025 1200 by Jasvir Alvarenga RN  Enhanced Safety Measures:   room near unit station   review medications for side effects with activity  Trust Relationship/Rapport:   care explained   choices provided   emotional support provided   empathic listening provided   questions answered   reassurance provided   questions encouraged   thoughts/feelings acknowledged  Taken 5/3/2025 0800 by Jasvir Alvarenga RN  Enhanced Safety Measures:   room near unit station   review medications for side effects with activity  Trust Relationship/Rapport:   care explained   choices provided   emotional support provided   empathic listening provided   questions answered   reassurance provided   questions encouraged    thoughts/feelings acknowledged  Goal: Improved Attention and Thought Clarity  Outcome: Progressing  Intervention: Maximize Cognitive Function  Recent Flowsheet Documentation  Taken 5/3/2025 1600 by Jasvir Alvarenga RN  Sensory Stimulation Regulation:   auditory stimulation minimized   care clustered   quiet environment promoted   television on  Reorientation Measures:   calendar in view   clock in view  Taken 5/3/2025 1200 by Jasvir Alvarenga RN  Sensory Stimulation Regulation:   auditory stimulation minimized   care clustered   quiet environment promoted   television on  Reorientation Measures:   calendar in view   clock in view  Taken 5/3/2025 0800 by Jasvir Alvarenga RN  Sensory Stimulation Regulation:   auditory stimulation minimized   care clustered   quiet environment promoted   television on  Reorientation Measures:   calendar in view   clock in view  Goal: Improved Sleep  Outcome: Progressing  Intervention: Promote Sleep  Recent Flowsheet Documentation  Taken 5/3/2025 1600 by Jasivr Alvarenga RN  Sleep/Rest Enhancement:   awakenings minimized   natural light exposure provided   noise level reduced   regular sleep/rest pattern promoted  Taken 5/3/2025 1200 by Jasvir Alvarenga RN  Sleep/Rest Enhancement:   awakenings minimized   natural light exposure provided   noise level reduced   regular sleep/rest pattern promoted  Taken 5/3/2025 0800 by Jasvir Alvarenga RN  Sleep/Rest Enhancement:   awakenings minimized   natural light exposure provided   noise level reduced   regular sleep/rest pattern promoted

## 2025-05-03 NOTE — PROGRESS NOTES
05/03/25 1300   Appointment Info   Signing Clinician's Name / Credentials (PT) Joann Cash PT, DPT   Living Environment   People in Home facility resident   Current Living Arrangements group home   Home Accessibility no concerns;stairs to enter home;stairs within home  (chair lifts present)   Number of Stairs, Main Entrance none   Number of Stairs, Within Home, Primary greater than 10 stairs  (15 stairs, can use the lift but usualy walks them.)   Stair Railings, Within Home, Primary railings safe and in good condition   Transportation Anticipated family or friend will provide;health plan transportation   Living Environment Comments States she uses the stairs all the time, if she is tired she uses the chair lift.   Self-Care   Usual Activity Tolerance moderate   Current Activity Tolerance fair   Regular Exercise Yes   Activity/Exercise Type walking   Exercise Amount/Frequency 2 times/wk   Equipment Currently Used at Home   (Pt states she uses a cane most of the time.)   Fall history within last six months no   Activity/Exercise/Self-Care Comment Has set up and supervision for ADLs, chair lift prn, has help with meal prep.   General Information   Onset of Illness/Injury or Date of Surgery 04/30/25   Referring Physician Emmie Gillis MD   Patient/Family Therapy Goals Statement (PT) Eager to go home.   Pertinent History of Current Problem (include personal factors and/or comorbidities that impact the POC) 69 year old female with history of TBI in 2012, CAD s/p PCI in 2018 and 2021, suspected takotsubo cardiomyopathy now recovered, presenting for vague complaints of fatigue for the last 2 days. Within the ED, hypoxic to the 80s on room air and BP notably soft into the 90s. Lab revealed troponin of 804, Cr of 3.23, K of 5.7. Admitted to the ICU given need for pressors for non-fluid responsive hypotension. Work up thus far showing bilateral pleural effusions, elevated troponins and EKG changes 2/2 likely  stress cardiomyopathy, likely urosepsis and elevated LFTs.   Existing Precautions/Restrictions fall   General Observations Asleep, easily awakens, eager to move about.   Cognition   Affect/Mental Status (Cognition) WFL   Orientation Status (Cognition) oriented x 4   Follows Commands (Cognition) WNL   Safety Deficit (Cognition) minimal deficit;insight into deficits/self-awareness;safety precautions awareness   Cognitive Status Comments Per chart pt with previous TBI, does reside in a group home. Defer formal cognitive assessment to OT.   Pain Assessment   Patient Currently in Pain No   Integumentary/Edema   Integumentary/Edema Comments Pitting at the ankles noted with removal of PCDs   Posture    Posture Forward head position;Protracted shoulders   Posture Comments R sided lean, rotation R.   Range of Motion (ROM)   ROM Comment B LEs and UES WFL via AROM.   Strength (Manual Muscle Testing)   Strength Comments DEmonstrates antigravity strength, but fatigues quickly, able to complete SLR B, R withminimal lift vs the L. IMpaired functional activity tolerance   Bed Mobility   Comment, (Bed Mobility) Min A sup>sit, poor rotation, needs assist to get shoulders stacked and push up to sitting via roll.   Transfers   Comment, (Transfers) Sit>stand CGA   Gait/Stairs (Locomotion)   Comment, (Gait/Stairs) Bedside gait, 10' with walker, lists R as walker lists L, keeps shoulders squared up to the R. Shuffles feet.   Balance   Balance Comments Sitting balance, weight shifts L and pt looks R, able to track and pass midline but significant preference otherwise. STanding balance, B UE support provided and required for opitmal safety, unsteady.   Sensory Examination   Sensory Perception Comments Denies n/t, gross light touch intact.   Coordination   Coordination Comments Slow to process at times, needs repeat cues at timesl   Clinical Impression   Criteria for Skilled Therapeutic Intervention Yes, treatment indicated   PT Diagnosis  (PT) Impaired functional activity tolerance   Influenced by the following impairments Weakness,fatigue, decreased balance   Functional limitations due to impairments Decreased functional independence with mobility   Clinical Presentation (PT Evaluation Complexity) stable   Clinical Presentation Rationale see MR   Clinical Decision Making (Complexity) low complexity   Planned Therapy Interventions (PT) balance training;bed mobility training;gait training;home exercise program;neuromuscular re-education;patient/family education;ROM (range of motion);stair training;strengthening;stretching;transfer training;progressive activity/exercise;risk factor education;home program guidelines   Risk & Benefits of therapy have been explained evaluation/treatment results reviewed;care plan/treatment goals reviewed;risks/benefits reviewed;current/potential barriers reviewed;participants voiced agreement with care plan;participants included;patient   PT Total Evaluation Time   PT Eval, Low Complexity Minutes (32349) 10   Physical Therapy Goals   PT Frequency Daily   PT Predicted Duration/Target Date for Goal Attainment 05/10/25   PT Goals Bed Mobility;Transfers;Gait;Stairs;PT Goal 1   PT: Bed Mobility Independent;Supine to/from sit;Rolling   PT: Transfers Modified independent;Sit to/from stand;Bed to/from chair;Assistive device   PT: Gait Supervision/stand-by assist;Assistive device;Greater than 200 feet   PT: Stairs Supervision/stand-by assist;Greater than 10 stairs;Rail on both sides   PT: Goal 1 Pt will complete the TUG in <13.5 seconds with indication of decreased fall risk.   Interventions   Interventions Quick Adds Therapeutic Activity;Therapeutic Procedure;Gait Training   Therapeutic Procedure/Exercise   Treatment Detail/Skilled Intervention Dangled EOB completed March, LAQ an dheel toe rocks x 10 reps each. Up in chair cued to complete AROM for circulation nda reduced stiffness benefits.   Therapeutic Activity   Therapeutic  "Activities: dynamic activities to improve functional performance Minutes (42015) 15   Treatment Detail/Skilled Intervention Dangled EOB x 10 min, dynamic sitting activity-doff/don new gown with Mod A for managment of clothing and Min A x 1 for  balance, pt sits on the edge of the bed, feet flat, slight turn to the R. Repeat sit<>Stand CGA, cued for safety wtih hand placement on bed up to stand, and to reach back from standing to sit. Pt verbalized understanding, butlimited carry over. Pt needs hand over hand assist in batista to let go of the walker and reach for the wc. Pt up in recliner, use of seated repositioner to scoot fully back for optimal lumbar support. Pillows to take up additional space for posture and comfort. Blue wedge at th R flank for midline-pt states \"That feels good.\" Black aerobic stap placed for feet flat, optimal PROM and proprioreeptive feedbakc. Alarm on, all needs in reach, VSS and RN updated at PT exit.   Gait Training   Gait Training Minutes (47848) 15   Treatment Detail/Skilled Intervention Gait training inlcuded verbal and tactile cues for midline and proximity with the walker. Pt drifts R as walker drifts L. Shuffles feet, slight R knee felxion with ambulation. Standing rests x 4, seated rest x 1 with Min A for balane and walker navigation. RN with wc behind, rehab aide [facility] IV pole and space lab.Pt tolerated walking well, but does fatigue. THerapist needs to enourage pt to rest, pt then admits she is very tired, but normally \"i can do more than that.\"   Distance in Feet 160   PT Discharge Planning   PT Plan see daily  on the fence about TCU vs Home? Gait, standing balance, safety with walker.   PT Discharge Recommendation (DC Rec) Transitional Care Facility   PT Rationale for DC Rec Currently recommend TCU; however, pt has potential to progress quickly and reports she would have some good assist at home, as she resides in a group home. She has stair lifts/chair lift and assist for ADLs. " Currently limted by weakness, fatigue, impaired act tolerance and decrased balance. A 1-2 for transfers and gait.   PT Brief overview of current status Goals of therapy will be to address safe mobility and make recs for d/c to next level of care. Pt and RN will continue to follow all falls risk precautions as documented by RN staff while hospitalized.   PT Total Distance Amb During Session (feet) 170   Physical Therapy Time and Intention   Timed Code Treatment Minutes 30   Total Session Time (sum of timed and untimed services) 40

## 2025-05-04 LAB
ALBUMIN SERPL BCG-MCNC: 3.1 G/DL (ref 3.5–5.2)
ALP SERPL-CCNC: 199 U/L (ref 40–150)
ALT SERPL W P-5'-P-CCNC: 171 U/L (ref 0–50)
ANION GAP SERPL CALCULATED.3IONS-SCNC: 12 MMOL/L (ref 7–15)
AST SERPL W P-5'-P-CCNC: 196 U/L (ref 0–45)
BASE EXCESS BLDV CALC-SCNC: 8.2 MMOL/L (ref -3–3)
BASE EXCESS BLDV CALC-SCNC: 8.2 MMOL/L (ref -3–3)
BASE EXCESS BLDV CALC-SCNC: 8.7 MMOL/L (ref -3–3)
BILIRUB SERPL-MCNC: 0.5 MG/DL
BUN SERPL-MCNC: 42.5 MG/DL (ref 8–23)
CALCIUM SERPL-MCNC: 9.3 MG/DL (ref 8.8–10.4)
CHLORIDE SERPL-SCNC: 99 MMOL/L (ref 98–107)
CREAT SERPL-MCNC: 1.94 MG/DL (ref 0.51–0.95)
EGFRCR SERPLBLD CKD-EPI 2021: 28 ML/MIN/1.73M2
ERYTHROCYTE [DISTWIDTH] IN BLOOD BY AUTOMATED COUNT: 14.6 % (ref 10–15)
GLUCOSE BLDC GLUCOMTR-MCNC: 102 MG/DL (ref 70–99)
GLUCOSE BLDC GLUCOMTR-MCNC: 105 MG/DL (ref 70–99)
GLUCOSE BLDC GLUCOMTR-MCNC: 120 MG/DL (ref 70–99)
GLUCOSE BLDC GLUCOMTR-MCNC: 129 MG/DL (ref 70–99)
GLUCOSE SERPL-MCNC: 100 MG/DL (ref 70–99)
HCO3 BLDV-SCNC: 35 MMOL/L (ref 21–28)
HCO3 SERPL-SCNC: 31 MMOL/L (ref 22–29)
HCT VFR BLD AUTO: 39.4 % (ref 35–47)
HGB BLD-MCNC: 12.2 G/DL (ref 11.7–15.7)
MAGNESIUM SERPL-MCNC: 1.6 MG/DL (ref 1.7–2.3)
MCH RBC QN AUTO: 28.8 PG (ref 26.5–33)
MCHC RBC AUTO-ENTMCNC: 31 G/DL (ref 31.5–36.5)
MCV RBC AUTO: 93 FL (ref 78–100)
O2/TOTAL GAS SETTING VFR VENT: 28 %
O2/TOTAL GAS SETTING VFR VENT: 3 %
O2/TOTAL GAS SETTING VFR VENT: 30 %
OXYHGB MFR BLDV: 91 % (ref 70–75)
OXYHGB MFR BLDV: 92 % (ref 70–75)
OXYHGB MFR BLDV: 92 % (ref 70–75)
PCO2 BLDV: 55 MM HG (ref 40–50)
PCO2 BLDV: 56 MM HG (ref 40–50)
PCO2 BLDV: 58 MM HG (ref 40–50)
PH BLDV: 7.39 [PH] (ref 7.32–7.43)
PH BLDV: 7.41 [PH] (ref 7.32–7.43)
PH BLDV: 7.42 [PH] (ref 7.32–7.43)
PHOSPHATE SERPL-MCNC: 5.1 MG/DL (ref 2.5–4.5)
PLATELET # BLD AUTO: 170 10E3/UL (ref 150–450)
PO2 BLDV: 66 MM HG (ref 25–47)
PO2 BLDV: 69 MM HG (ref 25–47)
PO2 BLDV: 71 MM HG (ref 25–47)
POTASSIUM SERPL-SCNC: 3.5 MMOL/L (ref 3.4–5.3)
PROT SERPL-MCNC: 5.7 G/DL (ref 6.4–8.3)
RBC # BLD AUTO: 4.23 10E6/UL (ref 3.8–5.2)
SAO2 % BLDV: 93.1 % (ref 70–75)
SAO2 % BLDV: 93.9 % (ref 70–75)
SAO2 % BLDV: 94 % (ref 70–75)
SODIUM SERPL-SCNC: 142 MMOL/L (ref 135–145)
UFH PPP CHRO-ACNC: 0.26 IU/ML
WBC # BLD AUTO: 6.9 10E3/UL (ref 4–11)

## 2025-05-04 PROCEDURE — 250N000011 HC RX IP 250 OP 636: Performed by: PHYSICIAN ASSISTANT

## 2025-05-04 PROCEDURE — 250N000009 HC RX 250: Performed by: INTERNAL MEDICINE

## 2025-05-04 PROCEDURE — 36415 COLL VENOUS BLD VENIPUNCTURE: CPT | Performed by: PHYSICIAN ASSISTANT

## 2025-05-04 PROCEDURE — 250N000013 HC RX MED GY IP 250 OP 250 PS 637: Performed by: STUDENT IN AN ORGANIZED HEALTH CARE EDUCATION/TRAINING PROGRAM

## 2025-05-04 PROCEDURE — 250N000013 HC RX MED GY IP 250 OP 250 PS 637: Performed by: PHYSICIAN ASSISTANT

## 2025-05-04 PROCEDURE — 99291 CRITICAL CARE FIRST HOUR: CPT | Performed by: INTERNAL MEDICINE

## 2025-05-04 PROCEDURE — 82805 BLOOD GASES W/O2 SATURATION: CPT | Performed by: PHYSICIAN ASSISTANT

## 2025-05-04 PROCEDURE — 250N000013 HC RX MED GY IP 250 OP 250 PS 637: Performed by: INTERNAL MEDICINE

## 2025-05-04 PROCEDURE — 83735 ASSAY OF MAGNESIUM: CPT | Performed by: INTERNAL MEDICINE

## 2025-05-04 PROCEDURE — 85041 AUTOMATED RBC COUNT: CPT | Performed by: INTERNAL MEDICINE

## 2025-05-04 PROCEDURE — 84100 ASSAY OF PHOSPHORUS: CPT | Performed by: INTERNAL MEDICINE

## 2025-05-04 PROCEDURE — 36415 COLL VENOUS BLD VENIPUNCTURE: CPT | Performed by: INTERNAL MEDICINE

## 2025-05-04 PROCEDURE — 250N000011 HC RX IP 250 OP 636: Performed by: INTERNAL MEDICINE

## 2025-05-04 PROCEDURE — 99233 SBSQ HOSP IP/OBS HIGH 50: CPT | Performed by: PHYSICIAN ASSISTANT

## 2025-05-04 PROCEDURE — 82565 ASSAY OF CREATININE: CPT | Performed by: INTERNAL MEDICINE

## 2025-05-04 PROCEDURE — 210N000001 HC R&B IMCU HEART CARE

## 2025-05-04 PROCEDURE — 85520 HEPARIN ASSAY: CPT | Performed by: SURGERY

## 2025-05-04 PROCEDURE — 82805 BLOOD GASES W/O2 SATURATION: CPT | Performed by: INTERNAL MEDICINE

## 2025-05-04 RX ORDER — FUROSEMIDE 10 MG/ML
20 INJECTION INTRAMUSCULAR; INTRAVENOUS EVERY 12 HOURS
Status: DISCONTINUED | OUTPATIENT
Start: 2025-05-05 | End: 2025-05-05

## 2025-05-04 RX ORDER — LIDOCAINE 40 MG/G
CREAM TOPICAL
Status: DISCONTINUED | OUTPATIENT
Start: 2025-05-04 | End: 2025-05-04

## 2025-05-04 RX ORDER — CEFTRIAXONE 2 G/1
2 INJECTION, POWDER, FOR SOLUTION INTRAMUSCULAR; INTRAVENOUS EVERY 24 HOURS
Status: DISCONTINUED | OUTPATIENT
Start: 2025-05-04 | End: 2025-05-06

## 2025-05-04 RX ADMIN — FUROSEMIDE 20 MG: 10 INJECTION, SOLUTION INTRAVENOUS at 00:46

## 2025-05-04 RX ADMIN — LACTASE TAB 3000 UNIT 9000 UNITS: 3000 TAB at 17:50

## 2025-05-04 RX ADMIN — FUROSEMIDE 20 MG: 10 INJECTION, SOLUTION INTRAVENOUS at 08:44

## 2025-05-04 RX ADMIN — LACTASE TAB 3000 UNIT 9000 UNITS: 3000 TAB at 13:03

## 2025-05-04 RX ADMIN — HEPARIN SODIUM 1500 UNITS/HR: 10000 INJECTION, SOLUTION INTRAVENOUS at 16:42

## 2025-05-04 RX ADMIN — LACTASE TAB 3000 UNIT 9000 UNITS: 3000 TAB at 08:45

## 2025-05-04 RX ADMIN — CEFTRIAXONE SODIUM 2 G: 2 INJECTION, POWDER, FOR SOLUTION INTRAMUSCULAR; INTRAVENOUS at 16:08

## 2025-05-04 RX ADMIN — AMIODARONE HYDROCHLORIDE 200 MG: 200 TABLET ORAL at 20:10

## 2025-05-04 RX ADMIN — LEVOTHYROXINE SODIUM 75 MCG: 75 TABLET ORAL at 07:57

## 2025-05-04 RX ADMIN — METOPROLOL TARTRATE 5 MG: 5 INJECTION INTRAVENOUS at 04:21

## 2025-05-04 RX ADMIN — AMIODARONE HYDROCHLORIDE 200 MG: 200 TABLET ORAL at 08:45

## 2025-05-04 RX ADMIN — METOPROLOL TARTRATE 12.5 MG: 25 TABLET, FILM COATED ORAL at 20:10

## 2025-05-04 RX ADMIN — LEVETIRACETAM 500 MG: 500 TABLET, FILM COATED ORAL at 20:10

## 2025-05-04 RX ADMIN — LEVETIRACETAM 500 MG: 500 TABLET, FILM COATED ORAL at 08:45

## 2025-05-04 RX ADMIN — SERTRALINE HYDROCHLORIDE 150 MG: 100 TABLET ORAL at 08:45

## 2025-05-04 RX ADMIN — MIRTAZAPINE 30 MG: 30 TABLET, FILM COATED ORAL at 21:23

## 2025-05-04 RX ADMIN — PANTOPRAZOLE SODIUM 40 MG: 40 TABLET, DELAYED RELEASE ORAL at 08:45

## 2025-05-04 RX ADMIN — GABAPENTIN 200 MG: 100 CAPSULE ORAL at 21:23

## 2025-05-04 RX ADMIN — FUROSEMIDE 20 MG: 10 INJECTION, SOLUTION INTRAVENOUS at 16:08

## 2025-05-04 RX ADMIN — ASPIRIN 81 MG CHEWABLE TABLET 81 MG: 81 TABLET CHEWABLE at 08:45

## 2025-05-04 ASSESSMENT — ACTIVITIES OF DAILY LIVING (ADL)
ADLS_ACUITY_SCORE: 74
ADLS_ACUITY_SCORE: 72
ADLS_ACUITY_SCORE: 74
ADLS_ACUITY_SCORE: 72
ADLS_ACUITY_SCORE: 74
ADLS_ACUITY_SCORE: 72
ADLS_ACUITY_SCORE: 74
ADLS_ACUITY_SCORE: 72
ADLS_ACUITY_SCORE: 74
ADLS_ACUITY_SCORE: 74

## 2025-05-04 NOTE — PROGRESS NOTES
[facility]    Medicine Progress Note - Hospitalist Service    Date of Admission:  4/29/2025    Assessment & Plan   Amy Bryan is a 69 year old female with history of TBI in 2012, CAD s/p PCI in 2018 and 2021, suspected takotsubo cardiomyopathy now recovered, presenting for vague complaints of fatigue and ultimately found to have NSTEMI vs stress cardiomyopathy, biventricular heart failure with cardiogenic shock requiring dobutamine and epinephrine in the ICU. Hospitalization complicated by shock liver, acute renal failure, and new runs of atrial fibrillation among others. Hospitalist assumed care on 05/04/25.    NSTEMI vs stress cardiomyopathy  Biventricular heart failure, cardiogenic shock, improving  CAD s/p PCI in 2018 and 2021  STEMI 2018  Presented from group home with 2 days of fatigue. Found to have grossly elevated troponins >800-900 range and EKG with new T wave inversions. No chest pain or cardiac complaints in the ED. Admitted for NSTEMI, on heparin gtt, pleural effusions, GIOVANNI, shock liver, and developed worsening hypotension prompting transfer to ICU for cardiogenic shock as well as likely septic shock from UTI, treated with dobutamine, epinephrine, BiPAP. Other medical problems treated as below. Weaned off pressors, and to 3L NC, diuresing initially with furosemide gtt which was transitioned to scheduled IV furosemide. Cardiology followed closely as well as Nephrology.  ECHO 4/30 on admission with EF 45-50%, mild lateral/anterior wall hypokinesia. Moderately reduced RV systolic function, mild to moderate TR, pulmonary hypertension. Repeat ECHO 5/3 with improvement in EF to 55-60%, mid latera/inferolateral wall hypokinesis.   -Transfer to [facility], heart center if available 05/04/25   -Hospitalist assumed care as primary service 05/04/25   -Cardiac monitoring  -Cardiology following  -Considering a cardiac cath  -Managing cardiac medications, diuresis, and amiodarine  -Heparin  gtt  -ASA 81mg/d  -HOLD PTA BB, cardiology to determine if/when to add  -Hold Statin until LFTs <100  -Intake/Output  -Daily standing weights if able  -Furosemide gtt off, transitioned to IV furosemide 20mg Q 8 hours, cardiology do manage (d/w fellow 05/04/25)  -Monitor closely    Intake/Output Summary (Last 24 hours) at 5/4/2025 1549  Last data filed at 5/4/2025 1500  Gross per 24 hour   Intake 1583 ml   Output 3930 ml   Net -2347 ml       Vitals:    04/30/25 1400 05/01/25 0500 05/02/25 0700 05/03/25 0400   Weight: 92.9 kg (204 lb 14.4 oz) 90.5 kg (199 lb 8 oz) 78.4 kg (172 lb 12.8 oz) 83 kg (183 lb)    05/04/25 0000   Weight: 83.6 kg (184 lb 6.4 oz)       Wt Readings from Last 4 Encounters:   05/04/25 83.6 kg (184 lb 6.4 oz)   03/15/23 102.2 kg (225 lb 4.8 oz)   02/28/22 97.3 kg (214 lb 8 oz)   08/18/21 91.6 kg (202 lb)        Septic shock in the setting of UTI  Urinary retention s/p bender placement  UA neg nitrite, large LE, WBC 17. UCx E. Coli 50-100K. Urinary retention on admission in the setting of UTI and GIOVANNI, UTI being treated and GIOVANNI improving. Initially treated with Zosyn which was transitioned to ceftriaxone after UCx noted E. Coli. Mild leukocytosis resolved. BCx NGTD.   -Increase ceftriaxone to 2gm in the setting of resolving GIOVANNI  -Remove bender 5/4 and do TOV, d/w RN  -Monitor      Paroxysmal atrial fibrillation with RVR, not on AC, new diagnosis  History of transient Afib 2018  Hx runs of Afib in 2018 in setting of STEMI and cardiogenic shock, discharged on DAPT but not recommended for anticoagulation at that time due to transient in the setting of acute illness. Now with recurrent Afib again this hospitalization requiring amiodarone gtt and since transitioned to amiodarone PO. CHADS-VASc score 4   - Cardiac monitoring  - HOLD PTA beta blocker, defer to cardiology to decide on timing of resuming some beta blockade  - Continue Heparin gtt for AC as cardiology considering cardiac cath  - Pharmacy Liaison  for DOAC coverage  - PRN IV metoprolol for sustained heart rate >120  - Monitor K+/Mg++, replace PRN with improving GIOVANNI    Acute kidney injury, improving  Baseline Cr 0.9. Cr peaked at 3.69 and has downtrended to 1.94 day of transfer out of ICU 05/04/25. Nephrology was following and singed off 5/4 with improving Cr, sodium normalized, excellent urine output. Felt likely ischemic ATN in setting of shock.  - Avoid nephrotoxins - contrast, NSAIDs  - Renally dose medications as able/needed  - Diuresis per cardiology  - Monitor UOP  - Monitor daily, Nephrology signed off 5/4     Acute hypoxic respiratory failure, improving  Hypercapnia  Pleural effusion s/p thoracentesis 4/30/25, improving  Resolving on imaging with diuresis. Was on BiPAP and weaned to 3L nasal cannula and stable. Has been hypercapneic with CO2 levels ranging 50s-60sm, stable/improving. Pleural fluid 4/30 neg for organisms. Transudative effusion.  -Wean O2 as able  -Monitor  -Can re-trial BiPAP if needed, possibly nocturnally    Shock liver in the setting of septic and cardiogenic shock, improving  Elevated LFTs not in obstructive pattern with some mild RUQ pain on exam. US cholelithiasis, nonspecific GB thickening could represent cholecystitis though negative pagan.   No signs of developing acute cholecystitis, tolerating PO  LFT trend improving, in the setting of shock.   Tbili 1.5-0.5 normalized  Alk Phos 335-199  -171  -196  - LFTs improving and pain improving  - Continue to monitor consider repeating US or HIDA scan if remain concerned  - HOLD statin until liver tests improved and <100    Elevated D-dimer  V/Q negative for PE  In conjunction with fall at home and new hypoxia there is concern for PE. Reassuringly dopplers negative for DVT. Unable to give contrast CT due to GIOVANNI. V/Q negative for PE, doubt PE given shock picture above and now weaining  O2.  - Wean O2 as able  - Continue heparin for NSTEMI    Physical  "deconditioning  Resides in group home  -PT recommending TCU  -Continue to work with therapies  -SW/CC following    Resolved Hospital problems  Hypernatremia  Hyperkalemia  Pleural effusion  Leukocytosis     Chronic stable diagnoses and other pertinent medical history: Appropriate PTA medications will be resumed.     Cognitive disorder with history of TBI in 2012  Currently living in group home  - Continue PTA Keppra and gabapentin 200mg Q HS     Generalized anxiety and depression: Continue PTA mirtazapine 30mg Q HS, sertraline 150mg/d     GERD: Continue PPI    Hypothyroidism: Continue PTA TRT 75mcg/d    Lactose intolerance: Continue lactase TID        Diet: 2 gram sodium   DVT Prophylaxis: Heparin gtt  Chance Catheter: PRESENT, indication: ICU only: hourly urine output needed for patient care  Lines: None     Cardiac Monitoring: ACTIVE order. Indication: ICU  Code Status: Full Code      Clinically Significant Risk Factors         # Hypernatremia: Highest Na = 148 mmol/L in last 2 days, will monitor as appropriate     # Hypomagnesemia: Lowest Mg = 1.6 mg/dL in last 2 days, will replace as needed   # Hypoalbuminemia: Lowest albumin = 3.1 g/dL at 5/4/2025  6:18 AM, will monitor as appropriate     # Hypertension: Noted on problem list     # Acute Hypercapnic Respiratory Failure: based on venous blood gas results.  Continue supplemental oxygen and ventilatory support as indicated.         # Obesity: Estimated body mass index is 30.69 kg/m  as calculated from the following:    Height as of this encounter: 1.651 m (5' 5\").    Weight as of this encounter: 83.6 kg (184 lb 6.4 oz).        # Financial/Environmental Concerns: none         Social Drivers of Health    Depression: At risk (5/19/2021)    PHQ-2     PHQ-2 Score: 4          Disposition Plan     Medically Ready for Discharge: Anticipated in 2-4 Days           The patient's care was discussed with the Attending Physician, Dr. Holley, Bedside Nurse, Patient, " Cardiology Consultant(s), and Intensivist Dr. Nash .    Denise Sunshine PA-C  Hospitalist Service  [facility]  Securely message with [employer] (more info)  Text page via [facility] Paging/Directory   ______________________________________________________________________    Interval History   Seen and examined. Sign out obtained from Intensivist. Pt sitting up watching TV, no complaints, denies CP, SOB, lightheadedness, dizziness, N/V. No abd pain. Transitioned from Lasix gtt to scheduled IV and amiodarone gtt to oral. Plan to [facility] [place] [facility] [place] if bed available, remove bender, and cardiology to manage diuretics and consider beta blocker as well as possible angiogram prior to discharge, on heparin gtt for now while plans pending. Questions welcomed and answered to the best of my knowledge.    Physical Exam   Vital Signs: Temp: 99.9  F (37.7  C) Temp src: Bladder BP: 122/47 Pulse: 66   Resp: 19 SpO2: 97 % O2 Device: Nasal cannula Oxygen Delivery: 2 LPM  Weight: 184 lbs 6.4 oz    Physical Exam    General: Awake, alert, very pleasant woma. Looks comfortable sitting up in bed. No acute distress.  HEENT: Normocephalic, atraumatic. Extraocular movements intact.  Respiratory: Clear to anterolateral fields.  Cardiovascular: Regular rate and rhythm, +S1 and S2, no murmur auscultated. Trace peripheral edema, PCDs in place and removed for assessment.  Gastrointestinal: Soft, non-tender, obese but non-distended. Bowel sounds present.  Skin: Warm, dry. No obvious rashes or lesions on exposed skin. Dorsalis pedis pulses palpable bilaterally.  Musculoskeletal: No joint swelling, erythema or tenderness. Moves all extremities equally.  Neurologic: AAO x3. Forgetful.  Psychiatric: Appropriate mood and affect. No obvious anxiety or depression.      Medical Decision Making       >60 MINUTES SPENT BY ME on the date of service doing chart review, history, exam, documentation & further activities per the  note.      Data     I have personally reviewed the following data over the past 24 hrs:    6.9  \   12.2   / 170     142 99 42.5 (H) /  105 (H)   3.5 31 (H) 1.94 (H) \     ALT: 171 (H) AST: 196 (H) AP: 199 (H) TBILI: 0.5   ALB: 3.1 (L) TOT PROTEIN: 5.7 (L) LIPASE: N/A       Imaging results reviewed over the past 24 hrs:   No results found for this or any previous visit (from the past 24 hours).

## 2025-05-04 NOTE — PROGRESS NOTES
Chart reviewed.  Renal parameters continue to improve.  Creatinine 1.9, sodium normalized  Excellent urine output on intermittent dosing of Lasix -2.5 L  Repeat TTE now shows 55 to 60% EF     Nephrology will sign off.  Please call with question    Yadira Grayson MD  [facility] - Nephrology   [phone]

## 2025-05-04 NOTE — PROGRESS NOTES
[facility]- Cardiology Progress Note    Date of Admission: 4/29/2025  [facility]    Subjective     Today, she states that she feels better. She denies any chest pain or shortness of breath. She denies experiencing palpitations.      Past Medical History:   Diagnosis Date    Cardiogenic shock (H) 07/09/2021    Coma (H) 05/2012    CHT after a fall    Coronary artery disease involving native coronary artery of native heart 07/09/2021    3 vessel    Developmental delay     DVT (deep vein thrombosis) in pregnancy     post trauma    Fall 05/2012    multiple fracture    Hypertension     Menopause     age 52    Pelvic fracture (H) 5-201`2    Rib fracture 05/2012    STEMI (ST elevation myocardial infarction) (H) 01/25/2018       Past Surgical History:   Procedure Laterality Date    COLONOSCOPY  01/01/2010    CV CORONARY LITHOTRIPSY PCI N/A 8/3/2021    Procedure: CV Coronary Lithotripsy PCI;  Surgeon: Kamran Singleton MD;  Location:  HEART CARDIAC CATH LAB    CV HEART CATHETERIZATION WITH POSSIBLE INTERVENTION N/A 8/3/2021    Procedure: Heart Catheterization with Possible Intervention;  Surgeon: Kamran Singleton MD;  Location:  HEART CARDIAC CATH LAB    CV INTRAVASULAR ULTRASOUND N/A 8/3/2021    Procedure: Intravascular Ultrasound;  Surgeon: Kamran Singleton MD;  Location:  HEART CARDIAC CATH LAB    CV PCI STENT DRUG ELUTING N/A 8/3/2021    Procedure: Percutaneous Coronary Intervention Stent Drug Eluting;  Surgeon: Kamran Singleton MD;  Location:  HEART CARDIAC CATH LAB    HEART CATH DRUG ELUTING STENT PLACEMENT N/A 01/25/2018    LASER YAG CAPSULOTOMY Left 05/29/2018    Procedure: LASER YAG CAPSULOTOMY;  LEFT YAG LASER CAPSULOTOMY ;  Surgeon: Tabby Guillaume MD;  Location:  [facility]    LASER YAG CAPSULOTOMY Right 06/05/2018    Procedure: LASER YAG CAPSULOTOMY;  RIGHT EYE YAG LASER CAPSULOTOMY ;  Surgeon: Tabby Guillaume MD;  Location:   [facility]       Social History     Tobacco Use    Smoking status: Never    Smokeless tobacco: Never   Substance Use Topics    Alcohol use: Not Currently     Alcohol/week: 1.7 standard drinks of alcohol     Types: 2 Standard drinks or equivalent per week       Family History   Problem Relation Age of Onset    Other - See Comments Father         alpha 1 antitrypsin deficiency     Cancer - colorectal Mother     Diabetes Sister           Allergies   Allergen Reactions    Penicillins      dizziness       HOME MEDS:  Current Outpatient Medications   Medication Instructions    acetaminophen (TYLENOL) 1,000 mg, Oral, 2 TIMES DAILY PRN    aspirin (ASA) 81 mg, Oral, DAILY    clopidogrel (PLAVIX) 75 mg, Oral, DAILY    Dextromethorphan-guaiFENesin  MG/5ML syrup 10 mLs, EVERY 4 HOURS PRN    Dihydroxyaluminum Sod Carb (ROLAIDS PO) 2 tablets, Oral, 4 TIMES DAILY PRN    emollient (VANICREAM) external cream APPLY FROM HEAD TO TOE DAILY AFTER SHOWERS *CALL TO REORDER*    FIBER- MG tablet 1 tablet, Oral, DAILY PRN    furosemide (LASIX) 20 mg, Oral, DAILY    gabapentin (NEURONTIN) 100 mg, Oral, 2 TIMES DAILY, Takes at 0800 and 1200    gabapentin (NEURONTIN) 200 mg, Oral, AT BEDTIME    lactase (LACTAID) 9,000 Units, Oral, 3 TIMES DAILY WITH MEALS    levETIRAcetam (KEPPRA) 500 mg, Oral, 2 TIMES DAILY    levothyroxine (SYNTHROID/LEVOTHROID) 75 mcg, Oral, EVERY MORNING BEFORE BREAKFAST    lisinopril (ZESTRIL) 5 mg, Oral, DAILY    loperamide (IMODIUM) 2 mg, Oral, 4 TIMES DAILY PRN    LORazepam (ATIVAN) 0.5 mg, Oral, 3 TIMES DAILY PRN    melatonin 3 MG tablet 1 tablet, Oral, AT BEDTIME    metoprolol tartrate (LOPRESSOR) 75 mg, Oral, 2 TIMES DAILY    metroNIDAZOLE (METROGEL) 0.75 % external gel Topical, 2 TIMES DAILY PRN    mirtazapine (REMERON) 30 mg, Oral, AT BEDTIME    nitroGLYcerin (NITROSTAT) 0.4 MG sublingual tablet For chest pain place 1 tablet under the tongue every 5 minutes for 3 doses. If symptoms persist 5 minutes after 1st  dose call 911.    nystatin (MYCOSTATIN) 405749 UNIT/GM external powder Topical, 2 TIMES DAILY PRN    omeprazole (PRILOSEC) 20 mg, Oral, EVERY MORNING    ondansetron (ZOFRAN-ODT) 4 MG ODT tab TAKE ONE TABLET BY MOUTH TWICE A DAY AS NEEDED FOR NAUSEA & VOMITING    potassium chloride ER (KLOR-CON M) 20 MEQ CR tablet 1 tablet, Oral, DAILY    QUEtiapine (SEROQUEL) 25 mg, 4 TIMES DAILY    rosuvastatin (CRESTOR) 10 mg, Oral, DAILY    sertraline (ZOLOFT) 50 mg, Oral, DAILY, Takes with 100mg tablet for a total dose of 150mg daily    sertraline (ZOLOFT) 100 mg, Oral, DAILY, Takes with 50mg tablet for a total dose of 150mg daily    trolamine salicylate (ASPERCREME) 10 % cream Topical, PRN, To affected areas     vitamin D3 (CHOLECALCIFEROL) 50 mcg (2000 units) tablet 1 tablet, DAILY       CURRENT MEDS:    Current Facility-Administered Medications:     amiodarone (PACERONE) tablet 200 mg, 200 mg, Oral, BID, Noel Nash MD, 200 mg at 05/03/25 2007    aspirin (ASA) chewable tablet 81 mg, 81 mg, Oral, Daily, Dannielle Gunn DO, 81 mg at 05/03/25 0910    calcium carbonate (TUMS) chewable tablet 1,000 mg, 1,000 mg, Oral, 4x Daily PRN, Dannielle Gunn,     carboxymethylcellulose PF (REFRESH PLUS) 0.5 % ophthalmic solution 1 drop, 1 drop, Both Eyes, Q1H PRN, Niki Montoya, NP    cefTRIAXone (ROCEPHIN) 1 g vial to attach to  mL bag for ADULTS or NS 50 mL bag for PEDS, 1 g, Intravenous, Q24H, Noel Nash MD, 1 g at 05/03/25 1612    Continuing beta blocker from home medication list OR beta blocker order already placed during this visit, , Does not apply, DOES NOT GO TO Velia BRAGG Alison E, DO    Continuing statin from home medication list OR statin order already placed during this visit, , Does not apply, DOES NOT GO TO Velia BRAGG Alison E, DO    glucose gel 15-30 g, 15-30 g, Oral, Q15 Min PRN **OR** dextrose 50 % injection 25-50 mL, 25-50 mL, Intravenous, Q15 Min PRN **OR** glucagon injection 1 mg, 1 mg,  Subcutaneous, Q15 Min PRN, Emmie Gillis MD    furosemide (LASIX) injection 20 mg, 20 mg, Intravenous, Q8H, Noel Nash MD, 20 mg at 05/03/25 1751    gabapentin (NEURONTIN) capsule 200 mg, 200 mg, Oral, At Bedtime, Velia Dannielle E, DO, 200 mg at 05/03/25 2007    heparin 25,000 units in 0.45% NaCl 250 mL ANTICOAGULANT infusion, 0-5,000 Units/hr, Intravenous, Continuous, Noel Nash MD, Last Rate: 15 mL/hr at 05/03/25 2007, 1,500 Units/hr at 05/03/25 2007    lactase (LACTAID) tablet 9,000 Units, 9,000 Units, Oral, TID w/meals, Velia Dannielle E, DO, 9,000 Units at 05/03/25 1750    levETIRAcetam (KEPPRA) tablet 500 mg, 500 mg, Oral, BID, Velia Dannielle E, DO, 500 mg at 05/03/25 2007    levothyroxine (SYNTHROID/LEVOTHROID) tablet 75 mcg, 75 mcg, Oral, QAM AC, Ashleyrber, Dannielle E, DO, 75 mcg at 05/03/25 0732    lidocaine (LMX4) cream, , Topical, Q1H PRN, Niki Montoya, NP    lidocaine (LMX4) cream, , Topical, Q1H PRN, Ashleyrber, Dannielle E, DO    lidocaine 1 % 0.1-1 mL, 0.1-1 mL, Other, Q1H PRN, Niki Montoya, NP    lidocaine 1 % 0.1-1 mL, 0.1-1 mL, Other, Q1H PRN, Scharber, Dannielle E, DO    loperamide (IMODIUM) capsule 2 mg, 2 mg, Oral, 4x Daily PRN, Maddi Brenner, ANALI CNP, 2 mg at 05/02/25 2057    LORazepam (ATIVAN) tablet 0.5 mg, 0.5 mg, Oral, TID PRN, Scharber, Dannielle E, DO    metoprolol (LOPRESSOR) injection 5 mg, 5 mg, Intravenous, Q15 Min PRN, Willie Barboza MD, 5 mg at 05/03/25 2000    [Held by provider] metoprolol tartrate (LOPRESSOR) tablet 75 mg, 75 mg, Oral, BID, Dannielle Gunn,     mirtazapine (REMERON) tablet 30 mg, 30 mg, Oral, At Bedtime, Dannielle Gunn, , 30 mg at 05/03/25 2008    nitroGLYcerin (NITROSTAT) sublingual tablet 0.4 mg, 0.4 mg, Sublingual, Q5 Min PRN, Dannielle Gunn DO    No lozenges or gum should be given while patient on BIPAP/AVAPS/AVAPS AE, , Does not apply, Continuous PRN, Niki Montoya, NP    norepinephrine (LEVOPHED) 4 mg in  mL infusion  "PREMIX, 0.01-0.6 mcg/kg/min, Intravenous, Continuous, Noel Nash MD    pantoprazole (PROTONIX) EC tablet 40 mg, 40 mg, Oral, QAMVelia Alison E, DO, 40 mg at 05/03/25 0910    Patient is already receiving anticoagulation with heparin, enoxaparin (LOVENOX), warfarin (COUMADIN)  or other anticoagulant medication, , Does not apply, Continuous PRN, Dannielle Gunn,     Patient may continue current oral medications, , Does not apply, Continuous PRN, Maddi Brenner, ANALI CNP    [Held by provider] QUEtiapine (SEROquel) tablet 25 mg, 25 mg, Oral, 4x Daily, Dannielle Gunn DO, 25 mg at 04/30/25 0748    Reason ACE/ARB/ARNI order not selected, , Other, DOES NOT GO TO MARVelia Alison E, DO    senna-docusate (SENOKOT-S/PERICOLACE) 8.6-50 MG per tablet 1 tablet, 1 tablet, Oral, BID PRN **OR** senna-docusate (SENOKOT-S/PERICOLACE) 8.6-50 MG per tablet 2 tablet, 2 tablet, Oral, BID PRN, Dannielle Gunn DO    sertraline (ZOLOFT) tablet 150 mg, 150 mg, Oral, Daily, Dannielle Gunn, DO, 150 mg at 05/03/25 0910    sodium chloride (PF) 0.9% PF flush 3 mL, 3 mL, Intracatheter, Q8H ABHI, Niki Montoya, NP, 3 mL at 05/03/25 1609    sodium chloride (PF) 0.9% PF flush 3 mL, 3 mL, Intracatheter, q1 min prn, Niki Montoya, NP    sodium chloride (PF) 0.9% PF flush 3 mL, 3 mL, Intracatheter, Q8H ABHI, Dannielle Gunn, DO, 3 mL at 05/03/25 1750    sodium chloride (PF) 0.9% PF flush 3 mL, 3 mL, Intracatheter, q1 min prn, Dannielle Gunn, DO, 3 mL at 05/03/25 1728    Objective     Vital signs:  Temp: 99.7  F (37.6  C) Temp src: Bladder BP: 119/90 Pulse: 66   Resp: 23 SpO2: 95 % O2 Device: Oxymask Oxygen Delivery: 4 LPM Height: 165.1 cm (5' 5\") Weight: 74 kg (163 lb 1.6 oz)  Estimated body mass index is 27.14 kg/m  as calculated from the following:    Height as of this encounter: 1.651 m (5' 5\").    Weight as of this encounter: 74 kg (163 lb 1.6 oz).       PHYSICAL EXAMINATION   General: Alert and oriented. "   HEENT: Normocephalic. Extraocular motion intact. Moist mucous membranes.  CV: irregularly irregular rhythm. Normocardic. 1/6 systolic murmur. Extremities are warm and well-perfused. Distal pulses palpable. No clubbing, cyanosis, or edema.  Lungs: faint crackles.  Psych:: cooperative, responsive to verbal cues.    DIAGNOSTICS  I have reviewed the patient's current laboratory, imaging, and other diagnostic studies.    Labs:  Most Recent 3 CBC's:  Recent Labs   Lab Test 05/03/25  0525 05/02/25  0513 05/01/25  0830   WBC 7.6 7.3 10.6   HGB 11.2* 11.2* 11.6*   MCV 94 92 97    165 212     Most Recent 3  BMP's:  Recent Labs   Lab Test 05/03/25  1203 05/03/25  0803 05/03/25  0525 05/02/25  2343 05/02/25  1846 05/02/25  0808 05/02/25  0513   NA  --   --  148*  --  146*  --  144   POTASSIUM  --   --  4.1  --  4.1  --  3.4   CHLORIDE  --   --  107  --  103  --  102   CO2  --   --  31*  --  31*  --  28   BUN  --   --  42.9*  --  45.4*  --  52.2*   CR  --   --  2.25*  --  2.41*  --  2.80*   ANIONGAP  --   --  10  --  12  --  14   HECTOR  --   --  9.0  --  9.3  --  8.9   * 95 94   < > 96   < > 144*    < > = values in this interval not displayed.     Most Recent 3 BNP's:  Recent Labs   Lab Test 04/29/25  1348   NTBNPI 57,315*      Most Recent Cholesterol Panel:  Recent Labs   Lab Test 11/22/23  1010 03/15/23  1555 02/28/22  0841   CHOL  --   --  113   LDL  --   --  31   HDL  --   --  31*   TRIG 256*   < > 254*    < > = values in this interval not displayed.       Assessment & Plan     Septic shock in the setting of UTI   Non-STEMI versus stress cardiomyopathy. Echocardiogram this admission shows LVEF of 45 to 50% with mid lateral and mid anterior wall hypokinesia, improved to 55%-60% on 5/3/2025. On medical therapy due to acute renal failure.   Known history of CAD  Acute decompensated congestive heart failure. Etiology #2. Significantly elevated NT-proBNP at admission.   Newly diagnosed paroxysmal atrial  fibrillation-this happened in the context of patient having septic shock and on pressors including dobutamine. CHADS-VASc score of 4.   Elevated LFTs.  Statin on hold.  Acute renal failure, creatinine improving    Thankfully, since yesterday, patient has been off Lasix drips as well as BiPAP.  She has had a good urine output yesterday.  She remains largely well rate controlled with intermittent episodes of right ventricular response amiodarone drip has been stopped.  She has been switched to oral amiodarone and PRN metoprolol. Her liver enzymes have been downtrending with  and  today.  Repeat TTE today shows LVEF 55% - 60% with no significant valvular lesions.  The significant improvement in her LVEF in the span of days corroborates the possibility that this is stress/induced cardiomyopathy.  However, given prior CAD history and RWMA, a true NSTEMI cannot be fully ruled out without an invasive assessment.    - Continue ASA 81 mg once daily  - Continue heparin gtt  - Continue to hold statin until LFTs < 100  - PRN lasix per nephrology and primary team  - Agree with continuing heparin for anticoagulation  - Concur with amiodarone for now; however, given tenuous liver function, please, continue to trend LFTs  - Given intact LVEF and if her BP allows, scheduled metoprolol in addition to amiodarone, if needed, is also reasonable.    Thank you for including us in the care of Amy Bryan. We will continue to follow.    Vish Price MD  Cardiovascular Disease  05/03/25

## 2025-05-04 NOTE — PROGRESS NOTES
Critical Care Progress Note      05/04/2025    Name: Amy Bryan MRN#: [id number]   Age: 69 year old YOB: 1955     Hospital Day# 5        Code status: Full code         Problem List:   Active Problems:    Hyperkalemia    Pleural effusion    Elevated troponin    GIOVANNI (acute kidney injury)    Acute respiratory failure with hypoxia (H)    Multiple falls           Summary/Hospital Course:     Amy Bryan is a 69 year old female with history of TBI in 2012, CAD s/p PCI in 2018 and 2021, suspected takotsubo cardiomyopathy now recovered, presenting for vague complaints of fatigue for the last 2 days. Within the ED, hypoxic to the 80s on room air and BP notably soft into the 90s. Lab revealed troponin of 804, Cr of 3.23, K of 5.7. Admitted to the ICU given need for pressors for non-fluid responsive hypotension. Work up thus far showing bilateral pleural effusions, elevated troponins and EKG changes 2/2 likely stress cardiomyopathy, likely urosepsis and elevated LFTs.           Interim History:     Got 2.7L of fluid in ED, continued to be hypotensive leading to initiation of pressors, requiring minimal amount of epinephrine. Plan to place central line, arterial line and perform thoracentesis this afternoon. Stable on Bipap.     #5/3/25:  -Off lasix drip as well as amiodaroen drip     #5/4:  -Switched to IV lasix.  -Also po amiodarone. Improving mentation.     Assessment and plan :     Amy Bryan is a 69 year old female admitted on 4/29/2025 for EKG changes, elevated troponins, requiring pressor support for hemodynamic instability.   I have personally reviewed the daily labs, imaging studies, cultures and discussed the case with referring physician and consulting physicians.     My assessment and plan by system for this patient is as follows:    Neurology/Psychiatry:  # Cognitive disorder with history of TBI in 2012    # Anxiety/MDD  Currently living in group home  - Continue PTA Keppra  - PRN  lorazepam      Cardiovascular:  # Stress cardiomyopathy versus NSTEMI  # CAD s/p PCI in 2018, 2021   # Hypotension  EKG reviewed with new T wave inversions, sinus bradycardia, initial troponin 814>905. ECHO 4/30 showing EF 45-50%, increased LV filling pressures, dilated RV with decreased systolic function. In ED, got 2.7L without improvement in BP.  - Continue heparin gtt, aspirin  - Trend troponins  - No coronary angiogram indicated at time given lack of anginal symptoms and acute renal failure  - Cardiology consulted, following      Pulmonary:  # Acute hypoxic/hypercapnic respiratory failure  # Pleural effusion, large  V/Q negative for pulmonary embolism.  Improverd       GI and Nutrition:  # Elevated LFTs  # GERD  US showing possible acute cholecystitis, negative Damon sign. CT abdomen pelvis showing cholelithiasis with edema but no fat stranding.  - Trend LFTs, down trending  - PPI        Renal/Fluids/Electrolytes:  # GIOVANNI, likely ischemic ATN in setting of shock  # Hyperkalemia- resolved  Cr on admit 2.23, now 3.69, elevated BNP, dry mucous membranes.  - Check CK, renal panel daily  - Hold lisinopril, lasix, PTA potassium supplement  - Electrolyte replacement per ICU protocols  - Avoid nephrotoxic agents and adjust medications as needed for renal clearance  - Follow I/O  - NICOM assessment: initial CO 4.5 CI 2.2 SVI -11% indicating NOT fluid responsive, leave leads in place   - Nephrology consulted, following      Infectious Disease:  # Abnormal UA, likely UTI  # Concern for sepsis  MRSA nares negative.  - Zosyn  - Urine, blood culture pending      Endocrine:  # Hypothyroidism   - T4 pending  - PTA levothyroxine 75mcg      Hematology:  # Elevated D-dimer   - Initiate heparin  - Dopplers negative for DVT      MSKL/Rheum:  None currently.      IV/Access:   1. Venous access   2. Arterial access    3. Central access      ICU Prophylaxis:   1. DVT: Hep, mechanical  2. VAP: HOB 30 degrees, chlorhexidine rinse  3.  Stress Ulcer: PPI  4. Restraints: none  5. Wound care - per unit routine   6. Feeding - NPO, plan to resume diet soon  7. Family Update: CINDY German, at bedside  8. Disposition - likely back to group home             Key Medications:     Current Facility-Administered Medications   Medication Dose Route Frequency Provider Last Rate Last Admin    amiodarone (PACERONE) tablet 200 mg  200 mg Oral BID Noel Nash MD   200 mg at 05/04/25 0845    aspirin (ASA) chewable tablet 81 mg  81 mg Oral Daily Dannielle Gunn DO   81 mg at 05/04/25 0845    cefTRIAXone (ROCEPHIN) 2 g vial to attach to  ml bag for ADULTS or NS 50 ml bag for PEDS  2 g Intravenous Q24H Denise Sunshine PA-C   2 g at 05/04/25 1608    furosemide (LASIX) injection 20 mg  20 mg Intravenous Q8H Noel Nash MD   20 mg at 05/04/25 1608    gabapentin (NEURONTIN) capsule 200 mg  200 mg Oral At Bedtime Dannielle Gunn DO   200 mg at 05/03/25 2007    lactase (LACTAID) tablet 9,000 Units  9,000 Units Oral TID w/meals Dannielle Gunn, DO   9,000 Units at 05/04/25 1750    levETIRAcetam (KEPPRA) tablet 500 mg  500 mg Oral BID Dannielle Gunn, DO   500 mg at 05/04/25 0845    levothyroxine (SYNTHROID/LEVOTHROID) tablet 75 mcg  75 mcg Oral QAM AC Dannielle Gunn, DO   75 mcg at 05/04/25 0757    [Held by provider] metoprolol tartrate (LOPRESSOR) tablet 75 mg  75 mg Oral BID Dannielle Gunn, DO        mirtazapine (REMERON) tablet 30 mg  30 mg Oral At Bedtime Dannielle Gunn DO   30 mg at 05/03/25 2008    pantoprazole (PROTONIX) EC tablet 40 mg  40 mg Oral QAM Dannielle Gunn, DO   40 mg at 05/04/25 0845    [Held by provider] QUEtiapine (SEROquel) tablet 25 mg  25 mg Oral 4x Daily Dannielle Gunn DO   25 mg at 04/30/25 0748    sertraline (ZOLOFT) tablet 150 mg  150 mg Oral Daily Denise Sunshine PA-C   150 mg at 05/04/25 0845    sodium chloride (PF) 0.9% PF flush 3 mL  3 mL Intracatheter Q8H [facility] Denise Sunshine,  SAADIA   3 mL at 05/04/25 1608     Current Facility-Administered Medications   Medication Dose Route Frequency Provider Last Rate Last Admin    Continuing beta blocker from home medication list OR beta blocker order already placed during this visit   Does not apply DOES NOT GO TO Dannielle Sousa DO        Continuing statin from home medication list OR statin order already placed during this visit   Does not apply DOES NOT GO TO Dannielle Sousa DO        heparin 25,000 units in 0.45% NaCl 250 mL ANTICOAGULANT infusion  0-5,000 Units/hr Intravenous Continuous Noel Nash MD 15 mL/hr at 05/04/25 1642 1,500 Units/hr at 05/04/25 1642    No lozenges or gum should be given while patient on BIPAP/AVAPS/AVAPS AE   Does not apply Continuous PRN Denise Sunshine PA-C        Patient is already receiving anticoagulation with heparin, enoxaparin (LOVENOX), warfarin (COUMADIN)  or other anticoagulant medication   Does not apply Continuous PRN Dannielle Gunn DO        Patient may continue current oral medications   Does not apply Continuous PRN Maddi Brenner APRN CNP        Reason ACE/ARB/ARNI order not selected   Other DOES NOT GO TO Dannielle Sousa DO                   Physical Examination:   Temp:  [98.4  F (36.9  C)-100  F (37.8  C)] 99.9  F (37.7  C)  Pulse:  [] 66  Resp:  [13-33] 19  BP: (103-145)/(47-90) 122/47  SpO2:  [87 %-98 %] 97 %    Intake/Output Summary (Last 24 hours) at 4/30/2025 1315  Last data filed at 4/30/2025 0351  Gross per 24 hour   Intake 2659.6 ml   Output --   Net 2659.6 ml     Wt Readings from Last 4 Encounters:   05/04/25 83.6 kg (184 lb 6.4 oz)   03/15/23 102.2 kg (225 lb 4.8 oz)   02/28/22 97.3 kg (214 lb 8 oz)   08/18/21 91.6 kg (202 lb)     BP - Mean:  [] 70  FiO2 (%): 35 %, Resp: 19  Recent Labs   Lab 05/04/25  1513 05/04/25  0617 05/03/25  2206 05/03/25  1521   O2PER 3 30 28 21       GEN: no acute distress, alert and orientated x4   HEENT: head  ncat, sclera anicteric, OP patent, trachea midline, dry mucous membranes   PULM: faint wheeze over right upper lung field, otherwise clear lung sounds, bipap in place    CV/COR: RRR S1S2, murmur noted   ABD: soft, nontender, no mass  EXT: warm extremities, no edema  NEURO: grossly intact  SKIN: no obvious rash  LINES: clean, dry intact         Data:   All data and imaging reviewed     ROUTINE ICU LABS (Last four results)  CMP  Recent Labs   Lab 05/04/25  1621 05/04/25  1300 05/04/25  0924 05/04/25  0618 05/03/25  0803 05/03/25  0525 05/02/25  2343 05/02/25  1846 05/02/25  0808 05/02/25  0513   NA  --   --   --  142  --  148*  --  146*  --  144   POTASSIUM  --   --   --  3.5  --  4.1  --  4.1  --  3.4   CHLORIDE  --   --   --  99  --  107  --  103  --  102   CO2  --   --   --  31*  --  31*  --  31*  --  28   ANIONGAP  --   --   --  12  --  10  --  12  --  14   * 105* 102* 100*   < > 94   < > 96   < > 144*   BUN  --   --   --  42.5*  --  42.9*  --  45.4*  --  52.2*   CR  --   --   --  1.94*  --  2.25*  --  2.41*  --  2.80*   GFRESTIMATED  --   --   --  28*  --  23*  --  21*  --  18*   HECTOR  --   --   --  9.3  --  9.0  --  9.3  --  8.9   MAG  --   --   --  1.6*  --  1.8  --  1.8  --  2.0   PHOS  --   --   --  5.1*  --  3.4  --  3.2  --  3.6   PROTTOTAL  --   --   --  5.7*  --  5.4*  --  6.2*  --  5.8*   ALBUMIN  --   --   --  3.1*  --  3.1*  --  3.3*  --  3.1*   BILITOTAL  --   --   --  0.5  --  0.6  --  0.6  --  0.6   ALKPHOS  --   --   --  199*  --  205*  --  242*  --  231*   AST  --   --   --  196*  --  225*  --  289*  --  125*   ALT  --   --   --  171*  --  161*  --  190*  --  163*    < > = values in this interval not displayed.     CBC  Recent Labs   Lab 05/04/25  0618 05/03/25  0525 05/02/25  0513 05/01/25  0830   WBC 6.9 7.6 7.3 10.6   RBC 4.23 3.84 3.80 3.93   HGB 12.2 11.2* 11.2* 11.6*   HCT 39.4 36.2 34.9* 38.1   MCV 93 94 92 97   MCH 28.8 29.2 29.5 29.5   MCHC 31.0* 30.9* 32.1 30.4*   RDW 14.6 14.6  "14.6 14.4    183 165 212     INRNo lab results found in last 7 days.  Arterial Blood Gas  Recent Labs   Lab 05/04/25  1513 05/04/25  0617 05/03/25  2206 05/03/25  1521   O2PER 3 30 28 21       All cultures:  No results for input(s): \"CULT\" in the last 168 hours.  No results found for this or any previous visit (from the past 24 hours).      Martina Walker, MS4  [facility] ([facility]  CRITICAL CARE STAFF NOTE    I am managing these acute and ongoing critical issues resulting in critical condition that impairs one or more vital organ systems, incur a high probability of imminent or life-threatening deterioration in the patient's condition and providing frequent personal assessment and manipulation of medications and life support equipment.     1. Cardiogenic shock.     ICU Interventions: parenteral medications necessitating continuous monitoring including vasoactive medications, medication for heart rhythm control, insulin infusion, and/or sedative/opiates  and interpretation of lab values, cardiac output, cxr, pulse oximetry, blood gases, and/or information/data stored in computers    The patient was seen and examined with the resident/fellow/EVAN and/or medical student.  We have discussed the patient in detail and I agree with the findings, assessment, and plan as documented when this note was cosigned on this day. The plan was formulated in conjunction with pharmacy, ICU nurses, and respiratory therapist. I have evaluated all laboratory values and imaging studies for the past 24 hours. I have reviewed all the consults that have been ordered and are active for this patient.      Critical Care Time: 32 min.  I spent this time (excluding procedures) personally providing and directing critical care services at the bedside and on the critical care unit.      Noel HAMMER MPH   of Medicine  Pager: [phone]    Clinically Significant Risk Factors       " "  # Hypernatremia: Highest Na = 148 mmol/L in last 2 days, will monitor as appropriate     # Hypomagnesemia: Lowest Mg = 1.6 mg/dL in last 2 days, will replace as needed   # Hypoalbuminemia: Lowest albumin = 3.1 g/dL at 5/4/2025  6:18 AM, will monitor as appropriate     # Hypertension: Noted on problem list     # Acute Hypercapnic Respiratory Failure: based on venous blood gas results.  Continue supplemental oxygen and ventilatory support as indicated.         # Obesity: Estimated body mass index is 30.69 kg/m  as calculated from the following:    Height as of this encounter: 1.651 m (5' 5\").    Weight as of this encounter: 83.6 kg (184 lb 6.4 oz).        # Financial/Environmental Concerns: none                "

## 2025-05-04 NOTE — PROGRESS NOTES
[facility]- Cardiology Progress Note    Date of Admission: 4/29/2025  [facility]    Subjective       Today, patient feels much improved from a shortness of breath standpoint.      Past Medical History:   Diagnosis Date    Cardiogenic shock (H) 07/09/2021    Coma (H) 05/2012    CHT after a fall    Coronary artery disease involving native coronary artery of native heart 07/09/2021    3 vessel    Developmental delay     DVT (deep vein thrombosis) in pregnancy     post trauma    Fall 05/2012    multiple fracture    Hypertension     Menopause     age 52    Pelvic fracture (H) 5-201`2    Rib fracture 05/2012    STEMI (ST elevation myocardial infarction) (H) 01/25/2018       Past Surgical History:   Procedure Laterality Date    COLONOSCOPY  01/01/2010    CV CORONARY LITHOTRIPSY PCI N/A 8/3/2021    Procedure: CV Coronary Lithotripsy PCI;  Surgeon: Kamran Singleton MD;  Location:  HEART CARDIAC CATH LAB    CV HEART CATHETERIZATION WITH POSSIBLE INTERVENTION N/A 8/3/2021    Procedure: Heart Catheterization with Possible Intervention;  Surgeon: Kamran Singleton MD;  Location:  HEART CARDIAC CATH LAB    CV INTRAVASULAR ULTRASOUND N/A 8/3/2021    Procedure: Intravascular Ultrasound;  Surgeon: Kamran Singleton MD;  Location:  HEART CARDIAC CATH LAB    CV PCI STENT DRUG ELUTING N/A 8/3/2021    Procedure: Percutaneous Coronary Intervention Stent Drug Eluting;  Surgeon: Kamran Singleton MD;  Location:  HEART CARDIAC CATH LAB    HEART CATH DRUG ELUTING STENT PLACEMENT N/A 01/25/2018    LASER YAG CAPSULOTOMY Left 05/29/2018    Procedure: LASER YAG CAPSULOTOMY;  LEFT YAG LASER CAPSULOTOMY ;  Surgeon: Tabby Guillaume MD;  Location: [facility]    LASER YAG CAPSULOTOMY Right 06/05/2018    Procedure: LASER YAG CAPSULOTOMY;  RIGHT EYE YAG LASER CAPSULOTOMY ;  Surgeon: Tabby Guillaume MD;  Location: [facility]       Social History     Tobacco Use    Smoking  status: Never    Smokeless tobacco: Never   Substance Use Topics    Alcohol use: Not Currently     Alcohol/week: 1.7 standard drinks of alcohol     Types: 2 Standard drinks or equivalent per week       Family History   Problem Relation Age of Onset    Other - See Comments Father         alpha 1 antitrypsin deficiency     Cancer - colorectal Mother     Diabetes Sister           Allergies   Allergen Reactions    Penicillins      dizziness       HOME MEDS:  Current Outpatient Medications   Medication Instructions    acetaminophen (TYLENOL) 1,000 mg, Oral, 2 TIMES DAILY PRN    aspirin (ASA) 81 mg, Oral, DAILY    clopidogrel (PLAVIX) 75 mg, Oral, DAILY    Dextromethorphan-guaiFENesin  MG/5ML syrup 10 mLs, EVERY 4 HOURS PRN    Dihydroxyaluminum Sod Carb (ROLAIDS PO) 2 tablets, Oral, 4 TIMES DAILY PRN    emollient (VANICREAM) external cream APPLY FROM HEAD TO TOE DAILY AFTER SHOWERS *CALL TO REORDER*    FIBER- MG tablet 1 tablet, Oral, DAILY PRN    furosemide (LASIX) 20 mg, Oral, DAILY    gabapentin (NEURONTIN) 100 mg, Oral, 2 TIMES DAILY, Takes at 0800 and 1200    gabapentin (NEURONTIN) 200 mg, Oral, AT BEDTIME    lactase (LACTAID) 9,000 Units, Oral, 3 TIMES DAILY WITH MEALS    levETIRAcetam (KEPPRA) 500 mg, Oral, 2 TIMES DAILY    levothyroxine (SYNTHROID/LEVOTHROID) 75 mcg, Oral, EVERY MORNING BEFORE BREAKFAST    lisinopril (ZESTRIL) 5 mg, Oral, DAILY    loperamide (IMODIUM) 2 mg, Oral, 4 TIMES DAILY PRN    LORazepam (ATIVAN) 0.5 mg, Oral, 3 TIMES DAILY PRN    melatonin 3 MG tablet 1 tablet, Oral, AT BEDTIME    metoprolol tartrate (LOPRESSOR) 75 mg, Oral, 2 TIMES DAILY    metroNIDAZOLE (METROGEL) 0.75 % external gel Topical, 2 TIMES DAILY PRN    mirtazapine (REMERON) 30 mg, Oral, AT BEDTIME    nitroGLYcerin (NITROSTAT) 0.4 MG sublingual tablet For chest pain place 1 tablet under the tongue every 5 minutes for 3 doses. If symptoms persist 5 minutes after 1st dose call 911.    nystatin (MYCOSTATIN) 178031  UNIT/GM external powder Topical, 2 TIMES DAILY PRN    omeprazole (PRILOSEC) 20 mg, Oral, EVERY MORNING    ondansetron (ZOFRAN-ODT) 4 MG ODT tab TAKE ONE TABLET BY MOUTH TWICE A DAY AS NEEDED FOR NAUSEA & VOMITING    potassium chloride ER (KLOR-CON M) 20 MEQ CR tablet 1 tablet, Oral, DAILY    QUEtiapine (SEROQUEL) 25 mg, 4 TIMES DAILY    rosuvastatin (CRESTOR) 10 mg, Oral, DAILY    sertraline (ZOLOFT) 50 mg, Oral, DAILY, Takes with 100mg tablet for a total dose of 150mg daily    sertraline (ZOLOFT) 100 mg, Oral, DAILY, Takes with 50mg tablet for a total dose of 150mg daily    trolamine salicylate (ASPERCREME) 10 % cream Topical, PRN, To affected areas     vitamin D3 (CHOLECALCIFEROL) 50 mcg (2000 units) tablet 1 tablet, DAILY       CURRENT MEDS:    Current Facility-Administered Medications:     amiodarone (PACERONE) tablet 200 mg, 200 mg, Oral, BID, Noel Nash MD, 200 mg at 05/04/25 0845    aspirin (ASA) chewable tablet 81 mg, 81 mg, Oral, Daily, Dannielle Gunn DO, 81 mg at 05/04/25 0845    calcium carbonate (TUMS) chewable tablet 1,000 mg, 1,000 mg, Oral, 4x Daily PRN, Dannielle Gunn DO    carboxymethylcellulose PF (REFRESH PLUS) 0.5 % ophthalmic solution 1 drop, 1 drop, Both Eyes, Q1H PRN, Denise Sunshine PA-C    cefTRIAXone (ROCEPHIN) 2 g vial to attach to  ml bag for ADULTS or NS 50 ml bag for PEDS, 2 g, Intravenous, Q24H, Denise Sunshine PA-C, 2 g at 05/04/25 1608    Continuing beta blocker from home medication list OR beta blocker order already placed during this visit, , Does not apply, DOES NOT GO TO Velia BRAGG Alison E, DO    Continuing statin from home medication list OR statin order already placed during this visit, , Does not apply, DOES NOT GO TO Velia BRAGG Alison E, DO    glucose gel 15-30 g, 15-30 g, Oral, Q15 Min PRN **OR** dextrose 50 % injection 25-50 mL, 25-50 mL, Intravenous, Q15 Min PRN **OR** glucagon injection 1 mg, 1 mg, Subcutaneous, Q15 Min PRN,  Emmie Gillis MD    furosemide (LASIX) injection 20 mg, 20 mg, Intravenous, Q8H, Noel Nash MD, 20 mg at 05/04/25 1608    gabapentin (NEURONTIN) capsule 200 mg, 200 mg, Oral, At Bedtime, Velia Dannielle E, DO, 200 mg at 05/03/25 2007    heparin 25,000 units in 0.45% NaCl 250 mL ANTICOAGULANT infusion, 0-5,000 Units/hr, Intravenous, Continuous, Noel Nash MD, Last Rate: 15 mL/hr at 05/04/25 1642, 1,500 Units/hr at 05/04/25 1642    lactase (LACTAID) tablet 9,000 Units, 9,000 Units, Oral, TID w/meals, Velia Dannielle E, DO, 9,000 Units at 05/04/25 1750    levETIRAcetam (KEPPRA) tablet 500 mg, 500 mg, Oral, BID, Velia Dannielle E, DO, 500 mg at 05/04/25 0845    levothyroxine (SYNTHROID/LEVOTHROID) tablet 75 mcg, 75 mcg, Oral, QAM AC, Radhaer, Dannielle E, DO, 75 mcg at 05/04/25 0757    lidocaine (LMX4) cream, , Topical, Q1H PRN, Denise Sunshine PA-C    lidocaine (LMX4) cream, , Topical, Q1H PRN, Radhaer Dannielle E, DO    lidocaine 1 % 0.1-1 mL, 0.1-1 mL, Other, Q1H PRN, Denise Sunshine PA-C    lidocaine 1 % 0.1-1 mL, 0.1-1 mL, Other, Q1H PRN, Scharber, Dannielle E, DO    loperamide (IMODIUM) capsule 2 mg, 2 mg, Oral, 4x Daily PRN, Maddi Brenner APRN CNP, 2 mg at 05/02/25 2057    LORazepam (ATIVAN) tablet 0.5 mg, 0.5 mg, Oral, TID PRN, Scharber, Dannielle E, DO    metoprolol (LOPRESSOR) injection 5 mg, 5 mg, Intravenous, Q15 Min PRN, Willie Barboza MD, 5 mg at 05/04/25 0421    [Held by provider] metoprolol tartrate (LOPRESSOR) tablet 75 mg, 75 mg, Oral, BID, Dannielle Gunn DO    mirtazapine (REMERON) tablet 30 mg, 30 mg, Oral, At Bedtime, Dannielle Gunn DO, 30 mg at 05/03/25 2008    nitroGLYcerin (NITROSTAT) sublingual tablet 0.4 mg, 0.4 mg, Sublingual, Q5 Min PRN, Dannielle Gunn DO    No lozenges or gum should be given while patient on BIPAP/AVAPS/AVAPS AE, , Does not apply, Continuous PRN, Denise Sunshine PA-C    pantoprazole (PROTONIX) EC tablet 40 mg, 40 mg, Oral, QAM,  "Dannielle Gunn DO, 40 mg at 05/04/25 0845    Patient is already receiving anticoagulation with heparin, enoxaparin (LOVENOX), warfarin (COUMADIN)  or other anticoagulant medication, , Does not apply, Continuous PRN, Dannielle Gunn DO    Patient may continue current oral medications, , Does not apply, Continuous PRN, Maddi Brenner, ANALI CNP    [Held by provider] QUEtiapine (SEROquel) tablet 25 mg, 25 mg, Oral, 4x Daily, Dannielle Gunn, DO, 25 mg at 04/30/25 0748    Reason ACE/ARB/ARNI order not selected, , Other, DOES NOT GO TO MARVelia Alison E, DO    senna-docusate (SENOKOT-S/PERICOLACE) 8.6-50 MG per tablet 1 tablet, 1 tablet, Oral, BID PRN **OR** senna-docusate (SENOKOT-S/PERICOLACE) 8.6-50 MG per tablet 2 tablet, 2 tablet, Oral, BID PRN, Dannielle Gunn DO    sertraline (ZOLOFT) tablet 150 mg, 150 mg, Oral, Daily, Denise Sunshine PA-C, 150 mg at 05/04/25 0845    sodium chloride (PF) 0.9% PF flush 3 mL, 3 mL, Intracatheter, Q8H ABHI, Denise Sunshine PA-C, 3 mL at 05/04/25 1608    sodium chloride (PF) 0.9% PF flush 3 mL, 3 mL, Intracatheter, q1 min prn, Denise Sunshine PA-C    sodium chloride (PF) 0.9% PF flush 3 mL, 3 mL, Intracatheter, q1 min prn, Dannielle Gunn DO, 3 mL at 05/03/25 1728    Objective     Vital signs:  Temp: 99.9  F (37.7  C) Temp src: Bladder BP: 122/47 Pulse: 66   Resp: 19 SpO2: 97 % O2 Device: Nasal cannula Oxygen Delivery: 2 LPM Height: 165.1 cm (5' 5\") Weight: 83.6 kg (184 lb 6.4 oz)  Estimated body mass index is 30.69 kg/m  as calculated from the following:    Height as of this encounter: 1.651 m (5' 5\").    Weight as of this encounter: 83.6 kg (184 lb 6.4 oz).       PHYSICAL EXAMINATION   General: Alert and oriented.   HEENT: Normocephalic. Extraocular motion intact. Moist mucous membranes.  CV: regular rhythm. Normocardic. 1/6 systolic murmur. Extremities are warm and well-perfused. Distal pulses palpable. No clubbing, cyanosis, " or edema.  Lungs: faint crackles.  Psych:: cooperative, responsive to verbal cues.    DIAGNOSTICS  I have reviewed the patient's current laboratory, imaging, and other diagnostic studies.    Labs:  Most Recent 3 CBC's:  Recent Labs   Lab Test 05/04/25  0618 05/03/25  0525 05/02/25  0513   WBC 6.9 7.6 7.3   HGB 12.2 11.2* 11.2*   MCV 93 94 92    183 165     Most Recent 3  BMP's:  Recent Labs   Lab Test 05/04/25  1621 05/04/25  1300 05/04/25  0924 05/04/25  0618 05/03/25  0803 05/03/25  0525 05/02/25  2343 05/02/25  1846   NA  --   --   --  142  --  148*  --  146*   POTASSIUM  --   --   --  3.5  --  4.1  --  4.1   CHLORIDE  --   --   --  99  --  107  --  103   CO2  --   --   --  31*  --  31*  --  31*   BUN  --   --   --  42.5*  --  42.9*  --  45.4*   CR  --   --   --  1.94*  --  2.25*  --  2.41*   ANIONGAP  --   --   --  12  --  10  --  12   HECTOR  --   --   --  9.3  --  9.0  --  9.3   * 105* 102* 100*   < > 94   < > 96    < > = values in this interval not displayed.     Most Recent 3 BNP's:  Recent Labs   Lab Test 04/29/25  1348   NTBNPI 57,315*      Most Recent Cholesterol Panel:  Recent Labs   Lab Test 11/22/23  1010 03/15/23  1555 02/28/22  0841   CHOL  --   --  113   LDL  --   --  31   HDL  --   --  31*   TRIG 256*   < > 254*    < > = values in this interval not displayed.       Assessment & Plan     Septic shock in the setting of UTI, resolved  Non-STEMI versus stress cardiomyopathy.   1st TTE this admission shows LVEF of 45 to 50% with mid lateral and mid anterior wall hypokinesia, improved to 55%-60% on 5/3/2025.    Known history of CAD  S/p Ismael to distal LMCA into LAD and diagonal  Acute decompensated congestive heart failure in the setting of stress-induced CM vs NSTEMI Type I  Newly diagnosed paroxysmal atrial fibrillation-this happened in the context of patient having septic shock and on pressors including dobutamine. CHADS-VASc score of 4.   Elevated LFTs.  Statin on hold.  Acute renal  failure, improving     Patient continues to improve clinically.  She was transferred out of the ICU under the hospitalist's care.  She remains afebrile, hemodynamically stable, requiring 2 L nasal cannula oxygen supplementation.  She has been diuresing very well.  Her admission weight was 102 kg and she is 83.6 kg today.  Since midnight, the patient has urinated 3.2 L and she is net -1.7 L.  Her creatinine continues to improve downtrending to 1.94 and BUN is stable at 43.  Her magnesium is low at 1.6.  Her AST is down to 196 from 225 and her ALT is at 171 from 161.    Given her clinical improvement, improvement in her LVEF and her tenuous kidney function, I think it may be reasonable to delay invasive coronary angiography. One approach may be to pursue stress testing when the patient is more stable in place of pursuing invasive coronary angiography.     - Continue ASA 81 mg once daily  - Continue heparin gtt; switch to DOAC tomorrow  - Continue to hold statin until LFTs < 100  - Give lasix 20 mg IV twice daily tomorrow and reassess need on a daily basis  - Concur with amiodarone for now; however, given tenuous liver function, please, continue to trend LFTs  - Resume PTA metoprolol tartrate at 12.5 mg twice daily starting tonight then uptitrate to 75 mg twice daily (PTA dose)    Thank you for including us in the care of Amy Bryan. We will continue to follow.    Vish Price MD  Cardiovascular Disease  05/04/25

## 2025-05-04 NOTE — PLAN OF CARE
"Neuro: A&Ox4, PERRL, COLIN, intact.    CV: SR, 50-60s most of night, when not in afib RVR. PRN metoprolol needed x3 this shift for afib RVR.    Resp: Lung sounds diminished bases. NC 2L.    GI: Bowel sounds normoactive, last BM 5/2. Diet regular.    : Chance in place, adequate UOP.    Skin: CVC dressing noted to be reddened around edges, near chest abrasion, when changing the dressing, it was noted to be tender, pink where the dressing had been. MD Gómez brought to bedside, and agreed with removal.    Activity: Ax1 w/ gait belt.    Other: Heparin infusing. Plan to hopefully transfer out of ICU in AM.    Problem: Adult Inpatient Plan of Care  Goal: Plan of Care Review  Description: The Plan of Care Review/Shift note should be completed every shift.  The Outcome Evaluation is a brief statement about your assessment that the patient is improving, declining, or no change.  This information will be displayed automatically on your shiftnote.  Outcome: Progressing  Goal: Patient-Specific Goal (Individualized)  Description: You can add care plan individualizations to a care plan. Examples of Individualization might be:  \"Parent requests to be called daily at 9am for status\", \"I have a hard time hearing out of my right ear\", or \"Do not touch me to wake me up as it startlesme\".  Outcome: Progressing  Goal: Absence of Hospital-Acquired Illness or Injury  Outcome: Progressing  Intervention: Identify and Manage Fall Risk  Recent Flowsheet Documentation  Taken 5/4/2025 0000 by Jared Murillo RN  Safety Promotion/Fall Prevention:   activity supervised   assistive device/personal items within reach   clutter free environment maintained   lighting adjusted   nonskid shoes/slippers when out of bed   room door open   room near nurse's station   supervised activity   treat reversible contributory factors   treat underlying cause  Taken 5/3/2025 2000 by Jared Murillo, RN  Safety Promotion/Fall Prevention:   activity supervised   " assistive device/personal items within reach   clutter free environment maintained   lighting adjusted   nonskid shoes/slippers when out of bed   room door open   room near nurse's station   supervised activity   treat reversible contributory factors   treat underlying cause  Intervention: Prevent Skin Injury  Recent Flowsheet Documentation  Taken 5/4/2025 0000 by Jared Murillo RN  Body Position:   turned   heels elevated  Taken 5/3/2025 2200 by Jared Murillo RN  Body Position:   turned   heels elevated  Taken 5/3/2025 2000 by Jared Murillo RN  Body Position:   turned   heels elevated  Intervention: Prevent and Manage VTE (Venous Thromboembolism) Risk  Recent Flowsheet Documentation  Taken 5/4/2025 0000 by Jared Murillo RN  VTE Prevention/Management: SCDs on (sequential compression devices)  Taken 5/3/2025 2000 by Jared Murillo RN  VTE Prevention/Management: SCDs on (sequential compression devices)  Goal: Optimal Comfort and Wellbeing  Outcome: Progressing  Intervention: Provide Person-Centered Care  Recent Flowsheet Documentation  Taken 5/4/2025 0000 by Jared Murillo RN  Trust Relationship/Rapport:   care explained   choices provided   emotional support provided   empathic listening provided   questions answered   questions encouraged   reassurance provided   thoughts/feelings acknowledged  Taken 5/3/2025 2000 by Jared Murillo RN  Trust Relationship/Rapport:   care explained   choices provided   emotional support provided   empathic listening provided   questions answered   questions encouraged   reassurance provided   thoughts/feelings acknowledged  Goal: Readiness for Transition of Care  Outcome: Progressing     Problem: Delirium  Goal: Optimal Coping  Outcome: Progressing  Intervention: Optimize Psychosocial Adjustment to Delirium  Recent Flowsheet Documentation  Taken 5/4/2025 0000 by Jared Murillo RN  Supportive Measures:   active listening utilized   decision-making supported    positive reinforcement provided   problem-solving facilitated  Taken 5/3/2025 2000 by Jared Murillo RN  Supportive Measures:   active listening utilized   decision-making supported   positive reinforcement provided   problem-solving facilitated  Goal: Improved Behavioral Control  Outcome: Progressing  Intervention: Minimize Safety Risk  Recent Flowsheet Documentation  Taken 5/4/2025 0000 by Jared Murillo RN  Enhanced Safety Measures:   assistive devices when indicated   monitor patients coagulation values   review medications for side effects with activity   room near unit station  Trust Relationship/Rapport:   care explained   choices provided   emotional support provided   empathic listening provided   questions answered   questions encouraged   reassurance provided   thoughts/feelings acknowledged  Taken 5/3/2025 2000 by Jared Murillo RN  Enhanced Safety Measures:   assistive devices when indicated   monitor patients coagulation values   review medications for side effects with activity   room near unit station  Trust Relationship/Rapport:   care explained   choices provided   emotional support provided   empathic listening provided   questions answered   questions encouraged   reassurance provided   thoughts/feelings acknowledged  Goal: Improved Attention and Thought Clarity  Outcome: Progressing  Intervention: Maximize Cognitive Function  Recent Flowsheet Documentation  Taken 5/4/2025 0000 by Jared Murillo RN  Sensory Stimulation Regulation:   care clustered   lighting decreased   quiet environment promoted  Reorientation Measures:   calendar in view   clock in view  Taken 5/3/2025 2000 by Jared Murillo RN  Sensory Stimulation Regulation:   care clustered   lighting decreased   quiet environment promoted  Reorientation Measures:   calendar in view   clock in view  Goal: Improved Sleep  Outcome: Progressing  Intervention: Promote Sleep  Recent Flowsheet Documentation  Taken 5/4/2025 0000 by  Chidi, Jared E, RN  Sleep/Rest Enhancement:   awakenings minimized   natural light exposure provided   noise level reduced   regular sleep/rest pattern promoted   relaxation techniques promoted   room darkened   snack   medication  Taken 5/3/2025 2000 by Jared Murillo RN  Sleep/Rest Enhancement:   awakenings minimized   natural light exposure provided   noise level reduced   regular sleep/rest pattern promoted   relaxation techniques promoted   room darkened   medication     Problem: Risk for Delirium  Goal: Optimal Coping  Outcome: Progressing  Intervention: Optimize Psychosocial Adjustment to Delirium  Recent Flowsheet Documentation  Taken 5/4/2025 0000 by Jared Murillo RN  Supportive Measures:   active listening utilized   decision-making supported   positive reinforcement provided   problem-solving facilitated  Taken 5/3/2025 2000 by Jared Murillo RN  Supportive Measures:   active listening utilized   decision-making supported   positive reinforcement provided   problem-solving facilitated  Goal: Improved Behavioral Control  Outcome: Progressing  Intervention: Minimize Safety Risk  Recent Flowsheet Documentation  Taken 5/4/2025 0000 by Jared Murillo RN  Enhanced Safety Measures:   assistive devices when indicated   monitor patients coagulation values   review medications for side effects with activity   room near unit station  Trust Relationship/Rapport:   care explained   choices provided   emotional support provided   empathic listening provided   questions answered   questions encouraged   reassurance provided   thoughts/feelings acknowledged  Taken 5/3/2025 2000 by Jared Murillo RN  Enhanced Safety Measures:   assistive devices when indicated   monitor patients coagulation values   review medications for side effects with activity   room near unit station  Trust Relationship/Rapport:   care explained   choices provided   emotional support provided   empathic listening provided    questions answered   questions encouraged   reassurance provided   thoughts/feelings acknowledged  Goal: Improved Attention and Thought Clarity  Outcome: Progressing  Intervention: Maximize Cognitive Function  Recent Flowsheet Documentation  Taken 5/4/2025 0000 by Jared Murillo RN  Sensory Stimulation Regulation:   care clustered   lighting decreased   quiet environment promoted  Reorientation Measures:   calendar in view   clock in view  Taken 5/3/2025 2000 by Jared Murillo RN  Sensory Stimulation Regulation:   care clustered   lighting decreased   quiet environment promoted  Reorientation Measures:   calendar in view   clock in view  Goal: Improved Sleep  Outcome: Progressing  Intervention: Promote Sleep  Recent Flowsheet Documentation  Taken 5/4/2025 0000 by Jared Murillo RN  Sleep/Rest Enhancement:   awakenings minimized   natural light exposure provided   noise level reduced   regular sleep/rest pattern promoted   relaxation techniques promoted   room darkened   snack   medication  Taken 5/3/2025 2000 by Jared Murillo RN  Sleep/Rest Enhancement:   awakenings minimized   natural light exposure provided   noise level reduced   regular sleep/rest pattern promoted   relaxation techniques promoted   room darkened   medication

## 2025-05-05 ENCOUNTER — APPOINTMENT (OUTPATIENT)
Dept: OCCUPATIONAL THERAPY | Facility: CLINIC | Age: 70
DRG: 280 | End: 2025-05-05
Payer: COMMERCIAL

## 2025-05-05 LAB
% LINING CELLS, BODY FLUID: 2 %
ALBUMIN SERPL BCG-MCNC: 3 G/DL (ref 3.5–5.2)
ALP SERPL-CCNC: 198 U/L (ref 40–150)
ALT SERPL W P-5'-P-CCNC: 158 U/L (ref 0–50)
ANION GAP SERPL CALCULATED.3IONS-SCNC: 11 MMOL/L (ref 7–15)
APPEARANCE FLD: ABNORMAL
AST SERPL W P-5'-P-CCNC: ABNORMAL U/L
ATRIAL RATE - MUSE: 220 BPM
ATRIAL RATE - MUSE: 75 BPM
BACTERIA BLD CULT: NO GROWTH
BACTERIA BLD CULT: NO GROWTH
BACTERIA PLR CULT: NO GROWTH
BASE EXCESS BLDV CALC-SCNC: 10.2 MMOL/L (ref -3–3)
BASE EXCESS BLDV CALC-SCNC: 7.8 MMOL/L (ref -3–3)
BASE EXCESS BLDV CALC-SCNC: 9.4 MMOL/L (ref -3–3)
BILIRUB SERPL-MCNC: 0.6 MG/DL
BUN SERPL-MCNC: 39.2 MG/DL (ref 8–23)
CALCIUM SERPL-MCNC: 9.4 MG/DL (ref 8.8–10.4)
CHLORIDE SERPL-SCNC: 97 MMOL/L (ref 98–107)
COLOR FLD: ABNORMAL
CREAT SERPL-MCNC: 1.6 MG/DL (ref 0.51–0.95)
DIASTOLIC BLOOD PRESSURE - MUSE: NORMAL MMHG
DIASTOLIC BLOOD PRESSURE - MUSE: NORMAL MMHG
EGFRCR SERPLBLD CKD-EPI 2021: 35 ML/MIN/1.73M2
ERYTHROCYTE [DISTWIDTH] IN BLOOD BY AUTOMATED COUNT: 14.2 % (ref 10–15)
GLUCOSE SERPL-MCNC: 102 MG/DL (ref 70–99)
GRAM STAIN RESULT: NORMAL
GRAM STAIN RESULT: NORMAL
HCO3 BLDV-SCNC: 34 MMOL/L (ref 21–28)
HCO3 BLDV-SCNC: 37 MMOL/L (ref 21–28)
HCO3 BLDV-SCNC: 37 MMOL/L (ref 21–28)
HCO3 SERPL-SCNC: 32 MMOL/L (ref 22–29)
HCT VFR BLD AUTO: 37.8 % (ref 35–47)
HGB BLD-MCNC: 12.3 G/DL (ref 11.7–15.7)
INTERPRETATION ECG - MUSE: NORMAL
INTERPRETATION ECG - MUSE: NORMAL
LYMPHOCYTES NFR FLD MANUAL: 80 %
MAGNESIUM SERPL-MCNC: 1.7 MG/DL (ref 1.7–2.3)
MCH RBC QN AUTO: 29.2 PG (ref 26.5–33)
MCHC RBC AUTO-ENTMCNC: 32.5 G/DL (ref 31.5–36.5)
MCV RBC AUTO: 90 FL (ref 78–100)
MONOS+MACROS NFR FLD MANUAL: 10 %
NEUTS BAND NFR FLD MANUAL: 7 %
O2/TOTAL GAS SETTING VFR VENT: 1 %
O2/TOTAL GAS SETTING VFR VENT: 2 %
O2/TOTAL GAS SETTING VFR VENT: 28 %
OTHER CELLS FLD MANUAL: 1 %
OXYHGB MFR BLDV: 74 % (ref 70–75)
OXYHGB MFR BLDV: 88 % (ref 70–75)
OXYHGB MFR BLDV: 89 % (ref 70–75)
P AXIS - MUSE: 41 DEGREES
P AXIS - MUSE: NORMAL DEGREES
PATH REV: NORMAL
PCO2 BLDV: 53 MM HG (ref 40–50)
PCO2 BLDV: 57 MM HG (ref 40–50)
PCO2 BLDV: 60 MM HG (ref 40–50)
PH BLDV: 7.39 [PH] (ref 7.32–7.43)
PH BLDV: 7.42 [PH] (ref 7.32–7.43)
PH BLDV: 7.42 [PH] (ref 7.32–7.43)
PHOSPHATE SERPL-MCNC: 4.9 MG/DL (ref 2.5–4.5)
PLATELET # BLD AUTO: 198 10E3/UL (ref 150–450)
PO2 BLDV: 47 MM HG (ref 25–47)
PO2 BLDV: 60 MM HG (ref 25–47)
PO2 BLDV: 60 MM HG (ref 25–47)
POTASSIUM SERPL-SCNC: 4.1 MMOL/L (ref 3.4–5.3)
PR INTERVAL - MUSE: 160 MS
PR INTERVAL - MUSE: NORMAL MS
PROT SERPL-MCNC: 6 G/DL (ref 6.4–8.3)
QRS DURATION - MUSE: 92 MS
QRS DURATION - MUSE: 94 MS
QT - MUSE: 276 MS
QT - MUSE: 560 MS
QTC - MUSE: 412 MS
QTC - MUSE: 625 MS
R AXIS - MUSE: -67 DEGREES
R AXIS - MUSE: -75 DEGREES
RBC # BLD AUTO: 4.21 10E6/UL (ref 3.8–5.2)
SAO2 % BLDV: 76 % (ref 70–75)
SAO2 % BLDV: 91.1 % (ref 70–75)
SAO2 % BLDV: 91.3 % (ref 70–75)
SODIUM SERPL-SCNC: 140 MMOL/L (ref 135–145)
SPECIMEN SOURCE FLD: ABNORMAL
SYSTOLIC BLOOD PRESSURE - MUSE: NORMAL MMHG
SYSTOLIC BLOOD PRESSURE - MUSE: NORMAL MMHG
T AXIS - MUSE: -82 DEGREES
T AXIS - MUSE: 15 DEGREES
UFH PPP CHRO-ACNC: 0.24 IU/ML
UFH PPP CHRO-ACNC: 0.48 IU/ML
VENTRICULAR RATE- MUSE: 134 BPM
VENTRICULAR RATE- MUSE: 75 BPM
WBC # BLD AUTO: 6.9 10E3/UL (ref 4–11)
WBC # FLD AUTO: 991 /UL

## 2025-05-05 PROCEDURE — 84100 ASSAY OF PHOSPHORUS: CPT | Performed by: PHYSICIAN ASSISTANT

## 2025-05-05 PROCEDURE — 210N000001 HC R&B IMCU HEART CARE

## 2025-05-05 PROCEDURE — 93005 ELECTROCARDIOGRAM TRACING: CPT

## 2025-05-05 PROCEDURE — 36415 COLL VENOUS BLD VENIPUNCTURE: CPT | Performed by: PHYSICIAN ASSISTANT

## 2025-05-05 PROCEDURE — 99233 SBSQ HOSP IP/OBS HIGH 50: CPT | Performed by: HOSPITALIST

## 2025-05-05 PROCEDURE — 83735 ASSAY OF MAGNESIUM: CPT | Performed by: PHYSICIAN ASSISTANT

## 2025-05-05 PROCEDURE — 85520 HEPARIN ASSAY: CPT | Performed by: STUDENT IN AN ORGANIZED HEALTH CARE EDUCATION/TRAINING PROGRAM

## 2025-05-05 PROCEDURE — 97535 SELF CARE MNGMENT TRAINING: CPT | Mod: GO

## 2025-05-05 PROCEDURE — 250N000011 HC RX IP 250 OP 636: Performed by: PHYSICIAN ASSISTANT

## 2025-05-05 PROCEDURE — 85520 HEPARIN ASSAY: CPT | Performed by: PHYSICIAN ASSISTANT

## 2025-05-05 PROCEDURE — 250N000013 HC RX MED GY IP 250 OP 250 PS 637: Performed by: INTERNAL MEDICINE

## 2025-05-05 PROCEDURE — 82565 ASSAY OF CREATININE: CPT | Performed by: PHYSICIAN ASSISTANT

## 2025-05-05 PROCEDURE — 82805 BLOOD GASES W/O2 SATURATION: CPT | Performed by: PHYSICIAN ASSISTANT

## 2025-05-05 PROCEDURE — 85014 HEMATOCRIT: CPT | Performed by: PHYSICIAN ASSISTANT

## 2025-05-05 PROCEDURE — 36415 COLL VENOUS BLD VENIPUNCTURE: CPT | Performed by: STUDENT IN AN ORGANIZED HEALTH CARE EDUCATION/TRAINING PROGRAM

## 2025-05-05 PROCEDURE — 99232 SBSQ HOSP IP/OBS MODERATE 35: CPT | Mod: FS | Performed by: PHYSICIAN ASSISTANT

## 2025-05-05 PROCEDURE — 97530 THERAPEUTIC ACTIVITIES: CPT | Mod: GO

## 2025-05-05 PROCEDURE — 250N000011 HC RX IP 250 OP 636: Performed by: INTERNAL MEDICINE

## 2025-05-05 PROCEDURE — 250N000013 HC RX MED GY IP 250 OP 250 PS 637: Performed by: PHYSICIAN ASSISTANT

## 2025-05-05 RX ORDER — AMIODARONE HYDROCHLORIDE 200 MG/1
200 TABLET ORAL 2 TIMES DAILY
Status: DISCONTINUED | OUTPATIENT
Start: 2025-05-05 | End: 2025-05-07 | Stop reason: HOSPADM

## 2025-05-05 RX ORDER — METOPROLOL TARTRATE 25 MG/1
25 TABLET, FILM COATED ORAL 2 TIMES DAILY
Status: DISCONTINUED | OUTPATIENT
Start: 2025-05-05 | End: 2025-05-07 | Stop reason: HOSPADM

## 2025-05-05 RX ORDER — FUROSEMIDE 40 MG/1
40 TABLET ORAL DAILY
Status: DISCONTINUED | OUTPATIENT
Start: 2025-05-06 | End: 2025-05-07 | Stop reason: HOSPADM

## 2025-05-05 RX ADMIN — SERTRALINE HYDROCHLORIDE 150 MG: 100 TABLET ORAL at 08:59

## 2025-05-05 RX ADMIN — LACTASE TAB 3000 UNIT 9000 UNITS: 3000 TAB at 17:52

## 2025-05-05 RX ADMIN — HEPARIN SODIUM 1650 UNITS/HR: 10000 INJECTION, SOLUTION INTRAVENOUS at 10:06

## 2025-05-05 RX ADMIN — FUROSEMIDE 20 MG: 10 INJECTION, SOLUTION INTRAMUSCULAR; INTRAVENOUS at 03:47

## 2025-05-05 RX ADMIN — LEVOTHYROXINE SODIUM 75 MCG: 75 TABLET ORAL at 06:36

## 2025-05-05 RX ADMIN — LEVETIRACETAM 500 MG: 500 TABLET, FILM COATED ORAL at 21:11

## 2025-05-05 RX ADMIN — CEFTRIAXONE SODIUM 2 G: 2 INJECTION, POWDER, FOR SOLUTION INTRAMUSCULAR; INTRAVENOUS at 16:19

## 2025-05-05 RX ADMIN — AMIODARONE HYDROCHLORIDE 200 MG: 200 TABLET ORAL at 21:11

## 2025-05-05 RX ADMIN — ASPIRIN 81 MG CHEWABLE TABLET 81 MG: 81 TABLET CHEWABLE at 09:00

## 2025-05-05 RX ADMIN — AMIODARONE HYDROCHLORIDE 200 MG: 200 TABLET ORAL at 08:59

## 2025-05-05 RX ADMIN — PANTOPRAZOLE SODIUM 40 MG: 40 TABLET, DELAYED RELEASE ORAL at 08:59

## 2025-05-05 RX ADMIN — METOPROLOL TARTRATE 25 MG: 25 TABLET, FILM COATED ORAL at 21:11

## 2025-05-05 RX ADMIN — MIRTAZAPINE 30 MG: 30 TABLET, FILM COATED ORAL at 21:11

## 2025-05-05 RX ADMIN — LEVETIRACETAM 500 MG: 500 TABLET, FILM COATED ORAL at 08:59

## 2025-05-05 RX ADMIN — LACTASE TAB 3000 UNIT 9000 UNITS: 3000 TAB at 11:47

## 2025-05-05 RX ADMIN — METOPROLOL TARTRATE 12.5 MG: 25 TABLET, FILM COATED ORAL at 08:59

## 2025-05-05 RX ADMIN — LACTASE TAB 3000 UNIT 9000 UNITS: 3000 TAB at 09:00

## 2025-05-05 RX ADMIN — FUROSEMIDE 20 MG: 10 INJECTION, SOLUTION INTRAMUSCULAR; INTRAVENOUS at 16:19

## 2025-05-05 RX ADMIN — GABAPENTIN 200 MG: 100 CAPSULE ORAL at 21:11

## 2025-05-05 ASSESSMENT — ACTIVITIES OF DAILY LIVING (ADL)
ADLS_ACUITY_SCORE: 72
ADLS_ACUITY_SCORE: 69
ADLS_ACUITY_SCORE: 75
ADLS_ACUITY_SCORE: 72
ADLS_ACUITY_SCORE: 71
ADLS_ACUITY_SCORE: 69
ADLS_ACUITY_SCORE: 73
ADLS_ACUITY_SCORE: 72
ADLS_ACUITY_SCORE: 69
ADLS_ACUITY_SCORE: 75
ADLS_ACUITY_SCORE: 69
ADLS_ACUITY_SCORE: 72
ADLS_ACUITY_SCORE: 73
ADLS_ACUITY_SCORE: 73
ADLS_ACUITY_SCORE: 69
ADLS_ACUITY_SCORE: 71
ADLS_ACUITY_SCORE: 73
ADLS_ACUITY_SCORE: 72
ADLS_ACUITY_SCORE: 69
ADLS_ACUITY_SCORE: 71
ADLS_ACUITY_SCORE: 69

## 2025-05-05 NOTE — PROGRESS NOTES
[facility] Cardiology Progress Note    Date of Admission: 4/29/2025  Service Date: 05/05/2025     Hospital Summary:  Ms. Amy Bryan is a very pleasant 69 year old female with a past medical history of TBI in 2012, CAD s/p PCI in 2018 and 2021, hx of takotsubo admitted on 4/29/2025 with fatigue and positive troponin w/ EF 45%. While in the ED, hypoxic to the 80's on room air and BP notably soft into the 90's. Lab revealed Cr of 3.23, K of 5.7. Admitted to the ICU given need for pressors for non-fluid responsive hypotension. Bilateral pleural effusions, elevated troponin's and EKG changes 2/2 stress cardiomyopathy vs. NSTEMI, likely urosepsis and elevated LFT's. Central and arterial line's placed and she underwent thoracentesis on 5/4/25.    Repeat echo performed 5/3/25 showed improved EF to 55-60% with mid-lateral/inferolateral wall hypokinesis. Noted to have new onset atrial fibrillation in the setting of septic shock on pressors. Started on heparin gtt for AC, and amiodarone 200 mg BID and metoprolol tartrate 12.5 daily for rhythm and rate control, respectively. CHADS-VASc score of 4.      Interval History:  HR 60-70s. -130s. Net +450 .  186 lb today, down 40 pounds from admission and up 14 pounds from 5/2/25. K 4.1. Cr 1.6, Trending down. Mg 1.7.     Patient reports she is feeling much better today. Cr down significantly from admission, now at 1.6 from highest of 3.69 on 4/30/2025. She denies chest pain, dyspnea, palpitations, orthopnea or LE edema. Presence of P-waves on telemetry. Patient currently on heparin gtt for AC. Notes that she has a history of falls, denies history of GI bleed.     Detailed Assessment:  Stress cardiomyopathy vs. NSTEMI, TTE 5/3/2025 showing improved EF from 45% to 55-60%  1st TTE this admission shows LVEF of 45 to 50% with mid lateral and mid anterior wall hypokinesia, improved to 55%-60% on 5/3/2025.   Last troponin (4/29/25) showed  improvement 994 --> 692.  Patient denies persistent chest pain/dyspnea today.  Per fellow note, stress imaging to assess EF may be more appropriate given improved patient condition following recent sepsis.  Septic shock likely 2/2 UTI, resolved  Following with intensivist/nephrology.  Paroxysmal atrial fibrillation in setting of septic shock   Noted on EKG obtained 5/1/25.  Started on heparin gtt for AC as well as amiodarone 200 mg BID and Lopressor 12.5 mg daily.  Hx of PAF w/ prior history of PCI in 2018, but no other history of Afib previously.  On telemetry presently, showing possible return of P-waves. Standing orders for EKG.  GIOVANNI, improving  Cr down significantly from admission, now at 1.6 from highest of 3.69 on 4/30/2025.  Dyslipidemia  PTA Crestor on hold given elevated LFT's ( today).  Hx of CAD w/ thrombectomy to the PL 3 branch and POBA to the D1 in 2018, drug-eluting stent x 3 the left main into the proximal LAD, drug-eluting stent x 1 to the D2 with ongoing small 80% D1 lesion, 70% circumflex lesion and minimal disease in the RCA in 2021     Plan:  Repeat EKG today  Monitor for resolution of Afib.  Will discuss with continuing amiodarone and Lopressor after discharge with Dr. Wren.  Continue ASA 81 mg  Continue heparin gtt for AC for now  CHADS-VASc score of 4, will need to assess risks and benefits for AC after discharge given history of PAF and increased fall-risk.  Continue amiodarone.  Trend LFT's.  Statin on hold  Per fellow note, resume when ALT/AST <100.  Continue Lopressor. Titrate up to PTA dose of 75 mg daily.  PRN lasix per nephrology  Will discuss need for stress imaging with Dr. Wren as EF has improved to 55-60% on recent echo.      Thank you for the opportunity to participate in this pleasant patient's care.     Howard Dobson PA-C   [facility]  Securely message via [employer] (8am - 5pm, M-F)      Objective:    Vitals: /61   Pulse 64   Temp 98  F (36.7  " C) (Oral)   Resp 16   Ht 1.651 m (5' 5\")   Wt 84.7 kg (186 lb 11.2 oz)   SpO2 93%   BMI 31.07 kg/m    Wt Readings from Last 4 Encounters:   25 84.7 kg (186 lb 11.2 oz)   03/15/23 102.2 kg (225 lb 4.8 oz)   22 97.3 kg (214 lb 8 oz)   21 91.6 kg (202 lb)     I/O last 3 completed shifts:  In: 1890 [P.O.:1710; I.V.:180]  Out: 3070 [Urine:3070]    Physical Exam:  General: Awake and alert. No acute distress.  Skin: Warm and dry. Appropriate color. No lesions or rashes noted.  HEENT: Normocephalic and atraumatic. EOM intact. PERRL. Trachea midline. Right-sided JVD: none.  Cardiac: Normal S1 and S2, no murmurs, rubs, or gallops noted. Regular rate and rhythm .  Lung: Regular work of breathing without accessory muscle use. Clear to auscultation bilaterally.  Abdomen: No distension.  Extremities: Bilateral lower extremity edema: none.  Neuro: A & O. No focal deficits noted. Moves all extremities appropriately.      Imaging:  Recent Results (from the past 48 hours)   Echocardiogram Complete   Result Value    LVEF  55-60%    Narrative    [id number]  [id number]  [id number]^MONROY^HEM     [facility]  Echocardiography Laboratory  [address] [place] [address]     Name: ANGELA HENRY  MRN: [id number]  : 1955  Study Date: 2025 10:40 AM  Age: 69 yrs  Gender: Female  Patient Location: [facility]  Reason For Study: Cardiomyopathy  Ordering Physician: ANEESH MONROY  Performed By: Kannan Kohler     BSA: 1.8 m2  Height: 65 in  Weight: 163 lb  HR: 144  BP: 120/76 mmHg  ______________________________________________________________________________  Procedure  Echocardiogram with two-dimensional, color and spectral Doppler. Definity (NDC  #[id number]) given intravenously.  ______________________________________________________________________________  Interpretation Summary     Left ventricular systolic function is normal.  The visual ejection fraction is 55-60%.  MId lateral, " inferolateral wall is hypokinetic.  No hemodynamically significant valvular abnormalities on 2D or color flow  imaging.  ______________________________________________________________________________  Left Ventricle  The left ventricle is normal in size. There is mild concentric left  ventricular hypertrophy. Left ventricular systolic function is normal. The  visual ejection fraction is 55-60%. Diastolic function not assessed due to  atrial fibrillation. MId lateral, inferolateral wall is hypokinetic.     Right Ventricle  The right ventricle is not well visualized. The right ventricular systolic  function is borderline reduced.     Atria  The left atrium is not well visualized. The left atrium is borderline dilated.  Right atrium not well visualized. The atrial septum is aneurysmal.     Mitral Valve  The mitral valve is normal in structure and function. There is mild mitral  annular calcification. There is trace mitral regurgitation. There is no mitral  valve stenosis.     Tricuspid Valve  The tricuspid valve is not well visualized, but is grossly normal. Right  ventricular systolic pressure could not be approximated due to inadequate  tricuspid regurgitation.     Aortic Valve  The aortic valve is trileaflet. No aortic stenosis is present.     Pulmonic Valve  The pulmonic valve is not well visualized. Normal pulmonic valve velocity.     Vessels  Normal size aorta. The inferior vena cava is normal.     Pericardium  There is no pericardial effusion.     Rhythm  The rhythm was rapid atrial fibrillation.  ______________________________________________________________________________  MMode/2D Measurements & Calculations     IVSd: 1.2 cm  LVIDd: 4.2 cm  LVIDs: 2.9 cm  LVPWd: 1.2 cm  FS: 31.7 %  LV mass(C)d: 178.2 grams  LV mass(C)dI: 98.2 grams/m2  Ao root diam: 3.3 cm  asc Aorta Diam: 3.6 cm  LVOT diam: 2.3 cm  LVOT area: 4.1 cm2  Ao root diam index Ht(cm/m): 2.0  Ao root diam index BSA (cm/m2): 1.8  Asc Ao diam index  BSA (cm/m2): 2.0  Asc Ao diam index Ht(cm/m): 2.2  LA Volume (BP): 49.2 ml     LA Volume Index (BP): 27.2 ml/m2  RV Base: 3.9 cm  RWT: 0.57  TAPSE: 1.6 cm     Doppler Measurements & Calculations  MV E max jayce: 121.0 cm/sec  MV dec slope: 740.1 cm/sec2  MV dec time: 0.16 sec  PA acc time: 0.04 sec  E/E' av.3  Lateral E/e': 10.7  Medial E/e': 15.9  RV S Jayce: 11.9 cm/sec     ______________________________________________________________________________  Report approved by: Kelsey Malave MD on 2025 01:12 PM             Echo 5/3/25:  Interpretation Summary     Left ventricular systolic function is normal.  The visual ejection fraction is 55-60%.  MId lateral, inferolateral wall is hypokinetic.  No hemodynamically significant valvular abnormalities on 2D or color flow  imaging.      Data      Recent Labs   Lab 25  0618 25  0525   WBC 6.9 6.9 7.6   HGB 12.3 12.2 11.2*   HCT 37.8 39.4 36.2   MCV 90 93 94    170 183     Recent Labs   Lab 25  2245 25  1513   PHV 7.42 7.42 7.41   PO2V 60* 66* 71*   PCO2V 57* 55* 56*   HCO3V 37* 35* 35*     Recent Labs   Lab 2539 25  2142 25  1621 25  0924 25  0618 25  0803 25  0525     --   --   --  142  --  148*   POTASSIUM 4.1  --   --   --  3.5  --  4.1   CHLORIDE 97*  --   --   --  99  --  107   CO2 32*  --   --   --  31*  --  31*   ANIONGAP 11  --   --   --  12  --  10   * 120* 129*   < > 100*   < > 94   BUN 39.2*  --   --   --  42.5*  --  42.9*   CR 1.60*  --   --   --  1.94*  --  2.25*   GFRESTIMATED 35*  --   --   --  28*  --  23*   HECTOR 9.4  --   --   --  9.3  --  9.0    < > = values in this interval not displayed.     Recent Labs   Lab 25  0539 25  2142 25  1621 25  0924 25  0618 25  0803 25  0525 25  2343 25  1846     --   --   --  142  --  148*  --  146*   POTASSIUM 4.1  --   --   --  3.5   "--  4.1  --  4.1   CHLORIDE 97*  --   --   --  99  --  107  --  103   CO2 32*  --   --   --  31*  --  31*  --  31*   ANIONGAP 11  --   --   --  12  --  10  --  12   * 120* 129*   < > 100*   < > 94   < > 96   BUN 39.2*  --   --   --  42.5*  --  42.9*  --  45.4*   CR 1.60*  --   --   --  1.94*  --  2.25*  --  2.41*   GFRESTIMATED 35*  --   --   --  28*  --  23*  --  21*   HECTOR 9.4  --   --   --  9.3  --  9.0  --  9.3   MAG 1.7  --   --   --  1.6*  --  1.8  --  1.8   PHOS 4.9*  --   --   --  5.1*  --  3.4  --  3.2   PROTTOTAL 6.0*  --   --   --  5.7*  --  5.4*  --  6.2*   ALBUMIN 3.0*  --   --   --  3.1*  --  3.1*  --  3.3*   BILITOTAL 0.6  --   --   --  0.5  --  0.6  --  0.6   ALKPHOS 198*  --   --   --  199*  --  205*  --  242*   AST  --   --   --   --  196*  --  225*  --  289*   *  --   --   --  171*  --  161*  --  190*    < > = values in this interval not displayed.     Recent [employer]   Lab 05/05/25  0539 05/04/25  2142 05/04/25  1621 05/04/25  1300 05/04/25  0924   * 120* 129* 105* 102*     No results for input(s): \"TROPONIN\", \"TROPI\", \"TROPR\", \"TROPONINIS\" in the last 168 hours.    Invalid input(s): \"TROPT\", \"TROP\", \"TROPONINIES\", \"TNIH\"  Recent [employer]   Lab 04/30/25  0936   COLOR Yellow   APPEARANCE Cloudy*   URINEGLC Negative   URINEBILI Negative   URINEKETONE Negative   SG 1.016   UBLD Small*   URINEPH 5.0   PROTEIN 50*   NITRITE Negative   LEUKEST Large*   RBCU 2   WBCU 17*     Recent Labs   Lab 04/30/25  0936   URINEKETONE Negative     Recent Labs   Lab 05/05/25  0539 05/04/25  0618 05/03/25  0525 05/02/25  0513 05/01/25  0830 04/30/25  0934 04/29/25  2102 04/29/25  1348   WBC 6.9 6.9 7.6 7.3 10.6 8.3 12.0* 9.7          Past Medical History   Past Medical History:   Diagnosis Date    Cardiogenic shock (H) 07/09/2021    Coma (H) 05/2012    CHT after a fall    Coronary artery disease involving native coronary artery of native heart 07/09/2021    3 vessel    Developmental delay     DVT (deep vein " thrombosis) in pregnancy     post trauma    Fall 05/2012    multiple fracture    Hypertension     Menopause     age 52    Pelvic fracture (H) 5-201`2    Rib fracture 05/2012    STEMI (ST elevation myocardial infarction) (H) 01/25/2018       Past Surgical History   Past Surgical History:   Procedure Laterality Date    COLONOSCOPY  01/01/2010    CV CORONARY LITHOTRIPSY PCI N/A 8/3/2021    Procedure: CV Coronary Lithotripsy PCI;  Surgeon: Kamran Singleton MD;  Location:  HEART CARDIAC CATH LAB    CV HEART CATHETERIZATION WITH POSSIBLE INTERVENTION N/A 8/3/2021    Procedure: Heart Catheterization with Possible Intervention;  Surgeon: Kamran Singleton MD;  Location:  HEART CARDIAC CATH LAB    CV INTRAVASULAR ULTRASOUND N/A 8/3/2021    Procedure: Intravascular Ultrasound;  Surgeon: Kamran Singleton MD;  Location:  HEART CARDIAC CATH LAB    CV PCI STENT DRUG ELUTING N/A 8/3/2021    Procedure: Percutaneous Coronary Intervention Stent Drug Eluting;  Surgeon: Kamran Singleton MD;  Location:  HEART CARDIAC CATH LAB    HEART CATH DRUG ELUTING STENT PLACEMENT N/A 01/25/2018    LASER YAG CAPSULOTOMY Left 05/29/2018    Procedure: LASER YAG CAPSULOTOMY;  LEFT YAG LASER CAPSULOTOMY ;  Surgeon: Tabby Guillaume MD;  Location:  [facility]    LASER YAG CAPSULOTOMY Right 06/05/2018    Procedure: LASER YAG CAPSULOTOMY;  RIGHT EYE YAG LASER CAPSULOTOMY ;  Surgeon: Tabby Guillaume MD;  Location:  [facility]       Prior to Admission Medications   Prior to Admission Medications   Prescriptions Last Dose Informant Patient Reported? Taking?   Dextromethorphan-guaiFENesin  MG/5ML syrup  Nursing Home Yes Yes   Sig: Take 10 mLs by mouth every 4 hours as needed for cough.   Dihydroxyaluminum Sod Carb (ROLAIDS PO)  Nursing Home Yes Yes   Sig: Take 2 tablets by mouth 4 times daily as needed.   FIBER- MG tablet  Nursing Home Yes Yes   Sig: Take 1 tablet by mouth daily as needed.   LORazepam (ATIVAN) 0.5 MG  tablet  Nursing Home Yes Yes   Sig: Take 0.5 mg by mouth 3 times daily as needed for anxiety.   QUEtiapine (SEROQUEL) 25 MG tablet 4/29/2025 at 12:00 PM Nursing Home Yes Yes   Sig: Take 25 mg by mouth 4 times daily. At 0800, 1200, 1700, 2000   acetaminophen (TYLENOL) 500 MG tablet  Nursing Home Yes Yes   Sig: Take 1,000 mg by mouth 2 times daily as needed.   aspirin (ASA) 81 MG EC tablet 4/29/2025 at  8:00 AM Nursing Home No Yes   Sig: Take 1 tablet (81 mg) by mouth daily   clopidogrel (PLAVIX) 75 MG tablet 4/29/2025 at  8:00 AM Nursing Home No Yes   Sig: Take 1 tablet (75 mg) by mouth daily   emollient (VANICREAM) external cream  Nursing Home No Yes   Sig: APPLY FROM HEAD TO TOE DAILY AFTER SHOWERS *CALL TO REORDER*   furosemide (LASIX) 20 MG tablet 4/29/2025 at  8:00 AM Nursing Home Yes Yes   Sig: Take 20 mg by mouth daily   gabapentin (NEURONTIN) 100 MG tablet 4/29/2025 at 12:00 PM Nursing Home Yes Yes   Sig: Take 100 mg by mouth 2 times daily. Takes at 0800 and 1200   gabapentin (NEURONTIN) 100 MG tablet 4/28/2025 at  8:00 PM Nursing Home Yes Yes   Sig: Take 200 mg by mouth at bedtime.   lactase (LACTAID) 9000 units TABS tablet 4/29/2025 at 12:00 PM Nursing Home Yes Yes   Sig: Take 9,000 Units by mouth 3 times daily (with meals).   levETIRAcetam (KEPPRA) 500 MG tablet 4/29/2025 at  8:00 AM Nursing Home Yes Yes   Sig: Take 500 mg by mouth 2 times daily.   levothyroxine (SYNTHROID/LEVOTHROID) 75 MCG tablet 4/29/2025 at  8:00 AM Nursing Home Yes Yes   Sig: Take 75 mcg by mouth every morning (before breakfast).   lisinopril (ZESTRIL) 10 MG tablet 4/28/2025 at  8:00 PM Nursing Home No Yes   Sig: Take 0.5 tablets (5 mg) by mouth daily   loperamide (IMODIUM) 2 MG capsule  Nursing Home Yes Yes   Sig: Take 2 mg by mouth 4 times daily as needed for diarrhea   melatonin 3 MG tablet 4/28/2025 Bedtime Nursing Home Yes Yes   Sig: Take 1 tablet by mouth at bedtime.   metoprolol tartrate (LOPRESSOR) 25 MG tablet 4/29/2025 at   8:00 AM Nursing Home Yes Yes   Sig: Take 75 mg by mouth 2 times daily   metroNIDAZOLE (METROGEL) 0.75 % external gel  Nursing Home Yes Yes   Sig: Apply topically 2 times daily as needed.   mirtazapine (REMERON) 30 MG tablet 4/28/2025 at  8:00 PM Nursing Home Yes Yes   Sig: Take 30 mg by mouth At Bedtime   nitroGLYcerin (NITROSTAT) 0.4 MG sublingual tablet  Nursing Home No Yes   Sig: For chest pain place 1 tablet under the tongue every 5 minutes for 3 doses. If symptoms persist 5 minutes after 1st dose call 911.   nystatin (MYCOSTATIN) 600553 UNIT/GM external powder  Nursing Home Yes Yes   Sig: Apply topically 2 times daily as needed for dry skin.   omeprazole (PRILOSEC) 20 MG DR capsule 4/29/2025 at  8:00 AM Nursing Home Yes Yes   Sig: Take 20 mg by mouth every morning.   ondansetron (ZOFRAN-ODT) 4 MG ODT tab  Nursing Home Yes Yes   Sig: TAKE ONE TABLET BY MOUTH TWICE A DAY AS NEEDED FOR NAUSEA & VOMITING   potassium chloride ER (KLOR-CON M) 20 MEQ CR tablet 4/29/2025 at  8:00 AM Nursing Home Yes Yes   Sig: Take 1 tablet by mouth daily.   rosuvastatin (CRESTOR) 20 MG tablet 4/29/2025 at  8:00 AM Nursing Home No Yes   Sig: Take 0.5 tablets (10 mg) by mouth daily   sertraline (ZOLOFT) 100 MG tablet 4/29/2025 at  8:00 AM Nursing Home Yes Yes   Sig: Take 100 mg by mouth daily. Takes with 50mg tablet for a total dose of 150mg daily   sertraline (ZOLOFT) 50 MG tablet 4/29/2025 at  8:00 AM Nursing Home Yes Yes   Sig: Take 50 mg by mouth daily. Takes with 100mg tablet for a total dose of 150mg daily   trolamine salicylate (ASPERCREME) 10 % cream  Nursing Home Yes Yes   Sig: Apply topically as needed for moderate pain To affected areas   vitamin D3 (CHOLECALCIFEROL) 50 mcg (2000 units) tablet 4/29/2025 at  8:00 AM Nursing Home Yes Yes   Sig: Take 1 tablet by mouth daily.      Facility-Administered Medications: None     Current Facility-Administered Medications   Medication Dose Route Frequency Provider Last Rate Last Admin     amiodarone (PACERONE) tablet 200 mg  200 mg Oral BID Denise Sunshine PA-C   200 mg at 05/05/25 0859    aspirin (ASA) chewable tablet 81 mg  81 mg Oral Daily Denise Sunshine PA-C   81 mg at 05/05/25 0900    calcium carbonate (TUMS) chewable tablet 1,000 mg  1,000 mg Oral 4x Daily PRN Denise Sunshine PA-C        carboxymethylcellulose PF (REFRESH PLUS) 0.5 % ophthalmic solution 1 drop  1 drop Both Eyes Q1H PRN Denise Sunshine PA-C        cefTRIAXone (ROCEPHIN) 2 g vial to attach to  ml bag for ADULTS or NS 50 ml bag for PEDS  2 g Intravenous Q24H Denise Sunshine PA-C   2 g at 05/04/25 1608    Continuing beta blocker from home medication list OR beta blocker order already placed during this visit   Does not apply DOES NOT GO TO Denise Simpson PA-C        Continuing statin from home medication list OR statin order already placed during this visit   Does not apply DOES NOT GO TO Denise Simpson PA-C        glucose gel 15-30 g  15-30 g Oral Q15 Min PRN Denise Sunshine PA-C        Or    dextrose 50 % injection 25-50 mL  25-50 mL Intravenous Q15 Min PRN Denise Sunshine PA-C        Or    glucagon injection 1 mg  1 mg Subcutaneous Q15 Min PRN Denise Sunshine PA-C        furosemide (LASIX) injection 20 mg  20 mg Intravenous Q12H Vish Price MD   20 mg at 05/05/25 0347    gabapentin (NEURONTIN) capsule 200 mg  200 mg Oral At Bedtime Denise Sunshine PA-C   200 mg at 05/04/25 2123    heparin 25,000 units in 0.45% NaCl 250 mL ANTICOAGULANT infusion  0-5,000 Units/hr Intravenous Continuous Denise Sunshine PA-C 16.5 mL/hr at 05/05/25 1006 1,650 Units/hr at 05/05/25 1006    lactase (LACTAID) tablet 9,000 Units  9,000 Units Oral TID w/meals Denise Sunshine PA-C   9,000 Units at 05/05/25 0900    levETIRAcetam (KEPPRA) tablet 500 mg  500 mg Oral BID Denise Sunshine PA-C   500 mg at  05/05/25 0859    levothyroxine (SYNTHROID/LEVOTHROID) tablet 75 mcg  75 mcg Oral QAM AC Denise Sunshine PA-C   75 mcg at 05/05/25 0636    lidocaine (LMX4) cream   Topical Q1H PRN Denise Sunshine PA-C        lidocaine 1 % 0.1-1 mL  0.1-1 mL Other Q1H PRN Denise Sunshine PA-C        loperamide (IMODIUM) capsule 2 mg  2 mg Oral 4x Daily PRN Denise Sunshine PA-C   2 mg at 05/02/25 2057    LORazepam (ATIVAN) tablet 0.5 mg  0.5 mg Oral TID PRN Denise Sunshine PA-C        metoprolol (LOPRESSOR) injection 5 mg  5 mg Intravenous Q15 Min PRN Denise Sunshine PA-C   5 mg at 05/04/25 0421    metoprolol tartrate (LOPRESSOR) half-tab 12.5 mg  12.5 mg Oral or NG Tube BID Vish Price MD   12.5 mg at 05/05/25 0859    mirtazapine (REMERON) tablet 30 mg  30 mg Oral At Bedtime Denise Sunshine PA-C   30 mg at 05/04/25 2123    nitroGLYcerin (NITROSTAT) sublingual tablet 0.4 mg  0.4 mg Sublingual Q5 Min PRN Denise Sunshine PA-C        No lozenges or gum should be given while patient on BIPAP/AVAPS/AVAPS AE   Does not apply Continuous PRN Denise Sunshine PA-C        pantoprazole (PROTONIX) EC tablet 40 mg  40 mg Oral QAM Denise Sunshine PA-C   40 mg at 05/05/25 0859    Patient is already receiving anticoagulation with heparin, enoxaparin (LOVENOX), warfarin (COUMADIN)  or other anticoagulant medication   Does not apply Continuous PRN Denise Sunshine PA-C        Patient may continue current oral medications   Does not apply Continuous PRN Denise Sunshine PA-C        [Held by provider] QUEtiapine (SEROquel) tablet 25 mg  25 mg Oral 4x Daily Denise Sunshine PA-C   25 mg at 04/30/25 0748    Reason ACE/ARB/ARNI order not selected   Other DOES NOT GO TO Denise Simpson PA-C        senna-docusate (SENOKOT-S/PERICOLACE) 8.6-50 MG per tablet 1 tablet  1 tablet Oral BID PRN Denise Sunshine PA-C         Or    senna-docusate (SENOKOT-S/PERICOLACE) 8.6-50 MG per tablet 2 tablet  2 tablet Oral BID PRN Denise Sunshine PA-C        sertraline (ZOLOFT) tablet 150 mg  150 mg Oral Daily Denise Sunshine PA-C   150 mg at 05/05/25 0859    sodium chloride (PF) 0.9% PF flush 3 mL  3 mL Intracatheter Q8H ABHI Denise Sunshine PA-C   3 mL at 05/05/25 0901    sodium chloride (PF) 0.9% PF flush 3 mL  3 mL Intracatheter q1 min prn Denise Sunshine PA-C   3 mL at 05/05/25 0347     Current Facility-Administered Medications   Medication Dose Route Frequency Provider Last Rate Last Admin    amiodarone (PACERONE) tablet 200 mg  200 mg Oral BID Denise Sunshine PA-C   200 mg at 05/05/25 0859    aspirin (ASA) chewable tablet 81 mg  81 mg Oral Daily Denise Sunshine PA-C   81 mg at 05/05/25 0900    calcium carbonate (TUMS) chewable tablet 1,000 mg  1,000 mg Oral 4x Daily PRN Denise Sunshine PA-C        carboxymethylcellulose PF (REFRESH PLUS) 0.5 % ophthalmic solution 1 drop  1 drop Both Eyes Q1H PRN Denise Sunshine PA-C        cefTRIAXone (ROCEPHIN) 2 g vial to attach to  ml bag for ADULTS or NS 50 ml bag for PEDS  2 g Intravenous Q24H Denise Sunshine PA-C   2 g at 05/04/25 1608    Continuing beta blocker from home medication list OR beta blocker order already placed during this visit   Does not apply DOES NOT GO TO Denise Simpson PA-C        Continuing statin from home medication list OR statin order already placed during this visit   Does not apply DOES NOT GO TO Denise Simpson PA-C        glucose gel 15-30 g  15-30 g Oral Q15 Min PRN Denise Sunshine PA-C        Or    dextrose 50 % injection 25-50 mL  25-50 mL Intravenous Q15 Min PRN Sunshine, Denise Tigist, PA-C        Or    glucagon injection 1 mg  1 mg Subcutaneous Q15 Min PRN Denise Sunshine PA-C        furosemide (LASIX) injection 20 mg  20  mg Intravenous Q12H Vish Price MD   20 mg at 05/05/25 0347    gabapentin (NEURONTIN) capsule 200 mg  200 mg Oral At Bedtime Denise Sunshine PA-C   200 mg at 05/04/25 2123    heparin 25,000 units in 0.45% NaCl 250 mL ANTICOAGULANT infusion  0-5,000 Units/hr Intravenous Continuous Denise Sunshine PA-C 16.5 mL/hr at 05/05/25 1006 1,650 Units/hr at 05/05/25 1006    lactase (LACTAID) tablet 9,000 Units  9,000 Units Oral TID w/meals Denise Sunshine PA-C   9,000 Units at 05/05/25 0900    levETIRAcetam (KEPPRA) tablet 500 mg  500 mg Oral BID Denise Sunshine PA-C   500 mg at 05/05/25 0859    levothyroxine (SYNTHROID/LEVOTHROID) tablet 75 mcg  75 mcg Oral QAM AC Denise Sunshine PA-C   75 mcg at 05/05/25 0636    lidocaine (LMX4) cream   Topical Q1H PRN Denise Sunshine PA-C        lidocaine 1 % 0.1-1 mL  0.1-1 mL Other Q1H PRN Denise Sunshine PA-C        loperamide (IMODIUM) capsule 2 mg  2 mg Oral 4x Daily PRN Denise Sunshine PA-C   2 mg at 05/02/25 2057    LORazepam (ATIVAN) tablet 0.5 mg  0.5 mg Oral TID PRN Denise Sunshine PA-C        metoprolol (LOPRESSOR) injection 5 mg  5 mg Intravenous Q15 Min PRN Denise Sunshine PA-C   5 mg at 05/04/25 0421    metoprolol tartrate (LOPRESSOR) half-tab 12.5 mg  12.5 mg Oral or NG Tube BID Vish Price MD   12.5 mg at 05/05/25 0859    mirtazapine (REMERON) tablet 30 mg  30 mg Oral At Bedtime Denise Sunshine PA-C   30 mg at 05/04/25 2123    nitroGLYcerin (NITROSTAT) sublingual tablet 0.4 mg  0.4 mg Sublingual Q5 Min PRN Denise Sunshine PA-C        No lozenges or gum should be given while patient on BIPAP/AVAPS/AVAPS AE   Does not apply Continuous PRN Denise Sunshine PA-C        pantoprazole (PROTONIX) EC tablet 40 mg  40 mg Oral QAM Denise Sunshine PA-C   40 mg at 05/05/25 0863    Patient is already receiving anticoagulation with heparin, enoxaparin  (LOVENOX), warfarin (COUMADIN)  or other anticoagulant medication   Does not apply Continuous PRN Denise Sunshine PA-C        Patient may continue current oral medications   Does not apply Continuous PRN Denise Sunshine PA-C        [Held by provider] QUEtiapine (SEROquel) tablet 25 mg  25 mg Oral 4x Daily Denise Sunshine PA-C   25 mg at 04/30/25 0748    Reason ACE/ARB/ARNI order not selected   Other DOES NOT GO TO Denise Simpson PA-C        senna-docusate (SENOKOT-S/PERICOLACE) 8.6-50 MG per tablet 1 tablet  1 tablet Oral BID PRN Denise Sunshine PA-C        Or    senna-docusate (SENOKOT-S/PERICOLACE) 8.6-50 MG per tablet 2 tablet  2 tablet Oral BID PRN Denise Sunshine PA-C        sertraline (ZOLOFT) tablet 150 mg  150 mg Oral Daily Denise Sunshine PA-C   150 mg at 05/05/25 0859    sodium chloride (PF) 0.9% PF flush 3 mL  3 mL Intracatheter Q8H ABHI Denise Sunshine PA-C   3 mL at 05/05/25 0901    sodium chloride (PF) 0.9% PF flush 3 mL  3 mL Intracatheter q1 min prn Denise Sunshine PA-C   3 mL at 05/05/25 0347     Allergies   Allergies   Allergen Reactions    Penicillins      dizziness       Social History    reports that she has never smoked. She has never used smokeless tobacco. She reports that she does not currently use alcohol after a past usage of about 1.7 standard drinks of alcohol per week.    Family History   I have reviewed this patient's family history and updated it with pertinent information if needed.  Family History   Problem Relation Age of Onset    Other - See Comments Father         alpha 1 antitrypsin deficiency     Cancer - colorectal Mother     Diabetes Sister           Review of Systems   A comprehensive review of system was performed and is negative other than that noted in the HPI.     Primary Care Physician   [facility]

## 2025-05-05 NOTE — PROGRESS NOTES
[facility]    Hospitalist Progress Note    Interval History   Patient is awake and alert.  Is up and walking the halls.  Currently on 1 L nasal cannula.  Slightly forgetful.  Denies active chest pain.  No shortness of breath.    -Data reviewed today: I reviewed all new labs and imaging results over the last 24 hours. I personally reviewed the chest x-ray image(s) showing no significant pleural effusion.  No pneumothorax.  Right IJ venous catheter.  Small left pleural effusion. .    Physical Exam   Temp: 97.6  F (36.4  C) Temp src: Oral BP: 124/72 Pulse: 64   Resp: 16 SpO2: 93 % O2 Device: Nasal cannula Oxygen Delivery: 1 LPM  Vitals:    05/03/25 0400 05/04/25 0000 05/05/25 0617   Weight: 83 kg (183 lb) 83.6 kg (184 lb 6.4 oz) 84.7 kg (186 lb 11.2 oz)     Vital Signs with Ranges  Temp:  [97.5  F (36.4  C)-99.9  F (37.7  C)] 97.6  F (36.4  C)  Pulse:  [62-75] 64  Resp:  [14-31] 16  BP: ()/(47-93) 124/72  Cuff Mean (mmHg):  [87] 87  SpO2:  [79 %-97 %] 93 %  I/O last 3 completed shifts:  In: 1890 [P.O.:1710; I.V.:180]  Out: 3070 [Urine:3070]    Physical Exam  Constitutional:       Appearance: She is ill-appearing.   Cardiovascular:      Rate and Rhythm: Normal rate and regular rhythm.      Pulses: Normal pulses.      Heart sounds: Normal heart sounds.   Pulmonary:      Effort: Pulmonary effort is normal. No respiratory distress.      Breath sounds: Normal breath sounds.      Comments: Bilateral basal crackles present  Abdominal:      General: Abdomen is flat. Bowel sounds are normal. There is no distension.      Tenderness: There is no abdominal tenderness. There is no guarding.   Skin:     General: Skin is warm and dry.   Neurological:      General: No focal deficit present.           Medications   Current Facility-Administered Medications   Medication Dose Route Frequency Provider Last Rate Last Admin    Continuing beta blocker from home medication list OR beta blocker order already  placed during this visit   Does not apply DOES NOT GO TO Denise Simpson PA-C        Continuing statin from home medication list OR statin order already placed during this visit   Does not apply DOES NOT GO TO Denise Simpson PA-C        heparin 25,000 units in 0.45% NaCl 250 mL ANTICOAGULANT infusion  0-5,000 Units/hr Intravenous Continuous Denise Sunshine PA-C 16.5 mL/hr at 05/05/25 1006 1,650 Units/hr at 05/05/25 1006    No lozenges or gum should be given while patient on BIPAP/AVAPS/AVAPS AE   Does not apply Continuous PRN Denise Sunshine PA-C        Patient is already receiving anticoagulation with heparin, enoxaparin (LOVENOX), warfarin (COUMADIN)  or other anticoagulant medication   Does not apply Continuous PRN Denise Sunshine PA-C        Patient may continue current oral medications   Does not apply Continuous PRN Denise Sunshine PA-C        Reason ACE/ARB/ARNI order not selected   Other DOES NOT GO TO Denise Simpson PA-C         Current Facility-Administered Medications   Medication Dose Route Frequency Provider Last Rate Last Admin    aspirin (ASA) chewable tablet 81 mg  81 mg Oral Daily Denise Sunshine PA-C   81 mg at 05/05/25 0900    cefTRIAXone (ROCEPHIN) 2 g vial to attach to  ml bag for ADULTS or NS 50 ml bag for PEDS  2 g Intravenous Q24H Denise Sunshine PA-C   2 g at 05/04/25 1608    furosemide (LASIX) injection 20 mg  20 mg Intravenous Q12H Vish Price MD   20 mg at 05/05/25 0347    gabapentin (NEURONTIN) capsule 200 mg  200 mg Oral At Bedtime Denise Sunshine PA-C   200 mg at 05/04/25 2123    lactase (LACTAID) tablet 9,000 Units  9,000 Units Oral TID w/meals Denise Sunshine PA-C   9,000 Units at 05/05/25 1147    levETIRAcetam (KEPPRA) tablet 500 mg  500 mg Oral BID Denise Sunshine PA-C   500 mg at 05/05/25 0859    levothyroxine (SYNTHROID/LEVOTHROID) tablet 75  mcg  75 mcg Oral QAM AC Denise Sunshine PA-C   75 mcg at 05/05/25 0636    metoprolol tartrate (LOPRESSOR) half-tab 12.5 mg  12.5 mg Oral or NG Tube BID Vish Price MD   12.5 mg at 05/05/25 0859    mirtazapine (REMERON) tablet 30 mg  30 mg Oral At Bedtime Denise Sunshine PA-C   30 mg at 05/04/25 2123    pantoprazole (PROTONIX) EC tablet 40 mg  40 mg Oral QAM Denise Sunshine PA-C   40 mg at 05/05/25 0859    [Held by provider] QUEtiapine (SEROquel) tablet 25 mg  25 mg Oral 4x Daily Denise Sunshine PA-C   25 mg at 04/30/25 0748    sertraline (ZOLOFT) tablet 150 mg  150 mg Oral Daily Denise Sunshine PA-C   150 mg at 05/05/25 0859    sodium chloride (PF) 0.9% PF flush 3 mL  3 mL Intracatheter Q8H ABHI Denise Sunshine PA-C   3 mL at 05/05/25 0901       Data   Recent Labs   Lab 05/05/25  0539 05/04/25  2142 05/04/25  1621 05/04/25  0924 05/04/25  0618 05/03/25  0803 05/03/25  0525 04/29/25  2102 04/29/25  1533   WBC 6.9  --   --   --  6.9  --  7.6   < >  --    HGB 12.3  --   --   --  12.2  --  11.2*   < >  --    MCV 90  --   --   --  93  --  94   < >  --      --   --   --  170  --  183   < >  --      --   --   --  142  --  148*   < >  --    POTASSIUM 4.1  --   --   --  3.5  --  4.1   < > 5.7*   CHLORIDE 97*  --   --   --  99  --  107   < >  --    CO2 32*  --   --   --  31*  --  31*   < >  --    BUN 39.2*  --   --   --  42.5*  --  42.9*   < >  --    CR 1.60*  --   --   --  1.94*  --  2.25*   < >  --    ANIONGAP 11  --   --   --  12  --  10   < >  --    HECTOR 9.4  --   --   --  9.3  --  9.0   < >  --    * 120* 129*   < > 100*   < > 94   < >  --    ALBUMIN 3.0*  --   --   --  3.1*  --  3.1*   < > 3.3*   PROTTOTAL 6.0*  --   --   --  5.7*  --  5.4*   < > 5.9*   BILITOTAL 0.6  --   --   --  0.5  --  0.6   < > 1.5*   ALKPHOS 198*  --   --   --  199*  --  205*   < > 335*   *  --   --   --  171*  --  161*   < > 416*   AST  --   --   --   --  196*   --  225*   < > 518*   LIPASE  --   --   --   --   --   --   --   --  14    < > = values in this interval not displayed.       No results found for this or any previous visit (from the past 24 hours).      Assessment & Plan     Amy Bryan is a 69 year old female with history of TBI in 2012, CAD s/p PCI in 2018 and 2021, suspected takotsubo cardiomyopathy now recovered, presenting for vague complaints of fatigue and ultimately found to have NSTEMI vs stress cardiomyopathy, biventricular heart failure with cardiogenic shock requiring dobutamine and epinephrine in the ICU. Hospitalization complicated by shock liver, acute renal failure, and new runs of atrial fibrillation among others. Hospitalist assumed care on 05/04/25.     NSTEMI vs stress cardiomyopathy  Biventricular heart failure, cardiogenic shock, improving  CAD s/p PCI in 2018 and 2021  STEMI 2018  Presented from group home with 2 days of fatigue. Found to have grossly elevated troponins >800-900 range and EKG with new T wave inversions. No chest pain or cardiac complaints in the ED. Admitted for NSTEMI, on heparin gtt, pleural effusions, GIOVANNI, shock liver, and developed worsening hypotension prompting transfer to ICU for cardiogenic shock as well as likely septic shock from UTI, treated with dobutamine, epinephrine, BiPAP. Other medical problems treated as below. Weaned off pressors, and to 3L NC, diuresing initially with furosemide gtt which was transitioned to scheduled IV furosemide. Cardiology followed closely as well as Nephrology.  ECHO 4/30 on admission with EF 45-50%, mild lateral/anterior wall hypokinesia. Moderately reduced RV systolic function, mild to moderate TR, pulmonary hypertension. Repeat ECHO 5/3 with improvement in EF to 55-60%, mid latera/inferolateral wall hypokinesis.   -Transfer to [facility], heart center if available 05/04/25   -Hospitalist assumed care as primary service 05/04/25   -Cardiac monitoring  -Cardiology following.  Still  deciding on left heart cath versus stress test  - continue heparin drip until final cardiac plans  -ASA 81mg/d  - Started on metoprolol 12.5 twice daily on 5/4/2025  -Hold Statin until LFTs <100  -Intake/Output  -Daily standing weights if able  -Furosemide gtt off, transitioned to IV furosemide 20mg every 12 hours.  Cardiology managing this  -Monitor closely     Septic shock in the setting of UTI  Urinary retention s/p bender placement  UA neg nitrite, large LE, WBC 17. UCx E. Coli 50-100K. Urinary retention on admission in the setting of UTI and GIOVANNI, UTI being treated and GIOVANNI improving. Initially treated with Zosyn which was transitioned to ceftriaxone after UCx noted E. Coli. Mild leukocytosis resolved. BCx NGTD.   -Increase ceftriaxone to 2gm in the setting of resolving GIOVANNI.  Continue 2 more days to complete 7-day course  -Remove bender 5/4 and do TOV, d/w RN  -Monitor     Paroxysmal atrial fibrillation with RVR, not on AC, new diagnosis  History of transient Afib 2018  Hx runs of Afib in 2018 in setting of STEMI and cardiogenic shock, discharged on DAPT but not recommended for anticoagulation at that time due to transient in the setting of acute illness. Now with recurrent Afib again this hospitalization requiring amiodarone gtt and since transitioned to amiodarone PO. CHADS-VASc score 4   - Cardiac monitoring  -Patient converted to sinus rhythm on 5/5/2025.  Amiodarone discontinued.  - Restarted Toprol 12.5 mg twice daily on 5/4/2025  - Continue Heparin gtt for AC as cardiology considering cardiac cath  - Pharmacy Liaison for DOAC coverage  - PRN IV metoprolol for sustained heart rate >120  - Monitor K+/Mg++, replace PRN with improving GIOVANNI     Acute kidney injury, improving  Baseline Cr 0.9. Cr peaked at 3.69 and has downtrended to 1.94 day of transfer out of ICU 05/04/25. Nephrology was following and singed off 5/4 with improving Cr, sodium normalized, excellent urine output. Felt likely ischemic ATN in setting  of shock.  - Avoid nephrotoxins - contrast, NSAIDs  -Creatinine 1.60 on 5/5/2025  - Renally dose medications as able/needed  - Diuresis per cardiology  - Monitor UOP  - Monitor daily, Nephrology signed off 5/4     Acute hypoxic respiratory failure, improving  Hypercapnia  Pleural effusion s/p thoracentesis 4/30/25, improving  Resolving on imaging with diuresis. Was on BiPAP and weaned to 1 L nasal cannula currently.  Has been hypercapneic with CO2 levels ranging 50s-60sm, stable/improving. Pleural fluid 4/30 neg for organisms. Transudative effusion.  -Wean O2 as able.  Currently on 1 L nasal cannula  -Monitor  -Can re-trial BiPAP if needed, possibly nocturnally     Shock liver in the setting of septic and cardiogenic shock, improving  Elevated LFTs not in obstructive pattern with some mild RUQ pain on exam. US cholelithiasis, nonspecific GB thickening could represent cholecystitis though negative pagan.   No signs of developing acute cholecystitis, tolerating PO  LFT trend improving, in the setting of shock.   Tbili 1.5-0.5 normalized  Alk Phos 335-199  -171  -196  - LFTs improving and pain improving  - Continue to monitor consider repeating US or HIDA scan if remain concerned  - HOLD statin until liver tests improved and <100     Elevated D-dimer  V/Q negative for PE  In conjunction with fall at home and new hypoxia there is concern for PE. Reassuringly dopplers negative for DVT. Unable to give contrast CT due to GIOVANNI. V/Q negative for PE, doubt PE given shock picture above and now weaining  O2.  - Wean O2 as able  - Continue heparin for NSTEMI     Physical deconditioning  Resides in group home  -PT recommending TCU  -Continue to work with therapies  -SW/CC following     Resolved Hospital problems  Hypernatremia  Hyperkalemia  Pleural effusion  Leukocytosis     Chronic stable diagnoses and other pertinent medical history: Appropriate PTA medications will be resumed.      Cognitive disorder with  "history of TBI in 2012  Currently living in group home  - Continue PTA Keppra and gabapentin 200mg Q HS     Generalized anxiety and depression: Continue PTA mirtazapine 30mg Q HS, sertraline 150mg/d      GERD: Continue PPI     Hypothyroidism: Continue PTA TRT 75mcg/d     Lactose intolerance: Continue lactase TID     Clinically Significant Risk Factors       # Hypochloremia: Lowest Cl = 97 mmol/L in last 2 days, will monitor as appropriate    # Hypomagnesemia: Lowest Mg = 1.6 mg/dL in last 2 days, will replace as needed   # Hypoalbuminemia: Lowest albumin = 3 g/dL at 5/5/2025  5:39 AM, will monitor as appropriate     # Hypertension: Noted on problem list     # Acute Hypercapnic Respiratory Failure: based on venous blood gas results.  Continue supplemental oxygen and ventilatory support as indicated.         # Obesity: Estimated body mass index is 31.07 kg/m  as calculated from the following:    Height as of this encounter: 1.651 m (5' 5\").    Weight as of this encounter: 84.7 kg (186 lb 11.2 oz).      # Financial/Environmental Concerns: none          DVT Prophylaxis: Pneumatic Compression Devices  Code Status: Full Code  Medically Ready for Discharge: Anticipated in 2-4 Days      Please see A&P for additional details of medical decision making.  55 MINUTES SPENT BY ME on the date of service doing chart review, history, exam, documentation & further activities per the note.    Harmony Flores MD, MD  [phone](p)   "

## 2025-05-05 NOTE — PROVIDER NOTIFICATION
MD Notification    Notified Person: MD    Notified Person Name: Howard Dobson    Notification Date/Time: 5/5 1327    Notification Interaction: vocera message     Purpose of Notification: Pt back in rapid A fib, -140's. Pt asymptomatic at this time    Orders Received: provider will touch base with Dr. Wren, may need PO amio reordered.      1340: converted between A fib RVR and SR a few times, but now is ultimately back in SR.    Comments:

## 2025-05-05 NOTE — PROGRESS NOTES
Care Management Follow Up    Length of Stay (days): 6    Expected Discharge Date: 05/07/2025     Concerns to be Addressed: discharge planning     Patient plan of care discussed at interdisciplinary rounds: Yes    Anticipated Discharge Disposition: TCU              Anticipated Discharge Services: None  Anticipated Discharge DME: None    Patient/family educated on Medicare website which has current facility and service quality ratings: no  Education Provided on the Discharge Plan: No  Patient/Family in Agreement with the Plan: yes    Referrals Placed by CM/SW:  post acute facilities   Private pay costs discussed: Not applicable    Discussed  Partnership in Safe Discharge Planning  document with patient/family: No     Handoff Completed: No, handoff not indicated or clinically appropriate    Additional Information:  Per chart review, recommendation is for TCU. Previous notes indicate that patient/Monserrat in agreement with TCU and would prefer [place] or [place]. Referrals sent out. PAS completed to determine if it will trigger a Obra Level 2. PAS did NOT trigger for OBRA.     PAS-RR    D: Per DHS regulation, SW completed and submitted PAS-RR to [place] [employer] Direct Connect via the Senior LinkAge Line.  PAS-RR confirmation # is : [id number]    I: SW spoke with patient and they are aware a PAS-RR has been submitted.  SW reviewed with patient that they may be contacted for a follow up appointment within 10 days of hospital discharge if their SNF stay is < 30 days.  Contact information for Senior LinkAge Line was also provided.    A: patient verbalized understanding.    P: Further questions may be directed to [facility] LinkAge Line at #[phone], option #4 for PAS-RR staff.     Next Steps: Confirm placement     PARAMJIT Munguia, LICSW  Social Work- Inpatient Care Coordination  [facility]

## 2025-05-05 NOTE — PROGRESS NOTES
Orientations: A& Ox4. Flat.  Vitals/Pain: vitals stable on 1 L O2 via NC. Denies pain.   Resp: clear, dim bases.   Tele: SR with BBB  Diet: 2 gm Na  Lines/Drains: R PIV infusing hep at 1650. L PIV, SL  Skin/Wounds: R neck access site, R upper back thora site, CDI. Blanchable redness to coccyx, mepilex in place. Scattered bruising and scabs.   GI/: active BS, no BM, passing flatus. Adequately voiding via ext cath.   Labs: Abnormal/Trends, Hep Xa recheck at 1221. Electrolyte Replacement- on K/Mg/Phos protocols, recheck 5/6 am.   Ambulation/Assist: Ax2 GBW  Sleep Quality: good.  Plan: continue hep gtt, IV diuresis. TCU placement upon discharge. SW following.

## 2025-05-05 NOTE — CONSULTS
Patient has Medicare + Medicaid through [employer].    Xarelto/Eliquis:  $0/mo.     Jewels Feliz  Pharmacy Technician/Liaison, Discharge Pharmacy   [phone] (voice or text)  [email or web address]  Pharmacy test claims are estimates and may not reflect final costs.  Suggested alternatives aim to be cost-effective and may not be therapeutically equivalent as this consult is informational and does not constitute medical advice.  Clinical decisions should be made by qualified healthcare providers.

## 2025-05-05 NOTE — PLAN OF CARE
"Goal Outcome Evaluation:  [id number]  Neuro- x4  Most Recent Vitals- BP (!) 144/63   Pulse 78   Temp 98.2  F (36.8  C) (Oral)   Resp (!) 9   Ht 1.651 m (5' 5\")   Wt 84.7 kg (186 lb 11.2 oz)   SpO2 92%   BMI 31.07 kg/m    Tele/Cardiac- SR   Resp- 1L NC, unable to wean to RA   Activity- 1A gb walker   Pain- denies   Drips- intermittent iv abx  Skin- RIJ site, blanchable redness to coccyx and scattered bruising   GI/- WDL except incontinent at times   Diet: 2 Gram Sodium Diet  Room Service    Plan- heparin gtt discontinued, start po lasix, increased metoprolol dose and stress test tomorrow. TCU referrals sent today.       "

## 2025-05-06 ENCOUNTER — APPOINTMENT (OUTPATIENT)
Dept: CARDIOLOGY | Facility: CLINIC | Age: 70
DRG: 280 | End: 2025-05-06
Attending: INTERNAL MEDICINE
Payer: COMMERCIAL

## 2025-05-06 ENCOUNTER — APPOINTMENT (OUTPATIENT)
Dept: NUCLEAR MEDICINE | Facility: CLINIC | Age: 70
DRG: 280 | End: 2025-05-06
Attending: INTERNAL MEDICINE
Payer: COMMERCIAL

## 2025-05-06 ENCOUNTER — APPOINTMENT (OUTPATIENT)
Dept: PHYSICAL THERAPY | Facility: CLINIC | Age: 70
DRG: 280 | End: 2025-05-06
Payer: COMMERCIAL

## 2025-05-06 ENCOUNTER — APPOINTMENT (OUTPATIENT)
Dept: OCCUPATIONAL THERAPY | Facility: CLINIC | Age: 70
DRG: 280 | End: 2025-05-06
Payer: COMMERCIAL

## 2025-05-06 LAB
ALBUMIN SERPL BCG-MCNC: 3.4 G/DL (ref 3.5–5.2)
ALP SERPL-CCNC: 192 U/L (ref 40–150)
ALT SERPL W P-5'-P-CCNC: 144 U/L (ref 0–50)
ANION GAP SERPL CALCULATED.3IONS-SCNC: 11 MMOL/L (ref 7–15)
AST SERPL W P-5'-P-CCNC: 93 U/L (ref 0–45)
BASE EXCESS BLDV CALC-SCNC: 8.4 MMOL/L (ref -3–3)
BASE EXCESS BLDV CALC-SCNC: 9 MMOL/L (ref -3–3)
BASE EXCESS BLDV CALC-SCNC: 9.2 MMOL/L (ref -3–3)
BILIRUB SERPL-MCNC: 0.7 MG/DL
BUN SERPL-MCNC: 36.1 MG/DL (ref 8–23)
CALCIUM SERPL-MCNC: 9.8 MG/DL (ref 8.8–10.4)
CHLORIDE SERPL-SCNC: 97 MMOL/L (ref 98–107)
CREAT SERPL-MCNC: 1.46 MG/DL (ref 0.51–0.95)
CV STRESS MAX HR HE: 74
EGFRCR SERPLBLD CKD-EPI 2021: 39 ML/MIN/1.73M2
ERYTHROCYTE [DISTWIDTH] IN BLOOD BY AUTOMATED COUNT: 13.7 % (ref 10–15)
GLUCOSE SERPL-MCNC: 105 MG/DL (ref 70–99)
HCO3 BLDV-SCNC: 35 MMOL/L (ref 21–28)
HCO3 BLDV-SCNC: 37 MMOL/L (ref 21–28)
HCO3 BLDV-SCNC: 37 MMOL/L (ref 21–28)
HCO3 SERPL-SCNC: 33 MMOL/L (ref 22–29)
HCT VFR BLD AUTO: 40.3 % (ref 35–47)
HGB BLD-MCNC: 12.4 G/DL (ref 11.7–15.7)
MAGNESIUM SERPL-MCNC: 1.9 MG/DL (ref 1.7–2.3)
MCH RBC QN AUTO: 28.6 PG (ref 26.5–33)
MCHC RBC AUTO-ENTMCNC: 30.8 G/DL (ref 31.5–36.5)
MCV RBC AUTO: 93 FL (ref 78–100)
NUC STRESS EJECTION FRACTION: 60 %
O2/TOTAL GAS SETTING VFR VENT: 2 %
O2/TOTAL GAS SETTING VFR VENT: 2 %
O2/TOTAL GAS SETTING VFR VENT: 28 %
OXYHGB MFR BLDV: 49 % (ref 70–75)
OXYHGB MFR BLDV: 80 % (ref 70–75)
OXYHGB MFR BLDV: 93 % (ref 70–75)
PCO2 BLDV: 54 MM HG (ref 40–50)
PCO2 BLDV: 61 MM HG (ref 40–50)
PCO2 BLDV: 65 MM HG (ref 40–50)
PH BLDV: 7.36 [PH] (ref 7.32–7.43)
PH BLDV: 7.39 [PH] (ref 7.32–7.43)
PH BLDV: 7.42 [PH] (ref 7.32–7.43)
PHOSPHATE SERPL-MCNC: 5.1 MG/DL (ref 2.5–4.5)
PLATELET # BLD AUTO: 208 10E3/UL (ref 150–450)
PO2 BLDV: 31 MM HG (ref 25–47)
PO2 BLDV: 49 MM HG (ref 25–47)
PO2 BLDV: 72 MM HG (ref 25–47)
POTASSIUM SERPL-SCNC: 3.3 MMOL/L (ref 3.4–5.3)
POTASSIUM SERPL-SCNC: 4.2 MMOL/L (ref 3.4–5.3)
POTASSIUM SERPL-SCNC: 4.3 MMOL/L (ref 3.4–5.3)
PROT SERPL-MCNC: 6 G/DL (ref 6.4–8.3)
RATE PRESSURE PRODUCT: NORMAL
RBC # BLD AUTO: 4.34 10E6/UL (ref 3.8–5.2)
SAO2 % BLDV: 50 % (ref 70–75)
SAO2 % BLDV: 82.3 % (ref 70–75)
SAO2 % BLDV: 95.5 % (ref 70–75)
SODIUM SERPL-SCNC: 141 MMOL/L (ref 135–145)
STRESS ECHO BASELINE DIASTOLIC HE: 82
STRESS ECHO BASELINE HR: 66 BPM
STRESS ECHO BASELINE SYSTOLIC BP: 142
STRESS ECHO CALCULATED PERCENT HR: 48 %
STRESS ECHO LAST STRESS DIASTOLIC BP: 61
STRESS ECHO LAST STRESS SYSTOLIC BP: 138
STRESS ECHO TARGET HR: 153
WBC # BLD AUTO: 6.2 10E3/UL (ref 4–11)

## 2025-05-06 PROCEDURE — 97110 THERAPEUTIC EXERCISES: CPT | Mod: GP

## 2025-05-06 PROCEDURE — 36415 COLL VENOUS BLD VENIPUNCTURE: CPT | Performed by: PHYSICIAN ASSISTANT

## 2025-05-06 PROCEDURE — 250N000011 HC RX IP 250 OP 636: Performed by: HOSPITALIST

## 2025-05-06 PROCEDURE — 83735 ASSAY OF MAGNESIUM: CPT | Performed by: PHYSICIAN ASSISTANT

## 2025-05-06 PROCEDURE — 85014 HEMATOCRIT: CPT | Performed by: PHYSICIAN ASSISTANT

## 2025-05-06 PROCEDURE — 93016 CV STRESS TEST SUPVJ ONLY: CPT | Performed by: INTERNAL MEDICINE

## 2025-05-06 PROCEDURE — 84132 ASSAY OF SERUM POTASSIUM: CPT | Performed by: INTERNAL MEDICINE

## 2025-05-06 PROCEDURE — 97530 THERAPEUTIC ACTIVITIES: CPT | Mod: GO

## 2025-05-06 PROCEDURE — 250N000011 HC RX IP 250 OP 636: Mod: JZ | Performed by: INTERNAL MEDICINE

## 2025-05-06 PROCEDURE — 78452 HT MUSCLE IMAGE SPECT MULT: CPT

## 2025-05-06 PROCEDURE — 210N000002 HC R&B HEART CARE

## 2025-05-06 PROCEDURE — 84100 ASSAY OF PHOSPHORUS: CPT | Performed by: PHYSICIAN ASSISTANT

## 2025-05-06 PROCEDURE — 343N000001 HC RX 343 MED OP 636: Performed by: INTERNAL MEDICINE

## 2025-05-06 PROCEDURE — 250N000013 HC RX MED GY IP 250 OP 250 PS 637: Performed by: INTERNAL MEDICINE

## 2025-05-06 PROCEDURE — 84132 ASSAY OF SERUM POTASSIUM: CPT | Performed by: HOSPITALIST

## 2025-05-06 PROCEDURE — 93017 CV STRESS TEST TRACING ONLY: CPT

## 2025-05-06 PROCEDURE — 99232 SBSQ HOSP IP/OBS MODERATE 35: CPT | Mod: 25 | Performed by: PHYSICIAN ASSISTANT

## 2025-05-06 PROCEDURE — 99232 SBSQ HOSP IP/OBS MODERATE 35: CPT | Performed by: HOSPITALIST

## 2025-05-06 PROCEDURE — 250N000013 HC RX MED GY IP 250 OP 250 PS 637: Performed by: PHYSICIAN ASSISTANT

## 2025-05-06 PROCEDURE — 999N000049 HC STATISTIC ECHO STRESS OR NM NPI

## 2025-05-06 PROCEDURE — 80053 COMPREHEN METABOLIC PANEL: CPT | Performed by: PHYSICIAN ASSISTANT

## 2025-05-06 PROCEDURE — 78452 HT MUSCLE IMAGE SPECT MULT: CPT | Mod: 26 | Performed by: INTERNAL MEDICINE

## 2025-05-06 PROCEDURE — A9500 TC99M SESTAMIBI: HCPCS | Performed by: INTERNAL MEDICINE

## 2025-05-06 PROCEDURE — 93018 CV STRESS TEST I&R ONLY: CPT | Performed by: INTERNAL MEDICINE

## 2025-05-06 PROCEDURE — 97535 SELF CARE MNGMENT TRAINING: CPT | Mod: GO

## 2025-05-06 PROCEDURE — 82805 BLOOD GASES W/O2 SATURATION: CPT | Performed by: PHYSICIAN ASSISTANT

## 2025-05-06 PROCEDURE — 97530 THERAPEUTIC ACTIVITIES: CPT | Mod: GP

## 2025-05-06 RX ORDER — ALBUTEROL SULFATE 90 UG/1
2 INHALANT RESPIRATORY (INHALATION) EVERY 5 MIN PRN
Status: DISCONTINUED | OUTPATIENT
Start: 2025-05-06 | End: 2025-05-07 | Stop reason: HOSPADM

## 2025-05-06 RX ORDER — CAFFEINE CITRATE 20 MG/ML
60 SOLUTION INTRAVENOUS
Status: ACTIVE | OUTPATIENT
Start: 2025-05-06 | End: 2025-05-06

## 2025-05-06 RX ORDER — CEFTRIAXONE 2 G/1
2 INJECTION, POWDER, FOR SOLUTION INTRAMUSCULAR; INTRAVENOUS EVERY 24 HOURS
Status: COMPLETED | OUTPATIENT
Start: 2025-05-06 | End: 2025-05-06

## 2025-05-06 RX ORDER — NITROGLYCERIN 0.4 MG/1
0.4 TABLET SUBLINGUAL EVERY 5 MIN PRN
Status: DISCONTINUED | OUTPATIENT
Start: 2025-05-06 | End: 2025-05-06

## 2025-05-06 RX ORDER — POTASSIUM CHLORIDE 1500 MG/1
40 TABLET, EXTENDED RELEASE ORAL ONCE
Status: COMPLETED | OUTPATIENT
Start: 2025-05-06 | End: 2025-05-06

## 2025-05-06 RX ORDER — AMINOPHYLLINE 25 MG/ML
50-100 INJECTION, SOLUTION INTRAVENOUS
Status: DISCONTINUED | OUTPATIENT
Start: 2025-05-06 | End: 2025-05-07 | Stop reason: HOSPADM

## 2025-05-06 RX ORDER — SODIUM CHLORIDE 9 MG/ML
INJECTION, SOLUTION INTRAVENOUS CONTINUOUS PRN
Status: ACTIVE | OUTPATIENT
Start: 2025-05-06 | End: 2025-05-06

## 2025-05-06 RX ORDER — REGADENOSON 0.08 MG/ML
0.4 INJECTION, SOLUTION INTRAVENOUS ONCE
Status: COMPLETED | OUTPATIENT
Start: 2025-05-06 | End: 2025-05-06

## 2025-05-06 RX ORDER — CAFFEINE 200 MG
200 TABLET ORAL
Status: DISPENSED | OUTPATIENT
Start: 2025-05-06 | End: 2025-05-06

## 2025-05-06 RX ADMIN — AMIODARONE HYDROCHLORIDE 200 MG: 200 TABLET ORAL at 20:11

## 2025-05-06 RX ADMIN — RIVAROXABAN 15 MG: 15 TABLET, FILM COATED ORAL at 16:11

## 2025-05-06 RX ADMIN — LACTASE TAB 3000 UNIT 9000 UNITS: 3000 TAB at 10:00

## 2025-05-06 RX ADMIN — LEVOTHYROXINE SODIUM 75 MCG: 75 TABLET ORAL at 06:41

## 2025-05-06 RX ADMIN — LACTASE TAB 3000 UNIT 9000 UNITS: 3000 TAB at 18:35

## 2025-05-06 RX ADMIN — POTASSIUM CHLORIDE 40 MEQ: 1500 TABLET, EXTENDED RELEASE ORAL at 08:08

## 2025-05-06 RX ADMIN — CEFTRIAXONE SODIUM 2 G: 2 INJECTION, POWDER, FOR SOLUTION INTRAMUSCULAR; INTRAVENOUS at 16:10

## 2025-05-06 RX ADMIN — METOPROLOL TARTRATE 25 MG: 25 TABLET, FILM COATED ORAL at 20:09

## 2025-05-06 RX ADMIN — FUROSEMIDE 40 MG: 40 TABLET ORAL at 10:07

## 2025-05-06 RX ADMIN — AMIODARONE HYDROCHLORIDE 200 MG: 200 TABLET ORAL at 10:00

## 2025-05-06 RX ADMIN — LEVETIRACETAM 500 MG: 500 TABLET, FILM COATED ORAL at 09:59

## 2025-05-06 RX ADMIN — REGADENOSON 0.4 MG: 0.08 INJECTION, SOLUTION INTRAVENOUS at 09:34

## 2025-05-06 RX ADMIN — LACTASE TAB 3000 UNIT 9000 UNITS: 3000 TAB at 13:22

## 2025-05-06 RX ADMIN — GABAPENTIN 200 MG: 100 CAPSULE ORAL at 20:07

## 2025-05-06 RX ADMIN — Medication 10.4 MILLICURIE: at 07:10

## 2025-05-06 RX ADMIN — PANTOPRAZOLE SODIUM 40 MG: 40 TABLET, DELAYED RELEASE ORAL at 09:59

## 2025-05-06 RX ADMIN — ASPIRIN 81 MG CHEWABLE TABLET 81 MG: 81 TABLET CHEWABLE at 09:59

## 2025-05-06 RX ADMIN — MIRTAZAPINE 30 MG: 30 TABLET, FILM COATED ORAL at 20:06

## 2025-05-06 RX ADMIN — SERTRALINE HYDROCHLORIDE 150 MG: 100 TABLET ORAL at 10:00

## 2025-05-06 RX ADMIN — Medication 32.1 MILLICURIE: at 09:40

## 2025-05-06 RX ADMIN — METOPROLOL TARTRATE 25 MG: 25 TABLET, FILM COATED ORAL at 09:59

## 2025-05-06 RX ADMIN — LEVETIRACETAM 500 MG: 500 TABLET, FILM COATED ORAL at 20:05

## 2025-05-06 ASSESSMENT — ACTIVITIES OF DAILY LIVING (ADL)
ADLS_ACUITY_SCORE: 75
ADLS_ACUITY_SCORE: 77
ADLS_ACUITY_SCORE: 75
ADLS_ACUITY_SCORE: 77
ADLS_ACUITY_SCORE: 75
ADLS_ACUITY_SCORE: 75
ADLS_ACUITY_SCORE: 77
ADLS_ACUITY_SCORE: 77
ADLS_ACUITY_SCORE: 75
ADLS_ACUITY_SCORE: 77
ADLS_ACUITY_SCORE: 77
ADLS_ACUITY_SCORE: 75
ADLS_ACUITY_SCORE: 77
ADLS_ACUITY_SCORE: 75
ADLS_ACUITY_SCORE: 77
ADLS_ACUITY_SCORE: 75

## 2025-05-06 NOTE — PLAN OF CARE
Pt aox4, A1gbw, 2LNC and full code. Tele SR. Pt denies CP and SOB. Nadia scan completed today, showed no changes. Cards signed off.  IV abx rocephin. Pt incontinent of bowel and bladder.   Plan: Discharge tomorrow to [place]. Ride set up between 12pm - 1pm.

## 2025-05-06 NOTE — PROGRESS NOTES
[facility]    Hospitalist Progress Note    Interval History   Patient is awake and alert.  Sitting up in chair having breakfast.  Currently on 1 L nasal cannula.  Underwent stress test today and tolerated it well.  No new complaints.    -Data reviewed today: I reviewed all new labs and imaging results over the last 24 hours. I personally reviewed the chest x-ray image(s) showing no significant pleural effusion.  No pneumothorax.  Right IJ venous catheter.  Small left pleural effusion. .    Physical Exam   Temp: 97.9  F (36.6  C) Temp src: Oral BP: 123/72 Pulse: 73   Resp: 18 SpO2: 94 % O2 Device: Nasal cannula Oxygen Delivery: 3 LPM  Vitals:    05/04/25 0000 05/05/25 0617 05/06/25 0645   Weight: 83.6 kg (184 lb 6.4 oz) 84.7 kg (186 lb 11.2 oz) 84.8 kg (187 lb)     Vital Signs with Ranges  Temp:  [97.9  F (36.6  C)-98.2  F (36.8  C)] 97.9  F (36.6  C)  Pulse:  [63-78] 73  Resp:  [9-20] 18  BP: (100-161)/(61-91) 123/72  SpO2:  [86 %-98 %] 94 %  I/O last 3 completed shifts:  In: 850 [P.O.:850]  Out: 650 [Urine:650]    Physical Exam  Constitutional:       Appearance: She is ill-appearing.   Cardiovascular:      Rate and Rhythm: Normal rate and regular rhythm.      Pulses: Normal pulses.      Heart sounds: Normal heart sounds.   Pulmonary:      Effort: Pulmonary effort is normal. No respiratory distress.      Breath sounds: Normal breath sounds.      Comments: Bilateral basal crackles present  Abdominal:      General: Abdomen is flat. Bowel sounds are normal. There is no distension.      Tenderness: There is no abdominal tenderness. There is no guarding.   Skin:     General: Skin is warm and dry.   Neurological:      General: No focal deficit present.           Medications   Current Facility-Administered Medications   Medication Dose Route Frequency Provider Last Rate Last Admin    Continuing beta blocker from home medication list OR beta blocker order already placed during this visit   Does not  apply DOES NOT GO TO Denise Simpson PA-C        Continuing statin from home medication list OR statin order already placed during this visit   Does not apply DOES NOT GO TO Denise Simpson PA-C        No lozenges or gum should be given while patient on BIPAP/AVAPS/AVAPS AE   Does not apply Continuous PRN Denise Sunshine PA-C        Patient is already receiving anticoagulation with heparin, enoxaparin (LOVENOX), warfarin (COUMADIN)  or other anticoagulant medication   Does not apply Continuous PRN Denise Sunshine PA-C        Patient may continue current oral medications   Does not apply Continuous PRN Denise Sunshine PA-C        Reason ACE/ARB/ARNI order not selected   Other DOES NOT GO TO Denise Simpson PA-C         Current Facility-Administered Medications   Medication Dose Route Frequency Provider Last Rate Last Admin    amiodarone (PACERONE) tablet 200 mg  200 mg Oral BID Howard Dobson PA-C   200 mg at 05/06/25 1000    aspirin (ASA) chewable tablet 81 mg  81 mg Oral Daily Denise Sunshine PA-C   81 mg at 05/06/25 0959    cefTRIAXone (ROCEPHIN) 2 g vial to attach to  ml bag for ADULTS or NS 50 ml bag for PEDS  2 g Intravenous Q24H Harmony Flores MD        furosemide (LASIX) tablet 40 mg  40 mg Oral Daily Darren Wren MD   40 mg at 05/06/25 1007    gabapentin (NEURONTIN) capsule 200 mg  200 mg Oral At Bedtime Denise Sunshine PA-C   200 mg at 05/05/25 2111    lactase (LACTAID) tablet 9,000 Units  9,000 Units Oral TID w/meals Denise Sunshine PA-C   9,000 Units at 05/06/25 1322    levETIRAcetam (KEPPRA) tablet 500 mg  500 mg Oral BID Denise Sunshine PA-C   500 mg at 05/06/25 0959    levothyroxine (SYNTHROID/LEVOTHROID) tablet 75 mcg  75 mcg Oral QAM AC Denise Sunshine PA-C   75 mcg at 05/06/25 0641    metoprolol tartrate (LOPRESSOR) tablet 25 mg  25 mg Oral or NG Tube BID Holger,  MD Darren   25 mg at 05/06/25 0959    mirtazapine (REMERON) tablet 30 mg  30 mg Oral At Bedtime Denise Sunshine PA-C   30 mg at 05/05/25 2111    pantoprazole (PROTONIX) EC tablet 40 mg  40 mg Oral QAM Denise Sunshine PA-C   40 mg at 05/06/25 0959    [Held by provider] QUEtiapine (SEROquel) tablet 25 mg  25 mg Oral 4x Daily Denise Sunshine PA-C   25 mg at 04/30/25 0748    sertraline (ZOLOFT) tablet 150 mg  150 mg Oral Daily Denise Sunshine PA-C   150 mg at 05/06/25 1000    sodium chloride (PF) 0.9% PF flush 3 mL  3 mL Intracatheter Q8H ABHI Denise Sunshine PA-C   3 mL at 05/06/25 1001       Data   Recent Labs   Lab 05/06/25  0558 05/05/25  0539 05/04/25  2142 05/04/25  0924 05/04/25  0618 04/29/25  2102 04/29/25  1533   WBC 6.2 6.9  --   --  6.9   < >  --    HGB 12.4 12.3  --   --  12.2   < >  --    MCV 93 90  --   --  93   < >  --     198  --   --  170   < >  --     140  --   --  142   < >  --    POTASSIUM 3.3* 4.1  --   --  3.5   < > 5.7*   CHLORIDE 97* 97*  --   --  99   < >  --    CO2 33* 32*  --   --  31*   < >  --    BUN 36.1* 39.2*  --   --  42.5*   < >  --    CR 1.46* 1.60*  --   --  1.94*   < >  --    ANIONGAP 11 11  --   --  12   < >  --    HECTOR 9.8 9.4  --   --  9.3   < >  --    * 102* 120*   < > 100*   < >  --    ALBUMIN 3.4* 3.0*  --   --  3.1*   < > 3.3*   PROTTOTAL 6.0* 6.0*  --   --  5.7*   < > 5.9*   BILITOTAL 0.7 0.6  --   --  0.5   < > 1.5*   ALKPHOS 192* 198*  --   --  199*   < > 335*   * 158*  --   --  171*   < > 416*   AST 93*  --   --   --  196*   < > 518*   LIPASE  --   --   --   --   --   --  14    < > = values in this interval not displayed.       Recent Results (from the past 24 hours)   NM Lexiscan stress test (nuc card)   Result Value    Target     Baseline Systolic     Baseline Diastolic BP 82    Last Stress Systolic     Last Stress Diastolic BP 61    Baseline HR 66    Max HR  74    Max  Predicted HR  48    Rate Pressure Product 10,212.0    Left Ventricular EF 60    Narrative      The nuclear stress test is abnormal.    There is transmural infarction in the mid to basal anterolateral   segment(s) of the left ventricle associated with a mild degree of   boubacar-infarct ischemia.    Left ventricular function is normal.    The left ventricular ejection fraction at stress is 60%.    A prior study was conducted on 6/30/2021.  This study has changes noted   when compared with the prior study. there is a mild degree of boubacar infarct   ischemia noted on current study           Assessment & Plan     Amy Bryan is a 69 year old female with history of TBI in 2012, CAD s/p PCI in 2018 and 2021, suspected takotsubo cardiomyopathy now recovered, presenting for vague complaints of fatigue and ultimately found to have NSTEMI vs stress cardiomyopathy, biventricular heart failure with cardiogenic shock requiring dobutamine and epinephrine in the ICU. Hospitalization complicated by shock liver, acute renal failure, and new runs of atrial fibrillation among others. Hospitalist assumed care on 05/04/25.     NSTEMI vs stress cardiomyopathy  Biventricular heart failure, cardiogenic shock, improving  CAD s/p PCI in 2018 and 2021  STEMI 2018  Presented from group home with 2 days of fatigue. Found to have grossly elevated troponins >800-900 range and EKG with new T wave inversions. No chest pain or cardiac complaints in the ED. Admitted for NSTEMI, on heparin gtt, pleural effusions, GIOVANNI, shock liver, and developed worsening hypotension prompting transfer to ICU for cardiogenic shock as well as likely septic shock from UTI, treated with dobutamine, epinephrine, BiPAP. Other medical problems treated as below. Weaned off pressors, and to 3L NC, diuresing initially with furosemide gtt which was transitioned to scheduled IV furosemide. Cardiology followed closely as well as Nephrology.  ECHO 4/30 on admission with EF 45-50%,  mild lateral/anterior wall hypokinesia. Moderately reduced RV systolic function, mild to moderate TR, pulmonary hypertension. Repeat ECHO 5/3 with improvement in EF to 55-60%, mid latera/inferolateral wall hypokinesis.   -Transfer to [facility], heart center if available 05/04/25   -Hospitalist assumed care as primary service 05/04/25   -Cardiac monitoring  -Cardiology following.    - Was initially on heparin drip.  Discontinued on 525.  -ASA 81mg/d  - Started on metoprolol 12.5 twice daily on 5/4/2025  -Hold Statin until LFTs <100  -Intake/Output  -Daily standing weights if able  -Furosemide gtt off, eventually transition to oral Lasix 40 daily.  -Monitor closely  -Underwent Lexiscan stress test today.  Shows transmural infarction in the mid to distal anterolateral segments of the left ventricle associated with mild degree of boubacar-infarct ischemia.  EF 60%.  Awaiting cardiology recommendations regarding the need for cardiac cath  -Patient will eventually need to be started on anticoagulation upon discharge     Septic shock in the setting of UTI  Urinary retention s/p bender placement  UA neg nitrite, large LE, WBC 17. UCx E. Coli 50-100K. Urinary retention on admission in the setting of UTI and GIOVANNI, UTI being treated and GIOVANNI improving. Initially treated with Zosyn which was transitioned to ceftriaxone after UCx noted E. Coli. Mild leukocytosis resolved. BCx NGTD.   -Increase ceftriaxone to 2gm in the setting of resolving GIOVANNI.  Complete 7-day course.  - Bender removed on 5/4/2025.  -Monitor     Paroxysmal atrial fibrillation with RVR, not on AC, new diagnosis  History of transient Afib 2018  Hx runs of Afib in 2018 in setting of STEMI and cardiogenic shock, discharged on DAPT but not recommended for anticoagulation at that time due to transient in the setting of acute illness. Now with recurrent Afib again this hospitalization requiring amiodarone gtt and since transitioned to amiodarone PO. CHADS-VASc score 4   - Cardiac  monitoring  -Patient converted to sinus rhythm on 5/5/2025.    - Continue on amiodarone 200 mg twice daily  - Restarted Toprol 12.5 mg twice daily on 5/4/2025  - Was initially on heparin drip.  Discontinued on 5/5/2025.  Will start DOAC upon discharge.  - Pharmacy Liaison for DOAC coverage  - PRN IV metoprolol for sustained heart rate >120  - Monitor K+/Mg++, replace PRN with improving GIOVANNI     Acute kidney injury, improving  Baseline Cr 0.9. Cr peaked at 3.69 and has downtrended to 1.94 day of transfer out of ICU 05/04/25. Nephrology was following and singed off 5/4 with improving Cr, sodium normalized, excellent urine output. Felt likely ischemic ATN in setting of shock.  - Avoid nephrotoxins - contrast, NSAIDs  -Creatinine 1.46 on 5/6  - Renally dose medications as able/needed  - Diuresis per cardiology  - Monitor UOP  - Monitor daily, Nephrology signed off 5/4     Acute hypoxic respiratory failure, improving  Hypercapnia  Pleural effusion s/p thoracentesis 4/30/25, improving  Resolving on imaging with diuresis. Was on BiPAP and weaned to 1 L nasal cannula currently.  Has been hypercapneic with CO2 levels ranging 50s-60sm, stable/improving. Pleural fluid 4/30 neg for organisms. Transudative effusion.  -Wean O2 as able.  Currently on 1 L nasal cannula  -Monitor  -Can re-trial BiPAP if needed, possibly nocturnally     Shock liver in the setting of septic and cardiogenic shock, improving  Elevated LFTs not in obstructive pattern with some mild RUQ pain on exam. US cholelithiasis, nonspecific GB thickening could represent cholecystitis though negative pagan.   No signs of developing acute cholecystitis, tolerating PO  LFT trend improving, in the setting of shock.   Tbili 1.5-0.5 normalized  Alk Phos [phone]  -[phone]  -196-93  - LFTs improving and pain improving  - Continue to monitor consider repeating US or HIDA scan if remain concerned  - HOLD statin until liver tests improved and <100    "  Elevated D-dimer  V/Q negative for PE  In conjunction with fall at home and new hypoxia there is concern for PE. Reassuringly dopplers negative for DVT. Unable to give contrast CT due to GIOVANNI. V/Q negative for PE, doubt PE given shock picture above and now weaining  O2.  - Wean O2 as able  - Was on heparin for NSTEMI.  Stopped on 525     Physical deconditioning  Resides in group home  -PT recommending TCU  -Continue to work with therapies  -SW/CC following     Resolved Hospital problems  Hypernatremia  Hyperkalemia  Pleural effusion  Leukocytosis     Chronic stable diagnoses and other pertinent medical history: Appropriate PTA medications will be resumed.      Cognitive disorder with history of TBI in 2012  Currently living in group home  - Continue PTA Keppra and gabapentin 200mg Q HS     Generalized anxiety and depression: Continue PTA mirtazapine 30mg Q HS, sertraline 150mg/d      GERD: Continue PPI     Hypothyroidism: Continue PTA TRT 75mcg/d     Lactose intolerance: Continue lactase TID     Clinically Significant Risk Factors     # Hypokalemia: Lowest K = 3.3 mmol/L in last 2 days, will replace as needed   # Hypochloremia: Lowest Cl = 97 mmol/L in last 2 days, will monitor as appropriate   # Hypercalcemia: corrected calcium is >10.1, will monitor as appropriate    # Hypoalbuminemia: Lowest albumin = 3 g/dL at 5/5/2025  5:39 AM, will monitor as appropriate     # Hypertension: Noted on problem list     # Acute Hypercapnic Respiratory Failure: based on venous blood gas results.  Continue supplemental oxygen and ventilatory support as indicated.         # Obesity: Estimated body mass index is 31.12 kg/m  as calculated from the following:    Height as of this encounter: 1.651 m (5' 5\").    Weight as of this encounter: 84.8 kg (187 lb).        # Financial/Environmental Concerns: none          DVT Prophylaxis: Pneumatic Compression Devices  Code Status: Full Code  Medically Ready for Discharge: Anticipated " Tomorrow      Please see A&P for additional details of medical decision making.  35 MINUTES SPENT BY ME on the date of service doing chart review, history, exam, documentation & further activities per the note.  Reviewed cardiology notes    Harmony Flores MD, MD  [phone](p)

## 2025-05-06 NOTE — PLAN OF CARE
Neuro: A&Ox4  Tele/Cardiac: SR  Resp: 1-2L NC  Activity: A1 gb/walker  Pain: denies  Drips/IV: SL  GI/: incontinent of bladder and bowel. Purewick in place overnight  Skin: scattered bruising. Excoriated groin, blanchable redness coccyx   Diet: Diet: 2 Gram Sodium Diet  Room Service     NPO 0500  Test/Procedures: Nadia scan today  Plan: Nadia scan, plan of care ongoing

## 2025-05-06 NOTE — PROGRESS NOTES
Per Howard Dobson PA-C- ok order food, no intervention today. Provider ok to discontinue IMC orders.

## 2025-05-06 NOTE — PROGRESS NOTES
Care Management Follow Up    Length of Stay (days): 7    Expected Discharge Date: 05/07/2025     Concerns to be Addressed: discharge planning     Patient plan of care discussed at interdisciplinary rounds: Yes    Anticipated Discharge Disposition: Group Home              Anticipated Discharge Services: None  Anticipated Discharge DME: None    Patient/family educated on Medicare website which has current facility and service quality ratings: no  Education Provided on the Discharge Plan: No  Patient/Family in Agreement with the Plan: yes    Referrals Placed by CM/SW:    Private pay costs discussed: Not applicable    Discussed  Partnership in Safe Discharge Planning  document with patient/family: Yes: bedside nurse Oz, patient     Handoff Completed: No, handoff not indicated or clinically appropriate    Additional Information:  SW met with the patient and talked to her about acceptance to TCU [place] and explained TCU to the patient and will be there to gain strength and independence. SW asked the patient if [facility] that [facility] Patient told SW they will need transportation and SW made a phone call to [facility] and spoke with Ami and arranged wheelchair with O2 transport for the patient. The ride schedule between 1208 - 1253 and PHILIPPE updated patient about the ride time and is in agreement as well as the bedside nurse informed. PHILIPPE called Maddi with [facility] and updated them about patient transportation arranged and TCU facility patient will be staying at and PHILIPPE updated Marianela with [facility] on transport time and is in agreement.    Next Steps: orders    ROSIE Aguilar  [facility]  Social Work  [address] [place] [address]

## 2025-05-06 NOTE — PROGRESS NOTES
[facility] Cardiology Progress Note    Date of Admission: 4/29/2025  Service Date: 05/06/2025     Hospital Summary:  Ms. Amy Bryan is a very pleasant 69 year old female with a past medical history of TBI in 2012, CAD s/p PCI in 2018 and 2021, hx of takotsubo admitted on 4/29/2025 with fatigue and positive troponin w/ EF 45%. While in the ED, hypoxic to the 80's on room air and BP notably soft into the 90's. Lab revealed Cr of 3.23, K of 5.7. Admitted to the ICU given need for pressors for non-fluid responsive hypotension. Bilateral pleural effusions, elevated troponin's and EKG changes 2/2 stress cardiomyopathy vs. NSTEMI, likely urosepsis and elevated LFT's. Central and arterial line's placed and she underwent thoracentesis on 5/4/25.     Repeat echo performed 5/3/25 showed improved EF to 55-60% with mid-lateral/inferolateral wall hypokinesis. Noted to have new onset atrial fibrillation in the setting of septic shock on pressors. Started on heparin gtt for AC, and amiodarone 200 mg BID and metoprolol tartrate 12.5 daily for rhythm and rate control, respectively. CHADS-VASc score of 4.      Interval History:  HR 60-70s. -130s. Net +450 . Weight is up from yesterday. K 3.3. Cr 1.46, Trending down. Mg 1.9.     Patient reports she is feeling much better today. Cr down significantly from admission, now at 1.46 from highest of 3.69 on 4/30/2025. She denies chest pain, dyspnea, palpitations, orthopnea or LE edema. Presence of P-waves on telemetry, had two EKG's yesterday 5/5/25, one showing Afib and one showing NSR. Patient currently on heparin gtt for AC. Notes that she has a history of falls, denies history of GI bleed.     Detailed Assessment:  Stress cardiomyopathy vs. NSTEMI, TTE 5/3/2025 showing improved EF from 45% to 55-60%  1st TTE this admission shows LVEF of 45 to 50% with mid lateral and mid anterior wall hypokinesia, improved to 55%-60% on 5/3/2025.  "  Last troponin (4/29/25) showed improvement 994 --> 692.  Patient denies persistent chest pain/dyspnea today.  Stress imaging 5/6/25  Transmural infarction in mid to basal anterolateral segment of the left ventricle associated w/ mild degree of boubacar-infarct ischemia.  LVEF normal, EF at stress is 60%.  Septic shock likely 2/2 UTI, resolved  Following with intensivist/nephrology.  Paroxysmal atrial fibrillation in setting of septic shock   Noted on EKG obtained 5/1/25.  Started on heparin gtt for AC as well as amiodarone 200 mg BID and Lopressor 12.5 mg daily.  Hx of PAF w/ prior history of PCI in 2018, but no other history of Afib previously.  EKG obtained 5/5/25 showed NSR, but noted to alternate back to Afib throughout the day yesterday on telemetry.  Pacerone 200 mg BID.  GIOVANNI, improving  Cr down significantly from admission, now at 1.6 from highest of 3.69 on 4/30/2025.  Dyslipidemia  PTA Crestor on hold given elevated LFT's ( today).  Hx of CAD w/ thrombectomy to the PL 3 branch and POBA to the D1 in 2018, drug-eluting stent x 3 the left main into the proximal LAD, drug-eluting stent x 1 to the D2 with ongoing small 80% D1 lesion, 70% circumflex lesion and minimal disease in the RCA in 2021     Plan:  Begin AC at discharge  Continue amiodarone 200 mg daily and lopressor 25 BID  Continue lisinopril 5 mg daily  Continue lasix 40 mg daily  Continue telemetry  Daily weights  Monitor intake/output      Thank you for the opportunity to participate in this pleasant patient's care.     Howard Dobson PA-C   [facility]  Securely message via eLama (8am - 5pm, M-F)      Objective:    Vitals: /72   Pulse 73   Temp 97.9  F (36.6  C)   Resp 18   Ht 1.651 m (5' 5\")   Wt 84.8 kg (187 lb)   SpO2 94%   BMI 31.12 kg/m    Wt Readings from Last 4 Encounters:   05/06/25 84.8 kg (187 lb)   03/15/23 102.2 kg (225 lb 4.8 oz)   02/28/22 97.3 kg (214 lb 8 oz)   08/18/21 91.6 kg (202 lb)     I/O last " 3 completed shifts:  In: 850 [P.O.:850]  Out: 650 [Urine:650]    Physical Exam:  General: Awake and alert. No acute distress.  Skin: Warm and dry. Appropriate color. No lesions or rashes noted.  HEENT: Normocephalic and atraumatic. EOM intact. PERRL. Trachea midline. Right-sided JVD: none.  Cardiac: Normal S1 and S2, no murmurs, rubs, or gallops noted. Regular rate and rhythm .  Lung: Regular work of breathing without accessory muscle use. Clear to auscultation bilaterally.  Abdomen: No distension.  Extremities: Bilateral lower extremity edema: none.  Neuro: A & O. No focal deficits noted. Moves all extremities appropriately.      Imaging:  Recent Results (from the past 48 hours)   NM Lexiscan stress test (nuc card)   Result Value    Target     Baseline Systolic     Baseline Diastolic BP 82    Last Stress Systolic     Last Stress Diastolic BP 61    Baseline HR 66    Max HR  74    Max Predicted HR  48    Rate Pressure Product 10,212.0       NM MPI w/ Lexiscan, 5/6/25:  Interpretation    The nuclear stress test is abnormal.    There is transmural infarction in the mid to basal anterolateral segment(s) of the left ventricle associated with a mild degree of boubacar-infarct ischemia.    Left ventricular function is normal.    The left ventricular ejection fraction at stress is 60%.    A prior study was conducted on 6/30/2021.  This study has changes noted when compared with the prior study. there is a mild degree of boubacar infarct ischemia noted on current study      Data   Recent Labs   Lab 05/06/25  0558 05/05/25  0539 05/04/25  0618   WBC 6.2 6.9 6.9   HGB 12.4 12.3 12.2   HCT 40.3 37.8 39.4   MCV 93 90 93    198 170     Recent Labs   Lab 05/06/25  0558 05/05/25  0539 05/04/25  2142 05/04/25  0924 05/04/25  0618    140  --   --  142   POTASSIUM 3.3* 4.1  --   --  3.5   CHLORIDE 97* 97*  --   --  99   CO2 33* 32*  --   --  31*   ANIONGAP 11 11  --   --  12   * 102* 120*   < > 100*   BUN  36.1* 39.2*  --   --  42.5*   CR 1.46* 1.60*  --   --  1.94*   GFRESTIMATED 39* 35*  --   --  28*   HECTOR 9.8 9.4  --   --  9.3    < > = values in this interval not displayed.     Recent Labs   Lab 04/30/25  0934 04/29/25  1948   LACT 1.0 1.1     Recent Labs   Lab 05/06/25  0558 05/05/25  0539 05/04/25  0618    198 170          Past Medical History   Past Medical History:   Diagnosis Date    Cardiogenic shock (H) 07/09/2021    Coma (H) 05/2012    CHT after a fall    Coronary artery disease involving native coronary artery of native heart 07/09/2021    3 vessel    Developmental delay     DVT (deep vein thrombosis) in pregnancy     post trauma    Fall 05/2012    multiple fracture    Hypertension     Menopause     age 52    Pelvic fracture (H) 5-201`2    Rib fracture 05/2012    STEMI (ST elevation myocardial infarction) (H) 01/25/2018       Past Surgical History   Past Surgical History:   Procedure Laterality Date    COLONOSCOPY  01/01/2010    CV CORONARY LITHOTRIPSY PCI N/A 8/3/2021    Procedure: CV Coronary Lithotripsy PCI;  Surgeon: Kamran Singleton MD;  Location:  HE[facility]ARDI[facility] CATH LAB    CV HEART CATHETERIZATION WITH POSSIBLE INTERVENTION N/A 8/3/2021    Procedure: Heart Catheterization with Possible Intervention;  Surgeon: Kamran Singleton MD;  Location:  HEART [facility]ARD[facility] CATH LAB    CV INTRAVASULAR ULTRASOUND N/A 8/3/2021    Procedure: Intravascular Ultrasound;  Surgeon: Kamran Singleton MD;  Location:  HEART CARD[facility] CATH LAB    CV PCI STENT DRUG ELUTING N/A 8/3/2021    Procedure: Percutaneous Coronary Intervention Stent Drug Eluting;  Surgeon: Kamran Singleton MD;  Location:  HEART CARDIAC CATH LAB    HEART CATH DRUG ELUTING STENT PLACEMENT N/A 01/25/2018    LASER YAG CAPSULOTOMY Left 05/29/2018    Procedure: LASER YAG CAPSULOTOMY;  LEFT YAG LASER CAPSULOTOMY ;  Surgeon: Tabby Guillaume MD;  Location:  [facility]    LASER YAG CAPSULOTOMY Right 06/05/2018    Procedure: LASER  YAG CAPSULOTOMY;  RIGHT EYE YAG LASER CAPSULOTOMY ;  Surgeon: Tabby Guillaume MD;  Location[facility]       Prior to Admission Medications   Prior to Admission Medications   Prescriptions Last Dose Informant Patient Reported? Taking?   Dextromethorphan-guaiFENesin  MG/5ML syrup  Nursing Home Yes Yes   Sig: Take 10 mLs by mouth every 4 hours as needed for cough.   Dihydroxyaluminum Sod Carb (ROLAIDS PO)  Nursing Home Yes Yes   Sig: Take 2 tablets by mouth 4 times daily as needed.   FIBER- MG tablet  Nursing Home Yes Yes   Sig: Take 1 tablet by mouth daily as needed.   LORazepam (ATIVAN) 0.5 MG tablet  Nursing Home Yes Yes   Sig: Take 0.5 mg by mouth 3 times daily as needed for anxiety.   QUEtiapine (SEROQUEL) 25 MG tablet 4/29/2025 at 12:00 PM Nursing Home Yes Yes   Sig: Take 25 mg by mouth 4 times daily. At 0800, 1200, 1700, 2000   acetaminophen (TYLENOL) 500 MG tablet  Nursing Home Yes Yes   Sig: Take 1,000 mg by mouth 2 times daily as needed.   aspirin (ASA) 81 MG EC tablet 4/29/2025 at  8:00 AM Nursing Home No Yes   Sig: Take 1 tablet (81 mg) by mouth daily   clopidogrel (PLAVIX) 75 MG tablet 4/29/2025 at  8:00 AM Nursing Home No Yes   Sig: Take 1 tablet (75 mg) by mouth daily   emollient (VANICREAM) external cream  Nursing Home No Yes   Sig: APPLY FROM HEAD TO TOE DAILY AFTER SHOWERS *CALL TO REORDER*   furosemide (LASIX) 20 MG tablet 4/29/2025 at  8:00 AM Nursing Home Yes Yes   Sig: Take 20 mg by mouth daily   gabapentin (NEURONTIN) 100 MG tablet 4/29/2025 at 12:00 PM Nursing Home Yes Yes   Sig: Take 100 mg by mouth 2 times daily. Takes at 0800 and 1200   gabapentin (NEURONTIN) 100 MG tablet 4/28/2025 at  8:00 PM Nursing Home Yes Yes   Sig: Take 200 mg by mouth at bedtime.   lactase (LACTAID) 9000 units TABS tablet 4/29/2025 at 12:00 PM Nursing Home Yes Yes   Sig: Take 9,000 Units by mouth 3 times daily (with meals).   levETIRAcetam (KEPPRA) 500 MG tablet 4/29/2025 at  8:00 AM Nursing Home Yes Yes    Sig: Take 500 mg by mouth 2 times daily.   levothyroxine (SYNTHROID/LEVOTHROID) 75 MCG tablet 4/29/2025 at  8:00 AM Nursing Home Yes Yes   Sig: Take 75 mcg by mouth every morning (before breakfast).   lisinopril (ZESTRIL) 10 MG tablet 4/28/2025 at  8:00 PM Nursing Home No Yes   Sig: Take 0.5 tablets (5 mg) by mouth daily   loperamide (IMODIUM) 2 MG capsule  Nursing Home Yes Yes   Sig: Take 2 mg by mouth 4 times daily as needed for diarrhea   melatonin 3 MG tablet 4/28/2025 Bedtime Nursing Home Yes Yes   Sig: Take 1 tablet by mouth at bedtime.   metoprolol tartrate (LOPRESSOR) 25 MG tablet 4/29/2025 at  8:00 AM Nursing Home Yes Yes   Sig: Take 75 mg by mouth 2 times daily   metroNIDAZOLE (METROGEL) 0.75 % external gel  Nursing Home Yes Yes   Sig: Apply topically 2 times daily as needed.   mirtazapine (REMERON) 30 MG tablet 4/28/2025 at  8:00 PM Nursing Home Yes Yes   Sig: Take 30 mg by mouth At Bedtime   nitroGLYcerin (NITROSTAT) 0.4 MG sublingual tablet  Nursing Home No Yes   Sig: For chest pain place 1 tablet under the tongue every 5 minutes for 3 doses. If symptoms persist 5 minutes after 1st dose call 911.   nystatin (MYCOSTATIN) 981210 UNIT/GM external powder  Nursing Home Yes Yes   Sig: Apply topically 2 times daily as needed for dry skin.   omeprazole (PRILOSEC) 20 MG DR capsule 4/29/2025 at  8:00 AM Nursing Home Yes Yes   Sig: Take 20 mg by mouth every morning.   ondansetron (ZOFRAN-ODT) 4 MG ODT tab  Nursing Home Yes Yes   Sig: TAKE ONE TABLET BY MOUTH TWICE A DAY AS NEEDED FOR NAUSEA & VOMITING   potassium chloride ER (KLOR-CON M) 20 MEQ CR tablet 4/29/2025 at  8:00 AM Nursing Home Yes Yes   Sig: Take 1 tablet by mouth daily.   rosuvastatin (CRESTOR) 20 MG tablet 4/29/2025 at  8:00 AM Nursing Home No Yes   Sig: Take 0.5 tablets (10 mg) by mouth daily   sertraline (ZOLOFT) 100 MG tablet 4/29/2025 at  8:00 AM Nursing Home Yes Yes   Sig: Take 100 mg by mouth daily. Takes with 50mg tablet for a total dose of  150mg daily   sertraline (ZOLOFT) 50 MG tablet 4/29/2025 at  8:00 AM Nursing Home Yes Yes   Sig: Take 50 mg by mouth daily. Takes with 100mg tablet for a total dose of 150mg daily   trolamine salicylate (ASPERCREME) 10 % cream  Nursing Home Yes Yes   Sig: Apply topically as needed for moderate pain To affected areas   vitamin D3 (CHOLECALCIFEROL) 50 mcg (2000 units) tablet 4/29/2025 at  8:00 AM Nursing Home Yes Yes   Sig: Take 1 tablet by mouth daily.      Facility-Administered Medications: None     Current Facility-Administered Medications   Medication Dose Route Frequency Provider Last Rate Last Admin    albuterol (PROVENTIL HFA/VENTOLIN HFA) inhaler  2 puff Inhalation Q5 Min PRN Madhav Kimble MD        aminophylline  mg   mg Intravenous Once PRN Madhav Kimble MD        amiodarone (PACERONE) tablet 200 mg  200 mg Oral BID Howard Dobson PA-C   200 mg at 05/06/25 1000    aspirin (ASA) chewable tablet 81 mg  81 mg Oral Daily Denise Sunshine PA-C   81 mg at 05/06/25 0959    caffeine (NO-DOZE) tablet 200 mg  200 mg Oral Once PRN Madhav Kimble MD        caffeine citrate (CAFCIT) injection 60 mg  60 mg Intravenous Once PRN Madhav Kimble MD        calcium carbonate (TUMS) chewable tablet 1,000 mg  1,000 mg Oral 4x Daily PRN Denise Sunshine PA-C        carboxymethylcellulose PF (REFRESH PLUS) 0.5 % ophthalmic solution 1 drop  1 drop Both Eyes Q1H PRN Denise Sunshine PA-C        cefTRIAXone (ROCEPHIN) 2 g vial to attach to  ml bag for ADULTS or NS 50 ml bag for PEDS  2 g Intravenous Q24H Harmony Flores MD        Continuing beta blocker from home medication list OR beta blocker order already placed during this visit   Does not apply DOES NOT GO TO Denise Simpson PA-C        Continuing statin from home medication list OR statin order already placed during this visit   Does not apply DOES NOT GO TO Denise Simpson  SAADIA        glucose gel 15-30 g  15-30 g Oral Q15 Min PRN Denise Sunshine PA-C        Or    dextrose 50 % injection 25-50 mL  25-50 mL Intravenous Q15 Min PRN Denise Sunshine PA-C        Or    glucagon injection 1 mg  1 mg Subcutaneous Q15 Min PRN Denise Sunshine PA-C        furosemide (LASIX) tablet 40 mg  40 mg Oral Daily Darren Wren MD   40 mg at 05/06/25 1007    gabapentin (NEURONTIN) capsule 200 mg  200 mg Oral At Bedtime Denise Sunshine PA-C   200 mg at 05/05/25 2111    HOLD: Caffeine containing medications 12 hours prior to the procedure   Does not apply HOLD Darren Wren MD        HOLD: dipyridamole (PERSANTINE) or aspirin/dipyridamole (AGGRENOX) 48 hours prior to the procedure   Does not apply HOLD Darren Wren MD        HOLD: theophylline or aminophylline 12 hours prior to the procedure   Does not apply HOLD Darren Wren MD        IF patient diabetic - HOLD: ALL ORAL HYPOGLYCEMICS and include: glipizide, glyburide, glimepiride, gliclazide, metformin, any metformin containing medication, on day of the procedure   Does not apply HOLD Darren Wren MD        lactase (LACTAID) tablet 9,000 Units  9,000 Units Oral TID w/meals Denise Sunshine PA-C   9,000 Units at 05/06/25 1322    levETIRAcetam (KEPPRA) tablet 500 mg  500 mg Oral BID Denise Sunshine PA-C   500 mg at 05/06/25 0959    levothyroxine (SYNTHROID/LEVOTHROID) tablet 75 mcg  75 mcg Oral QAM AC Denise Sunshine PA-C   75 mcg at 05/06/25 0641    lidocaine (LMX4) cream   Topical Q1H PRN Densie Sunshine PA-C        lidocaine 1 % 0.1-1 mL  0.1-1 mL Other Q1H PRN Denise Sunshine PA-C        loperamide (IMODIUM) capsule 2 mg  2 mg Oral 4x Daily PRN Denise Sunshine PA-C   2 mg at 05/02/25 2057    LORazepam (ATIVAN) tablet 0.5 mg  0.5 mg Oral TID PRN Denise Sunshine PA-C        metoprolol (LOPRESSOR) injection 5 mg  5 mg Intravenous Q15 Min  PRN Denise Sunshine PA-C   5 mg at 05/04/25 0421    metoprolol tartrate (LOPRESSOR) tablet 25 mg  25 mg Oral or NG Tube BID Darren Wren MD   25 mg at 05/06/25 0959    mirtazapine (REMERON) tablet 30 mg  30 mg Oral At Bedtime Denise Sunshine PA-C   30 mg at 05/05/25 2111    nitroGLYcerin (NITROSTAT) sublingual tablet 0.4 mg  0.4 mg Sublingual Q5 Min PRN Denise Sunshine PA-C        No lozenges or gum should be given while patient on BIPAP/AVAPS/AVAPS AE   Does not apply Continuous PRN Denise Sunshine PA-C        pantoprazole (PROTONIX) EC tablet 40 mg  40 mg Oral QAM Denise Sunshine PA-C   40 mg at 05/06/25 0959    Patient is already receiving anticoagulation with heparin, enoxaparin (LOVENOX), warfarin (COUMADIN)  or other anticoagulant medication   Does not apply Continuous PRN Denise Sunshine PA-C        Patient may continue current oral medications   Does not apply Continuous PRN Denise Sunshine PA-C        [Held by provider] QUEtiapine (SEROquel) tablet 25 mg  25 mg Oral 4x Daily Denise Sunshine PA-C   25 mg at 04/30/25 0748    Reason ACE/ARB/ARNI order not selected   Other DOES NOT GO TO Denise Simpson PA-C        senna-docusate (SENOKOT-S/PERICOLACE) 8.6-50 MG per tablet 1 tablet  1 tablet Oral BID PRN Denise Sunshine PA-C        Or    senna-docusate (SENOKOT-S/PERICOLACE) 8.6-50 MG per tablet 2 tablet  2 tablet Oral BID PRN Denise Sunshine PA-C        sertraline (ZOLOFT) tablet 150 mg  150 mg Oral Daily Denise Sunshine PA-C   150 mg at 05/06/25 1000    sodium chloride (PF) 0.9% PF flush 1-10 mL  1-10 mL Intravenous Q10 Min PRN Darren Wren MD        sodium chloride (PF) 0.9% PF flush 3 mL  3 mL Intracatheter Q8H ABHI Denise Sunshine PA-C   3 mL at 05/06/25 1001    sodium chloride (PF) 0.9% PF flush 3 mL  3 mL Intracatheter q1 min prn Denise Sunshine PA-C   3 mL  at 05/05/25 1152    sodium chloride 0.9% BOLUS 250 mL  250 mL Intravenous Once PRN Madhav Kimble MD         Current Facility-Administered Medications   Medication Dose Route Frequency Provider Last Rate Last Admin    albuterol (PROVENTIL HFA/VENTOLIN HFA) inhaler  2 puff Inhalation Q5 Min PRN Madhav Kimble MD        aminophylline  mg   mg Intravenous Once PRN Madhav Kimble MD        amiodarone (PACERONE) tablet 200 mg  200 mg Oral BID Howard Dobson PA-C   200 mg at 05/06/25 1000    aspirin (ASA) chewable tablet 81 mg  81 mg Oral Daily Denise Sunshine PA-C   81 mg at 05/06/25 0959    caffeine (NO-DOZE) tablet 200 mg  200 mg Oral Once PRN Madhav Kimble MD        caffeine citrate (CAFCIT) injection 60 mg  60 mg Intravenous Once PRN Madhav Kimble MD        calcium carbonate (TUMS) chewable tablet 1,000 mg  1,000 mg Oral 4x Daily PRN Denise Sunshine PA-C        carboxymethylcellulose PF (REFRESH PLUS) 0.5 % ophthalmic solution 1 drop  1 drop Both Eyes Q1H PRN Denise Sunshine PA-C        cefTRIAXone (ROCEPHIN) 2 g vial to attach to  ml bag for ADULTS or NS 50 ml bag for PEDS  2 g Intravenous Q24H Harmony Flores MD        Continuing beta blocker from home medication list OR beta blocker order already placed during this visit   Does not apply DOES NOT GO TO Denise Simpson PA-C        Continuing statin from home medication list OR statin order already placed during this visit   Does not apply DOES NOT GO TO Denise Simpson PA-C        glucose gel 15-30 g  15-30 g Oral Q15 Min PRN Denise Sunshine PA-C        Or    dextrose 50 % injection 25-50 mL  25-50 mL Intravenous Q15 Min PRN Denise Sunshine PA-C        Or    glucagon injection 1 mg  1 mg Subcutaneous Q15 Min PRN Denise Sunshine PA-C        furosemide (LASIX) tablet 40 mg  40 mg Oral Daily Darren Wren MD   40 mg at  05/06/25 1007    gabapentin (NEURONTIN) capsule 200 mg  200 mg Oral At Bedtime Denise Sunshine PA-C   200 mg at 05/05/25 2111    HOLD: Caffeine containing medications 12 hours prior to the procedure   Does not apply HOLD Darren Wren MD        HOLD: dipyridamole (PERSANTINE) or aspirin/dipyridamole (AGGRENOX) 48 hours prior to the procedure   Does not apply HOLD Darren Wren MD        HOLD: theophylline or aminophylline 12 hours prior to the procedure   Does not apply HOLD Darren Wren MD        IF patient diabetic - HOLD: ALL ORAL HYPOGLYCEMICS and include: glipizide, glyburide, glimepiride, gliclazide, metformin, any metformin containing medication, on day of the procedure   Does not apply HOLD Darren Wren MD        lactase (LACTAID) tablet 9,000 Units  9,000 Units Oral TID w/meals Denise Sunshine PA-C   9,000 Units at 05/06/25 1322    levETIRAcetam (KEPPRA) tablet 500 mg  500 mg Oral BID Denise Sunshine PA-C   500 mg at 05/06/25 0959    levothyroxine (SYNTHROID/LEVOTHROID) tablet 75 mcg  75 mcg Oral QAM AC Denise Sunshine PA-C   75 mcg at 05/06/25 0641    lidocaine (LMX4) cream   Topical Q1H PRN Denise Sunshine PA-C        lidocaine 1 % 0.1-1 mL  0.1-1 mL Other Q1H PRN Denise Sunshine PA-C        loperamide (IMODIUM) capsule 2 mg  2 mg Oral 4x Daily PRN Denise Sunshine PA-C   2 mg at 05/02/25 2057    LORazepam (ATIVAN) tablet 0.5 mg  0.5 mg Oral TID PRN Denise Sunshine PA-C        metoprolol (LOPRESSOR) injection 5 mg  5 mg Intravenous Q15 Min PRN Denise Sunshine PA-C   5 mg at 05/04/25 0421    metoprolol tartrate (LOPRESSOR) tablet 25 mg  25 mg Oral or NG Tube BID Darren Wren MD   25 mg at 05/06/25 0959    mirtazapine (REMERON) tablet 30 mg  30 mg Oral At Bedtime Denise Sunshine PA-C   30 mg at 05/05/25 2111    nitroGLYcerin (NITROSTAT) sublingual tablet 0.4 mg  0.4 mg Sublingual Q5 Min PRN  Denise Sunshine PA-C        No lozenges or gum should be given while patient on BIPAP/AVAPS/AVAPS AE   Does not apply Continuous PRN Denise Sunshine PA-C        pantoprazole (PROTONIX) EC tablet 40 mg  40 mg Oral QAM Denise Sunshine PA-C   40 mg at 05/06/25 0959    Patient is already receiving anticoagulation with heparin, enoxaparin (LOVENOX), warfarin (COUMADIN)  or other anticoagulant medication   Does not apply Continuous PRN Denise Sunshine PA-C        Patient may continue current oral medications   Does not apply Continuous PRN Denise Sunshine PA-C        [Held by provider] QUEtiapine (SEROquel) tablet 25 mg  25 mg Oral 4x Daily Denise Sunshine PA-C   25 mg at 04/30/25 0748    Reason ACE/ARB/ARNI order not selected   Other DOES NOT GO TO Denise Simpson PA-C        senna-docusate (SENOKOT-S/PERICOLACE) 8.6-50 MG per tablet 1 tablet  1 tablet Oral BID PRN Denise Sunshine PA-C        Or    senna-docusate (SENOKOT-S/PERICOLACE) 8.6-50 MG per tablet 2 tablet  2 tablet Oral BID PRN Denise Sunshine PA-C        sertraline (ZOLOFT) tablet 150 mg  150 mg Oral Daily Denise Sunshine PA-C   150 mg at 05/06/25 1000    sodium chloride (PF) 0.9% PF flush 1-10 mL  1-10 mL Intravenous Q10 Min PRN Darren Wren MD        sodium chloride (PF) 0.9% PF flush 3 mL  3 mL Intracatheter Q8H ABHI Denise Sunshine PA-C   3 mL at 05/06/25 1001    sodium chloride (PF) 0.9% PF flush 3 mL  3 mL Intracatheter q1 min prn Denise Sunshine PA-C   3 mL at 05/05/25 1152    sodium chloride 0.9% BOLUS 250 mL  250 mL Intravenous Once PRN Madhav Kimble MD         Allergies   Allergies   Allergen Reactions    Penicillins      dizziness       Social History    reports that she has never smoked. She has never used smokeless tobacco. She reports that she does not currently use alcohol after a past usage of about 1.7 standard  drinks of alcohol per week.    Family History   I have reviewed this patient's family history and updated it with pertinent information if needed.  Family History   Problem Relation Age of Onset    Other - See Comments Father         alpha 1 antitrypsin deficiency     Cancer - colorectal Mother     Diabetes Sister           Review of Systems   A comprehensive review of system was performed and is negative other than that noted in the HPI.     Primary Care Physician   [facility]

## 2025-05-06 NOTE — PROGRESS NOTES
"CLINICAL NUTRITION SERVICES - ASSESSMENT NOTE    RECOMMENDATIONS FOR MDs/PROVIDERS TO ORDER:  None    Registered Dietitian Interventions:  - Continue ordering TID meals with \"good\" intakes.     Future/Additional Recommendations:  - Monitor adequacy of PO intakes.     Reason for Assessment: LOS    Reason for Admission: Hyperkalemia   PMH:   Past Medical History:   Diagnosis Date    Cardiogenic shock (H) 07/09/2021    Coma (H) 05/2012    CHT after a fall    Coronary artery disease involving native coronary artery of native heart 07/09/2021    3 vessel    Developmental delay     DVT (deep vein thrombosis) in pregnancy     post trauma    Fall 05/2012    multiple fracture    Hypertension     Menopause     age 52    Pelvic fracture (H) 5-201`2    Rib fracture 05/2012    STEMI (ST elevation myocardial infarction) (H) 01/25/2018     SUBJECTIVE INFORMATION  Assessed patient in room.  - Briefly discussed following a low sodium diet.     NUTRITION HISTORY  Patient reports no changes in appetite PTA, eating 2-3 meals daily with no weight changes. Patient attempts to follow a low sodium diet, doesn't add any additional salt to foods.     CURRENT NUTRITION ORDERS  Diet: 2 g Sodium    CURRENT INTAKE/TOLERANCE  % of TID meals per I/O flowsheets and per [facility] room service meal tray ticket review    LABS  Nutrition-relevant labs: Reviewed  Lab Results   Component Value Date     05/06/2025    POTASSIUM 3.3 (L) 05/06/2025     (H) 05/06/2025    BUN 36.1 (H) 05/06/2025    CR 1.46 (H) 05/06/2025    GFRESTIMATED 39 (L) 05/06/2025    HECTOR 9.8 05/06/2025    MAG 1.9 05/06/2025    PHOS 5.1 (H) 05/06/2025     MEDICATIONS  Nutrition-relevant medications: Reviewed  Current Facility-Administered Medications   Medication Dose Route Frequency Provider Last Rate Last Admin    amiodarone (PACERONE) tablet 200 mg  200 mg Oral BID Howard Dobson PA-C   200 mg at 05/05/25 2111    aspirin (ASA) chewable tablet 81 mg  81 mg Oral " Daily Denise Sunshine PA-C   81 mg at 05/05/25 0900    calcium carbonate (TUMS) chewable tablet 1,000 mg  1,000 mg Oral 4x Daily PRN Denise Sunshine PA-C        carboxymethylcellulose PF (REFRESH PLUS) 0.5 % ophthalmic solution 1 drop  1 drop Both Eyes Q1H PRN Denise Sunshine PA-C        cefTRIAXone (ROCEPHIN) 2 g vial to attach to  ml bag for ADULTS or NS 50 ml bag for PEDS  2 g Intravenous Q24H Harmony Flores MD        Continuing beta blocker from home medication list OR beta blocker order already placed during this visit   Does not apply DOES NOT GO TO Denise Simpson PA-C        Continuing statin from home medication list OR statin order already placed during this visit   Does not apply DOES NOT GO TO Denise Simpson PA-C        glucose gel 15-30 g  15-30 g Oral Q15 Min PRN Denise Sunshine PA-C        Or    dextrose 50 % injection 25-50 mL  25-50 mL Intravenous Q15 Min PRN Denise Sunshine PA-C        Or    glucagon injection 1 mg  1 mg Subcutaneous Q15 Min PRN Denise Sunshine PA-C        furosemide (LASIX) tablet 40 mg  40 mg Oral Daily Darren Wren MD        gabapentin (NEURONTIN) capsule 200 mg  200 mg Oral At Bedtime Denise Sunshine PA-C   200 mg at 05/05/25 2111    HOLD: Caffeine containing medications 12 hours prior to the procedure   Does not apply HOLD Darren Wren MD        HOLD: dipyridamole (PERSANTINE) or aspirin/dipyridamole (AGGRENOX) 48 hours prior to the procedure   Does not apply HOLD Darren Wren MD        HOLD: theophylline or aminophylline 12 hours prior to the procedure   Does not apply HOLD Darren Wren MD        IF patient diabetic - HOLD: ALL ORAL HYPOGLYCEMICS and include: glipizide, glyburide, glimepiride, gliclazide, metformin, any metformin containing medication, on day of the procedure   Does not apply HOLD Darren Wren MD        lactase (LACTAID) tablet 9,000  Units  9,000 Units Oral TID w/meals Denise Sunshine PA-C   9,000 Units at 05/05/25 1752    levETIRAcetam (KEPPRA) tablet 500 mg  500 mg Oral BID Denise Sunshine PA-C   500 mg at 05/05/25 2111    levothyroxine (SYNTHROID/LEVOTHROID) tablet 75 mcg  75 mcg Oral QAM AC Denise Sunshine PA-C   75 mcg at 05/06/25 0641    lidocaine (LMX4) cream   Topical Q1H PRN Denise Sunshine PA-C        lidocaine 1 % 0.1-1 mL  0.1-1 mL Other Q1H PRN Denise Sunshine PA-C        loperamide (IMODIUM) capsule 2 mg  2 mg Oral 4x Daily PRN Denise Sunshine PA-C   2 mg at 05/02/25 2057    LORazepam (ATIVAN) tablet 0.5 mg  0.5 mg Oral TID PRN Denise Sunshine PA-C        metoprolol (LOPRESSOR) injection 5 mg  5 mg Intravenous Q15 Min PRN Denise Sunshine PA-C   5 mg at 05/04/25 0421    metoprolol tartrate (LOPRESSOR) tablet 25 mg  25 mg Oral or NG Tube BID Darren Wren MD   25 mg at 05/05/25 2111    mirtazapine (REMERON) tablet 30 mg  30 mg Oral At Bedtime Denise Sunshine PA-C   30 mg at 05/05/25 2111    nitroGLYcerin (NITROSTAT) sublingual tablet 0.4 mg  0.4 mg Sublingual Q5 Min PRN Denise Sunshine PA-C        No lozenges or gum should be given while patient on BIPAP/AVAPS/AVAPS AE   Does not apply Continuous PRN Denise Sunshine PA-C        pantoprazole (PROTONIX) EC tablet 40 mg  40 mg Oral QADenise Stephens PA-C   40 mg at 05/05/25 0859    Patient is already receiving anticoagulation with heparin, enoxaparin (LOVENOX), warfarin (COUMADIN)  or other anticoagulant medication   Does not apply Continuous PRN Denise Sunshine PA-C        Patient may continue current oral medications   Does not apply Continuous PRN Denise Sunshine PA-C        potassium chloride hosea ER (KLOR-CON M20) CR tablet 40 mEq  40 mEq Oral Once Harmony Flores MD        [Held by provider] QUEtiapine (SEROquel) tablet 25 mg  25 mg  "Oral 4x Daily Denise Sunshine PA-C   25 mg at 04/30/25 0748    Reason ACE/ARB/ARNI order not selected   Other DOES NOT GO TO Denise Simpson PA-C        senna-docusate (SENOKOT-S/PERICOLACE) 8.6-50 MG per tablet 1 tablet  1 tablet Oral BID PRN Denise Sunshine PA-C        Or    senna-docusate (SENOKOT-S/PERICOLACE) 8.6-50 MG per tablet 2 tablet  2 tablet Oral BID PRN Denise Sunshine PA-C        sertraline (ZOLOFT) tablet 150 mg  150 mg Oral Daily Denise Sunshine PA-C   150 mg at 05/05/25 0859    sodium chloride (PF) 0.9% PF flush 1-10 mL  1-10 mL Intravenous Q10 Min PRN Darren Wren MD        sodium chloride (PF) 0.9% PF flush 10 mL  10 mL Intravenous Once Darren Wren MD        sodium chloride (PF) 0.9% PF flush 3 mL  3 mL Intracatheter Q8H ABHI Denise Sunshine PA-C   3 mL at 05/05/25 1619    sodium chloride (PF) 0.9% PF flush 3 mL  3 mL Intracatheter q1 min prn Denise Sunshine PA-C   3 mL at 05/05/25 1152    technetium sestamibi 2 UD per study (Tc99m MiBi) radioisotope injection 3-42 millicurie  3-42 millicurie Intravenous Q2H Darren Wren MD   10.4 millicurie at 05/06/25 0710     ANTHROPOMETRICS  Height: 1.651 m (5' 5\")  Admission Weight: 102.1 kg (225 lb) (04/29/25 1350)   Most Recent Weight: 84.8 kg (187 lb)  IBW: 56.8 kg, IBW: 149%  Body mass index is 31.12 kg/m . Obesity Class I BMI 30-34.9  Weight History: Fluid status likely contributing to weight loss  Net IO Since Admission: -3,632.96 mL [05/06/25 1221]  Wt Readings from Last 15 Encounters:   05/06/25 84.8 kg (187 lb)   03/15/23 102.2 kg (225 lb 4.8 oz)   02/28/22 97.3 kg (214 lb 8 oz)   08/18/21 91.6 kg (202 lb)   08/03/21 91.5 kg (201 lb 11.2 oz)   07/16/21 91.3 kg (201 lb 4.8 oz)   06/30/21 90.6 kg (199 lb 12.8 oz)   06/16/21 91.5 kg (201 lb 12.8 oz)   05/19/21 91.4 kg (201 lb 6.4 oz)   01/31/18 84.6 kg (186 lb 6.4 oz)   10/08/12 71.4 kg (157 lb 6.4 oz)   08/29/12 63.2 kg " (139 lb 6.4 oz)   07/20/12 74.4 kg (164 lb)   03/20/12 74.6 kg (164 lb 6.4 oz)   01/11/12 76.9 kg (169 lb 9.6 oz)     ESTIMATED NUTRITION NEEDS    Adjusted Body Weight: 63.8 kg    Energy: 7222-0030 kcals/day (20 - 25 kcals/kg of adjusted wt)  Justification: Obesity and Hypocaloric  Protein:  grams protein/day (1.5 - 2 grams of pro/kg of adjusted wt)  Justification: Obesity  Fluid:    1 mL/kcal (1 mL/kcal)  Justification: Maintenance    SYSTEM FINDINGS    Skin/wounds: Reviewed    GI symptoms: no concerns; Last BM: 05/05/25    MALNUTRITION  % Intake: No decreased intake noted  % Weight Loss: -- likely confounded by fluid status  Subcutaneous Fat Loss: None observed  Muscle Loss: None observed  Fluid Accumulation/Edema: None noted  Malnutrition Diagnosis: Patient does not meet two of the established criteria necessary for diagnosing malnutrition  Malnutrition Present on Admission: No    Nutrition Diagnosis  No nutrition diagnosis at this time     Interventions  See nutrition interventions above    Goals  PO intake >50% meals TID.     Monitoring/Evaluation  Progress toward goals will be monitored and evaluated per policy.    Kai Torres RD, LD, MS  [employer]  Available on [employer]

## 2025-05-06 NOTE — PROGRESS NOTES
Lexiscan Stress: Pt tolerated well. VSS. Pt denied chest pain or pressure prior to injection. After injection developed SOB which resolved.     0945 Pt transferred back to  271 per cart. Detailed report at bedside to Jose Daniel AUGUSTIN.

## 2025-05-07 ENCOUNTER — DOCUMENTATION ONLY (OUTPATIENT)
Dept: GERIATRICS | Facility: CLINIC | Age: 70
End: 2025-05-07
Payer: MEDICARE

## 2025-05-07 VITALS
OXYGEN SATURATION: 97 % | WEIGHT: 186.5 LBS | TEMPERATURE: 97.7 F | HEART RATE: 65 BPM | SYSTOLIC BLOOD PRESSURE: 154 MMHG | BODY MASS INDEX: 31.07 KG/M2 | RESPIRATION RATE: 18 BRPM | DIASTOLIC BLOOD PRESSURE: 86 MMHG | HEIGHT: 65 IN

## 2025-05-07 LAB
ALBUMIN SERPL BCG-MCNC: 3.3 G/DL (ref 3.5–5.2)
ALP SERPL-CCNC: 188 U/L (ref 40–150)
ALT SERPL W P-5'-P-CCNC: 125 U/L (ref 0–50)
ANION GAP SERPL CALCULATED.3IONS-SCNC: 10 MMOL/L (ref 7–15)
AST SERPL W P-5'-P-CCNC: 68 U/L (ref 0–45)
BASE EXCESS BLDV CALC-SCNC: 10.3 MMOL/L (ref -3–3)
BILIRUB SERPL-MCNC: 0.4 MG/DL
BUN SERPL-MCNC: 35.4 MG/DL (ref 8–23)
CALCIUM SERPL-MCNC: 10 MG/DL (ref 8.8–10.4)
CHLORIDE SERPL-SCNC: 98 MMOL/L (ref 98–107)
CREAT SERPL-MCNC: 1.55 MG/DL (ref 0.51–0.95)
EGFRCR SERPLBLD CKD-EPI 2021: 36 ML/MIN/1.73M2
ERYTHROCYTE [DISTWIDTH] IN BLOOD BY AUTOMATED COUNT: 13.5 % (ref 10–15)
GLUCOSE SERPL-MCNC: 98 MG/DL (ref 70–99)
HCO3 BLDV-SCNC: 39 MMOL/L (ref 21–28)
HCO3 SERPL-SCNC: 34 MMOL/L (ref 22–29)
HCT VFR BLD AUTO: 40.7 % (ref 35–47)
HGB BLD-MCNC: 12.3 G/DL (ref 11.7–15.7)
MAGNESIUM SERPL-MCNC: 1.9 MG/DL (ref 1.7–2.3)
MCH RBC QN AUTO: 28.5 PG (ref 26.5–33)
MCHC RBC AUTO-ENTMCNC: 30.2 G/DL (ref 31.5–36.5)
MCV RBC AUTO: 94 FL (ref 78–100)
O2/TOTAL GAS SETTING VFR VENT: 1 %
OXYHGB MFR BLDV: 35 % (ref 70–75)
PCO2 BLDV: 70 MM HG (ref 40–50)
PH BLDV: 7.35 [PH] (ref 7.32–7.43)
PHOSPHATE SERPL-MCNC: 5.5 MG/DL (ref 2.5–4.5)
PLATELET # BLD AUTO: 216 10E3/UL (ref 150–450)
PO2 BLDV: 25 MM HG (ref 25–47)
POTASSIUM SERPL-SCNC: 3.9 MMOL/L (ref 3.4–5.3)
PROT SERPL-MCNC: 6.4 G/DL (ref 6.4–8.3)
RBC # BLD AUTO: 4.31 10E6/UL (ref 3.8–5.2)
SAO2 % BLDV: 35.9 % (ref 70–75)
SODIUM SERPL-SCNC: 142 MMOL/L (ref 135–145)
WBC # BLD AUTO: 7 10E3/UL (ref 4–11)

## 2025-05-07 PROCEDURE — 99239 HOSP IP/OBS DSCHRG MGMT >30: CPT | Performed by: HOSPITALIST

## 2025-05-07 PROCEDURE — 84100 ASSAY OF PHOSPHORUS: CPT | Performed by: PHYSICIAN ASSISTANT

## 2025-05-07 PROCEDURE — 99207 PR NO BILLABLE SERVICE THIS VISIT: CPT | Performed by: HOSPITALIST

## 2025-05-07 PROCEDURE — 250N000013 HC RX MED GY IP 250 OP 250 PS 637: Performed by: PHYSICIAN ASSISTANT

## 2025-05-07 PROCEDURE — 80053 COMPREHEN METABOLIC PANEL: CPT | Performed by: PHYSICIAN ASSISTANT

## 2025-05-07 PROCEDURE — 250N000013 HC RX MED GY IP 250 OP 250 PS 637: Performed by: INTERNAL MEDICINE

## 2025-05-07 PROCEDURE — 83735 ASSAY OF MAGNESIUM: CPT | Performed by: PHYSICIAN ASSISTANT

## 2025-05-07 PROCEDURE — 36415 COLL VENOUS BLD VENIPUNCTURE: CPT | Performed by: PHYSICIAN ASSISTANT

## 2025-05-07 PROCEDURE — 85041 AUTOMATED RBC COUNT: CPT | Performed by: PHYSICIAN ASSISTANT

## 2025-05-07 PROCEDURE — 82805 BLOOD GASES W/O2 SATURATION: CPT | Performed by: PHYSICIAN ASSISTANT

## 2025-05-07 RX ORDER — LORAZEPAM 0.5 MG/1
0.5 TABLET ORAL 3 TIMES DAILY PRN
Qty: 10 TABLET | Refills: 0 | Status: SHIPPED | OUTPATIENT
Start: 2025-05-07

## 2025-05-07 RX ORDER — AMIODARONE HYDROCHLORIDE 200 MG/1
200 TABLET ORAL 2 TIMES DAILY
DISCHARGE
Start: 2025-05-07

## 2025-05-07 RX ORDER — FUROSEMIDE 40 MG/1
40 TABLET ORAL DAILY
DISCHARGE
Start: 2025-05-07

## 2025-05-07 RX ORDER — METOPROLOL TARTRATE 25 MG/1
25 TABLET, FILM COATED ORAL 2 TIMES DAILY
DISCHARGE
Start: 2025-05-07

## 2025-05-07 RX ADMIN — LEVETIRACETAM 500 MG: 500 TABLET, FILM COATED ORAL at 09:05

## 2025-05-07 RX ADMIN — AMIODARONE HYDROCHLORIDE 200 MG: 200 TABLET ORAL at 09:05

## 2025-05-07 RX ADMIN — PANTOPRAZOLE SODIUM 40 MG: 40 TABLET, DELAYED RELEASE ORAL at 09:05

## 2025-05-07 RX ADMIN — FUROSEMIDE 40 MG: 40 TABLET ORAL at 09:05

## 2025-05-07 RX ADMIN — SERTRALINE HYDROCHLORIDE 150 MG: 100 TABLET ORAL at 09:04

## 2025-05-07 RX ADMIN — LACTASE TAB 3000 UNIT 9000 UNITS: 3000 TAB at 09:05

## 2025-05-07 RX ADMIN — METOPROLOL TARTRATE 25 MG: 25 TABLET, FILM COATED ORAL at 09:05

## 2025-05-07 RX ADMIN — LEVOTHYROXINE SODIUM 75 MCG: 75 TABLET ORAL at 09:05

## 2025-05-07 ASSESSMENT — ACTIVITIES OF DAILY LIVING (ADL)
ADLS_ACUITY_SCORE: 77
ADLS_ACUITY_SCORE: 77
ADLS_ACUITY_SCORE: 75
ADLS_ACUITY_SCORE: 75
ADLS_ACUITY_SCORE: 77
ADLS_ACUITY_SCORE: 75
ADLS_ACUITY_SCORE: 77
ADLS_ACUITY_SCORE: 77

## 2025-05-07 NOTE — PLAN OF CARE
Occupational Therapy Discharge Summary    Reason for therapy discharge:    Discharged to transitional care facility.    Progress towards therapy goal(s). See goals on Care Plan in [facility] electronic health record for goal details.  Goals partially met.  Barriers to achieving goals:   discharge from facility.    Therapy recommendation(s):    Continued therapy is recommended.  Rationale/Recommendations:  Pt lives in a group home and reports I with ADLs and fucntional moblity with a cane, pt helps with cooking and other IADL's provided by staff. Pt currently limited due to impaired balance, strength, activity tolerance. Recommend TCU at discharge to increase ADL and functional mobility I and safety to PLOF. If group home able to provide assist, then assist in all I/ADLs and functional transfers, and home PT and OT.    **Pt not seen by discharging therapist on this date, note written based on previous treating therapist's notes and recommendations

## 2025-05-07 NOTE — PLAN OF CARE
Pt aox4, A1gbw, 2LNC and full code. Tele SR. Pt denies CP and SOB. Completed 7 day course of abx. Continue po xarelto. [facility]

## 2025-05-07 NOTE — PLAN OF CARE
"Goal Outcome Evaluation:    BP (!) 142/68 (BP Location: Left arm)   Pulse 65   Temp 97.9  F (36.6  C) (Oral)   Resp 18   Ht 1.651 m (5' 5\")   Wt 84.8 kg (187 lb)   SpO2 95%   BMI 31.12 kg/m   2L O2 Nasal Cannula. Alert and Oriented x 3. Assist with One. Sinus Rhythm. Incontinent of Bowel and Urine. May Discharge to TCU in PM. Continue ongoing treatment.      "

## 2025-05-07 NOTE — PROGRESS NOTES
Heart Failure Care Map  GOALS TO BE MET BEFORE DISCHARGE:    1. Decrease congestion and/or edema with diuretic therapy to achieve near optimal volume status.     Dyspnea improved: No, further care required to meet this goal. Please explain 2L O2 N.C.   Edema improved: Yes, satisfactory for discharge.        Last 24 hour I/O:   Intake/Output Summary (Last 24 hours) at 5/7/2025 0655  Last data filed at 5/6/2025 1947  Gross per 24 hour   Intake 900 ml   Output --   Net 900 ml           Net I/O and Weights since admission:   04/07 0700 - 05/07 0659  In: 21084.04 [P.O.:5820; I.V.:6055.04]  Out: 73898 [Urine:97504]  Net: -3182.96     Vitals:    04/29/25 1350 04/30/25 1400 05/01/25 0500 05/02/25 0700   Weight: 102.1 kg (225 lb) 92.9 kg (204 lb 14.4 oz) 90.5 kg (199 lb 8 oz) 78.4 kg (172 lb 12.8 oz)    05/03/25 0400 05/04/25 0000 05/05/25 0617 05/06/25 0645   Weight: 83 kg (183 lb) 83.6 kg (184 lb 6.4 oz) 84.7 kg (186 lb 11.2 oz) 84.8 kg (187 lb)    05/07/25 0624   Weight: 84.6 kg (186 lb 8 oz)       2.  O2 sats > 90% on room air, or at prior home O2 therapy level.      Able to wean O2 this shift to keep sats above 90%?: No, further care required to meet this goal. Please explain 2L O2 N.C.   Does patient use Home O2? No          Current oxygenation status:   SpO2: 95 %     O2 Device: Nasal cannula, Oxygen Delivery: 3 LPM    3.  Tolerates ambulation and mobility near baseline.     Ambulation: No, further care required to meet this goal. Please explain Ambulated Twice During Shift.   Times patient ambulated with staff this shift: 2    Please review the Heart Failure Care Map for additional HF goal outcomes.    Raji Samayoa RN  5/7/2025

## 2025-05-07 NOTE — PROGRESS NOTES
Care Management Discharge Note    Discharge Date: 05/07/2025       Discharge Disposition: Transitional Care    Discharge Services: None    Discharge DME: Oxygen    Discharge Transportation:  health plan transportation    Private pay costs discussed: Not applicable    Does the patient's insurance plan have a 3 day qualifying hospital stay waiver?  Yes     Which insurance plan 3 day waiver is available? Alternative insurance waiver    Will the waiver be used for post-acute placement? Yes    PAS Confirmation Code: [id number]  Patient/family educated on Medicare website which has current facility and service quality ratings: no    Education Provided on the Discharge Plan: Yes  Persons Notified of Discharge Plans: Patient, friend, Nursing, TCU  Patient/Family in Agreement with the Plan: yes    Handoff Referral Completed: No, handoff not indicated or clinically appropriate    Additional Information:  Call received from [facility] confirming discharge plans for today. Asking for a call from the MD. Message sent to the MD asking to call [facility] regarding medical update.     Addendum 1335: Message received from Elizabeth with [facility] on [facility] stating they did not get the Ativan/Lorazepam script. Writer paged  asking for a script. Script received and faxed to [facility] at [phone]. Confirmed with Elizabeth that they received it     PARAMJIT Munguia, LICSW  Social Work- Inpatient Care Coordination  [facility]

## 2025-05-07 NOTE — PROGRESS NOTES
Care Management Discharge Note    Discharge Date: 05/07/2025       Discharge Disposition: TCU    Discharge Services: None    Discharge DME: None    Discharge Transportation: [facility]  Private pay costs discussed: transportation costs    Does the patient's insurance plan have a 3 day qualifying hospital stay waiver?  Yes     Which insurance plan 3 day waiver is available? Alternative insurance waiver    Will the waiver be used for post-acute placement? Yes    PAS Confirmation Code:  [id number]  Patient/family educated on Medicare website which has current facility and service quality ratings: no    Education Provided on the Discharge Plan: No  Persons Notified of Discharge Plans: Bedside RN [facility], [facility]  Patient/Family in Agreement with the Plan: yes    Handoff Referral Completed: No, handoff not indicated or clinically appropriate    Additional Information:  SW met with the patient and updated them about the discharge and remind them about transportation schedule time between 12:00 - 1:00 pm. SW asked the patient if they wanted the SW to update [facility] on patient going to TCU and patient confirmed. SW attempted to reach [facility] and left them voicemail message and requested phone call back. PHILIPPE sent the orders to [facility]. PHILIPPE updated bedside RNOz about patient discharge. PHILIPPE updated the huc. PHILIPPE reached out to Marianela with [facility] to update them patient's orders in and will be ready for discharge for today and Marianela confirmed.    ORSIE Aguilar  [facility]  Social Work  [phone] [phone]

## 2025-05-07 NOTE — PLAN OF CARE
Physical Therapy Discharge Summary    Reason for therapy discharge:    Discharged to transitional care facility.    Progress towards therapy goal(s). See goals on Care Plan in [facility] electronic health record for goal details.  Goals partially met.  Barriers to achieving goals:   discharge from facility.    Therapy recommendation(s):    Continued therapy is recommended.  Rationale/Recommendations:  Pt is below baseline level of function, is participating in PT during hospital stay, and is improving with mobility. Would benefit from continued inpatient rehab to further address deficits and optimize functional recovery prior to return home..    **Pt not seen by discharging therapist, summary written according to documentation from prior treating PT

## 2025-05-07 NOTE — DISCHARGE SUMMARY
[facility]    Discharge Summary  Hospitalist    Date of Admission:  4/29/2025  Date of Discharge:  5/7/2025    Discharge Diagnoses      Multiple falls  Elevated troponin  GIOVANNI (acute kidney injury)  Hyperkalemia  Pleural effusion  Acute respiratory failure with hypoxia (H)  NSTEMI (non-ST elevated myocardial infarction) (H)  Status post coronary angiogram    History of Present Illness   Amy Bryan is an 69 year old female who presented with nstemi    Hospital Course   Amy Bryan was admitted on 4/29/2025.  The following problems were addressed during her hospitalization:    Amy Bryan is a 69 year old female with history of TBI in 2012, CAD s/p PCI in 2018 and 2021, suspected takotsubo cardiomyopathy now recovered, presenting for vague complaints of fatigue and ultimately found to have NSTEMI vs stress cardiomyopathy, biventricular heart failure with cardiogenic shock requiring dobutamine and epinephrine in the ICU. Hospitalization complicated by shock liver, acute renal failure, and new runs of atrial fibrillation among others. Hospitalist assumed care on 05/04/25.     NSTEMI vs stress cardiomyopathy  Biventricular heart failure, cardiogenic shock, improving  CAD s/p PCI in 2018 and 2021  STEMI 2018  Presented from group home with 2 days of fatigue. Found to have grossly elevated troponins >800-900 range and EKG with new T wave inversions. No chest pain or cardiac complaints in the ED. Admitted for NSTEMI, on heparin gtt, pleural effusions, GIOVANNI, shock liver, and developed worsening hypotension prompting transfer to ICU for cardiogenic shock as well as likely septic shock from UTI, treated with dobutamine, epinephrine, BiPAP. Other medical problems treated as below. Weaned off pressors, and to 3L NC, diuresing initially with furosemide gtt which was transitioned to scheduled IV furosemide. Cardiology followed closely as well as Nephrology.  ECHO 4/30 on admission with EF  45-50%, mild lateral/anterior wall hypokinesia. Moderately reduced RV systolic function, mild to moderate TR, pulmonary hypertension. Repeat ECHO 5/3 with improvement in EF to 55-60%, mid latera/inferolateral wall hypokinesis.   -Transfer to [facility], heart center if available 05/04/25   -Hospitalist assumed care as primary service 05/04/25   -Cardiac monitoring  -Cardiology following.    - Was initially on heparin drip.  Discontinued on 525.  -ASA 81mg/d  - Started on metoprolol 12.5 twice daily on 5/4/2025  -Hold Statin until LFTs <100  -Intake/Output  -Daily standing weights if able  -Furosemide gtt off, eventually transition to oral Lasix 40 daily.  -Monitor closely  -Underwent Lexiscan stress test .  Shows transmural infarction in the mid to distal anterolateral segments of the left ventricle associated with mild degree of boubacar-infarct ischemia.  EF 60%.  Cardiology evaluated the patient and did not feel that she would benefit from left heart cath.  Proceed with medical management.  - Patient was started on DOAC after heparin drip was discontinued.  - Continue amiodarone and beta-blocker upon discharge.  Continue home ACE inhibitor.  -1 month event monitor ordered upon discharge.       Septic shock in the setting of UTI  Urinary retention s/p bender placement  UA neg nitrite, large LE, WBC 17. UCx E. Coli 50-100K. Urinary retention on admission in the setting of UTI and GIOVANNI, UTI being treated and GIOVANNI improving. Initially treated with Zosyn which was transitioned to ceftriaxone after UCx noted E. Coli. Mild leukocytosis resolved. BCx NGTD.   -Increase ceftriaxone to 2gm in the setting of resolving GIOVANNI.  Complete 7-day course.  - Bender removed on 5/4/2025.  -Monitor     Paroxysmal atrial fibrillation with RVR, not on AC, new diagnosis  History of transient Afib 2018  Hx runs of Afib in 2018 in setting of STEMI and cardiogenic shock, discharged on DAPT but not recommended for anticoagulation at that time due to  transient in the setting of acute illness. Now with recurrent Afib again this hospitalization requiring amiodarone gtt and since transitioned to amiodarone PO. CHADS-VASc score 4   - Cardiac monitoring  -Patient converted to sinus rhythm on 5/5/2025.    - Continue on amiodarone 200 mg twice daily  - Restarted Toprol 12.5 mg twice daily on 5/4/2025  - Was initially on heparin drip.  Discontinued on 5/5/2025.  Started on DOAC prior to discharge.  - PRN IV metoprolol for sustained heart rate >120  - Monitor K+/Mg++, replace PRN with improving GIOVANNI     Acute kidney injury, improving  Baseline Cr 0.9. Cr peaked at 3.69 and has downtrended to 1.94 day of transfer out of ICU 05/04/25. Nephrology was following and singed off 5/4 with improving Cr, sodium normalized, excellent urine output. Felt likely ischemic ATN in setting of shock.  - Avoid nephrotoxins - contrast, NSAIDs  -Creatinine 1.46 on 5/6  - Renally dose medications as able/needed  - Diuresis per cardiology  - Monitor UOP  - Monitor daily, Nephrology signed off 5/4     Acute hypoxic respiratory failure, improving  Hypercapnia  Pleural effusion s/p thoracentesis 4/30/25, improving  Resolving on imaging with diuresis. Was on BiPAP and weaned to 1 L nasal cannula currently.  Has been hypercapneic with CO2 levels ranging 50s-60sm, stable/improving. Pleural fluid 4/30 neg for organisms. Transudative effusion.  -Wean O2 as able.  Currently on 1 L nasal cannula  -Monitor  -Can re-trial BiPAP if needed, possibly nocturnally     Shock liver in the setting of septic and cardiogenic shock, improving  Elevated LFTs not in obstructive pattern with some mild RUQ pain on exam. US cholelithiasis, nonspecific GB thickening could represent cholecystitis though negative pagan.   No signs of developing acute cholecystitis, tolerating PO  LFT trend improving, in the setting of shock.   Tbili 1.5-0.5 normalized  Alk Phos [phone]  -171-144  -196-93  - LFTs improving  "and pain improving  - Continue to monitor consider repeating US or HIDA scan if remain concerned  - HOLD statin until liver tests improved and <100.  Restarted prior to discharge     Elevated D-dimer  V/Q negative for PE  In conjunction with fall at home and new hypoxia there is concern for PE. Reassuringly dopplers negative for DVT. Unable to give contrast CT due to GIOVANNI. V/Q negative for PE, doubt PE given shock picture above and now weaining  O2.  - Wean O2 as able  - Was on heparin for NSTEMI.  Stopped and transition to DOAC     Physical deconditioning  Resides in group home  -PT recommending TCU  -Continue to work with therapies  -SW/CC following     Resolved Hospital problems  Hypernatremia  Hyperkalemia  Pleural effusion  Leukocytosis     Chronic stable diagnoses and other pertinent medical history: Appropriate PTA medications will be resumed.      Cognitive disorder with history of TBI in 2012  Currently living in group home  - Continue PTA Keppra and gabapentin 200mg Q HS     Generalized anxiety and depression: Continue PTA mirtazapine 30mg Q HS, sertraline 150mg/d      GERD: Continue PPI     Hypothyroidism: Continue PTA TRT 75mcg/d     Lactose intolerance: Continue lactase TID        Clinically Significant Risk Factors     # Hypokalemia: Lowest K = 3.3 mmol/L in last 2 days, will replace as needed   # Hypochloremia: Lowest Cl = 97 mmol/L in last 2 days, will monitor as appropriate   # Hypercalcemia: corrected calcium is >10.1, will monitor as appropriate    # Hypoalbuminemia: Lowest albumin = 3 g/dL at 5/5/2025  5:39 AM, will monitor as appropriate     # Hypertension: Noted on problem list     # Acute Hypercapnic Respiratory Failure: based on venous blood gas results.  Continue supplemental oxygen and ventilatory support as indicated.         # Obesity: Estimated body mass index is 31.04 kg/m  as calculated from the following:    Height as of this encounter: 1.651 m (5' 5\").    Weight as of this encounter: " 84.6 kg (186 lb 8 oz).        # Financial/Environmental Concerns: none          Harmony Flores MD, MD        Code Status   Full Code       Primary Care Physician   [facility]    Physical Exam   Temp: 97.7  F (36.5  C) Temp src: Oral BP: (!) 154/86 Pulse: 65   Resp: 18 SpO2: 97 % O2 Device: Nasal cannula Oxygen Delivery: 2 LPM  Vitals:    05/05/25 0617 05/06/25 0645 05/07/25 0624   Weight: 84.7 kg (186 lb 11.2 oz) 84.8 kg (187 lb) 84.6 kg (186 lb 8 oz)     Vital Signs with Ranges  Temp:  [97.7  F (36.5  C)-98  F (36.7  C)] 97.7  F (36.5  C)  Pulse:  [62-76] 65  Resp:  [16-20] 18  BP: (111-154)/() 154/86  SpO2:  [91 %-97 %] 97 %  I/O last 3 completed shifts:  In: 900 [P.O.:900]  Out: -     Physical Exam  Constitutional:       Appearance: She is obese.   Cardiovascular:      Rate and Rhythm: Normal rate and regular rhythm.      Pulses: Normal pulses.      Heart sounds: Normal heart sounds.   Pulmonary:      Effort: Pulmonary effort is normal. No respiratory distress.      Breath sounds: Normal breath sounds.      Comments: Right basal lung crackles   Abdominal:      General: Abdomen is flat. Bowel sounds are normal. There is no distension.      Tenderness: There is no abdominal tenderness. There is no guarding.   Skin:     General: Skin is warm and dry.   Neurological:      General: No focal deficit present.      Comments: Slowed mentation           Discharge Disposition   Discharged to short-term care facility  Condition at discharge: Stable    Consultations This Hospital Stay   PHARMACY IP CONSULT  CARDIAC REHAB IP CONSULT  CARE MANAGEMENT / SOCIAL WORK IP CONSULT  CARDIOLOGY IP CONSULT  PHARMACY IP CONSULT  CARE MANAGEMENT / SOCIAL WORK IP CONSULT  NEPHROLOGY IP CONSULT  INTENSIVIST IP CONSULT  SURGERY GENERAL IP CONSULT  OCCUPATIONAL THERAPY ADULT IP CONSULT  PHYSICAL THERAPY ADULT IP CONSULT  PHARMACY LIAISON FOR MEDICATION COVERAGE CONSULT  PHYSICAL THERAPY ADULT IP CONSULT  OCCUPATIONAL  THERAPY ADULT IP CONSULT    Time Spent on this Encounter   I, Harmony Flores MD, personally saw the patient today and spent greater than 30 minutes discharging this patient.    Discharge Orders      Follow-Up with Cardiology EVAN      General info for SNF    Length of Stay Estimate: Short Term Care: Estimated # of Days <30  Condition at Discharge: Stable  Level of care:skilled   Rehabilitation Potential: Good  Admission H&P remains valid and up-to-date: Yes  Recent Chemotherapy: N/A  Use Nursing Home Standing Orders: Yes     Mantoux instructions    Give two-step Mantoux (PPD) Per Facility Policy Yes     Follow Up and recommended labs and tests    Follow up with longterm physician.  The following labs/tests are recommended: cbc, bmp in 1 week .     Reason for your hospital stay    nstemi     Activity - Up ad edmund     Physical Therapy Adult Consult    Evaluate and treat as clinically indicated.    Reason:  weakness     Occupational Therapy Adult Consult    Evaluate and treat as clinically indicated.    Reason:  weakness     Oxygen (SNF/TCU) Discharge     Cardiac Event Monitor Adult Pediatric    For recent afib     Cardiac Event Monitor Adult Pediatric     Fall precautions     Diet    Follow this diet upon discharge: Current Diet:Orders Placed This Encounter      Room Service      Low Saturated Fat Na <2400 mg     Discharge Medications   Discharge Medication List as of 5/7/2025 11:00 AM        START taking these medications    Details   amiodarone (PACERONE) 200 MG tablet Take 1 tablet (200 mg) by mouth 2 times daily., Transitional      rivaroxaban ANTICOAGULANT (XARELTO) 15 MG TABS tablet Take 1 tablet (15 mg) by mouth daily (with dinner)., Transitional           CONTINUE these medications which have CHANGED    Details   furosemide (LASIX) 40 MG tablet Take 1 tablet (40 mg) by mouth daily., Transitional      metoprolol tartrate (LOPRESSOR) 25 MG tablet Take 1 tablet (25 mg) by mouth or NG Tube 2 times daily.,  Transitional           CONTINUE these medications which have NOT CHANGED    Details   acetaminophen (TYLENOL) 500 MG tablet Take 1,000 mg by mouth 2 times daily as needed., Historical      Dextromethorphan-guaiFENesin  MG/5ML syrup Take 10 mLs by mouth every 4 hours as needed for cough., Historical      Dihydroxyaluminum Sod Carb (ROLAIDS PO) Take 2 tablets by mouth 4 times daily as needed., Historical      emollient (VANICREAM) external cream APPLY FROM HEAD TO TOE DAILY AFTER SHOWERS *CALL TO REORDER*Disp-453 g, [id number]      FIBER- MG tablet Take 1 tablet by mouth daily as needed., TOSHIA, Historical      !! gabapentin (NEURONTIN) 100 MG tablet Take 200 mg by mouth at bedtime., Historical      !! gabapentin (NEURONTIN) 100 MG tablet Take 100 mg by mouth 2 times daily. Takes at 0800 and 1200, Historical      lactase (LACTAID) 9000 units TABS tablet Take 9,000 Units by mouth 3 times daily (with meals)., Historical      levETIRAcetam (KEPPRA) 500 MG tablet Take 500 mg by mouth 2 times daily., Historical      levothyroxine (SYNTHROID/LEVOTHROID) 75 MCG tablet Take 75 mcg by mouth every morning (before breakfast)., Historical      lisinopril (ZESTRIL) 10 MG tablet Take 0.5 tablets (5 mg) by mouth daily, Disp-45 tablet, R-3, E-Prescribe      loperamide (IMODIUM) 2 MG capsule Take 2 mg by mouth 4 times daily as needed for diarrhea, Historical      LORazepam (ATIVAN) 0.5 MG tablet Take 0.5 mg by mouth 3 times daily as needed for anxiety., Historical      melatonin 3 MG tablet Take 1 tablet by mouth at bedtime., Historical      metroNIDAZOLE (METROGEL) 0.75 % external gel Apply topically 2 times daily as needed.Historical      mirtazapine (REMERON) 30 MG tablet Take 30 mg by mouth At Bedtime, Historical      nitroGLYcerin (NITROSTAT) 0.4 MG sublingual tablet For chest pain place 1 tablet under the tongue every 5 minutes for 3 doses. If symptoms persist 5 minutes after 1st dose call 911., Disp-25 tablet,  R-0, E-Prescribe      nystatin (MYCOSTATIN) 200629 UNIT/GM external powder Apply topically 2 times daily as needed for dry skin.Historical      omeprazole (PRILOSEC) 20 MG DR capsule Take 20 mg by mouth every morning., Historical      ondansetron (ZOFRAN-ODT) 4 MG ODT tab TAKE ONE TABLET BY MOUTH TWICE A DAY AS NEEDED FOR NAUSEA & VOMITING, Historical      potassium chloride ER (KLOR-CON M) 20 MEQ CR tablet Take 1 tablet by mouth daily., Historical      rosuvastatin (CRESTOR) 20 MG tablet Take 0.5 tablets (10 mg) by mouth daily, Disp-90 tablet, R-3, E-Prescribe      !! sertraline (ZOLOFT) 100 MG tablet Take 100 mg by mouth daily. Takes with 50mg tablet for a total dose of 150mg daily, Historical      !! sertraline (ZOLOFT) 50 MG tablet Take 50 mg by mouth daily. Takes with 100mg tablet for a total dose of 150mg daily, Historical      trolamine salicylate (ASPERCREME) 10 % cream Apply topically as needed for moderate pain To affected areasHistorical      vitamin D3 (CHOLECALCIFEROL) 50 mcg (2000 units) tablet Take 1 tablet by mouth daily., Historical       !! - Potential duplicate medications found. Please discuss with provider.        STOP taking these medications       clopidogrel (PLAVIX) 75 MG tablet Comments:   Reason for Stopping:         QUEtiapine (SEROQUEL) 25 MG tablet Comments:   Reason for Stopping:             Allergies   Allergies   Allergen Reactions    Penicillins      dizziness     Data   Recent Labs   Lab Test 05/07/25  0543 05/06/25  0558 05/05/25  0539 04/29/25  1348 08/03/21  0712   WBC 7.0 6.2 6.9   < > 6.2   HGB 12.3 12.4 12.3   < > 12.9   MCV 94 93 90   < > 91    208 198   < > 254   INR  --   --   --   --  1.14    < > = values in this interval not displayed.      Recent Labs   Lab Test 05/07/25  0543 05/06/25  1409 05/06/25  0558 05/05/25  0539     --  141 140   POTASSIUM 3.9 4.3  4.2 3.3* 4.1   CHLORIDE 98  --  97* 97*   CO2 34*  --  33* 32*   BUN 35.4*  --  36.1* 39.2*   CR  1.55*  --  1.46* 1.60*   ANIONGAP 10  --  11 11   HECTOR 10.0  --  9.8 9.4   GLC 98  --  105* 102*         Results for orders placed or performed during the hospital encounter of 04/29/25   Chest XR,  PA & LAT    Narrative    EXAM: XR CHEST 2 VIEWS  LOCATION: [facility]  DATE: 4/29/2025    INDICATION: SOB, hypoxia  COMPARISON: Chest radiograph 1/26/2018      Impression    IMPRESSION: Moderate to large right pleural effusion with adjacent presumed atelectasis. Trace left pleural effusion. Interstitial pulmonary edema. Enlarged cardiac silhouette. Coronary stent. Aortic calcification. Chronic deformity of the left humerus.   US Lower Extremity Venous Duplex Bilateral    Narrative    EXAM: US LOWER EXTREMITY VENOUS DUPLEX BILATERAL  LOCATION: [facility]  DATE: 4/29/2025    INDICATION: Lower extremity pain, elevated dimer, rule out DVT  COMPARISON: None.  TECHNIQUE: Venous Duplex ultrasound of bilateral lower extremities with and without compression, augmentation and duplex. Color flow and spectral Doppler with waveform analysis performed.    FINDINGS: Exam includes the common femoral, femoral, popliteal veins as well as segmentally visualized deep calf veins and greater saphenous vein.     RIGHT: No deep vein thrombosis. No superficial thrombophlebitis. No popliteal cyst.    LEFT: No deep vein thrombosis. No superficial thrombophlebitis. No popliteal cyst.      Impression    IMPRESSION:  1.  No deep venous thrombosis in the bilateral lower extremities.   US Abdomen Limited    Narrative    EXAM: US ABDOMEN LIMITED  LOCATION: [facility]  DATE: 4/29/2025    INDICATION: Right upper quadrant pain, evaluate for biliary pathology  COMPARISON: None.  TECHNIQUE: Limited abdominal ultrasound.    FINDINGS:    GALLBLADDER: Multiple gallstones in the gallbladder. Moderate gallbladder wall thickening measuring up to 1 cm. Negative sonographic Damon's  sign.    BILE DUCTS: No biliary dilatation. The common duct measures 3 mm.    LIVER: Normal parenchyma with smooth contour. No focal mass. The portal vein is patent with flow in the normal direction.    RIGHT KIDNEY: No hydronephrosis.    PANCREAS: The visualized portions are normal.    No ascites.      Impression    IMPRESSION:  1.  Cholelithiasis. Nonspecific gallbladder wall thickening could represent cholecystitis although there is a negative sonographic Damon sign.       NM Lung Scan Ventilation and Perfusion    Narrative    EXAM: NM LUNG SCAN VENTILATION AND PERFUSION  LOCATION: [facility]  DATE: 4/30/2025    INDICATION: Shortness of breath, hypoxic  COMPARISON: Chest x-ray 4/29/2025  TECHNIQUE: 55 mCi Tc-99m DTPA inhaled. 6.2 mCi Tc-99m MAA, IV. Standard planar imaging during perfusion and ventilation portions of exam.    FINDINGS: Decreased ventilation and perfusion within the right lower hemithorax, likely related to the known pleural effusion. No mismatched segmental perfusion defect.      Impression    IMPRESSION:    No evidence of pulmonary embolism.   CT Chest Abdomen Pelvis w/o Contrast    Narrative    EXAM: CT CHEST ABDOMEN PELVIS W/O CONTRAST  LOCATION: [facility]  DATE: 4/30/2025    INDICATION: pleural effusion, Asses for Renal abscess, hydronephrosis.  COMPARISON: Right upper quadrant ultrasound and chest radiograph 4/29/2025  TECHNIQUE: CT scan of the chest, abdomen, and pelvis was performed without IV contrast. Multiplanar reformats were obtained. Dose reduction techniques were used.   CONTRAST: None.    FINDINGS:   LUNGS AND PLEURA: Bilateral pleural effusions with associated compressive atelectasis, moderate on the right and small on the left. Likely round atelectasis in the right lower lobe. No definite airspace consolidation or pneumothorax.    MEDIASTINUM/AXILLAE: Borderline enlarged mediastinal lymph nodes with right paratracheal node  measuring 1.1 cm in short axis (series 3 image 41), most likely reactive, no specific follow-up recommended. Heart is mildly enlarged. No pericardial effusion.    CORONARY ARTERY CALCIFICATION: Previous intervention (stents).    HEPATOBILIARY: Cholelithiasis with mild gallbladder wall edema, not significantly changed from recent right upper quadrant ultrasound. No significant surrounding fat stranding. No evidence of biliary obstruction.    PANCREAS: No ductal dilation or surrounding inflammation.    SPLEEN: Normal.    ADRENAL GLANDS: Normal.    KIDNEYS/BLADDER: Left renal cyst, no specific follow-up recommended. No definite radiographic evidence of renal abscess. No hydronephrosis. Urinary bladder demonstrates a single focus of internal gas without wall thickening or surrounding inflammation,   correlate with recent catheter placement.    BOWEL: No obstruction or inflammatory change. Normal appendix.    LYMPH NODES: No suspicious lymphadenopathy. Small volume abdominopelvic free fluid.    VASCULATURE: Moderate calcified atherosclerosis. No abdominal aortic aneurysm.    PELVIC ORGANS: Unremarkable.    MUSCULOSKELETAL: No acute bony abnormality. Multiple healed pelvic fractures. Mild body wall edema.      Impression    IMPRESSION:  1.  Bilateral pleural effusions with associated atelectasis, right greater than left, as described above.  2.  Cholelithiasis with gallbladder wall edema but no significant surrounding fat stranding, similar to recent right upper quadrant ultrasound given differences in modality.  3.  No definite evidence of renal abscess.  4.  No nephroureterolithiasis or hydronephrosis.   XR Chest Port 1 View    Narrative    EXAM: XR CHEST PORT 1 VIEW  LOCATION: [facility]  DATE: 4/30/2025    INDICATION: Post right-sided thoracentesis.  COMPARISON: None.      Impression    IMPRESSION: No significant pleural effusion visible. No pneumothorax. Right IJ venous catheter. Cardiac  enlargement with coronary arterial stent. Small left pleural effusion. Mild infiltrates lung bases.   Echocardiogram Complete     Value    LVEF  45-50%    Biplane LVEF 49%    [facility]    [id number]  [id number]  2011^LUKAS^IRWIN^JULIA     [facility]  Echocardiography Laboratory  [address] [place] [address]     Name: ANGELA HENRY  MRN: [id number]  : 1955  Study Date: 2025 09:58 AM  Age: 69 yrs  Gender: Female  Patient Location: [facility]  Reason For Study: Chest Pain, Chest Tightness, Chest Pressure  Ordering Physician: IRWIN GAYTAN  Performed By: Khris Armas     BSA: 2.1 m2  Height: 65 in  Weight: 225 lb  HR: 55  BP: 104/58 mmHg  ______________________________________________________________________________  Procedure  Echocardiogram with two-dimensional, color and spectral Doppler. Definity (NDC  #82068-689) given intravenously.  ______________________________________________________________________________  Interpretation Summary     Left ventricular systolic function is mildly reduced.The visual ejection  fraction is 45-50%.Biplane LVEF is 49%.Mid lateral and mid anterior wall  hypokinesia.  Moderately reduced RV systolic function with preserved RV apex function.The  right ventricle is moderately dilated.  There is mild to moderate (1-2+) tricuspid regurgitation.  Pulmonary hypertension- RVSP 40 mm hg +RA.  IVC diameter >2.1 cm collapsing <50% with sniff suggests a high RA pressure  estimated at 15 mmHg or greater.     Echo dated 07/15/2021 normal LV and RV systolic functions.  ______________________________________________________________________________  Left Ventricle  The left ventricle is normal in size. There is normal left ventricular wall  thickness. Diastolic Doppler findings (E/E' ratio and/or other parameters)  suggest left ventricular filling pressures are increased. Left ventricular  systolic function is mildly reduced. The visual ejection  fraction is 45-50%.  Biplane LVEF is 49%. Mid lateral and mid anterior wall hypokinesia.     Right Ventricle  The right ventricle is moderately dilated. Moderately reduced RV systolic  function with preserved RV apex function.     Atria  The left atrium is mildly dilated. The right atrium is mildly dilated. There  is no color Doppler evidence of an atrial shunt.     Mitral Valve  There is mild mitral annular calcification. There is mild (1+) mitral  regurgitation.     Tricuspid Valve  There is mild to moderate (1-2+) tricuspid regurgitation. The right  ventricular systolic pressure is approximated at 39.9 mmHg plus the right  atrial pressure. Pulmonary hypertension.     Aortic Valve  The aortic valve is trileaflet. No aortic regurgitation is present. No aortic  stenosis is present.     Pulmonic Valve  There is trace pulmonic valvular regurgitation. There is no pulmonic valvular  stenosis.     Vessels  The aortic root is normal size. Normal size ascending aorta. IVC diameter >2.1  cm collapsing <50% with sniff suggests a high RA pressure estimated at 15 mmHg  or greater.     Pericardium  There is no pericardial effusion.     Rhythm  The rhythm was sinus bradycardia.  ______________________________________________________________________________  MMode/2D Measurements & Calculations     IVSd: 1.1 cm  LVIDd: 4.8 cm  LVIDs: 3.6 cm  LVPWd: 1.0 cm  LVPWs: 0.64 cm  FS: 25.0 %  LV mass(C)d: 193.7 grams  LV mass(C)dI: 93.2 grams/m2  Ao root diam: 3.4 cm  asc Aorta Diam: 3.2 cm  LVOT diam: 2.0 cm  LVOT area: 3.2 cm2  Ao root diam index Ht(cm/m): 2.0  Ao root diam index BSA (cm/m2): 1.6  Asc Ao diam index BSA (cm/m2): 1.5  Asc Ao diam index Ht(cm/m): 1.9  EF Biplane: 48.7 %  LA Volume (BP): 76.5 ml     LA Volume Index (BP): 36.8 ml/m2  RWT: 0.43  TAPSE: 2.4 cm     Doppler Measurements & Calculations  MV E max mustapha: 103.0 cm/sec  MV A max mustapha: 72.8 cm/sec  MV E/A: 1.4  MV dec slope: 514.5 cm/sec2  MV dec time: 0.20 sec  TR max  mustapha: 315.7 cm/sec  TR max P.9 mmHg  E/E' av.7  Lateral E/e': 12.8  Medial E/e': 22.6     ______________________________________________________________________________  Report approved by: Alphonse Jarquin MD on 2025 10:51 AM         Echocardiogram Complete     Value    LVEF  55-60%    Narrative    [id number]  [id number]  [id number]^ANEESH     [facility]  Echocardiography Laboratory  [address] [place] [address]     Name: ANGELA HENRY  MRN: [id number]  : 1955  Study Date: 2025 10:40 AM  Age: 69 yrs  Gender: Female  Patient Location: [facility]  Reason For Study: Cardiomyopathy  Ordering Physician: ANEESH MONROY  Performed By: Kannan Kohler     BSA: 1.8 m2  Height: 65 in  Weight: 163 lb  HR: 144  BP: 120/76 mmHg  ______________________________________________________________________________  Procedure  Echocardiogram with two-dimensional, color and spectral Doppler. Definity (NDC  #36594-405) given intravenously.  ______________________________________________________________________________  Interpretation Summary     Left ventricular systolic function is normal.  The visual ejection fraction is 55-60%.  MId lateral, inferolateral wall is hypokinetic.  No hemodynamically significant valvular abnormalities on 2D or color flow  imaging.  ______________________________________________________________________________  Left Ventricle  The left ventricle is normal in size. There is mild concentric left  ventricular hypertrophy. Left ventricular systolic function is normal. The  visual ejection fraction is 55-60%. Diastolic function not assessed due to  atrial fibrillation. MId lateral, inferolateral wall is hypokinetic.     Right Ventricle  The right ventricle is not well visualized. The right ventricular systolic  function is borderline reduced.     Atria  The left atrium is not well visualized. The left atrium is borderline dilated.  Right atrium not well  visualized. The atrial septum is aneurysmal.     Mitral Valve  The mitral valve is normal in structure and function. There is mild mitral  annular calcification. There is trace mitral regurgitation. There is no mitral  valve stenosis.     Tricuspid Valve  The tricuspid valve is not well visualized, but is grossly normal. Right  ventricular systolic pressure could not be approximated due to inadequate  tricuspid regurgitation.     Aortic Valve  The aortic valve is trileaflet. No aortic stenosis is present.     Pulmonic Valve  The pulmonic valve is not well visualized. Normal pulmonic valve velocity.     Vessels  Normal size aorta. The inferior vena cava is normal.     Pericardium  There is no pericardial effusion.     Rhythm  The rhythm was rapid atrial fibrillation.  ______________________________________________________________________________  MMode/2D Measurements & Calculations     IVSd: 1.2 cm  LVIDd: 4.2 cm  LVIDs: 2.9 cm  LVPWd: 1.2 cm  FS: 31.7 %  LV mass(C)d: 178.2 grams  LV mass(C)dI: 98.2 grams/m2  Ao root diam: 3.3 cm  asc Aorta Diam: 3.6 cm  LVOT diam: 2.3 cm  LVOT area: 4.1 cm2  Ao root diam index Ht(cm/m): 2.0  Ao root diam index BSA (cm/m2): 1.8  Asc Ao diam index BSA (cm/m2): 2.0  Asc Ao diam index Ht(cm/m): 2.2  LA Volume (BP): 49.2 ml     LA Volume Index (BP): 27.2 ml/m2  RV Base: 3.9 cm  RWT: 0.57  TAPSE: 1.6 cm     Doppler Measurements & Calculations  MV E max jayce: 121.0 cm/sec  MV dec slope: 740.1 cm/sec2  MV dec time: 0.16 sec  PA acc time: 0.04 sec  E/E' av.3  Lateral E/e': 10.7  Medial E/e': 15.9  RV S Jayce: 11.9 cm/sec     ______________________________________________________________________________  Report approved by: Kelsey Malave MD on 2025 01:12 PM         NM Lexiscan stress test (nuc card)     Value    Target     Baseline Systolic     Baseline Diastolic BP 82    Last Stress Systolic     Last Stress Diastolic BP 61    Baseline HR 66    Max HR  74    Max  Predicted HR  48    Rate Pressure Product 10,212.0    Left Ventricular EF 60    Narrative      The nuclear stress test is abnormal.    There is transmural infarction in the mid to basal anterolateral   segment(s) of the left ventricle associated with a mild degree of   boubacar-infarct ischemia.    Left ventricular function is normal.    The left ventricular ejection fraction at stress is 60%.    A prior study was conducted on 6/30/2021.  This study has changes noted   when compared with the prior study. there is a mild degree of boubacar infarct   ischemia noted on current study

## 2025-05-08 ENCOUNTER — TRANSITIONAL CARE UNIT VISIT (OUTPATIENT)
Dept: GERIATRICS | Facility: CLINIC | Age: 70
End: 2025-05-08
Payer: COMMERCIAL

## 2025-05-08 VITALS
BODY MASS INDEX: 30.99 KG/M2 | SYSTOLIC BLOOD PRESSURE: 128 MMHG | DIASTOLIC BLOOD PRESSURE: 65 MMHG | HEART RATE: 64 BPM | WEIGHT: 186 LBS | OXYGEN SATURATION: 93 % | TEMPERATURE: 97.9 F | RESPIRATION RATE: 18 BRPM | HEIGHT: 65 IN

## 2025-05-08 DIAGNOSIS — K72.00 SHOCK LIVER: ICD-10-CM

## 2025-05-08 DIAGNOSIS — R53.81 PHYSICAL DECONDITIONING: ICD-10-CM

## 2025-05-08 DIAGNOSIS — F41.9 ANXIETY: ICD-10-CM

## 2025-05-08 DIAGNOSIS — N17.9 ACUTE RENAL FAILURE, UNSPECIFIED ACUTE RENAL FAILURE TYPE: ICD-10-CM

## 2025-05-08 DIAGNOSIS — I21.4 NSTEMI (NON-ST ELEVATED MYOCARDIAL INFARCTION) (H): Primary | ICD-10-CM

## 2025-05-08 DIAGNOSIS — I48.91 ATRIAL FIBRILLATION, UNSPECIFIED TYPE (H): ICD-10-CM

## 2025-05-08 DIAGNOSIS — I50.82 BIVENTRICULAR HEART FAILURE (H): ICD-10-CM

## 2025-05-08 DIAGNOSIS — G40.909 SEIZURE DISORDER (H): ICD-10-CM

## 2025-05-08 DIAGNOSIS — J96.01 ACUTE RESPIRATORY FAILURE WITH HYPOXIA (H): ICD-10-CM

## 2025-05-08 DIAGNOSIS — Z71.89 ADVANCED DIRECTIVES, COUNSELING/DISCUSSION: ICD-10-CM

## 2025-05-08 DIAGNOSIS — E03.9 HYPOTHYROIDISM, UNSPECIFIED TYPE: ICD-10-CM

## 2025-05-08 DIAGNOSIS — N39.0 E. COLI UTI: ICD-10-CM

## 2025-05-08 DIAGNOSIS — R57.0 CARDIOGENIC SHOCK (H): ICD-10-CM

## 2025-05-08 DIAGNOSIS — N17.9 AKI (ACUTE KIDNEY INJURY): ICD-10-CM

## 2025-05-08 DIAGNOSIS — B96.20 E. COLI UTI: ICD-10-CM

## 2025-05-08 DIAGNOSIS — K21.9 GASTROESOPHAGEAL REFLUX DISEASE WITHOUT ESOPHAGITIS: ICD-10-CM

## 2025-05-08 PROCEDURE — 99309 SBSQ NF CARE MODERATE MDM 30: CPT | Performed by: NURSE PRACTITIONER

## 2025-05-08 NOTE — PROGRESS NOTES
[facility] GERIATRICS    PRIMARY CARE PROVIDER AND CLINIC:  [facility], [address] [place] [id number]  Chief Complaint   Patient presents with    Hospital F/U      [facility] Medical Record Number:  [id number]  Place of Service where encounter took place:  [facility] ([facility]) [[id number]]    Amy Bryan  is a 69 year old  (1955), admitted to the above facility from  [facility]. Hospital stay 4/29/25 through 5/7/25.  HPI:    69 year old female PMHx TBI in 2012 who resides in a group home, CAD s/p PCI in 2018 and 2021, suspected takotsubo cardiomyopathy now recovered, hospitalized with fatigue and ultimately found to have NSTEMI vs stress cardiomyopathy, biventricular heart failure with cardiogenic shock requiring dobutamine and epinephrine in the ICU. Stay complicated by shock liver, acute renal failure, and new runs of atrial fibrillation among others. Hospitalist assumed care on 05/04/25. On admission grossly elevated troponins >800-900 range. Worsened hypotension related to septic shock from UTI, cardiogenic shock: treated with heparin gtt, dobutamine, epinephrine, BiPAP. Diuresed with IV lasix, now on PO lasix. ECHO improved from 45-50% to 55-60% at discharge. Started amiodarone, metoprolol, ACE inhibirot, DOAC. Event monitor x 1 month. E coli UTI, retention: treated with zosyn then ceftriaxone x 7 day course. At discharge WBC 7 on 5/7. New PAF with RVR: prior hx a fib 2018, converted to SR on 5/5, meds as above. GIOVANNI: baseline Cr 0.9, peaked at 3.69, down to 1.55 on 5/7. Acute hypoxic respiratory failure, pleural effusion s/p thoracentesis: improved with diuresis, weaning oxygen. Elevated LFTs with shock liver: Lfts improved, resumed statin at discharge. Elevated D-dimer: no PE. To TCU for rehab.              CODE STATUS/ADVANCE DIRECTIVES DISCUSSION:  Prior  CPR/Full code   ALLERGIES:   Allergies   Allergen Reactions    Penicillins       dizziness      PAST MEDICAL HISTORY:   Past Medical History:   Diagnosis Date    Cardiogenic shock (H) 07/09/2021    Coma (H) 05/2012    CHT after a fall    Coronary artery disease involving native coronary artery of native heart 07/09/2021    3 vessel    Developmental delay     DVT (deep vein thrombosis) in pregnancy     post trauma    Fall 05/2012    multiple fracture    Hypertension     Menopause     age 52    Pelvic fracture (H) 5-201`2    Rib fracture 05/2012    STEMI (ST elevation myocardial infarction) (H) 01/25/2018      PAST SURGICAL HISTORY:   has a past surgical history that includes colonoscopy (01/01/2010); Laser YAG capsulotomy (Left, 05/29/2018); Laser YAG capsulotomy (Right, 06/05/2018); Heart Cath Drug eluting stent placement (N/A, 01/25/2018); Heart Catheterization with Possible Intervention (N/A, 8/3/2021); Percutaneous Coronary Intervention - Lithotripsy (N/A, 8/3/2021); Intravascular Ultrasound (N/A, 8/3/2021); and Percutaneous Coronary Intervention Stent (N/A, 8/3/2021).  FAMILY HISTORY: family history includes Cancer - colorectal in her mother; Diabetes in her sister; Other - See Comments in her father.  SOCIAL HISTORY:   reports that she has never smoked. She has never used smokeless tobacco. She reports that she does not currently use alcohol after a past usage of about 1.7 standard drinks of alcohol per week.  Patient's living condition: group home    Post Discharge Medication Reconciliation Status:   MED REC REQUIRED{TIP  Click the link below to document or use med rec list, use list to pull in response :[id number]}  Post Medication Reconciliation Status: {MED REC LIST:904026}       Current Outpatient Medications   Medication Sig Dispense Refill    acetaminophen (TYLENOL) 500 MG tablet Take 1,000 mg by mouth 2 times daily as needed.      amiodarone (PACERONE) 200 MG tablet Take 1 tablet (200 mg) by mouth 2 times daily.      Dextromethorphan-guaiFENesin  MG/5ML syrup Take 10 mLs by  mouth every 4 hours as needed for cough.      Dihydroxyaluminum Sod Carb (ROLAIDS PO) Take 2 tablets by mouth 4 times daily as needed.      emollient (VANICREAM) external cream APPLY FROM HEAD TO TOE DAILY AFTER SHOWERS *CALL TO REORDER* 453 g 11    FIBER- MG tablet Take 1 tablet by mouth daily as needed.      furosemide (LASIX) 40 MG tablet Take 1 tablet (40 mg) by mouth daily.      gabapentin (NEURONTIN) 100 MG tablet Take 200 mg by mouth at bedtime.      gabapentin (NEURONTIN) 100 MG tablet Take 100 mg by mouth 2 times daily. Takes at 0800 and 1200      lactase (LACTAID) 9000 units TABS tablet Take 9,000 Units by mouth 3 times daily (with meals).      levETIRAcetam (KEPPRA) 500 MG tablet Take 500 mg by mouth 2 times daily.      levothyroxine (SYNTHROID/LEVOTHROID) 75 MCG tablet Take 75 mcg by mouth every morning (before breakfast).      lisinopril (ZESTRIL) 10 MG tablet Take 0.5 tablets (5 mg) by mouth daily 45 tablet 3    loperamide (IMODIUM) 2 MG capsule Take 2 mg by mouth 4 times daily as needed for diarrhea      LORazepam (ATIVAN) 0.5 MG tablet Take 1 tablet (0.5 mg) by mouth 3 times daily as needed for anxiety. 10 tablet 0    melatonin 3 MG tablet Take 1 tablet by mouth at bedtime.      metoprolol tartrate (LOPRESSOR) 25 MG tablet Take 1 tablet (25 mg) by mouth or NG Tube 2 times daily.      metroNIDAZOLE (METROGEL) 0.75 % external gel Apply topically 2 times daily as needed.      mirtazapine (REMERON) 30 MG tablet Take 30 mg by mouth At Bedtime      nitroGLYcerin (NITROSTAT) 0.4 MG sublingual tablet For chest pain place 1 tablet under the tongue every 5 minutes for 3 doses. If symptoms persist 5 minutes after 1st dose call 911. 25 tablet 0    nystatin (MYCOSTATIN) 569937 UNIT/GM external powder Apply topically 2 times daily as needed for dry skin.      omeprazole (PRILOSEC) 20 MG DR capsule Take 20 mg by mouth every morning.      ondansetron (ZOFRAN-ODT) 4 MG ODT tab TAKE ONE TABLET BY MOUTH TWICE A  "DAY AS NEEDED FOR NAUSEA & VOMITING      potassium chloride ER (KLOR-CON M) 20 MEQ CR tablet Take 1 tablet by mouth daily.      rivaroxaban ANTICOAGULANT (XARELTO) 15 MG TABS tablet Take 1 tablet (15 mg) by mouth daily (with dinner).      rosuvastatin (CRESTOR) 20 MG tablet Take 0.5 tablets (10 mg) by mouth daily 90 tablet 3    sertraline (ZOLOFT) 100 MG tablet Take 100 mg by mouth daily. Takes with 50mg tablet for a total dose of 150mg daily      sertraline (ZOLOFT) 50 MG tablet Take 50 mg by mouth daily. Takes with 100mg tablet for a total dose of 150mg daily      trolamine salicylate (ASPERCREME) 10 % cream Apply topically as needed for moderate pain To affected areas      vitamin D3 (CHOLECALCIFEROL) 50 mcg (2000 units) tablet Take 1 tablet by mouth daily.       No current facility-administered medications for this visit.       ROS:  {ROS FGS:624374}    Vitals:  /65   Pulse 64   Temp 97.9  F (36.6  C)   Resp 18   Ht 1.651 m (5' 5\")   Wt 84.4 kg (186 lb)   SpO2 93%   BMI 30.95 kg/m    Exam:  {Nursing home physical exam :059687}    Lab/Diagnostic data:  Most Recent 3 CBC's:  Recent Labs   Lab Test 05/07/25  0543 05/06/25  0558 05/05/25  0539   WBC 7.0 6.2 6.9   HGB 12.3 12.4 12.3   MCV 94 93 90    208 198     Most Recent 3 BMP's:  Recent Labs   Lab Test 05/07/25  0543 05/06/25  1409 05/06/25  0558 05/05/25  0539     --  141 140   POTASSIUM 3.9 4.3  4.2 3.3* 4.1   CHLORIDE 98  --  97* 97*   CO2 34*  --  33* 32*   BUN 35.4*  --  36.1* 39.2*   CR 1.55*  --  1.46* 1.60*   ANIONGAP 10  --  11 11   HECTOR 10.0  --  9.8 9.4   GLC 98  --  105* 102*     Most Recent 2 LFT's:  Recent Labs   Lab Test 05/07/25  0543 05/06/25  0558   AST 68* 93*   * 144*   ALKPHOS 188* 192*   BILITOTAL 0.4 0.7     Most Recent TSH and T4:  Recent Labs   Lab Test 04/30/25  0934 05/19/21  0827   TSH 2.03 10.69*   T4  --  0.57*     Most Recent Hemoglobin A1c:  Recent Labs   Lab Test 01/26/18  0500   A1C 5.5 "       ASSESSMENT/PLAN:    {[facility] DX2:425709}    Orders:  Full Code  Wean off oxygen  CMP on 5/9    Electronically signed by:  Shaunna Solis ***                 lisinopril 5 mg daily, lopressor 25 mg BID, NTG PRN, KCL 20 meq daily, Xarelto 15 mg daily, Crestor 10 mg daily. Monitor vs and wt. Cardiology follow-up as planned.     Shock liver  Acute with illness. CMP on 5/9.     Acute renal failure, unspecified acute renal failure type  GIOVANNI (acute kidney injury)  Acute, improving at discharge. Cr 1.55 on 5/7. BMP on 5/9. Avoid nephrotoxins.     E. coli UTI  Resolved with antibiotics. Monitor symptoms.     Acute respiratory failure with hypoxia (H)  Acute with recent illness. Remains on oxygen - wean as able.     Physical deconditioning  Acute. Continue tylenol 1000 mg BID PRN pain, Neurontin 100 mg BID and 200 mg HS. Therapies eval and treat. Follow-up progress next visit.     Hypothyroidism, unspecified type  Continue PTA synthroid 75 mcg daily.     Anxiety  Chronic. Continue ativan 0.5 mg TID PRN, melatonin 3 mg HS, Remeron 30 mg HS, Zoloft 150 mg daily. Monitor mood.     Gastroesophageal reflux disease without esophagitis  Continue PTA Prilosec 20 mg daily, Zofran 4 mg PRN nausea, monitor symptoms.     Seizure disorder (H)  Continue PTA Keppra 500 mg BID, neurology follow-up per home routine.     Advanced directives, counseling/discussion  Asks to be Full Code.     Orders:  Full Code  Wean off oxygen  CMP on 5/9 diagnosis GIOVANNI, elevated LFTs    Electronically signed by:  ANALI Gray CNP

## 2025-05-12 ENCOUNTER — LAB REQUISITION (OUTPATIENT)
Dept: LAB | Facility: CLINIC | Age: 70
End: 2025-05-12

## 2025-05-12 ENCOUNTER — TRANSITIONAL CARE UNIT VISIT (OUTPATIENT)
Dept: GERIATRICS | Facility: CLINIC | Age: 70
End: 2025-05-12
Payer: COMMERCIAL

## 2025-05-12 VITALS
HEART RATE: 65 BPM | OXYGEN SATURATION: 97 % | RESPIRATION RATE: 16 BRPM | SYSTOLIC BLOOD PRESSURE: 102 MMHG | BODY MASS INDEX: 31.35 KG/M2 | WEIGHT: 188.4 LBS | TEMPERATURE: 97.6 F | DIASTOLIC BLOOD PRESSURE: 64 MMHG

## 2025-05-12 DIAGNOSIS — K72.00 SHOCK LIVER: ICD-10-CM

## 2025-05-12 DIAGNOSIS — K21.9 GASTROESOPHAGEAL REFLUX DISEASE WITHOUT ESOPHAGITIS: ICD-10-CM

## 2025-05-12 DIAGNOSIS — N39.0 E. COLI UTI: ICD-10-CM

## 2025-05-12 DIAGNOSIS — N17.9 ACUTE KIDNEY FAILURE, UNSPECIFIED: ICD-10-CM

## 2025-05-12 DIAGNOSIS — R57.0 CARDIOGENIC SHOCK (H): ICD-10-CM

## 2025-05-12 DIAGNOSIS — N17.9 ACUTE RENAL FAILURE, UNSPECIFIED ACUTE RENAL FAILURE TYPE: ICD-10-CM

## 2025-05-12 DIAGNOSIS — R53.81 PHYSICAL DECONDITIONING: ICD-10-CM

## 2025-05-12 DIAGNOSIS — J96.01 ACUTE RESPIRATORY FAILURE WITH HYPOXIA (H): ICD-10-CM

## 2025-05-12 DIAGNOSIS — E03.9 HYPOTHYROIDISM, UNSPECIFIED TYPE: ICD-10-CM

## 2025-05-12 DIAGNOSIS — G40.909 SEIZURE DISORDER (H): ICD-10-CM

## 2025-05-12 DIAGNOSIS — B96.20 E. COLI UTI: ICD-10-CM

## 2025-05-12 DIAGNOSIS — I48.91 ATRIAL FIBRILLATION, UNSPECIFIED TYPE (H): ICD-10-CM

## 2025-05-12 DIAGNOSIS — F41.9 ANXIETY: ICD-10-CM

## 2025-05-12 DIAGNOSIS — N17.9 AKI (ACUTE KIDNEY INJURY): ICD-10-CM

## 2025-05-12 DIAGNOSIS — I21.4 NSTEMI (NON-ST ELEVATED MYOCARDIAL INFARCTION) (H): Primary | ICD-10-CM

## 2025-05-12 DIAGNOSIS — I50.82 BIVENTRICULAR HEART FAILURE (H): ICD-10-CM

## 2025-05-12 DIAGNOSIS — E78.5 DYSLIPIDEMIA: ICD-10-CM

## 2025-05-12 PROCEDURE — 99309 SBSQ NF CARE MODERATE MDM 30: CPT | Performed by: NURSE PRACTITIONER

## 2025-05-12 NOTE — PROGRESS NOTES
[facility] GERIATRICS    Chief Complaint   Patient presents with    RECHECK     HPI:  Amy Bryan is a 69 year old  (1955), who is being seen today for an episodic care visit at: [facility] ([facility]) [[id number]].     Per recent TCU provider progress notes:   69 year old female PMHx TBI in 2012 who resides in a group home, CAD s/p PCI in 2018 and 2021, suspected takotsubo cardiomyopathy now recovered, hospitalized with fatigue and ultimately found to have NSTEMI vs stress cardiomyopathy, biventricular heart failure with cardiogenic shock requiring dobutamine and epinephrine in the ICU. Stay complicated by shock liver, acute renal failure, and new runs of atrial fibrillation among others. Hospitalist assumed care on 05/04/25. On admission grossly elevated troponins >800-900 range. Worsened hypotension related to septic shock from UTI, cardiogenic shock: treated with heparin gtt, dobutamine, epinephrine, BiPAP. Diuresed with IV lasix, now on PO lasix. ECHO improved from 45-50% to 55-60% at discharge. Started amiodarone, metoprolol, ACE inhibitor, DOAC. Event monitor x 1 month. E coli UTI, retention: treated with zosyn then ceftriaxone x 7 day course. At discharge WBC 7 on 5/7. New PAF with RVR: prior hx a fib 2018, converted to SR on 5/5, meds as above. GIOVANNI: baseline Cr 0.9, peaked at 3.69, down to 1.55 on 5/7. Acute hypoxic respiratory failure, pleural effusion s/p thoracentesis: improved with diuresis, weaning oxygen. Elevated LFTs with shock liver: LFTs improved, resumed statin at discharge. Elevated D-dimer: no PE. To TCU for rehab.     Today's concern is: episodic follow-up vs, labs, mobility, respiratory status. Reports feeling stronger. No chest pain. No headaches, dizziness, bowel or bladder issues. Transfers with assist. Sats 97% on oxygen 1 lpm per NC. BP range /55-67.     Allergies, and PMH/PSH reviewed in EPIC today.  REVIEW OF SYSTEMS:  4 point ROS including Respiratory, CV, GI and  , other than that noted in the HPI,  is negative    Objective:   /64   Pulse 65   Temp 97.6  F (36.4  C)   Resp 16   Wt 85.5 kg (188 lb 6.4 oz)   SpO2 97%   BMI 31.35 kg/m    GENERAL APPEARANCE:  Alert, in no distress, pleasant, cooperative, oriented x 4  EYES:  EOM, lids, pupils and irises normal, sclera clear and conjunctiva normal, no discharge or mattering on lids or lashes noted  ENT:  Mouth normal, moist mucous membranes, nose normal without drainage or crusting, external ears without lesions, hearing acuity intact  RESP:  respiratory effort normal, no chest wall tenderness, no respiratory distress, Lung sounds clear, patient is on oxygen per NC  CV:  Auscultation of heart done, rate and rhythm controlled and regular, no murmur, no rub or gallop. Edema none bilateral lower extremities  ABDOMEN:  normal bowel sounds, soft, nontender, no palpable masses.  M/S:   Gait and station walks with assist , no tenderness or swelling of the joints; able to move all extremities, digits normal  SKIN:  Inspection and palpation of skin and subcutaneous tissue: skin on extremities warm, dry and intact without rashes  NEURO: cranial nerves 2-12 grossly intact, no facial asymmetry, no speech deficits and able to follow directions, moves all extremities symmetrically  PSYCH:  insight and judgement and memory intact, affect and mood normal     Most Recent 3 CBC's:  Recent Labs   Lab Test 05/07/25  0543 05/06/25  0558 05/05/25  0539   WBC 7.0 6.2 6.9   HGB 12.3 12.4 12.3   MCV 94 93 90    208 198     Most Recent 3 BMP's:  Recent Labs   Lab Test 05/13/25  0822 05/09/25  0540 05/07/25  0543    143 142   POTASSIUM 4.3 3.6 3.9   CHLORIDE 101 97* 98   CO2 29 28 34*   BUN 31.4* 42.0* 35.4*   CR 1.43* 2.18* 1.55*   ANIONGAP 12 18* 10   HECTOR 10.0 9.4 10.0   GLC 76 86 98     Liver Function Studies -   Recent Labs   Lab Test 05/09/25  0540   PROTTOTAL 6.0*   ALBUMIN 3.3*   BILITOTAL 0.4   ALKPHOS 170*   AST 47*    ALT 84*       Assessment/Plan:    NSTEMI (non-ST elevated myocardial infarction) (H)  Biventricular heart failure (H)  Cardiogenic shock (H)  Atrial fibrillation, unspecified type (H)  HLD  Acute, improving. Continue Amiodarone 200 mg BID, lasix 40 mg daily, lisinopril 5 mg daily, lopressor 25 mg BID, NTG PRN, KCL 20 meq daily, Xarelto 15 mg daily, Crestor 10 mg daily. Monitor vs and wt. Cardiology follow-up as planned.     Shock liver  Acute with illness.  LFTs remain elevated. Check CMP on 5/14    Acute renal failure, unspecified acute renal failure type  GIOVANNI (acute kidney injury)  Acute, improving at discharge. Cr 1.55 on 5/7. BMP on 5/9 shows Cr 1.28. Avoid nephrotoxins. Encourage Hydration. CMP on 5/14.     E. coli UTI  Resolved with antibiotics. Monitor symptoms.     Acute respiratory failure with hypoxia (H)  Acute with recent illness. Remains on oxygen - wean as able.     Physical deconditioning  Acute. Continue tylenol 1000 mg BID PRN pain, Neurontin 100 mg BID and 200 mg HS. Therapies eval and treat. Follow-up progress next visit.     Hypothyroidism, unspecified type  Continue PTA synthroid 75 mcg daily.     Anxiety  Chronic. Continue ativan 0.5 mg TID PRN, melatonin 3 mg HS, Remeron 30 mg HS, Zoloft 150 mg daily. Monitor mood.     Gastroesophageal reflux disease without esophagitis  Continue PTA Prilosec 20 mg daily, Zofran 4 mg PRN nausea, monitor symptoms.     Seizure disorder (H)  Continue PTA Keppra 500 mg BID, neurology follow-up per home routine.     MED REC REQUIRED  Post Medication Reconciliation Status: discharge medications reconciled, continue medications without change    Orders:  CMP on 5/12 diagnosis GIOVANNI    Electronically signed by: ANALI Gray CNP

## 2025-05-13 ENCOUNTER — HOSPITAL ENCOUNTER (OUTPATIENT)
Dept: CARDIOLOGY | Facility: CLINIC | Age: 70
Discharge: HOME | End: 2025-05-13
Attending: HOSPITALIST
Payer: COMMERCIAL

## 2025-05-13 DIAGNOSIS — I21.4 NSTEMI (NON-ST ELEVATED MYOCARDIAL INFARCTION) (H): ICD-10-CM

## 2025-05-13 LAB
ALBUMIN SERPL BCG-MCNC: 3.7 G/DL (ref 3.5–5.2)
ALP SERPL-CCNC: 172 U/L (ref 40–150)
ALT SERPL W P-5'-P-CCNC: 59 U/L (ref 0–50)
ANION GAP SERPL CALCULATED.3IONS-SCNC: 12 MMOL/L (ref 7–15)
AST SERPL W P-5'-P-CCNC: 51 U/L (ref 0–45)
BILIRUB SERPL-MCNC: 0.3 MG/DL
BUN SERPL-MCNC: 31.4 MG/DL (ref 8–23)
CALCIUM SERPL-MCNC: 10 MG/DL (ref 8.8–10.4)
CHLORIDE SERPL-SCNC: 101 MMOL/L (ref 98–107)
CREAT SERPL-MCNC: 1.43 MG/DL (ref 0.51–0.95)
EGFRCR SERPLBLD CKD-EPI 2021: 40 ML/MIN/1.73M2
GLUCOSE SERPL-MCNC: 76 MG/DL (ref 70–99)
HCO3 SERPL-SCNC: 29 MMOL/L (ref 22–29)
POTASSIUM SERPL-SCNC: 4.3 MMOL/L (ref 3.4–5.3)
PROT SERPL-MCNC: 6.7 G/DL (ref 6.4–8.3)
SODIUM SERPL-SCNC: 142 MMOL/L (ref 135–145)

## 2025-05-13 PROCEDURE — 82947 ASSAY GLUCOSE BLOOD QUANT: CPT | Performed by: NURSE PRACTITIONER

## 2025-05-13 PROCEDURE — P9604 ONE-WAY ALLOW PRORATED TRIP: HCPCS | Performed by: NURSE PRACTITIONER

## 2025-05-13 PROCEDURE — 93270 REMOTE 30 DAY ECG REV/REPORT: CPT

## 2025-05-13 PROCEDURE — 82565 ASSAY OF CREATININE: CPT | Performed by: NURSE PRACTITIONER

## 2025-05-13 PROCEDURE — 36415 COLL VENOUS BLD VENIPUNCTURE: CPT | Performed by: NURSE PRACTITIONER

## 2025-05-15 NOTE — PROGRESS NOTES
[facility] GERIATRICS    Chief Complaint   Patient presents with    RECHECK     HPI:  Amy Bryan is a 69 year old  (1955), who is being seen today for an episodic care visit at: [facility] ([facility]) [[id number]]. Today's concern is: ***    Allergies, and PMH/PSH reviewed in [facility] today.  REVIEW OF SYSTEMS:  {oshyqp54:154477}    Objective:   There were no vitals taken for this visit.  {group home physical exam :518437}    {fgslab:468617}    Assessment/Plan:  {FGS DX2:286631}    MED REC REQUIRED{TIP  Click the link below to document or use med rec list, use list to pull in response :659682}  Post Medication Reconciliation Status: {MED REC LIST:146383}      Orders:  {fgsorders:076879}  ***    Electronically signed by: Shaunna Solis ***       "reviewed in [facility] today.  REVIEW OF SYSTEMS:  4 point ROS including Respiratory, CV, GI and , other than that noted in the HPI,  is negative    Objective:   /65   Pulse 64   Temp 98.2  F (36.8  C)   Resp 18   Ht 1.651 m (5' 5\")   Wt 85.5 kg (188 lb 6.4 oz)   SpO2 97%   BMI 31.35 kg/m     GENERAL APPEARANCE:  Alert, in no distress, pleasant, cooperative, oriented x 4  EYES:  EOM, lids, pupils and irises normal, sclera clear and conjunctiva normal, no discharge or mattering on lids or lashes noted  ENT:  Mouth normal, moist mucous membranes, nose normal without drainage or crusting, external ears without lesions, hearing acuity intact  RESP:  respiratory effort normal,  no respiratory distress, patient is on oxygen per NC  CV:  Edema none bilateral lower extremities  M/S:   Gait and station walks with assist, no tenderness or swelling of the joints; able to move all extremities, digits normal  SKIN:  Inspection and palpation of skin and subcutaneous tissue: skin on extremities warm, dry and intact without rashes  NEURO: cranial nerves 2-12 grossly intact, no facial asymmetry, no speech deficits and able to follow directions, moves all extremities symmetrically  PSYCH:  insight and judgement and memory intact, affect and mood normal     Most Recent 3 CBC's:  Recent Labs   Lab Test 05/07/25  0543 05/06/25  0558 05/05/25  0539   WBC 7.0 6.2 6.9   HGB 12.3 12.4 12.3   MCV 94 93 90    208 198     Most Recent 3 BMP's:  Recent Labs   Lab Test 05/13/25  0822 05/09/25  0540 05/07/25  0543    143 142   POTASSIUM 4.3 3.6 3.9   CHLORIDE 101 97* 98   CO2 29 28 34*   BUN 31.4* 42.0* 35.4*   CR 1.43* 2.18* 1.55*   ANIONGAP 12 18* 10   HECTOR 10.0 9.4 10.0   GLC 76 86 98     Liver Function Studies -   Liver Function Studies -   Recent Labs   Lab Test 05/13/25  0822   PROTTOTAL 6.7   ALBUMIN 3.7   BILITOTAL 0.3   ALKPHOS 172*   AST 51*   ALT 59*       Recent Labs   Lab Test 05/09/25  0540   PROTTOTAL 6.0* "   ALBUMIN 3.3*   BILITOTAL 0.4   ALKPHOS 170*   AST 47*   ALT 84*       Assessment/Plan:    NSTEMI (non-ST elevated myocardial infarction) (H)  Biventricular heart failure (H)  Cardiogenic shock (H)  Atrial fibrillation, unspecified type (H)  HLD  Acute, improving. Continue Amiodarone 200 mg BID, lasix 40 mg daily, lisinopril 5 mg daily, lopressor 25 mg BID, NTG PRN, KCL 20 meq daily, Xarelto 15 mg daily, Crestor 10 mg daily. Monitor vs and wt. Cardiology follow-up as planned.     Shock liver  Acute with illness.  LFTs remain elevated. Checked CMP on 5/14, unchanged.     Acute renal failure, unspecified acute renal failure type  GIOVANNI (acute kidney injury)  Acute, improving at discharge. Cr 1.55 on 5/7. BMP on 5/9 shows Cr 1.28. Avoid nephrotoxins. Encourage Hydration. CMP on 5/14 with Cr 1.43    E. coli UTI  Resolved with antibiotics. Monitor symptoms.     Acute respiratory failure with hypoxia (H)  Acute with recent illness. Remains on oxygen - wean as able.     Physical deconditioning  Acute. Continue tylenol 1000 mg BID PRN pain, Neurontin 100 mg BID and 200 mg HS. Therapies eval and treat. Follow-up progress next visit.     Hypothyroidism, unspecified type  Continue PTA synthroid 75 mcg daily.     Anxiety  Chronic. Continue ativan 0.5 mg TID PRN, melatonin 3 mg HS, Remeron 30 mg HS, Zoloft 150 mg daily. Monitor mood.     Gastroesophageal reflux disease without esophagitis  Continue PTA Prilosec 20 mg daily, Zofran 4 mg PRN nausea, monitor symptoms.     Seizure disorder (H)  Continue PTA Keppra 500 mg BID, neurology follow-up per home routine.     MED REC REQUIRED  Post Medication Reconciliation Status: discharge medications reconciled, continue medications without change    Orders:  NNO    Electronically signed by: ANALI Gray CNP

## 2025-05-16 ENCOUNTER — TRANSITIONAL CARE UNIT VISIT (OUTPATIENT)
Dept: GERIATRICS | Facility: CLINIC | Age: 70
End: 2025-05-16
Payer: COMMERCIAL

## 2025-05-16 VITALS
OXYGEN SATURATION: 97 % | HEIGHT: 65 IN | HEART RATE: 64 BPM | WEIGHT: 188.4 LBS | RESPIRATION RATE: 18 BRPM | DIASTOLIC BLOOD PRESSURE: 65 MMHG | TEMPERATURE: 98.2 F | BODY MASS INDEX: 31.39 KG/M2 | SYSTOLIC BLOOD PRESSURE: 105 MMHG

## 2025-05-16 DIAGNOSIS — G40.909 SEIZURE DISORDER (H): ICD-10-CM

## 2025-05-16 DIAGNOSIS — R57.0 CARDIOGENIC SHOCK (H): ICD-10-CM

## 2025-05-16 DIAGNOSIS — E78.5 DYSLIPIDEMIA: ICD-10-CM

## 2025-05-16 DIAGNOSIS — K21.9 GASTROESOPHAGEAL REFLUX DISEASE WITHOUT ESOPHAGITIS: ICD-10-CM

## 2025-05-16 DIAGNOSIS — B96.20 E. COLI UTI: ICD-10-CM

## 2025-05-16 DIAGNOSIS — N17.9 ACUTE RENAL FAILURE, UNSPECIFIED ACUTE RENAL FAILURE TYPE: ICD-10-CM

## 2025-05-16 DIAGNOSIS — K72.00 SHOCK LIVER: ICD-10-CM

## 2025-05-16 DIAGNOSIS — I50.82 BIVENTRICULAR HEART FAILURE (H): ICD-10-CM

## 2025-05-16 DIAGNOSIS — J96.01 ACUTE RESPIRATORY FAILURE WITH HYPOXIA (H): ICD-10-CM

## 2025-05-16 DIAGNOSIS — F41.9 ANXIETY: ICD-10-CM

## 2025-05-16 DIAGNOSIS — I21.4 NSTEMI (NON-ST ELEVATED MYOCARDIAL INFARCTION) (H): Primary | ICD-10-CM

## 2025-05-16 DIAGNOSIS — I48.91 ATRIAL FIBRILLATION, UNSPECIFIED TYPE (H): ICD-10-CM

## 2025-05-16 DIAGNOSIS — E03.9 HYPOTHYROIDISM, UNSPECIFIED TYPE: ICD-10-CM

## 2025-05-16 DIAGNOSIS — N17.9 AKI (ACUTE KIDNEY INJURY): ICD-10-CM

## 2025-05-16 DIAGNOSIS — N39.0 E. COLI UTI: ICD-10-CM

## 2025-05-16 DIAGNOSIS — R53.81 PHYSICAL DECONDITIONING: ICD-10-CM

## 2025-05-16 PROCEDURE — 99309 SBSQ NF CARE MODERATE MDM 30: CPT | Performed by: NURSE PRACTITIONER

## 2025-05-19 ENCOUNTER — TRANSITIONAL CARE UNIT VISIT (OUTPATIENT)
Dept: GERIATRICS | Facility: CLINIC | Age: 70
End: 2025-05-19
Payer: COMMERCIAL

## 2025-05-19 VITALS
TEMPERATURE: 97.3 F | OXYGEN SATURATION: 94 % | DIASTOLIC BLOOD PRESSURE: 64 MMHG | RESPIRATION RATE: 14 BRPM | SYSTOLIC BLOOD PRESSURE: 107 MMHG | HEIGHT: 65 IN | BODY MASS INDEX: 31.09 KG/M2 | HEART RATE: 62 BPM | WEIGHT: 186.6 LBS

## 2025-05-19 DIAGNOSIS — F41.9 ANXIETY: ICD-10-CM

## 2025-05-19 DIAGNOSIS — E78.5 DYSLIPIDEMIA: ICD-10-CM

## 2025-05-19 DIAGNOSIS — B96.20 E. COLI UTI: ICD-10-CM

## 2025-05-19 DIAGNOSIS — K72.00 SHOCK LIVER: ICD-10-CM

## 2025-05-19 DIAGNOSIS — G40.909 SEIZURE DISORDER (H): ICD-10-CM

## 2025-05-19 DIAGNOSIS — K21.9 GASTROESOPHAGEAL REFLUX DISEASE WITHOUT ESOPHAGITIS: ICD-10-CM

## 2025-05-19 DIAGNOSIS — R53.81 PHYSICAL DECONDITIONING: ICD-10-CM

## 2025-05-19 DIAGNOSIS — N17.9 ACUTE RENAL FAILURE, UNSPECIFIED ACUTE RENAL FAILURE TYPE: ICD-10-CM

## 2025-05-19 DIAGNOSIS — I21.4 NSTEMI (NON-ST ELEVATED MYOCARDIAL INFARCTION) (H): Primary | ICD-10-CM

## 2025-05-19 DIAGNOSIS — I48.91 ATRIAL FIBRILLATION, UNSPECIFIED TYPE (H): ICD-10-CM

## 2025-05-19 DIAGNOSIS — J96.01 ACUTE RESPIRATORY FAILURE WITH HYPOXIA (H): ICD-10-CM

## 2025-05-19 DIAGNOSIS — N39.0 E. COLI UTI: ICD-10-CM

## 2025-05-19 DIAGNOSIS — E03.9 HYPOTHYROIDISM, UNSPECIFIED TYPE: ICD-10-CM

## 2025-05-19 DIAGNOSIS — I50.82 BIVENTRICULAR HEART FAILURE (H): ICD-10-CM

## 2025-05-19 DIAGNOSIS — R57.0 CARDIOGENIC SHOCK (H): ICD-10-CM

## 2025-05-19 PROCEDURE — 99309 SBSQ NF CARE MODERATE MDM 30: CPT | Performed by: NURSE PRACTITIONER

## 2025-05-19 NOTE — PROGRESS NOTES
[facility] GERIATRICS    Chief Complaint   Patient presents with    RECHECK     HPI:  Amy Bryan is a 69 year old  (1955), who is being seen today for an episodic care visit at: [facility] (TCU) [[id number]].     Per recent TCU provider progress notes:   69 year old female PMHx TBI in 2012 who resides in a group home, CAD s/p PCI in 2018 and 2021, suspected takotsubo cardiomyopathy now recovered, hospitalized with fatigue and ultimately found to have NSTEMI vs stress cardiomyopathy, biventricular heart failure with cardiogenic shock requiring dobutamine and epinephrine in the ICU. Stay complicated by shock liver, acute renal failure, and new runs of atrial fibrillation among others. Hospitalist assumed care on 05/04/25. On admission grossly elevated troponins >800-900 range. Worsened hypotension related to septic shock from UTI, cardiogenic shock: treated with heparin gtt, dobutamine, epinephrine, BiPAP. Diuresed with IV lasix, now on PO lasix. ECHO improved from 45-50% to 55-60% at discharge. Started amiodarone, metoprolol, ACE inhibitor, DOAC. Event monitor x 1 month. E coli UTI, retention: treated with zosyn then ceftriaxone x 7 day course. At discharge WBC 7 on 5/7. New PAF with RVR: prior hx a fib 2018, converted to SR on 5/5, meds as above. GIOVANNI: baseline Cr 0.9, peaked at 3.69, down to 1.55 on 5/7. Acute hypoxic respiratory failure, pleural effusion s/p thoracentesis: improved with diuresis, weaning oxygen. Elevated LFTs with shock liver: LFTs improved, resumed statin at discharge. Elevated D-dimer: no PE. To TCU for rehab.     Today's concern is: episodic follow-up vs, labs, mobility, respiratory status. Denies chest pain. No headaches, dizziness, bowel or bladder issues. Attempting to wean oxygen but still occasionally de-saturates on room air with activity. Walks 150 ft with 2WW. Sats 98% on oxygen 1 lpm per NC. BP range /52-67. SLUMS 26/30.     Allergies, and PMH/PSH reviewed in  "EPIC today.  REVIEW OF SYSTEMS:  4 point ROS including Respiratory, CV, GI and , other than that noted in the HPI,  is negative    Objective:   /64   Pulse 62   Temp 97.3  F (36.3  C)   Resp 14   Ht 1.651 m (5' 5\")   Wt 84.6 kg (186 lb 9.6 oz)   SpO2 94%   BMI 31.05 kg/m     GENERAL APPEARANCE:  Alert, in no distress, pleasant, cooperative, oriented x 4  EYES:  EOM, lids, pupils and irises normal, sclera clear and conjunctiva normal, no discharge or mattering on lids or lashes noted  ENT:  Mouth normal, moist mucous membranes, nose normal without drainage or crusting, external ears without lesions, hearing acuity intact  RESP:  respiratory effort normal,  no respiratory distress, patient is on oxygen per NC  CV:  Edema none bilateral lower extremities  M/S:   Gait and station walks with assist, no tenderness or swelling of the joints; able to move all extremities, digits normal  SKIN:  Inspection and palpation of skin and subcutaneous tissue: skin on extremities warm, dry and intact without rashes  NEURO: cranial nerves 2-12 grossly intact, no facial asymmetry, no speech deficits and able to follow directions, moves all extremities symmetrically  PSYCH:  insight and judgement and memory intact, affect and mood normal     Most Recent 3 CBC's:  Recent Labs   Lab Test 05/07/25  0543 05/06/25  0558 05/05/25  0539   WBC 7.0 6.2 6.9   HGB 12.3 12.4 12.3   MCV 94 93 90    208 198     Recent Labs   Lab Test 05/13/25  0822 05/09/25  0540 05/07/25  0543    143 142   POTASSIUM 4.3 3.6 3.9   CHLORIDE 101 97* 98   CO2 29 28 34*   BUN 31.4* 42.0* 35.4*   CR 1.43* 2.18* 1.55*   ANIONGAP 12 18* 10   HECTOR 10.0 9.4 10.0   GLC 76 86 98     Liver Function Studies -   Recent Labs   Lab Test 05/13/25  0822   PROTTOTAL 6.7   ALBUMIN 3.7   BILITOTAL 0.3   ALKPHOS 172*   AST 51*   ALT 59*     Assessment/Plan:    NSTEMI (non-ST elevated myocardial infarction) (H)  Biventricular heart failure (H)  Cardiogenic shock " (H)  Atrial fibrillation, unspecified type (H)  HLD  Acute, improving. Continue Amiodarone 200 mg BID, lasix 40 mg daily, lisinopril 5 mg daily, lopressor 25 mg BID, NTG PRN, KCL 20 meq daily, Xarelto 15 mg daily, Crestor 10 mg daily. Monitor vs and wt. Cardiology follow-up as planned.     Shock liver  Acute with illness.  LFTs remain elevated. Checked CMP on 5/14, unchanged. Repeat CMP on 5/21    Acute renal failure, unspecified acute renal failure type  Acute, improving at discharge. Cr 1.55 on 5/7. BMP on 5/9 shows Cr 1.28. Avoid nephrotoxins. Encourage Hydration. CMP on 5/14 with Cr 1.43. Check CMP on 5/21    E. coli UTI  Resolved with antibiotics. Monitor symptoms.     Acute respiratory failure with hypoxia (H)  Acute with recent illness. Gradually improving. Remains on oxygen - wean as able.     Physical deconditioning  Acute. Continue tylenol 1000 mg BID PRN pain, Neurontin 100 mg BID and 200 mg HS. Therapies eval and treat. Follow-up progress next visit.     Hypothyroidism, unspecified type  Continue PTA synthroid 75 mcg daily.     Anxiety  Chronic. Continue ativan 0.5 mg TID PRN, melatonin 3 mg HS, Remeron 30 mg HS, Zoloft 150 mg daily. Monitor mood.     Gastroesophageal reflux disease without esophagitis  Continue PTA Prilosec 20 mg daily, Zofran 4 mg PRN nausea, monitor symptoms.     Seizure disorder (H)  Continue PTA Keppra 500 mg BID, neurology follow-up per home routine.     MED REC REQUIRED  Post Medication Reconciliation Status: discharge medications reconciled, continue medications without change    Orders:  CMP on 5/21 diagnosis IGOVANNI, shock liver    Electronically signed by: ANALI Gray CNP

## 2025-05-20 ENCOUNTER — LAB REQUISITION (OUTPATIENT)
Dept: LAB | Facility: CLINIC | Age: 70
End: 2025-05-20

## 2025-05-20 DIAGNOSIS — Z00.01 ENCOUNTER FOR GENERAL ADULT MEDICAL EXAMINATION WITH ABNORMAL FINDINGS: ICD-10-CM

## 2025-05-21 ENCOUNTER — RESULTS FOLLOW-UP (OUTPATIENT)
Dept: GERIATRICS | Facility: CLINIC | Age: 70
End: 2025-05-21

## 2025-05-21 LAB
ALBUMIN SERPL BCG-MCNC: 3.4 G/DL (ref 3.5–5.2)
ALP SERPL-CCNC: 135 U/L (ref 40–150)
ALT SERPL W P-5'-P-CCNC: 30 U/L (ref 0–50)
ANION GAP SERPL CALCULATED.3IONS-SCNC: 10 MMOL/L (ref 7–15)
AST SERPL W P-5'-P-CCNC: 33 U/L (ref 0–45)
BILIRUB SERPL-MCNC: 0.2 MG/DL
BUN SERPL-MCNC: 35.6 MG/DL (ref 8–23)
CALCIUM SERPL-MCNC: 9.3 MG/DL (ref 8.8–10.4)
CHLORIDE SERPL-SCNC: 101 MMOL/L (ref 98–107)
CREAT SERPL-MCNC: 1.6 MG/DL (ref 0.51–0.95)
EGFRCR SERPLBLD CKD-EPI 2021: 35 ML/MIN/1.73M2
GLUCOSE SERPL-MCNC: 76 MG/DL (ref 70–99)
HCO3 SERPL-SCNC: 29 MMOL/L (ref 22–29)
POTASSIUM SERPL-SCNC: 4 MMOL/L (ref 3.4–5.3)
PROT SERPL-MCNC: 5.9 G/DL (ref 6.4–8.3)
SODIUM SERPL-SCNC: 140 MMOL/L (ref 135–145)

## 2025-05-21 PROCEDURE — 36415 COLL VENOUS BLD VENIPUNCTURE: CPT | Performed by: PHYSICIAN ASSISTANT

## 2025-05-21 PROCEDURE — P9603 ONE-WAY ALLOW PRORATED MILES: HCPCS | Performed by: PHYSICIAN ASSISTANT

## 2025-05-21 PROCEDURE — 84155 ASSAY OF PROTEIN SERUM: CPT | Performed by: PHYSICIAN ASSISTANT

## 2025-05-21 PROCEDURE — 82947 ASSAY GLUCOSE BLOOD QUANT: CPT | Performed by: PHYSICIAN ASSISTANT

## 2025-05-22 ENCOUNTER — DISCHARGE SUMMARY NURSING HOME (OUTPATIENT)
Dept: GERIATRICS | Facility: CLINIC | Age: 70
End: 2025-05-22
Payer: COMMERCIAL

## 2025-05-22 VITALS
RESPIRATION RATE: 16 BRPM | OXYGEN SATURATION: 95 % | SYSTOLIC BLOOD PRESSURE: 102 MMHG | HEIGHT: 65 IN | DIASTOLIC BLOOD PRESSURE: 66 MMHG | WEIGHT: 186.6 LBS | BODY MASS INDEX: 31.09 KG/M2 | HEART RATE: 54 BPM | TEMPERATURE: 97.6 F

## 2025-05-22 NOTE — PROGRESS NOTES
[facility] GERIATRICS DISCHARGE SUMMARY  PATIENT'S NAME: Amy Bryan  YOB: 1955  MEDICAL RECORD NUMBER:  [id number]  Place of Service where encounter took place:  [facility] (TC[facility]) [[id number]]    PRIMARY CARE PROVIDER AND CLINIC RESPONSIBLE AFTER TRANSFER:   [facility], [address] [place] [id number] ***   Non-FMG Provider     Transferring providers: Shaunna Solis, Dr. Hubert MD  Recent Hospitalization/ED:  [facility] stay 4/29/25 to 5/7/25.  Date of SNF Admission: 5/7/25  Date of SNF (anticipated) Discharge: {fgsdcdate:039560}  Discharged to: {fgsdischargeto1:507916}  Cognitive Scores: {fgscog1:792129}  Physical Function: {fgsphysicalfunction:601792}  DME: {fgsdmedc:127391}    CODE STATUS/ADVANCE DIRECTIVES DISCUSSION:  Prior {Provider, verify code status is accurate as an order in Epic}  ALLERGIES: Penicillins    NURSING FACILITY COURSE   Medication Changes/Rationale:   ***    Summary of nursing facility stay:   {FGS DX2:715744}    Discharge Medications:  MED REC REQUIRED{TIP  Click the link below to document or use med rec list, use list to pull in response :584392}  Post Medication Reconciliation Status: {MED REC LIST:333149}     {Providers Please double check the med list (in the plan section >> meds & orders tab) and Discontinue any of the meds flagged by the TC to be discontinued}  Current Outpatient Medications   Medication Sig Dispense Refill    acetaminophen (TYLENOL) 500 MG tablet Take 1,000 mg by mouth 2 times daily as needed.      amiodarone (PACERONE) 200 MG tablet Take 1 tablet (200 mg) by mouth 2 times daily.      Dextromethorphan-guaiFENesin  MG/5ML syrup Take 10 mLs by mouth every 4 hours as needed for cough.      Dihydroxyaluminum Sod Carb (ROLAIDS PO) Take 2 tablets by mouth 4 times daily as needed.      emollient (VANICREAM) external cream APPLY FROM HEAD TO TOE DAILY AFTER SHOWERS  *CALL TO REORDER* 453 g 11    FIBER- MG tablet Take 1 tablet by mouth daily as needed.      furosemide (LASIX) 40 MG tablet Take 1 tablet (40 mg) by mouth daily.      gabapentin (NEURONTIN) 100 MG tablet Take 200 mg by mouth at bedtime.      gabapentin (NEURONTIN) 100 MG tablet Take 100 mg by mouth 2 times daily. Takes at 0800 and 1200      lactase (LACTAID) 9000 units TABS tablet Take 9,000 Units by mouth 3 times daily (with meals).      levETIRAcetam (KEPPRA) 500 MG tablet Take 500 mg by mouth 2 times daily.      levothyroxine (SYNTHROID/LEVOTHROID) 75 MCG tablet Take 75 mcg by mouth every morning (before breakfast).      lisinopril (ZESTRIL) 10 MG tablet Take 0.5 tablets (5 mg) by mouth daily 45 tablet 3    loperamide (IMODIUM) 2 MG capsule Take 2 mg by mouth 4 times daily as needed for diarrhea      LORazepam (ATIVAN) 0.5 MG tablet Take 1 tablet (0.5 mg) by mouth 3 times daily as needed for anxiety. 10 tablet 0    melatonin 3 MG tablet Take 1 tablet by mouth at bedtime.      metoprolol tartrate (LOPRESSOR) 25 MG tablet Take 1 tablet (25 mg) by mouth or NG Tube 2 times daily.      metroNIDAZOLE (METROGEL) 0.75 % external gel Apply topically 2 times daily as needed.      mirtazapine (REMERON) 30 MG tablet Take 30 mg by mouth At Bedtime      nitroGLYcerin (NITROSTAT) 0.4 MG sublingual tablet For chest pain place 1 tablet under the tongue every 5 minutes for 3 doses. If symptoms persist 5 minutes after 1st dose call 911. 25 tablet 0    nystatin (MYCOSTATIN) 837497 UNIT/GM external powder Apply topically 2 times daily as needed for dry skin.      omeprazole (PRILOSEC) 20 MG DR capsule Take 20 mg by mouth every morning.      ondansetron (ZOFRAN-ODT) 4 MG ODT tab TAKE ONE TABLET BY MOUTH TWICE A DAY AS NEEDED FOR NAUSEA & VOMITING      potassium chloride ER (KLOR-CON M) 20 MEQ CR tablet Take 1 tablet by mouth daily.      rivaroxaban ANTICOAGULANT (XARELTO) 15 MG TABS tablet Take 1 tablet (15 mg) by mouth daily  "(with dinner).      rosuvastatin (CRESTOR) 20 MG tablet Take 0.5 tablets (10 mg) by mouth daily 90 tablet 3    sertraline (ZOLOFT) 100 MG tablet Take 100 mg by mouth daily. Takes with 50mg tablet for a total dose of 150mg daily      sertraline (ZOLOFT) 50 MG tablet Take 50 mg by mouth daily. Takes with 100mg tablet for a total dose of 150mg daily      trolamine salicylate (ASPERCREME) 10 % cream Apply topically as needed for moderate pain To affected areas      vitamin D3 (CHOLECALCIFEROL) 50 mcg (2000 units) tablet Take 1 tablet by mouth daily.        ***    Controlled medications:   {fgscontrolledmeds1:767175}     Past Medical History:   Past Medical History:   Diagnosis Date    Cardiogenic shock (H) 07/09/2021    Coma (H) 05/2012    CHT after a fall    Coronary artery disease involving native coronary artery of native heart 07/09/2021    3 vessel    Developmental delay     DVT (deep vein thrombosis) in pregnancy     post trauma    Fall 05/2012    multiple fracture    Hypertension     Menopause     age 52    Pelvic fracture (H) 5-201`2    Rib fracture 05/2012    STEMI (ST elevation myocardial infarction) (H) 01/25/2018     Physical Exam:   Vitals: /66   Pulse 54   Temp 97.6  F (36.4  C)   Resp 16   Ht 1.651 m (5' 5\")   Wt 84.6 kg (186 lb 9.6 oz)   SpO2 95%   BMI 31.05 kg/m    BMI: Body mass index is 31.05 kg/m .  {NURSING HOME PHYSICAL EXAM:918679}     SNF labs: {fgslabdischarge:396405}    DISCHARGE PLAN:  Follow up labs: {fgsfuturelabs1:308304}  Medical Follow Up:      {fgsdischargefollowup:492877}  [facility] scheduled appointments:  Appointments in Next Year      May 22, 2025 10:30 AM  Discharge Summary with ANALI Carlson CNP  [facility] ([facility] ) [phone]     Jun 23, 2025 10:00 AM  (Arrive by 9:50 AM)  Return Cardiology with Lisa Reddy NP  [facility] ([facility]) " [phone]         ***  Discharge Services: {fgsdcservices1:880560}  Discharge Instructions Verbalized to Patient at Discharge:   {fgsdischargeinstructions1:661722}    TOTAL DISCHARGE TIME:   {TIME:542988}  Electronically signed by:  Shaunna Solis ***    {[facility] F2F:052094}

## 2025-05-23 PROBLEM — E66.01 MORBID OBESITY (H): Status: RESOLVED | Noted: 2023-03-15 | Resolved: 2025-05-23

## 2025-05-27 DIAGNOSIS — I21.4 NSTEMI (NON-ST ELEVATED MYOCARDIAL INFARCTION) (H): ICD-10-CM

## 2025-05-27 RX ORDER — FUROSEMIDE 40 MG/1
40 TABLET ORAL DAILY
Qty: 30 TABLET | Refills: 0 | Status: SHIPPED | OUTPATIENT
Start: 2025-05-27

## 2025-05-27 RX ORDER — AMIODARONE HYDROCHLORIDE 200 MG/1
200 TABLET ORAL 2 TIMES DAILY
Qty: 60 TABLET | Refills: 0 | Status: SHIPPED | OUTPATIENT
Start: 2025-05-27

## 2025-05-27 RX ORDER — METOPROLOL TARTRATE 25 MG/1
25 TABLET, FILM COATED ORAL 2 TIMES DAILY
Qty: 60 TABLET | Refills: 0 | Status: SHIPPED | OUTPATIENT
Start: 2025-05-27

## 2025-05-28 ENCOUNTER — TELEPHONE (OUTPATIENT)
Dept: CARDIOLOGY | Facility: CLINIC | Age: 70
End: 2025-05-28
Payer: COMMERCIAL

## 2025-05-28 NOTE — TELEPHONE ENCOUNTER
Message received from TCU regarding patient being done with heart monitor.  Appears to have been ordered during ED/Hospital visit  Patient had taken off on 5/23/25    Called Krystal Nurse Manager back @ [phone].   Advised to send back.   Patient has upcoming appointment scheduled  If needing new box (if not sent home with patient from hospital) they should reach out to [employer] for replacement box     Team 3 phone at [phone]  left for callback if further questions    Terrie Adler RN on 5/28/2025 at 10:05 AM

## 2025-06-18 ENCOUNTER — TELEPHONE (OUTPATIENT)
Dept: CARDIOLOGY | Facility: CLINIC | Age: 70
End: 2025-06-18

## 2025-06-18 DIAGNOSIS — I21.3 ST ELEVATION MYOCARDIAL INFARCTION (STEMI), UNSPECIFIED ARTERY (H): ICD-10-CM

## 2025-06-18 DIAGNOSIS — I10 BENIGN ESSENTIAL HYPERTENSION: ICD-10-CM

## 2025-06-18 DIAGNOSIS — I25.10 CORONARY ARTERY DISEASE INVOLVING NATIVE CORONARY ARTERY OF NATIVE HEART WITHOUT ANGINA PECTORIS: ICD-10-CM

## 2025-06-18 DIAGNOSIS — Z79.899 ON AMIODARONE THERAPY: ICD-10-CM

## 2025-06-18 DIAGNOSIS — I21.4 NSTEMI (NON-ST ELEVATED MYOCARDIAL INFARCTION) (H): Primary | ICD-10-CM

## 2025-06-18 NOTE — TELEPHONE ENCOUNTER
Notification from chart prep team that patient needs labs for upcoming apmnt with Sana Reddy on 6\23\25.   New order placed.    Long ago this was a Dr. Wren patient.   She was recently hospitalized (April 2025) and has been at TCU (see T.E. 5/282/5).   Now coming in for follow up apmnt.   Dr. Jarquin did hospital consult, [facility] 4\29 to 5\7, then she was at TCU 5\7\25 to 5\28\25.      Patient lives at a group home in [place] and she has a friend who is listed as the emergency contact.  I called to [facility] about patient needing labs, and requesting if the person (Monserrat)  can call back.   Wondering how patient will get here for her apmnt, and if we can arrange to have her get labs drawn at facility or what the plan should be.    Await call back.    Yas Camarillo RN on 6/18/2025 at 10:05 AM

## 2025-06-23 ENCOUNTER — RESULTS FOLLOW-UP (OUTPATIENT)
Dept: CARDIOLOGY | Facility: CLINIC | Age: 70
End: 2025-06-23

## 2025-06-23 ENCOUNTER — OFFICE VISIT (OUTPATIENT)
Dept: CARDIOLOGY | Facility: CLINIC | Age: 70
End: 2025-06-23
Payer: COMMERCIAL

## 2025-06-23 ENCOUNTER — LAB (OUTPATIENT)
Dept: LAB | Facility: CLINIC | Age: 70
End: 2025-06-23
Payer: COMMERCIAL

## 2025-06-23 VITALS
BODY MASS INDEX: 30.7 KG/M2 | OXYGEN SATURATION: 95 % | SYSTOLIC BLOOD PRESSURE: 111 MMHG | WEIGHT: 191 LBS | HEART RATE: 60 BPM | DIASTOLIC BLOOD PRESSURE: 62 MMHG | HEIGHT: 66 IN

## 2025-06-23 DIAGNOSIS — I10 BENIGN ESSENTIAL HYPERTENSION: ICD-10-CM

## 2025-06-23 DIAGNOSIS — Z79.899 ON AMIODARONE THERAPY: ICD-10-CM

## 2025-06-23 DIAGNOSIS — I21.4 NSTEMI (NON-ST ELEVATED MYOCARDIAL INFARCTION) (H): ICD-10-CM

## 2025-06-23 DIAGNOSIS — I25.10 CORONARY ARTERY DISEASE INVOLVING NATIVE CORONARY ARTERY OF NATIVE HEART WITHOUT ANGINA PECTORIS: ICD-10-CM

## 2025-06-23 DIAGNOSIS — I48.0 PAROXYSMAL ATRIAL FIBRILLATION (H): Primary | ICD-10-CM

## 2025-06-23 DIAGNOSIS — I21.3 ST ELEVATION MYOCARDIAL INFARCTION (STEMI), UNSPECIFIED ARTERY (H): ICD-10-CM

## 2025-06-23 LAB
ALBUMIN SERPL BCG-MCNC: 3.8 G/DL (ref 3.5–5.2)
ALP SERPL-CCNC: 148 U/L (ref 40–150)
ALT SERPL W P-5'-P-CCNC: 34 U/L (ref 0–50)
ANION GAP SERPL CALCULATED.3IONS-SCNC: 8 MMOL/L (ref 7–15)
AST SERPL W P-5'-P-CCNC: 39 U/L (ref 0–45)
BILIRUB SERPL-MCNC: 0.2 MG/DL
BUN SERPL-MCNC: 25.6 MG/DL (ref 8–23)
CALCIUM SERPL-MCNC: 9.5 MG/DL (ref 8.8–10.4)
CHLORIDE SERPL-SCNC: 101 MMOL/L (ref 98–107)
CREAT SERPL-MCNC: 1.26 MG/DL (ref 0.51–0.95)
EGFRCR SERPLBLD CKD-EPI 2021: 47 ML/MIN/1.73M2
GLUCOSE SERPL-MCNC: 116 MG/DL (ref 70–99)
HCO3 SERPL-SCNC: 32 MMOL/L (ref 22–29)
POTASSIUM SERPL-SCNC: 4.2 MMOL/L (ref 3.4–5.3)
PROT SERPL-MCNC: 6.6 G/DL (ref 6.4–8.3)
SODIUM SERPL-SCNC: 141 MMOL/L (ref 135–145)
T4 FREE SERPL-MCNC: 0.83 NG/DL (ref 0.9–1.7)
TSH SERPL DL<=0.005 MIU/L-ACNC: 6.3 UIU/ML (ref 0.3–4.2)

## 2025-06-23 PROCEDURE — 80053 COMPREHEN METABOLIC PANEL: CPT | Performed by: INTERNAL MEDICINE

## 2025-06-23 PROCEDURE — 84439 ASSAY OF FREE THYROXINE: CPT | Performed by: INTERNAL MEDICINE

## 2025-06-23 PROCEDURE — 36415 COLL VENOUS BLD VENIPUNCTURE: CPT | Performed by: INTERNAL MEDICINE

## 2025-06-23 PROCEDURE — 84443 ASSAY THYROID STIM HORMONE: CPT | Performed by: INTERNAL MEDICINE

## 2025-06-23 PROCEDURE — 3078F DIAST BP <80 MM HG: CPT | Performed by: NURSE PRACTITIONER

## 2025-06-23 PROCEDURE — 3074F SYST BP LT 130 MM HG: CPT | Performed by: NURSE PRACTITIONER

## 2025-06-23 PROCEDURE — 99214 OFFICE O/P EST MOD 30 MIN: CPT | Performed by: NURSE PRACTITIONER

## 2025-06-23 RX ORDER — LISINOPRIL 2.5 MG/1
2.5 TABLET ORAL DAILY
Qty: 90 TABLET | Refills: 3 | Status: SHIPPED | OUTPATIENT
Start: 2025-06-23

## 2025-06-23 RX ORDER — AMIODARONE HYDROCHLORIDE 200 MG/1
200 TABLET ORAL DAILY
Qty: 90 TABLET | Refills: 3 | Status: SHIPPED | OUTPATIENT
Start: 2025-06-23

## 2025-06-23 NOTE — LETTER
6/23/2025    [facility]  [address] [facility] [address]  [facility] [address]    RE: Amy Bryan       Dear Colleague,     I had the pleasure of seeing Amy Bryan in the [facility].    Cardiology Clinic Progress Note  Amy Bryan MRN# [id number]   YOB: 1955 Age: 69 year old   Primary Cardiologist: Dr. Wren Reason for visit: Follow up hospitalization             Assessment and Plan:       1.  Acute decompensated heart failure with mildly reduced ejection fraction       Hx of takatsubo cardiomyopathy, likely recent recurrence with normalization  -NT proBNP significantly elevated at > 57,000 during recent hospital stay, diuresed with IV Lasix, underwent right sided thoracentesis and discharged on oral furosemide  -4/30/25 TTE with LVEF 45 to 50%-> improved to 55 to 60% with mid lateral, inferolateral wall hypokinesis prior to discharge  - Mild hypoxia on presentation today with sats in the high 80s, has supplemental oxygen ordered at her facility, however only uses at times when she plays cards    2.  Recent NSTEMI with known coronary artery disease       History of anterolateral STEMI (2021)  - Troponins trended with rise and fall, peak of ~900 during recent hospitalization  - Underwent a Lexiscan nuclear stress test prior to discharge which showed infarct in the anterolateral segment with mild boubacar-infarct ischemia, felt to be stable and medical management recommended  - No anginal symptoms    3.  Paroxysmal atrial fibrillation, in the setting of #6, [id number]  - Treated with IV amiodarone during April/May hospitalization and subsequent conversion to sinus rhythm, maintained on 200 mg twice daily and dose adjusted xarelto (current creatinine clearance 47)  - Event monitor worn at discharge, collected 4.5 days of data without evidence of recurrence of atrial fibrillation  - Given minimal time of wear on recent event monitor I think it is  reasonable to have her repeat a 14-day Zio patch monitor as well as reduce her dose of amiodarone down to 200mg daily for maintenance    4.  Elevated LFTs  - Trended downward throughout the course of her recent hospitalization, statin resumed at hospital discharge, CMP pending for today for repeat check of LFTs    5.  Acute renal failure in the setting of #6, resolved  -Baseline creatinine normal with peak at 3.69 and down trending to 1.55 at hospital discharge, stable on most recent repeat in late May, labs pending for today    6.  Septic shock, secondary to urosepsis, resolved  - Required pressors during recent hospital stay    Plan:  CMP today   Reduce lisinopril to 2.5 mg daily considering mild hypotension on presentation today  Reduce amiodarone to 200mg daily, if plan to continue amiodarone long term, will need routine amiodarone screening, LFTs and TSH pending for today   14-day Zio patch monitor  Continue Xarelto 15 mg daily, will need to closely monitor for improvement in renal function and increased dose if creatinine clearance greater than 50  Nursing to reach out to patient's care facility and get a log of most recent blood pressures and weights to monitor trend, will likely adjust furosemide and/or give a few extra doses following results of labs/review of weight log today given evidence of mild volume overload  Continue medical management of CAD with rosuvastatin 20 mg daily  Recommend routine spot checks of oxygen saturation at her facility and use of supplemental oxygen per as needed parameters  Discussed importance of monitoring of Na intake, elevation, and compression of her legs     Follow up: Follow up with Dr. 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    Patient presented to the hospital in 2018 for cardiogenic shock thought to be secondary to Takotsubo cardiomyopathy.  She had anterolateral ST elevations for which she underwent emergent coronary angiogram with aspiration thrombectomy of the PL 3 branch and balloon dilation of the small D1.  She was also noted to have a 70% proximal to mid LAD lesion for which a staged PCI was planned.  This did not happen until 2021 when she underwent stenting of the left main into the mid LAD as well as second diagonal.  She also had a 70% stenosis of the ostial circumflex which has been medically managed.    She underwent a Lexiscan nuclear stress test in 2021 prior to her angiogram which showed a fixed lateral defect without any significant ischemia.    She presented to the emergency room on 4/29/25 with symptoms of fatigue for the previous 2 days.  In the emergency room she was hypoxic, with oxygen in the 80s on room air and hypotensive with a blood pressure in the 90s.  Her labs revealed an elevated troponin of 804 creatinine of 3.23 and a potassium of 5.7.  Her repeat draw showed troponin of 9.5.  Cardiology was consulted due to elevated troponin and EKG changes-> new ST depressions in the anterior leads with a new right bundle branch block.  She was not having chest pain.  Given her acute renal failure with worsening creatinine coronary angiography was not recommended.  Her transthoracic echocardiogram revealed a left ventricular ejection fraction of 45 to 50%, felt to be most likely a stress cardiomyopathy.  Chest x-ray showed a large right pleural effusion and trace left, which was treated with thoracentesis on 4/30/2025.  She was treated with IV furosemide by nephrology and switched to oral dose 40 mg daily at discharge. She diuresed from 225# to 186# at discharge.   She was also in septic shock, likely from urosepsis requiring pressor support.  Her statin had to be held due to elevated LFTs.  She did go  into atrial fibrillation with rapid ventricular rate and treated with 24 hours of IV amiodarone following which she converted to sinus rhythm.  She had a repeat transthoracic echo on 5/3/2025 which showed normalization of left ventricular ejection fraction to 55 to 60%, mid lateral, inferolateral wall hypokinesis.  She also had a Lexiscan nuclear stress test which showed a transmural infarct in the mid to basal anterolateral segments with mild boubacar-infarct ischemia normal LV function.  Findings felt to be stable and medical management was recommended.    Patient is here today for a post hospital follow up.  She presents today her caregiver Monserrat.     Patient reports feeling good. Monitoring weights daily a her group home which have been reportedly stable in 180-186# range. She has some left leg swelling today. Denies shortness of breath, orthopnea or PND. She is more unsteady today and leg swelling is slightly worse than hospital discharge according to her caregiver.     Patient denies chest pain or chest tightness. Denies dizziness, lightheadedness or other presyncopal symptoms. Denies tachycardia or palpitations.      Most recent labs from 5/21/25 show sodium 140, potassium 4.0, BUN 35.6, creatinine 1.6, GFR 35, normal alk phos, ALT, and AST, 5/7/2025 hemoglobin 12.3 platelets 216.     Blood pressure 93/61 and HR 60 in clinic today. Up to 111/62 on recheck. Denies bleeding issues with xarelto.         Recent Hospitalizations   As above          Social History      Social History     Socioeconomic History     Marital status:      Spouse name: Not on file     Number of children: Not on file     Years of education: Not on file     Highest education level: Not on file   Occupational History     Not on file   Tobacco Use     Smoking status: Never     Smokeless tobacco: Never   Substance and Sexual Activity     Alcohol use: Not Currently     Alcohol/week: 1.7 standard drinks of alcohol     Types: 2 Standard  "drinks or equivalent per week     Drug use: Not on file     Sexual activity: Not on file   Other Topics Concern     Parent/sibling w/ CABG, MI or angioplasty before 65F 55M? Not Asked   Social History Narrative     Not on file     Social Drivers of Health     Financial Resource Strain: Not on file   Food Insecurity: Not on file   Transportation Needs: Not on file   Physical Activity: Not on file   Stress: Not on file   Social Connections: Not on file   Interpersonal Safety: Not on file   Housing Stability: Not on file            Review of Systems:   Skin:  not assessed rash, itching   Eyes:  not assessed    ENT:  not assessed    Respiratory:  Positive for dyspnea on exertion, shortness of breath  Cardiovascular:  Negative, palpitations, chest pain, syncope or near-syncope, lightheadedness, dizziness, cyanosis Positive for, edema  Gastroenterology: not assessed heartburn, nausea  Genitourinary:  not assessed urinary frequency  Musculoskeletal:  not assessed arthritis  Neurologic:  not assessed numbness or tingling of hands  Psychiatric:  not assessed anxiety  Heme/Lymph/Imm:  not assessed allergies  Endocrine:  Negative thyroid disorder         Physical Exam:   Vitals: /62   Pulse 60   Ht 1.664 m (5' 5.5\")   Wt 86.6 kg (191 lb)   SpO2 95%   BMI 31.30 kg/m     Wt Readings from Last 4 Encounters:   06/23/25 86.6 kg (191 lb)   05/22/25 84.6 kg (186 lb 9.6 oz)   05/19/25 84.6 kg (186 lb 9.6 oz)   05/16/25 85.5 kg (188 lb 6.4 oz)     GEN: well nourished, in no acute distress.  HEENT:  Pupils equal, round. Sclerae nonicteric.   NECK: Supple, no masses appreciated. Faint JVD at the base of the neck with patient supine.  C/V:  Regular rate and rhythm, no murmur, rub or gallop.    RESP: Respirations are unlabored. Clear to auscultation bilaterally without wheezing, rales, or rhonchi.  GI: Abdomen soft, nontender.  EXTREM: trace-1+ left LE edema.  NEURO: Alert and oriented, cooperative.  SKIN: Warm and dry.        " Data:     LIPID RESULTS:  Lab Results   Component Value Date    CHOL 113 02/28/2022    CHOL 145 05/19/2021    HDL 31 (L) 02/28/2022    HDL 27 (L) 05/19/2021    LDL 31 02/28/2022    LDL 51 05/19/2021    TRIG 256 (H) 11/22/2023    TRIG 336 (H) 05/19/2021     LIVER ENZYME RESULTS:  Lab Results   Component Value Date    AST 33 05/21/2025    AST 25 05/19/2021    ALT 30 05/21/2025    ALT 36 05/19/2021     CBC RESULTS:  Lab Results   Component Value Date    WBC 7.0 05/07/2025    WBC 7.6 05/19/2021    RBC 4.31 05/07/2025    RBC 4.72 05/19/2021    HGB 12.3 05/07/2025    HGB 14.5 05/19/2021    HCT 40.7 05/07/2025    HCT 45.4 05/19/2021    MCV 94 05/07/2025    MCV 96 05/19/2021    MCH 28.5 05/07/2025    MCH 30.7 05/19/2021    MCHC 30.2 (L) 05/07/2025    MCHC 31.9 05/19/2021    RDW 13.5 05/07/2025    RDW 12.5 05/19/2021     05/07/2025     05/19/2021     BMP RESULTS:  Lab Results   Component Value Date     05/21/2025     05/19/2021    POTASSIUM 4.0 05/21/2025    POTASSIUM 4.0 08/03/2021    POTASSIUM 4.7 05/19/2021    CHLORIDE 101 05/21/2025    CHLORIDE 106 11/22/2023    CHLORIDE 107 05/19/2021    CO2 29 05/21/2025    CO2 33 (H) 08/03/2021    CO2 33 (H) 05/19/2021    ANIONGAP 10 05/21/2025    ANIONGAP 2 (L) 08/03/2021    ANIONGAP 1 (L) 05/19/2021    GLC 76 05/21/2025     (H) 05/04/2025    GLC 99 08/03/2021    GLC 97 05/19/2021    BUN 35.6 (H) 05/21/2025    BUN 15 08/03/2021    BUN 16 05/19/2021    CR 1.60 (H) 05/21/2025    CR 0.90 05/19/2021    GFRESTIMATED 35 (L) 05/21/2025    GFRESTIMATED 67 05/19/2021    GFRESTBLACK 78 05/19/2021    HECTOR 9.3 05/21/2025    HECTOR 9.3 05/19/2021      A1C RESULTS:  Lab Results   Component Value Date    A1C 5.5 01/26/2018     INR RESULTS:  Lab Results   Component Value Date    INR 1.14 08/03/2021    INR 2.6 08/29/2012    INR 1.8 08/15/2012            Medications     Current Outpatient Medications   Medication Sig Dispense Refill     amiodarone (PACERONE) 200 MG tablet  Take 1 tablet (200 mg) by mouth daily. 90 tablet 3     furosemide (LASIX) 40 MG tablet Take 1 tablet (40 mg) by mouth daily. 30 tablet 0     gabapentin (NEURONTIN) 100 MG tablet Take 200 mg by mouth at bedtime.       gabapentin (NEURONTIN) 100 MG tablet Take 100 mg by mouth 2 times daily. Takes at 0800 and 1200       lactase (LACTAID) 9000 units TABS tablet Take 9,000 Units by mouth 3 times daily (with meals).       levETIRAcetam (KEPPRA) 500 MG tablet Take 500 mg by mouth 2 times daily.       levothyroxine (SYNTHROID/LEVOTHROID) 75 MCG tablet Take 75 mcg by mouth every morning (before breakfast).       lisinopril (ZESTRIL) 2.5 MG tablet Take 1 tablet (2.5 mg) by mouth daily. 90 tablet 3     loperamide (IMODIUM) 2 MG capsule Take 2 mg by mouth 4 times daily as needed for diarrhea       melatonin 3 MG tablet Take 1 tablet by mouth at bedtime.       metoprolol tartrate (LOPRESSOR) 25 MG tablet Take 1 tablet (25 mg) by mouth or NG Tube 2 times daily. 60 tablet 0     metroNIDAZOLE (METROGEL) 0.75 % external gel Apply topically 2 times daily as needed.       mirtazapine (REMERON) 30 MG tablet Take 30 mg by mouth At Bedtime       omeprazole (PRILOSEC) 20 MG DR capsule Take 20 mg by mouth every morning.       potassium chloride ER (KLOR-CON M) 20 MEQ CR tablet Take 1 tablet by mouth daily.       rivaroxaban ANTICOAGULANT (XARELTO) 15 MG TABS tablet Take 1 tablet (15 mg) by mouth daily (with dinner). 90 tablet 3     rosuvastatin (CRESTOR) 20 MG tablet Take 0.5 tablets (10 mg) by mouth daily 90 tablet 3     sertraline (ZOLOFT) 100 MG tablet Take 100 mg by mouth daily. Takes with 50mg tablet for a total dose of 150mg daily       sertraline (ZOLOFT) 50 MG tablet Take 50 mg by mouth daily. Takes with 100mg tablet for a total dose of 150mg daily       trolamine salicylate (ASPERCREME) 10 % cream Apply topically as needed for moderate pain To affected areas       vitamin D3 (CHOLECALCIFEROL) 50 mcg (2000 units) tablet Take 1  tablet by mouth daily.       acetaminophen (TYLENOL) 500 MG tablet Take 1,000 mg by mouth 2 times daily as needed.       Dextromethorphan-guaiFENesin  MG/5ML syrup Take 10 mLs by mouth every 4 hours as needed for cough.       Dihydroxyaluminum Sod Carb (ROLAIDS PO) Take 2 tablets by mouth 4 times daily as needed.       emollient (VANICREAM) external cream APPLY FROM HEAD TO TOE DAILY AFTER SHOWERS *CALL TO REORDER* 453 g 11     FIBER- MG tablet Take 1 tablet by mouth daily as needed.       LORazepam (ATIVAN) 0.5 MG tablet Take 1 tablet (0.5 mg) by mouth 3 times daily as needed for anxiety. (Patient not taking: Reported on 6/23/2025) 10 tablet 0     nitroGLYcerin (NITROSTAT) 0.4 MG sublingual tablet For chest pain place 1 tablet under the tongue every 5 minutes for 3 doses. If symptoms persist 5 minutes after 1st dose call 911. (Patient not taking: Reported on 6/23/2025) 25 tablet 0     nystatin (MYCOSTATIN) 749844 UNIT/GM external powder Apply topically 2 times daily as needed for dry skin. (Patient not taking: Reported on 6/23/2025)       ondansetron (ZOFRAN-ODT) 4 MG ODT tab TAKE ONE TABLET BY MOUTH TWICE A DAY AS NEEDED FOR NAUSEA & VOMITING (Patient not taking: Reported on 6/23/2025)            Past Medical History     Past Medical History:   Diagnosis Date     Cardiogenic shock (H) 07/09/2021     Coma (H) 05/2012    CHT after a fall     Coronary artery disease involving native coronary artery of native heart 07/09/2021    3 vessel     Developmental delay      DVT (deep vein thrombosis) in pregnancy     post trauma     Fall 05/2012    multiple fracture     Hypertension      Menopause     age 52     Pelvic fracture (H) 5-201`2     Rib fracture 05/2012     STEMI (ST elevation myocardial infarction) (H) 01/25/2018     Past Surgical History:   Procedure Laterality Date     COLONOSCOPY  01/01/2010     CV CORONARY LITHOTRIPSY PCI N/A 8/3/2021    Procedure: CV Coronary Lithotripsy PCI;  Surgeon: Areli  Kamran Tapia MD;  Location:  [facility]     CV HEART CATHETERIZATION WITH POSSIBLE INTERVENTION N/A 8/3/2021    Procedure: Heart Catheterization with Possible Intervention;  Surgeon: Kamran Singleton MD;  Location:  [facility]     CV INTRAVASULAR ULTRASOUND N/A 8/3/2021    Procedure: Intravascular Ultrasound;  Surgeon: Kamran Singleton MD;  Location:  [facility]     CV PCI STENT DRUG ELUTING N/A 8/3/2021    Procedure: Percutaneous Coronary Intervention Stent Drug Eluting;  Surgeon: Kamran Singleton MD;  Location:  [facility]     HEART CATH DRUG ELUTING STENT PLACEMENT N/A 01/25/2018     LASER YAG CAPSULOTOMY Left 05/29/2018    Procedure: LASER YAG CAPSULOTOMY;  LEFT YAG LASER CAPSULOTOMY ;  Surgeon: Tabby Guillaume MD;  Location:  [facility]     LASER YAG CAPSULOTOMY Right 06/05/2018    Procedure: LASER YAG CAPSULOTOMY;  RIGHT EYE YAG LASER CAPSULOTOMY ;  Surgeon: Tabby Guillaume MD;  Location: [facility]     Family History   Problem Relation Age of Onset     Other - See Comments Father         alpha 1 antitrypsin deficiency      Cancer - colorectal Mother      Diabetes Sister             Allergies   Penicillins        Lisa Reddy NP  [facility]     Thank you for allowing me to participate in the care of your patient.      Sincerely,     Lisa Reddy NP     [facility]  cc:   Howard Dobson PA-C  [address]  [place] [address]

## 2025-06-23 NOTE — PATIENT INSTRUCTIONS
Today's Recommendations    Reduce the lisinopril to 2.5mg daily   My nurses will call and get a log of your weights and blood pressures for me to review and I will also follow up your facility about your lab results today  Reduce amiodarone 200mg daily   I recommend you have your oxygen checked at least 3 times a day and use the supplemental oxygen as ordered   I would like you to wear a heart monitor for 2 weeks to look for additional atrial fibrillation, they will mail this out to [facility] and can mail it back   Watch the salt intake, less than 2gm/day, elevate your legs and wear compression stockings daily   Continue all other medications without changes.  Please follow up with Dr. Wren in 3 months.    Please send a [employer] message or call [phone] to the RN team with questions or concerns.     Scheduling number [phone]  ANALI Little, CNP

## 2025-06-23 NOTE — PROGRESS NOTES
Cardiology Clinic Progress Note  Amy Bryan MRN# [id number]   YOB: 1955 Age: 69 year old   Primary Cardiologist: Dr. Wren Reason for visit: Follow up hospitalization             Assessment and Plan:       1.  Acute decompensated heart failure with mildly reduced ejection fraction       Hx of takatsubo cardiomyopathy, likely recent recurrence with normalization  -NT proBNP significantly elevated at > 57,000 during recent hospital stay, diuresed with IV Lasix, underwent right sided thoracentesis and discharged on oral furosemide  -4/30/25 TTE with LVEF 45 to 50%-> improved to 55 to 60% with mid lateral, inferolateral wall hypokinesis prior to discharge  - Mild hypoxia on presentation today with sats in the high 80s, has supplemental oxygen ordered at her facility, however only uses at times when she plays cards    2.  Recent NSTEMI with known coronary artery disease       History of anterolateral STEMI (2021)  - Troponins trended with rise and fall, peak of ~900 during recent hospitalization  - Underwent a Lexiscan nuclear stress test prior to discharge which showed infarct in the anterolateral segment with mild boubacar-infarct ischemia, felt to be stable and medical management recommended  - No anginal symptoms    3.  Paroxysmal atrial fibrillation, in the setting of #6, [id number]  - Treated with IV amiodarone during April/May hospitalization and subsequent conversion to sinus rhythm, maintained on 200 mg twice daily and dose adjusted xarelto (current creatinine clearance 47)  - Event monitor worn at discharge, collected 4.5 days of data without evidence of recurrence of atrial fibrillation  - Given minimal time of wear on recent event monitor I think it is reasonable to have her repeat a 14-day Zio patch monitor as well as reduce her dose of amiodarone down to 200mg daily for maintenance    4.  Elevated LFTs  - Trended downward throughout the course of her recent hospitalization, statin  resumed at hospital discharge, CMP pending for today for repeat check of LFTs    5.  Acute renal failure in the setting of #6, resolved  -Baseline creatinine normal with peak at 3.69 and down trending to 1.55 at hospital discharge, stable on most recent repeat in late May, labs pending for today    6.  Septic shock, secondary to urosepsis, resolved  - Required pressors during recent hospital stay    Plan:  CMP today   Reduce lisinopril to 2.5 mg daily considering mild hypotension on presentation today  Reduce amiodarone to 200mg daily, if plan to continue amiodarone long term, will need routine amiodarone screening, LFTs and TSH pending for today   14-day Zio patch monitor  Continue Xarelto 15 mg daily, will need to closely monitor for improvement in renal function and increased dose if creatinine clearance greater than 50  Nursing to reach out to patient's care facility and get a log of most recent blood pressures and weights to monitor trend, will likely adjust furosemide and/or give a few extra doses following results of labs/review of weight log today given evidence of mild volume overload  Continue medical management of CAD with rosuvastatin 20 mg daily  Recommend routine spot checks of oxygen saturation at her facility and use of supplemental oxygen per as needed parameters  Discussed importance of monitoring of Na intake, elevation, and compression of her legs     Follow up: Follow up with Dr. Wren in 3 months        History of Presenting Illness:    Amy Bryan is a very pleasant 69 year old female with a history of TBI in 2012 w/seizure disorder, hypothyroidism, coronary artery disease, s/p PCI in 2018  and 2021, Takotsubo cardiomyopathy, atrial fibrillation.    Patient presented to the hospital in 2018 for cardiogenic shock thought to be secondary to Takotsubo cardiomyopathy.  She had anterolateral ST elevations for which she underwent emergent coronary angiogram with aspiration thrombectomy of the  PL 3 branch and balloon dilation of the small D1.  She was also noted to have a 70% proximal to mid LAD lesion for which a staged PCI was planned.  This did not happen until 2021 when she underwent stenting of the left main into the mid LAD as well as second diagonal.  She also had a 70% stenosis of the ostial circumflex which has been medically managed.    She underwent a Lexiscan nuclear stress test in 2021 prior to her angiogram which showed a fixed lateral defect without any significant ischemia.    She presented to the emergency room on 4/29/25 with symptoms of fatigue for the previous 2 days.  In the emergency room she was hypoxic, with oxygen in the 80s on room air and hypotensive with a blood pressure in the 90s.  Her labs revealed an elevated troponin of 804 creatinine of 3.23 and a potassium of 5.7.  Her repeat draw showed troponin of 9.5.  Cardiology was consulted due to elevated troponin and EKG changes-> new ST depressions in the anterior leads with a new right bundle branch block.  She was not having chest pain.  Given her acute renal failure with worsening creatinine coronary angiography was not recommended.  Her transthoracic echocardiogram revealed a left ventricular ejection fraction of 45 to 50%, felt to be most likely a stress cardiomyopathy.  Chest x-ray showed a large right pleural effusion and trace left, which was treated with thoracentesis on 4/30/2025.  She was treated with IV furosemide by nephrology and switched to oral dose 40 mg daily at discharge. She diuresed from 225# to 186# at discharge.   She was also in septic shock, likely from urosepsis requiring pressor support.  Her statin had to be held due to elevated LFTs.  She did go into atrial fibrillation with rapid ventricular rate and treated with 24 hours of IV amiodarone following which she converted to sinus rhythm.  She had a repeat transthoracic echo on 5/3/2025 which showed normalization of left ventricular ejection fraction to  55 to 60%, mid lateral, inferolateral wall hypokinesis.  She also had a Lexiscan nuclear stress test which showed a transmural infarct in the mid to basal anterolateral segments with mild boubacar-infarct ischemia normal LV function.  Findings felt to be stable and medical management was recommended.    Patient is here today for a post hospital follow up.  She presents today her caregiver Monserrat.     Patient reports feeling good. Monitoring weights daily a her group home which have been reportedly stable in 180-186# range. She has some left leg swelling today. Denies shortness of breath, orthopnea or PND. She is more unsteady today and leg swelling is slightly worse than hospital discharge according to her caregiver.     Patient denies chest pain or chest tightness. Denies dizziness, lightheadedness or other presyncopal symptoms. Denies tachycardia or palpitations.      Most recent labs from 5/21/25 show sodium 140, potassium 4.0, BUN 35.6, creatinine 1.6, GFR 35, normal alk phos, ALT, and AST, 5/7/2025 hemoglobin 12.3 platelets 216.     Blood pressure 93/61 and HR 60 in clinic today. Up to 111/62 on recheck. Denies bleeding issues with xarelto.         Recent Hospitalizations   As above          Social History      Social History     Socioeconomic History    Marital status:      Spouse name: Not on file    Number of children: Not on file    Years of education: Not on file    Highest education level: Not on file   Occupational History    Not on file   Tobacco Use    Smoking status: Never    Smokeless tobacco: Never   Substance and Sexual Activity    Alcohol use: Not Currently     Alcohol/week: 1.7 standard drinks of alcohol     Types: 2 Standard drinks or equivalent per week    Drug use: Not on file    Sexual activity: Not on file   Other Topics Concern    Parent/sibling w/ CABG, MI or angioplasty before 65F 55M? Not Asked   Social History Narrative    Not on file     Social Drivers of Health     Financial  "Resource Strain: Not on file   Food Insecurity: Not on file   Transportation Needs: Not on file   Physical Activity: Not on file   Stress: Not on file   Social Connections: Not on file   Interpersonal Safety: Not on file   Housing Stability: Not on file            Review of Systems:   Skin:  not assessed rash, itching   Eyes:  not assessed    ENT:  not assessed    Respiratory:  Positive for dyspnea on exertion, shortness of breath  Cardiovascular:  Negative, palpitations, chest pain, syncope or near-syncope, lightheadedness, dizziness, cyanosis Positive for, edema  Gastroenterology: not assessed heartburn, nausea  Genitourinary:  not assessed urinary frequency  Musculoskeletal:  not assessed arthritis  Neurologic:  not assessed numbness or tingling of hands  Psychiatric:  not assessed anxiety  Heme/Lymph/Imm:  not assessed allergies  Endocrine:  Negative thyroid disorder         Physical Exam:   Vitals: /62   Pulse 60   Ht 1.664 m (5' 5.5\")   Wt 86.6 kg (191 lb)   SpO2 95%   BMI 31.30 kg/m     Wt Readings from Last 4 Encounters:   06/23/25 86.6 kg (191 lb)   05/22/25 84.6 kg (186 lb 9.6 oz)   05/19/25 84.6 kg (186 lb 9.6 oz)   05/16/25 85.5 kg (188 lb 6.4 oz)     GEN: well nourished, in no acute distress.  HEENT:  Pupils equal, round. Sclerae nonicteric.   NECK: Supple, no masses appreciated. Faint JVD at the base of the neck with patient supine.  C/V:  Regular rate and rhythm, no murmur, rub or gallop.    RESP: Respirations are unlabored. Clear to auscultation bilaterally without wheezing, rales, or rhonchi.  GI: Abdomen soft, nontender.  EXTREM: trace-1+ left LE edema.  NEURO: Alert and oriented, cooperative.  SKIN: Warm and dry.        Data:     LIPID RESULTS:  Lab Results   Component Value Date    CHOL 113 02/28/2022    CHOL 145 05/19/2021    HDL 31 (L) 02/28/2022    HDL 27 (L) 05/19/2021    LDL 31 02/28/2022    LDL 51 05/19/2021    TRIG 256 (H) 11/22/2023    TRIG 336 (H) 05/19/2021     LIVER ENZYME " RESULTS:  Lab Results   Component Value Date    AST 33 05/21/2025    AST 25 05/19/2021    ALT 30 05/21/2025    ALT 36 05/19/2021     CBC RESULTS:  Lab Results   Component Value Date    WBC 7.0 05/07/2025    WBC 7.6 05/19/2021    RBC 4.31 05/07/2025    RBC 4.72 05/19/2021    HGB 12.3 05/07/2025    HGB 14.5 05/19/2021    HCT 40.7 05/07/2025    HCT 45.4 05/19/2021    MCV 94 05/07/2025    MCV 96 05/19/2021    MCH 28.5 05/07/2025    MCH 30.7 05/19/2021    MCHC 30.2 (L) 05/07/2025    MCHC 31.9 05/19/2021    RDW 13.5 05/07/2025    RDW 12.5 05/19/2021     05/07/2025     05/19/2021     BMP RESULTS:  Lab Results   Component Value Date     05/21/2025     05/19/2021    POTASSIUM 4.0 05/21/2025    POTASSIUM 4.0 08/03/2021    POTASSIUM 4.7 05/19/2021    CHLORIDE 101 05/21/2025    CHLORIDE 106 11/22/2023    CHLORIDE 107 05/19/2021    CO2 29 05/21/2025    CO2 33 (H) 08/03/2021    CO2 33 (H) 05/19/2021    ANIONGAP 10 05/21/2025    ANIONGAP 2 (L) 08/03/2021    ANIONGAP 1 (L) 05/19/2021    GLC 76 05/21/2025     (H) 05/04/2025    GLC 99 08/03/2021    GLC 97 05/19/2021    BUN 35.6 (H) 05/21/2025    BUN 15 08/03/2021    BUN 16 05/19/2021    CR 1.60 (H) 05/21/2025    CR 0.90 05/19/2021    GFRESTIMATED 35 (L) 05/21/2025    GFRESTIMATED 67 05/19/2021    GFRESTBLACK 78 05/19/2021    HECTOR 9.3 05/21/2025    HECTOR 9.3 05/19/2021      A1C RESULTS:  Lab Results   Component Value Date    A1C 5.5 01/26/2018     INR RESULTS:  Lab Results   Component Value Date    INR 1.14 08/03/2021    INR 2.6 08/29/2012    INR 1.8 08/15/2012            Medications     Current Outpatient Medications   Medication Sig Dispense Refill    amiodarone (PACERONE) 200 MG tablet Take 1 tablet (200 mg) by mouth daily. 90 tablet 3    furosemide (LASIX) 40 MG tablet Take 1 tablet (40 mg) by mouth daily. 30 tablet 0    gabapentin (NEURONTIN) 100 MG tablet Take 200 mg by mouth at bedtime.      gabapentin (NEURONTIN) 100 MG tablet Take 100 mg by mouth  2 times daily. Takes at 0800 and 1200      lactase (LACTAID) 9000 units TABS tablet Take 9,000 Units by mouth 3 times daily (with meals).      levETIRAcetam (KEPPRA) 500 MG tablet Take 500 mg by mouth 2 times daily.      levothyroxine (SYNTHROID/LEVOTHROID) 75 MCG tablet Take 75 mcg by mouth every morning (before breakfast).      lisinopril (ZESTRIL) 2.5 MG tablet Take 1 tablet (2.5 mg) by mouth daily. 90 tablet 3    loperamide (IMODIUM) 2 MG capsule Take 2 mg by mouth 4 times daily as needed for diarrhea      melatonin 3 MG tablet Take 1 tablet by mouth at bedtime.      metoprolol tartrate (LOPRESSOR) 25 MG tablet Take 1 tablet (25 mg) by mouth or NG Tube 2 times daily. 60 tablet 0    metroNIDAZOLE (METROGEL) 0.75 % external gel Apply topically 2 times daily as needed.      mirtazapine (REMERON) 30 MG tablet Take 30 mg by mouth At Bedtime      omeprazole (PRILOSEC) 20 MG DR capsule Take 20 mg by mouth every morning.      potassium chloride ER (KLOR-CON M) 20 MEQ CR tablet Take 1 tablet by mouth daily.      rivaroxaban ANTICOAGULANT (XARELTO) 15 MG TABS tablet Take 1 tablet (15 mg) by mouth daily (with dinner). 90 tablet 3    rosuvastatin (CRESTOR) 20 MG tablet Take 0.5 tablets (10 mg) by mouth daily 90 tablet 3    sertraline (ZOLOFT) 100 MG tablet Take 100 mg by mouth daily. Takes with 50mg tablet for a total dose of 150mg daily      sertraline (ZOLOFT) 50 MG tablet Take 50 mg by mouth daily. Takes with 100mg tablet for a total dose of 150mg daily      trolamine salicylate (ASPERCREME) 10 % cream Apply topically as needed for moderate pain To affected areas      vitamin D3 (CHOLECALCIFEROL) 50 mcg (2000 units) tablet Take 1 tablet by mouth daily.      acetaminophen (TYLENOL) 500 MG tablet Take 1,000 mg by mouth 2 times daily as needed.      Dextromethorphan-guaiFENesin  MG/5ML syrup Take 10 mLs by mouth every 4 hours as needed for cough.      Dihydroxyaluminum Sod Carb (ROLAIDS PO) Take 2 tablets by mouth 4  times daily as needed.      emollient (VANICREAM) external cream APPLY FROM HEAD TO TOE DAILY AFTER SHOWERS *CALL TO REORDER* 453 g 11    FIBER- MG tablet Take 1 tablet by mouth daily as needed.      LORazepam (ATIVAN) 0.5 MG tablet Take 1 tablet (0.5 mg) by mouth 3 times daily as needed for anxiety. (Patient not taking: Reported on 6/23/2025) 10 tablet 0    nitroGLYcerin (NITROSTAT) 0.4 MG sublingual tablet For chest pain place 1 tablet under the tongue every 5 minutes for 3 doses. If symptoms persist 5 minutes after 1st dose call 911. (Patient not taking: Reported on 6/23/2025) 25 tablet 0    nystatin (MYCOSTATIN) 618871 UNIT/GM external powder Apply topically 2 times daily as needed for dry skin. (Patient not taking: Reported on 6/23/2025)      ondansetron (ZOFRAN-ODT) 4 MG ODT tab TAKE ONE TABLET BY MOUTH TWICE A DAY AS NEEDED FOR NAUSEA & VOMITING (Patient not taking: Reported on 6/23/2025)            Past Medical History     Past Medical History:   Diagnosis Date    Cardiogenic shock (H) 07/09/2021    Coma (H) 05/2012    CHT after a fall    Coronary artery disease involving native coronary artery of native heart 07/09/2021    3 vessel    Developmental delay     DVT (deep vein thrombosis) in pregnancy     post trauma    Fall 05/2012    multiple fracture    Hypertension     Menopause     age 52    Pelvic fracture (H) 5-201`2    Rib fracture 05/2012    STEMI (ST elevation myocardial infarction) (H) 01/25/2018     Past Surgical History:   Procedure Laterality Date    COLONOSCOPY  01/01/2010    CV CORONARY LITHOTRIPSY PCI N/A 8/3/2021    Procedure: CV Coronary Lithotripsy PCI;  Surgeon: Kamran Singleton MD;  Location:  [facility]    CV HEART CATHETERIZATION WITH POSSIBLE INTERVENTION N/A 8/3/2021    Procedure: Heart Catheterization with Possible Intervention;  Surgeon: Kamran Singleton MD;  Location:  [facility]    CV INTRAVASULAR ULTRASOUND N/A 8/3/2021     Procedure: Intravascular Ultrasound;  Surgeon: Kamran Singleton MD;  Location:  [facility]    CV PCI STENT DRUG ELUTING N/A 8/3/2021    Procedure: Percutaneous Coronary Intervention Stent Drug Eluting;  Surgeon: Kamran Singleton MD;  Location:  [facility]    HEART CATH DRUG ELUTING STENT PLACEMENT N/A 01/25/2018    LASER YAG CAPSULOTOMY Left 05/29/2018    Procedure: LASER YAG CAPSULOTOMY;  LEFT YAG LASER CAPSULOTOMY ;  Surgeon: Tabby Guillaume MD;  Location:  [facility]    LASER YAG CAPSULOTOMY Right 06/05/2018    Procedure: LASER YAG CAPSULOTOMY;  RIGHT EYE YAG LASER CAPSULOTOMY ;  Surgeon: Tabby Guillaume MD;  Location: [facility]     Family History   Problem Relation Age of Onset    Other - See Comments Father         alpha 1 antitrypsin deficiency     Cancer - colorectal Mother     Diabetes Sister             Allergies   Penicillins        Lisa Reddy, CODIE  [facility]

## 2025-07-01 DIAGNOSIS — I48.0 PAROXYSMAL ATRIAL FIBRILLATION (H): Primary | ICD-10-CM

## 2025-07-01 DIAGNOSIS — Z79.899 ON AMIODARONE THERAPY: ICD-10-CM

## 2025-07-11 ENCOUNTER — TRANSFERRED RECORDS (OUTPATIENT)
Dept: HEALTH INFORMATION MANAGEMENT | Facility: CLINIC | Age: 70
End: 2025-07-11
Payer: COMMERCIAL

## 2025-07-16 DIAGNOSIS — L85.3 XEROSIS CUTIS: ICD-10-CM

## 2025-07-17 RX ORDER — EMOLLIENT BASE
CREAM (GRAM) TOPICAL
Qty: 453 G | Refills: 11 | Status: SHIPPED | OUTPATIENT
Start: 2025-07-17

## 2025-07-17 NOTE — TELEPHONE ENCOUNTER
Vanicream  Last Written Prescription Date:  6/14/24  Last Fill Quantity: 453 g,  # refills: 11   Last office visit: 2/28/2023 Visit date not found ; last virtual visit: Visit date not found with prescribing provider:  Reid   Future Office Visit: none    Routing refill request to provider for review/approval because:  Drug not on the [facility] refill protocol     Bri VARGAS RN  [facility] [place]  [phone]

## 2025-08-01 ENCOUNTER — APPOINTMENT (OUTPATIENT)
Dept: GENERAL RADIOLOGY | Facility: CLINIC | Age: 70
End: 2025-08-01
Attending: STUDENT IN AN ORGANIZED HEALTH CARE EDUCATION/TRAINING PROGRAM
Payer: COMMERCIAL

## 2025-08-01 ENCOUNTER — HOSPITAL ENCOUNTER (EMERGENCY)
Facility: CLINIC | Age: 70
Discharge: HOME | End: 2025-08-01
Attending: STUDENT IN AN ORGANIZED HEALTH CARE EDUCATION/TRAINING PROGRAM | Admitting: STUDENT IN AN ORGANIZED HEALTH CARE EDUCATION/TRAINING PROGRAM
Payer: COMMERCIAL

## 2025-08-01 VITALS
TEMPERATURE: 98 F | HEART RATE: 54 BPM | DIASTOLIC BLOOD PRESSURE: 64 MMHG | WEIGHT: 143 LBS | BODY MASS INDEX: 23.43 KG/M2 | OXYGEN SATURATION: 94 % | RESPIRATION RATE: 24 BRPM | SYSTOLIC BLOOD PRESSURE: 133 MMHG

## 2025-08-01 DIAGNOSIS — I50.9 ACUTE CONGESTIVE HEART FAILURE, UNSPECIFIED HEART FAILURE TYPE (H): ICD-10-CM

## 2025-08-01 DIAGNOSIS — R06.02 SHORTNESS OF BREATH: Primary | ICD-10-CM

## 2025-08-01 LAB
ALBUMIN SERPL BCG-MCNC: 3.5 G/DL (ref 3.5–5.2)
ALP SERPL-CCNC: 162 U/L (ref 40–150)
ALT SERPL W P-5'-P-CCNC: 47 U/L (ref 0–50)
ANION GAP SERPL CALCULATED.3IONS-SCNC: 9 MMOL/L (ref 7–15)
AST SERPL W P-5'-P-CCNC: 62 U/L (ref 0–45)
BASE EXCESS BLDV CALC-SCNC: 3 MMOL/L (ref -3–3)
BILIRUB DIRECT SERPL-MCNC: 0.18 MG/DL (ref 0–0.3)
BILIRUB SERPL-MCNC: 0.4 MG/DL
BUN SERPL-MCNC: 21.5 MG/DL (ref 8–23)
CALCIUM SERPL-MCNC: 9.2 MG/DL (ref 8.8–10.4)
CHLORIDE SERPL-SCNC: 103 MMOL/L (ref 98–107)
CREAT SERPL-MCNC: 1.2 MG/DL (ref 0.51–0.95)
EGFRCR SERPLBLD CKD-EPI 2021: 49 ML/MIN/1.73M2
ERYTHROCYTE [DISTWIDTH] IN BLOOD BY AUTOMATED COUNT: 14.9 % (ref 10–15)
FLUAV RNA SPEC QL NAA+PROBE: NEGATIVE
FLUBV RNA RESP QL NAA+PROBE: NEGATIVE
GLUCOSE SERPL-MCNC: 100 MG/DL (ref 70–99)
HCO3 BLDV-SCNC: 32 MMOL/L (ref 21–28)
HCO3 SERPL-SCNC: 30 MMOL/L (ref 22–29)
HCT VFR BLD AUTO: 35.7 % (ref 35–47)
HGB BLD-MCNC: 11.2 G/DL (ref 11.7–15.7)
HOLD SPECIMEN: NORMAL
HOLD SPECIMEN: NORMAL
LACTATE BLD-SCNC: 1.4 MMOL/L (ref 0.7–2)
MAGNESIUM SERPL-MCNC: 2.1 MG/DL (ref 1.7–2.3)
MCH RBC QN AUTO: 30.8 PG (ref 26.5–33)
MCHC RBC AUTO-ENTMCNC: 31.4 G/DL (ref 31.5–36.5)
MCV RBC AUTO: 98 FL (ref 78–100)
NT-PROBNP SERPL-MCNC: 5026 PG/ML (ref 0–353)
PCO2 BLDV: 60 MM HG (ref 40–50)
PH BLDV: 7.34 [PH] (ref 7.32–7.43)
PLATELET # BLD AUTO: 247 10E3/UL (ref 150–450)
PO2 BLDV: 20 MM HG (ref 25–47)
POTASSIUM SERPL-SCNC: 4.9 MMOL/L (ref 3.4–5.3)
PROT SERPL-MCNC: 6.2 G/DL (ref 6.4–8.3)
RBC # BLD AUTO: 3.64 10E6/UL (ref 3.8–5.2)
RSV RNA SPEC NAA+PROBE: NEGATIVE
SAO2 % BLDV: 28 % (ref 70–75)
SARS-COV-2 RNA RESP QL NAA+PROBE: NEGATIVE
SODIUM SERPL-SCNC: 142 MMOL/L (ref 135–145)
T4 FREE SERPL-MCNC: 0.93 NG/DL (ref 0.9–1.7)
TROPONIN T SERPL HS-MCNC: 19 NG/L
TROPONIN T SERPL HS-MCNC: 19 NG/L
TSH SERPL DL<=0.005 MIU/L-ACNC: 4.76 UIU/ML (ref 0.3–4.2)
WBC # BLD AUTO: 8.4 10E3/UL (ref 4–11)

## 2025-08-01 PROCEDURE — 83880 ASSAY OF NATRIURETIC PEPTIDE: CPT | Performed by: STUDENT IN AN ORGANIZED HEALTH CARE EDUCATION/TRAINING PROGRAM

## 2025-08-01 PROCEDURE — 83605 ASSAY OF LACTIC ACID: CPT

## 2025-08-01 PROCEDURE — 84439 ASSAY OF FREE THYROXINE: CPT | Performed by: STUDENT IN AN ORGANIZED HEALTH CARE EDUCATION/TRAINING PROGRAM

## 2025-08-01 PROCEDURE — 93005 ELECTROCARDIOGRAM TRACING: CPT | Performed by: STUDENT IN AN ORGANIZED HEALTH CARE EDUCATION/TRAINING PROGRAM

## 2025-08-01 PROCEDURE — 84155 ASSAY OF PROTEIN SERUM: CPT | Performed by: STUDENT IN AN ORGANIZED HEALTH CARE EDUCATION/TRAINING PROGRAM

## 2025-08-01 PROCEDURE — 36415 COLL VENOUS BLD VENIPUNCTURE: CPT | Performed by: STUDENT IN AN ORGANIZED HEALTH CARE EDUCATION/TRAINING PROGRAM

## 2025-08-01 PROCEDURE — 96374 THER/PROPH/DIAG INJ IV PUSH: CPT | Performed by: STUDENT IN AN ORGANIZED HEALTH CARE EDUCATION/TRAINING PROGRAM

## 2025-08-01 PROCEDURE — 71046 X-RAY EXAM CHEST 2 VIEWS: CPT

## 2025-08-01 PROCEDURE — 87637 SARSCOV2&INF A&B&RSV AMP PRB: CPT | Performed by: STUDENT IN AN ORGANIZED HEALTH CARE EDUCATION/TRAINING PROGRAM

## 2025-08-01 PROCEDURE — 84484 ASSAY OF TROPONIN QUANT: CPT | Performed by: STUDENT IN AN ORGANIZED HEALTH CARE EDUCATION/TRAINING PROGRAM

## 2025-08-01 PROCEDURE — 250N000011 HC RX IP 250 OP 636: Performed by: STUDENT IN AN ORGANIZED HEALTH CARE EDUCATION/TRAINING PROGRAM

## 2025-08-01 PROCEDURE — 84443 ASSAY THYROID STIM HORMONE: CPT | Performed by: STUDENT IN AN ORGANIZED HEALTH CARE EDUCATION/TRAINING PROGRAM

## 2025-08-01 PROCEDURE — 83735 ASSAY OF MAGNESIUM: CPT | Performed by: STUDENT IN AN ORGANIZED HEALTH CARE EDUCATION/TRAINING PROGRAM

## 2025-08-01 PROCEDURE — 99285 EMERGENCY DEPT VISIT HI MDM: CPT | Mod: 25 | Performed by: STUDENT IN AN ORGANIZED HEALTH CARE EDUCATION/TRAINING PROGRAM

## 2025-08-01 PROCEDURE — 85014 HEMATOCRIT: CPT | Performed by: EMERGENCY MEDICINE

## 2025-08-01 PROCEDURE — 85027 COMPLETE CBC AUTOMATED: CPT | Performed by: STUDENT IN AN ORGANIZED HEALTH CARE EDUCATION/TRAINING PROGRAM

## 2025-08-01 PROCEDURE — 80048 BASIC METABOLIC PNL TOTAL CA: CPT | Performed by: STUDENT IN AN ORGANIZED HEALTH CARE EDUCATION/TRAINING PROGRAM

## 2025-08-01 PROCEDURE — 36415 COLL VENOUS BLD VENIPUNCTURE: CPT | Performed by: EMERGENCY MEDICINE

## 2025-08-01 RX ORDER — FUROSEMIDE 10 MG/ML
60 INJECTION INTRAMUSCULAR; INTRAVENOUS ONCE
Status: COMPLETED | OUTPATIENT
Start: 2025-08-01 | End: 2025-08-01

## 2025-08-01 RX ADMIN — FUROSEMIDE 60 MG: 10 INJECTION, SOLUTION INTRAMUSCULAR; INTRAVENOUS at 18:10

## 2025-08-01 ASSESSMENT — ACTIVITIES OF DAILY LIVING (ADL)
ADLS_ACUITY_SCORE: 58

## 2025-08-04 LAB
ATRIAL RATE - MUSE: 57 BPM
DIASTOLIC BLOOD PRESSURE - MUSE: NORMAL MMHG
INTERPRETATION ECG - MUSE: NORMAL
P AXIS - MUSE: 18 DEGREES
PR INTERVAL - MUSE: 188 MS
QRS DURATION - MUSE: 94 MS
QT - MUSE: 522 MS
QTC - MUSE: 508 MS
R AXIS - MUSE: -1 DEGREES
SYSTOLIC BLOOD PRESSURE - MUSE: NORMAL MMHG
T AXIS - MUSE: 58 DEGREES
VENTRICULAR RATE- MUSE: 57 BPM

## 2025-08-25 ENCOUNTER — TELEPHONE (OUTPATIENT)
Dept: PHARMACY | Facility: OTHER | Age: 70
End: 2025-08-25
Payer: COMMERCIAL

## 2025-09-01 ENCOUNTER — MEDICAL CORRESPONDENCE (OUTPATIENT)
Dept: HEALTH INFORMATION MANAGEMENT | Facility: CLINIC | Age: 70
End: 2025-09-01

## 2025-09-01 ENCOUNTER — HOSPITAL ENCOUNTER (INPATIENT)
Facility: CLINIC | Age: 70
End: 2025-09-01
Attending: STUDENT IN AN ORGANIZED HEALTH CARE EDUCATION/TRAINING PROGRAM | Admitting: INTERNAL MEDICINE
Payer: COMMERCIAL

## 2025-09-01 DIAGNOSIS — R09.02 HYPOXIA: ICD-10-CM

## 2025-09-01 DIAGNOSIS — Z51.89 ENCOUNTER FOR WOUND CARE: ICD-10-CM

## 2025-09-01 DIAGNOSIS — S72.001A HIP FRACTURE, RIGHT, CLOSED, INITIAL ENCOUNTER (H): Primary | ICD-10-CM

## 2025-09-01 LAB
ALBUMIN SERPL BCG-MCNC: 3.5 G/DL (ref 3.5–5.2)
ALBUMIN UR-MCNC: 20 MG/DL
ALP SERPL-CCNC: 181 U/L (ref 40–150)
ALT SERPL W P-5'-P-CCNC: 46 U/L (ref 0–50)
AMORPH CRY #/AREA URNS HPF: ABNORMAL /HPF
ANION GAP SERPL CALCULATED.3IONS-SCNC: 13 MMOL/L (ref 7–15)
APPEARANCE UR: CLEAR
AST SERPL W P-5'-P-CCNC: 64 U/L (ref 0–45)
ATRIAL RATE - MUSE: 66 BPM
BACTERIA #/AREA URNS HPF: ABNORMAL /HPF
BASE EXCESS BLDV CALC-SCNC: 4 MMOL/L (ref -3–3)
BASOPHILS # BLD AUTO: <0.03 10E3/UL (ref 0–0.2)
BASOPHILS NFR BLD AUTO: 0.1 %
BILIRUB SERPL-MCNC: 0.4 MG/DL
BILIRUB UR QL STRIP: NEGATIVE
BUN SERPL-MCNC: 21.8 MG/DL (ref 8–23)
CALCIUM SERPL-MCNC: 9.5 MG/DL (ref 8.8–10.4)
CHLORIDE SERPL-SCNC: 101 MMOL/L (ref 98–107)
COLOR UR AUTO: YELLOW
CREAT SERPL-MCNC: 0.93 MG/DL (ref 0.51–0.95)
DIASTOLIC BLOOD PRESSURE - MUSE: NORMAL MMHG
EGFRCR SERPLBLD CKD-EPI 2021: 67 ML/MIN/1.73M2
EOSINOPHIL # BLD AUTO: <0.03 10E3/UL (ref 0–0.7)
EOSINOPHIL NFR BLD AUTO: 0.1 %
ERYTHROCYTE [DISTWIDTH] IN BLOOD BY AUTOMATED COUNT: 12.4 % (ref 10–15)
ETHANOL SERPL-MCNC: <0.01 G/DL
FLUAV RNA SPEC QL NAA+PROBE: NEGATIVE
FLUBV RNA RESP QL NAA+PROBE: NEGATIVE
GLUCOSE SERPL-MCNC: 99 MG/DL (ref 70–99)
GLUCOSE UR STRIP-MCNC: NEGATIVE MG/DL
HCO3 BLDV-SCNC: 30 MMOL/L (ref 21–28)
HCO3 SERPL-SCNC: 27 MMOL/L (ref 22–29)
HCT VFR BLD AUTO: 35 % (ref 35–47)
HGB BLD-MCNC: 11.4 G/DL (ref 11.7–15.7)
HGB UR QL STRIP: NEGATIVE
HOLD SPECIMEN: NORMAL
IMM GRANULOCYTES # BLD: 0.08 10E3/UL
IMM GRANULOCYTES NFR BLD: 0.6 %
INTERPRETATION ECG - MUSE: NORMAL
KETONES UR STRIP-MCNC: NEGATIVE MG/DL
LACTATE BLD-SCNC: 1.7 MMOL/L (ref 0.7–2)
LEUKOCYTE ESTERASE UR QL STRIP: ABNORMAL
LIPASE SERPL-CCNC: 15 U/L (ref 13–60)
LYMPHOCYTES # BLD AUTO: 0.62 10E3/UL (ref 0.8–5.3)
LYMPHOCYTES NFR BLD AUTO: 4.6 %
MCH RBC QN AUTO: 31.3 PG (ref 26.5–33)
MCHC RBC AUTO-ENTMCNC: 32.6 G/DL (ref 31.5–36.5)
MCV RBC AUTO: 96.2 FL (ref 78–100)
MONOCYTES # BLD AUTO: 0.55 10E3/UL (ref 0–1.3)
MONOCYTES NFR BLD AUTO: 4.1 %
MUCOUS THREADS #/AREA URNS LPF: PRESENT /LPF
NEUTROPHILS # BLD AUTO: 12.28 10E3/UL (ref 1.6–8.3)
NEUTROPHILS NFR BLD AUTO: 90.5 %
NITRATE UR QL: NEGATIVE
NRBC # BLD AUTO: <0.03 10E3/UL
NRBC BLD AUTO-RTO: 0 /100
NT-PROBNP SERPL-MCNC: 1003 PG/ML (ref 0–353)
P AXIS - MUSE: 56 DEGREES
PCO2 BLDV: 54 MM HG (ref 40–50)
PH BLDV: 7.36 [PH] (ref 7.32–7.43)
PH UR STRIP: 8.5 [PH] (ref 5–7)
PLATELET # BLD AUTO: 228 10E3/UL (ref 150–450)
PO2 BLDV: 27 MM HG (ref 25–47)
POTASSIUM SERPL-SCNC: 3.9 MMOL/L (ref 3.4–5.3)
PR INTERVAL - MUSE: 188 MS
PROCALCITONIN SERPL IA-MCNC: 0.04 NG/ML
PROT SERPL-MCNC: 6.3 G/DL (ref 6.4–8.3)
QRS DURATION - MUSE: 94 MS
QT - MUSE: 468 MS
QTC - MUSE: 490 MS
R AXIS - MUSE: 162 DEGREES
RBC # BLD AUTO: 3.64 10E6/UL (ref 3.8–5.2)
RBC URINE: 5 /HPF
RSV RNA SPEC NAA+PROBE: NEGATIVE
SAO2 % BLDV: 46 % (ref 70–75)
SARS-COV-2 RNA RESP QL NAA+PROBE: NEGATIVE
SODIUM SERPL-SCNC: 141 MMOL/L (ref 135–145)
SP GR UR STRIP: 1 (ref 1–1.03)
SYSTOLIC BLOOD PRESSURE - MUSE: NORMAL MMHG
T AXIS - MUSE: 63 DEGREES
TRI-PHOS CRY #/AREA URNS HPF: ABNORMAL /HPF
TROPONIN T SERPL HS-MCNC: 25 NG/L
TROPONIN T SERPL HS-MCNC: 25 NG/L
UROBILINOGEN UR STRIP-MCNC: NORMAL MG/DL
VENTRICULAR RATE- MUSE: 66 BPM
WBC # BLD AUTO: 13.56 10E3/UL (ref 4–11)
WBC URINE: 48 /HPF

## 2025-09-01 PROCEDURE — 84145 PROCALCITONIN (PCT): CPT | Performed by: STUDENT IN AN ORGANIZED HEALTH CARE EDUCATION/TRAINING PROGRAM

## 2025-09-01 PROCEDURE — 250N000013 HC RX MED GY IP 250 OP 250 PS 637: Performed by: PHYSICIAN ASSISTANT

## 2025-09-01 PROCEDURE — 255N000002 HC RX 255 OP 636: Performed by: STUDENT IN AN ORGANIZED HEALTH CARE EDUCATION/TRAINING PROGRAM

## 2025-09-01 PROCEDURE — 82077 ASSAY SPEC XCP UR&BREATH IA: CPT | Performed by: STUDENT IN AN ORGANIZED HEALTH CARE EDUCATION/TRAINING PROGRAM

## 2025-09-01 PROCEDURE — 84484 ASSAY OF TROPONIN QUANT: CPT | Performed by: STUDENT IN AN ORGANIZED HEALTH CARE EDUCATION/TRAINING PROGRAM

## 2025-09-01 PROCEDURE — 82565 ASSAY OF CREATININE: CPT | Performed by: EMERGENCY MEDICINE

## 2025-09-01 PROCEDURE — 210N000001 HC R&B IMCU HEART CARE

## 2025-09-01 PROCEDURE — 36415 COLL VENOUS BLD VENIPUNCTURE: CPT | Performed by: STUDENT IN AN ORGANIZED HEALTH CARE EDUCATION/TRAINING PROGRAM

## 2025-09-01 PROCEDURE — 87040 BLOOD CULTURE FOR BACTERIA: CPT | Performed by: STUDENT IN AN ORGANIZED HEALTH CARE EDUCATION/TRAINING PROGRAM

## 2025-09-01 PROCEDURE — 87637 SARSCOV2&INF A&B&RSV AMP PRB: CPT | Performed by: STUDENT IN AN ORGANIZED HEALTH CARE EDUCATION/TRAINING PROGRAM

## 2025-09-01 PROCEDURE — 83605 ASSAY OF LACTIC ACID: CPT

## 2025-09-01 PROCEDURE — 83880 ASSAY OF NATRIURETIC PEPTIDE: CPT | Performed by: EMERGENCY MEDICINE

## 2025-09-01 PROCEDURE — 83690 ASSAY OF LIPASE: CPT | Performed by: STUDENT IN AN ORGANIZED HEALTH CARE EDUCATION/TRAINING PROGRAM

## 2025-09-01 PROCEDURE — 99291 CRITICAL CARE FIRST HOUR: CPT | Mod: 25

## 2025-09-01 PROCEDURE — 250N000011 HC RX IP 250 OP 636: Performed by: STUDENT IN AN ORGANIZED HEALTH CARE EDUCATION/TRAINING PROGRAM

## 2025-09-01 PROCEDURE — 99207 PR APP CREDIT; MD BILLING SHARED VISIT: CPT | Performed by: PHYSICIAN ASSISTANT

## 2025-09-01 PROCEDURE — 81003 URINALYSIS AUTO W/O SCOPE: CPT | Performed by: STUDENT IN AN ORGANIZED HEALTH CARE EDUCATION/TRAINING PROGRAM

## 2025-09-01 PROCEDURE — 87186 SC STD MICRODIL/AGAR DIL: CPT | Performed by: STUDENT IN AN ORGANIZED HEALTH CARE EDUCATION/TRAINING PROGRAM

## 2025-09-01 PROCEDURE — 93005 ELECTROCARDIOGRAM TRACING: CPT

## 2025-09-01 PROCEDURE — 99223 1ST HOSP IP/OBS HIGH 75: CPT | Mod: FS | Performed by: INTERNAL MEDICINE

## 2025-09-01 PROCEDURE — 99291 CRITICAL CARE FIRST HOUR: CPT | Mod: 25 | Performed by: STUDENT IN AN ORGANIZED HEALTH CARE EDUCATION/TRAINING PROGRAM

## 2025-09-01 PROCEDURE — 85025 COMPLETE CBC W/AUTO DIFF WBC: CPT | Performed by: EMERGENCY MEDICINE

## 2025-09-01 PROCEDURE — 250N000013 HC RX MED GY IP 250 OP 250 PS 637: Performed by: INTERNAL MEDICINE

## 2025-09-01 PROCEDURE — 250N000009 HC RX 250: Performed by: STUDENT IN AN ORGANIZED HEALTH CARE EDUCATION/TRAINING PROGRAM

## 2025-09-01 PROCEDURE — 250N000011 HC RX IP 250 OP 636: Performed by: PHYSICIAN ASSISTANT

## 2025-09-01 PROCEDURE — 36415 COLL VENOUS BLD VENIPUNCTURE: CPT | Performed by: EMERGENCY MEDICINE

## 2025-09-01 RX ORDER — FENTANYL CITRATE 50 UG/ML
50 INJECTION, SOLUTION INTRAMUSCULAR; INTRAVENOUS ONCE
Status: COMPLETED | OUTPATIENT
Start: 2025-09-01 | End: 2025-09-01

## 2025-09-01 RX ORDER — ONDANSETRON 4 MG/1
4 TABLET, ORALLY DISINTEGRATING ORAL EVERY 6 HOURS PRN
Status: ACTIVE | OUTPATIENT
Start: 2025-09-01

## 2025-09-01 RX ORDER — LEVETIRACETAM 500 MG/1
500 TABLET ORAL 2 TIMES DAILY
Status: DISPENSED | OUTPATIENT
Start: 2025-09-01

## 2025-09-01 RX ORDER — DOXYCYCLINE 100 MG/10ML
100 INJECTION, POWDER, LYOPHILIZED, FOR SOLUTION INTRAVENOUS 2 TIMES DAILY
Status: DISCONTINUED | OUTPATIENT
Start: 2025-09-01 | End: 2025-09-01

## 2025-09-01 RX ORDER — LEVOTHYROXINE SODIUM 75 UG/1
75 TABLET ORAL
Status: DISPENSED | OUTPATIENT
Start: 2025-09-02

## 2025-09-01 RX ORDER — BISACODYL 10 MG
10 SUPPOSITORY, RECTAL RECTAL DAILY PRN
Status: ACTIVE | OUTPATIENT
Start: 2025-09-01

## 2025-09-01 RX ORDER — NALOXONE HYDROCHLORIDE 0.4 MG/ML
0.4 INJECTION, SOLUTION INTRAMUSCULAR; INTRAVENOUS; SUBCUTANEOUS
Status: ACTIVE | OUTPATIENT
Start: 2025-09-01

## 2025-09-01 RX ORDER — ACETAMINOPHEN 325 MG/1
650 TABLET ORAL EVERY 4 HOURS PRN
Status: ACTIVE | OUTPATIENT
Start: 2025-09-01

## 2025-09-01 RX ORDER — ONDANSETRON 2 MG/ML
4 INJECTION INTRAMUSCULAR; INTRAVENOUS EVERY 6 HOURS PRN
Status: ACTIVE | OUTPATIENT
Start: 2025-09-01

## 2025-09-01 RX ORDER — FUROSEMIDE 10 MG/ML
40 INJECTION INTRAMUSCULAR; INTRAVENOUS ONCE
Status: COMPLETED | OUTPATIENT
Start: 2025-09-01 | End: 2025-09-01

## 2025-09-01 RX ORDER — HYDROMORPHONE HCL IN WATER/PF 6 MG/30 ML
0.2 PATIENT CONTROLLED ANALGESIA SYRINGE INTRAVENOUS
Status: DISCONTINUED | OUTPATIENT
Start: 2025-09-01 | End: 2025-09-02

## 2025-09-01 RX ORDER — GABAPENTIN 100 MG/1
200 CAPSULE ORAL AT BEDTIME
Status: DISPENSED | OUTPATIENT
Start: 2025-09-01

## 2025-09-01 RX ORDER — LIDOCAINE 40 MG/G
CREAM TOPICAL
Status: DISCONTINUED | OUTPATIENT
Start: 2025-09-01 | End: 2025-09-03

## 2025-09-01 RX ORDER — PANTOPRAZOLE SODIUM 40 MG/1
40 TABLET, DELAYED RELEASE ORAL EVERY MORNING
Status: DISPENSED | OUTPATIENT
Start: 2025-09-02

## 2025-09-01 RX ORDER — AMPICILLIN AND SULBACTAM 2; 1 G/1; G/1
3 INJECTION, POWDER, FOR SOLUTION INTRAMUSCULAR; INTRAVENOUS ONCE
Status: DISCONTINUED | OUTPATIENT
Start: 2025-09-01 | End: 2025-09-01

## 2025-09-01 RX ORDER — OXYCODONE HYDROCHLORIDE 5 MG/1
5 TABLET ORAL EVERY 4 HOURS PRN
Status: DISCONTINUED | OUTPATIENT
Start: 2025-09-01 | End: 2025-09-03

## 2025-09-01 RX ORDER — POLYETHYLENE GLYCOL 3350 17 G/17G
17 POWDER, FOR SOLUTION ORAL 2 TIMES DAILY PRN
Status: ACTIVE | OUTPATIENT
Start: 2025-09-01

## 2025-09-01 RX ORDER — CEFTRIAXONE 2 G/1
2 INJECTION, POWDER, FOR SOLUTION INTRAMUSCULAR; INTRAVENOUS ONCE
Status: DISCONTINUED | OUTPATIENT
Start: 2025-09-01 | End: 2025-09-01

## 2025-09-01 RX ORDER — METOPROLOL TARTRATE 25 MG/1
25 TABLET, FILM COATED ORAL 2 TIMES DAILY
Status: DISPENSED | OUTPATIENT
Start: 2025-09-01

## 2025-09-01 RX ORDER — CALCIUM CARBONATE 500 MG/1
1000 TABLET, CHEWABLE ORAL 4 TIMES DAILY PRN
Status: ACTIVE | OUTPATIENT
Start: 2025-09-01

## 2025-09-01 RX ORDER — AMOXICILLIN 250 MG
1 CAPSULE ORAL 2 TIMES DAILY PRN
Status: ACTIVE | OUTPATIENT
Start: 2025-09-01

## 2025-09-01 RX ORDER — ROSUVASTATIN CALCIUM 10 MG/1
10 TABLET, COATED ORAL DAILY
Status: DISPENSED | OUTPATIENT
Start: 2025-09-01

## 2025-09-01 RX ORDER — CEFTRIAXONE 2 G/1
2 INJECTION, POWDER, FOR SOLUTION INTRAMUSCULAR; INTRAVENOUS EVERY 24 HOURS
Status: DISPENSED | OUTPATIENT
Start: 2025-09-01 | End: 2025-09-08

## 2025-09-01 RX ORDER — NALOXONE HYDROCHLORIDE 0.4 MG/ML
0.2 INJECTION, SOLUTION INTRAMUSCULAR; INTRAVENOUS; SUBCUTANEOUS
Status: ACTIVE | OUTPATIENT
Start: 2025-09-01

## 2025-09-01 RX ORDER — MIRTAZAPINE 15 MG/1
30 TABLET, FILM COATED ORAL AT BEDTIME
Status: DISPENSED | OUTPATIENT
Start: 2025-09-01

## 2025-09-01 RX ORDER — ACETAMINOPHEN 650 MG/1
650 SUPPOSITORY RECTAL EVERY 4 HOURS PRN
Status: ACTIVE | OUTPATIENT
Start: 2025-09-01

## 2025-09-01 RX ORDER — AMOXICILLIN 250 MG
2 CAPSULE ORAL 2 TIMES DAILY PRN
Status: ACTIVE | OUTPATIENT
Start: 2025-09-01

## 2025-09-01 RX ORDER — GABAPENTIN 100 MG/1
100 CAPSULE ORAL 2 TIMES DAILY
Status: DISPENSED | OUTPATIENT
Start: 2025-09-02

## 2025-09-01 RX ORDER — FENTANYL CITRATE 50 UG/ML
50 INJECTION, SOLUTION INTRAMUSCULAR; INTRAVENOUS ONCE
Status: DISCONTINUED | OUTPATIENT
Start: 2025-09-01 | End: 2025-09-01

## 2025-09-01 RX ORDER — AMIODARONE HYDROCHLORIDE 200 MG/1
200 TABLET ORAL DAILY
Status: DISCONTINUED | OUTPATIENT
Start: 2025-09-01 | End: 2025-09-02

## 2025-09-01 RX ORDER — HYDROMORPHONE HCL IN WATER/PF 6 MG/30 ML
0.4 PATIENT CONTROLLED ANALGESIA SYRINGE INTRAVENOUS
Status: DISCONTINUED | OUTPATIENT
Start: 2025-09-01 | End: 2025-09-02

## 2025-09-01 RX ORDER — PROCHLORPERAZINE MALEATE 5 MG/1
5 TABLET ORAL EVERY 6 HOURS PRN
Status: ACTIVE | OUTPATIENT
Start: 2025-09-01

## 2025-09-01 RX ADMIN — CEFTRIAXONE SODIUM 2 G: 2 INJECTION, POWDER, FOR SOLUTION INTRAMUSCULAR; INTRAVENOUS at 13:47

## 2025-09-01 RX ADMIN — LEVETIRACETAM 500 MG: 500 TABLET, FILM COATED ORAL at 20:39

## 2025-09-01 RX ADMIN — SODIUM CHLORIDE 80 ML: 9 INJECTION, SOLUTION INTRAVENOUS at 10:53

## 2025-09-01 RX ADMIN — IOHEXOL 76 ML: 350 INJECTION, SOLUTION INTRAVENOUS at 10:52

## 2025-09-01 RX ADMIN — AMIODARONE HYDROCHLORIDE 200 MG: 200 TABLET ORAL at 20:39

## 2025-09-01 RX ADMIN — ROSUVASTATIN CALCIUM 10 MG: 10 TABLET, FILM COATED ORAL at 20:39

## 2025-09-01 RX ADMIN — GABAPENTIN 200 MG: 100 CAPSULE ORAL at 20:39

## 2025-09-01 RX ADMIN — SERTRALINE HYDROCHLORIDE 150 MG: 50 TABLET ORAL at 20:39

## 2025-09-01 RX ADMIN — FENTANYL CITRATE 50 MCG: 50 INJECTION, SOLUTION INTRAMUSCULAR; INTRAVENOUS at 14:09

## 2025-09-01 RX ADMIN — MIRTAZAPINE 30 MG: 15 TABLET, FILM COATED ORAL at 20:38

## 2025-09-01 RX ADMIN — HYDROMORPHONE HYDROCHLORIDE 0.2 MG: 0.2 INJECTION, SOLUTION INTRAMUSCULAR; INTRAVENOUS; SUBCUTANEOUS at 20:39

## 2025-09-01 RX ADMIN — OXYCODONE HYDROCHLORIDE 5 MG: 5 TABLET ORAL at 16:42

## 2025-09-01 RX ADMIN — FUROSEMIDE 40 MG: 10 INJECTION, SOLUTION INTRAMUSCULAR; INTRAVENOUS at 16:01

## 2025-09-01 RX ADMIN — METOPROLOL TARTRATE 25 MG: 25 TABLET, FILM COATED ORAL at 20:40

## 2025-09-01 RX ADMIN — DOXYCYCLINE 100 MG: 100 INJECTION, POWDER, LYOPHILIZED, FOR SOLUTION INTRAVENOUS at 15:00

## 2025-09-01 ASSESSMENT — ACTIVITIES OF DAILY LIVING (ADL)
ADLS_ACUITY_SCORE: 60
ADLS_ACUITY_SCORE: 56
ADLS_ACUITY_SCORE: 56
ADLS_ACUITY_SCORE: 48
ADLS_ACUITY_SCORE: 60
ADLS_ACUITY_SCORE: 60
ADLS_ACUITY_SCORE: 58
ADLS_ACUITY_SCORE: 58
ADLS_ACUITY_SCORE: 60
ADLS_ACUITY_SCORE: 48
ADLS_ACUITY_SCORE: 60

## 2025-09-02 ENCOUNTER — ANESTHESIA EVENT (OUTPATIENT)
Dept: SURGERY | Facility: CLINIC | Age: 70
End: 2025-09-02
Payer: COMMERCIAL

## 2025-09-02 ENCOUNTER — ANESTHESIA (OUTPATIENT)
Dept: SURGERY | Facility: CLINIC | Age: 70
End: 2025-09-02
Payer: COMMERCIAL

## 2025-09-02 LAB
ANION GAP SERPL CALCULATED.3IONS-SCNC: 9 MMOL/L (ref 7–15)
BACTERIA UR CULT: ABNORMAL
BASOPHILS # BLD AUTO: <0.03 10E3/UL (ref 0–0.2)
BASOPHILS NFR BLD AUTO: 0.3 %
BUN SERPL-MCNC: 20 MG/DL (ref 8–23)
CALCIUM SERPL-MCNC: 9.4 MG/DL (ref 8.8–10.4)
CHLORIDE SERPL-SCNC: 104 MMOL/L (ref 98–107)
CREAT SERPL-MCNC: 0.93 MG/DL (ref 0.51–0.95)
EGFRCR SERPLBLD CKD-EPI 2021: 67 ML/MIN/1.73M2
EOSINOPHIL # BLD AUTO: 0.36 10E3/UL (ref 0–0.7)
EOSINOPHIL NFR BLD AUTO: 4.6 %
ERYTHROCYTE [DISTWIDTH] IN BLOOD BY AUTOMATED COUNT: 12.7 % (ref 10–15)
GLUCOSE SERPL-MCNC: 92 MG/DL (ref 70–99)
HCO3 SERPL-SCNC: 30 MMOL/L (ref 22–29)
HCT VFR BLD AUTO: 35.7 % (ref 35–47)
HGB BLD-MCNC: 11.3 G/DL (ref 11.7–15.7)
IMM GRANULOCYTES # BLD: 0.04 10E3/UL
IMM GRANULOCYTES NFR BLD: 0.5 %
LYMPHOCYTES # BLD AUTO: 0.66 10E3/UL (ref 0.8–5.3)
LYMPHOCYTES NFR BLD AUTO: 8.4 %
MCH RBC QN AUTO: 30.7 PG (ref 26.5–33)
MCHC RBC AUTO-ENTMCNC: 31.7 G/DL (ref 31.5–36.5)
MCV RBC AUTO: 97 FL (ref 78–100)
MONOCYTES # BLD AUTO: 0.46 10E3/UL (ref 0–1.3)
MONOCYTES NFR BLD AUTO: 5.9 %
NEUTROPHILS # BLD AUTO: 6.28 10E3/UL (ref 1.6–8.3)
NEUTROPHILS NFR BLD AUTO: 80.3 %
NRBC # BLD AUTO: <0.03 10E3/UL
NRBC BLD AUTO-RTO: 0 /100
PLATELET # BLD AUTO: 217 10E3/UL (ref 150–450)
POTASSIUM SERPL-SCNC: 4.2 MMOL/L (ref 3.4–5.3)
RBC # BLD AUTO: 3.68 10E6/UL (ref 3.8–5.2)
SODIUM SERPL-SCNC: 143 MMOL/L (ref 135–145)
WBC # BLD AUTO: 7.82 10E3/UL (ref 4–11)

## 2025-09-02 PROCEDURE — C1776 JOINT DEVICE (IMPLANTABLE): HCPCS | Performed by: ORTHOPAEDIC SURGERY

## 2025-09-02 PROCEDURE — 250N000009 HC RX 250: Performed by: ORTHOPAEDIC SURGERY

## 2025-09-02 PROCEDURE — 250N000009 HC RX 250: Performed by: NURSE ANESTHETIST, CERTIFIED REGISTERED

## 2025-09-02 PROCEDURE — 258N000003 HC RX IP 258 OP 636: Performed by: ANESTHESIOLOGY

## 2025-09-02 PROCEDURE — 258N000001 HC RX 258: Performed by: ORTHOPAEDIC SURGERY

## 2025-09-02 PROCEDURE — 999N000141 HC STATISTIC PRE-PROCEDURE NURSING ASSESSMENT: Performed by: ORTHOPAEDIC SURGERY

## 2025-09-02 PROCEDURE — 250N000011 HC RX IP 250 OP 636: Performed by: PHYSICIAN ASSISTANT

## 2025-09-02 PROCEDURE — 272N000001 HC OR GENERAL SUPPLY STERILE: Performed by: ORTHOPAEDIC SURGERY

## 2025-09-02 PROCEDURE — 250N000009 HC RX 250: Performed by: ANESTHESIOLOGY

## 2025-09-02 PROCEDURE — C1713 ANCHOR/SCREW BN/BN,TIS/BN: HCPCS | Performed by: ORTHOPAEDIC SURGERY

## 2025-09-02 PROCEDURE — 250N000013 HC RX MED GY IP 250 OP 250 PS 637: Performed by: INTERNAL MEDICINE

## 2025-09-02 PROCEDURE — 250N000013 HC RX MED GY IP 250 OP 250 PS 637: Performed by: ORTHOPAEDIC SURGERY

## 2025-09-02 PROCEDURE — 250N000011 HC RX IP 250 OP 636: Performed by: NURSE ANESTHETIST, CERTIFIED REGISTERED

## 2025-09-02 PROCEDURE — 250N000011 HC RX IP 250 OP 636: Performed by: ORTHOPAEDIC SURGERY

## 2025-09-02 PROCEDURE — 258N000003 HC RX IP 258 OP 636: Performed by: NURSE ANESTHETIST, CERTIFIED REGISTERED

## 2025-09-02 PROCEDURE — G0463 HOSPITAL OUTPT CLINIC VISIT: HCPCS

## 2025-09-02 PROCEDURE — 36415 COLL VENOUS BLD VENIPUNCTURE: CPT | Performed by: PHYSICIAN ASSISTANT

## 2025-09-02 PROCEDURE — 360N000077 HC SURGERY LEVEL 4, PER MIN: Performed by: ORTHOPAEDIC SURGERY

## 2025-09-02 PROCEDURE — 82310 ASSAY OF CALCIUM: CPT | Performed by: PHYSICIAN ASSISTANT

## 2025-09-02 PROCEDURE — 250N000025 HC SEVOFLURANE, PER MIN: Performed by: ORTHOPAEDIC SURGERY

## 2025-09-02 PROCEDURE — 710N000009 HC RECOVERY PHASE 1, LEVEL 1, PER MIN: Performed by: ORTHOPAEDIC SURGERY

## 2025-09-02 PROCEDURE — 85048 AUTOMATED LEUKOCYTE COUNT: CPT | Performed by: PHYSICIAN ASSISTANT

## 2025-09-02 PROCEDURE — 250N000013 HC RX MED GY IP 250 OP 250 PS 637: Performed by: PHYSICIAN ASSISTANT

## 2025-09-02 PROCEDURE — 210N000001 HC R&B IMCU HEART CARE

## 2025-09-02 PROCEDURE — 370N000017 HC ANESTHESIA TECHNICAL FEE, PER MIN: Performed by: ORTHOPAEDIC SURGERY

## 2025-09-02 PROCEDURE — 258N000003 HC RX IP 258 OP 636: Performed by: ORTHOPAEDIC SURGERY

## 2025-09-02 PROCEDURE — 99233 SBSQ HOSP IP/OBS HIGH 50: CPT | Performed by: INTERNAL MEDICINE

## 2025-09-02 DEVICE — IMP CABLE SYN ORTHO COCR W/CRIMP 1.7X750MM 611.105.01S: Type: IMPLANTABLE DEVICE | Site: HIP | Status: FUNCTIONAL

## 2025-09-02 DEVICE — IMPLANTABLE DEVICE: Type: IMPLANTABLE DEVICE | Site: HIP | Status: FUNCTIONAL

## 2025-09-02 DEVICE — IMP HEAD STRK FEMORAL UHR BIPOLAR 28X45MM UH1-45-28: Type: IMPLANTABLE DEVICE | Site: HIP | Status: FUNCTIONAL

## 2025-09-02 RX ORDER — HYDROMORPHONE HCL IN WATER/PF 6 MG/30 ML
0.2 PATIENT CONTROLLED ANALGESIA SYRINGE INTRAVENOUS
Status: ACTIVE | OUTPATIENT
Start: 2025-09-02

## 2025-09-02 RX ORDER — BUSPIRONE HYDROCHLORIDE 10 MG/1
10 TABLET ORAL 2 TIMES DAILY
Status: ON HOLD | COMMUNITY

## 2025-09-02 RX ORDER — SODIUM CHLORIDE, SODIUM LACTATE, POTASSIUM CHLORIDE, CALCIUM CHLORIDE 600; 310; 30; 20 MG/100ML; MG/100ML; MG/100ML; MG/100ML
INJECTION, SOLUTION INTRAVENOUS CONTINUOUS
Status: DISCONTINUED | OUTPATIENT
Start: 2025-09-02 | End: 2025-09-03

## 2025-09-02 RX ORDER — DEXAMETHASONE SODIUM PHOSPHATE 4 MG/ML
4 INJECTION, SOLUTION INTRA-ARTICULAR; INTRALESIONAL; INTRAMUSCULAR; INTRAVENOUS; SOFT TISSUE
Status: DISCONTINUED | OUTPATIENT
Start: 2025-09-02 | End: 2025-09-02 | Stop reason: HOSPADM

## 2025-09-02 RX ORDER — PROPOFOL 10 MG/ML
INJECTION, EMULSION INTRAVENOUS PRN
Status: DISCONTINUED | OUTPATIENT
Start: 2025-09-02 | End: 2025-09-02

## 2025-09-02 RX ORDER — CEFAZOLIN SODIUM/WATER 2 G/20 ML
2 SYRINGE (ML) INTRAVENOUS
Status: COMPLETED | OUTPATIENT
Start: 2025-09-02 | End: 2025-09-02

## 2025-09-02 RX ORDER — HYDROMORPHONE HCL IN WATER/PF 6 MG/30 ML
0.4 PATIENT CONTROLLED ANALGESIA SYRINGE INTRAVENOUS EVERY 5 MIN PRN
Status: DISCONTINUED | OUTPATIENT
Start: 2025-09-02 | End: 2025-09-02 | Stop reason: HOSPADM

## 2025-09-02 RX ORDER — BUPIVACAINE HYDROCHLORIDE 5 MG/ML
INJECTION, SOLUTION EPIDURAL; INTRACAUDAL; PERINEURAL
Status: DISCONTINUED
Start: 2025-09-02 | End: 2025-09-02 | Stop reason: HOSPADM

## 2025-09-02 RX ORDER — ONDANSETRON 4 MG/1
4 TABLET, ORALLY DISINTEGRATING ORAL EVERY 30 MIN PRN
Status: DISCONTINUED | OUTPATIENT
Start: 2025-09-02 | End: 2025-09-02 | Stop reason: HOSPADM

## 2025-09-02 RX ORDER — FENTANYL CITRATE 0.05 MG/ML
25 INJECTION, SOLUTION INTRAMUSCULAR; INTRAVENOUS EVERY 5 MIN PRN
Status: DISCONTINUED | OUTPATIENT
Start: 2025-09-02 | End: 2025-09-02 | Stop reason: HOSPADM

## 2025-09-02 RX ORDER — VANCOMYCIN HYDROCHLORIDE 1 G/20ML
INJECTION, POWDER, LYOPHILIZED, FOR SOLUTION INTRAVENOUS PRN
Status: DISCONTINUED | OUTPATIENT
Start: 2025-09-02 | End: 2025-09-02 | Stop reason: HOSPADM

## 2025-09-02 RX ORDER — NALOXONE HYDROCHLORIDE 0.4 MG/ML
0.1 INJECTION, SOLUTION INTRAMUSCULAR; INTRAVENOUS; SUBCUTANEOUS
Status: DISCONTINUED | OUTPATIENT
Start: 2025-09-02 | End: 2025-09-02 | Stop reason: HOSPADM

## 2025-09-02 RX ORDER — ONDANSETRON 2 MG/ML
4 INJECTION INTRAMUSCULAR; INTRAVENOUS EVERY 30 MIN PRN
Status: DISCONTINUED | OUTPATIENT
Start: 2025-09-02 | End: 2025-09-02 | Stop reason: HOSPADM

## 2025-09-02 RX ORDER — TRANEXAMIC ACID 10 MG/ML
1 INJECTION, SOLUTION INTRAVENOUS ONCE
Status: DISCONTINUED | OUTPATIENT
Start: 2025-09-02 | End: 2025-09-02 | Stop reason: HOSPADM

## 2025-09-02 RX ORDER — DEXAMETHASONE SODIUM PHOSPHATE 4 MG/ML
INJECTION, SOLUTION INTRA-ARTICULAR; INTRALESIONAL; INTRAMUSCULAR; INTRAVENOUS; SOFT TISSUE PRN
Status: DISCONTINUED | OUTPATIENT
Start: 2025-09-02 | End: 2025-09-02

## 2025-09-02 RX ORDER — METHOCARBAMOL 500 MG/1
500 TABLET, FILM COATED ORAL 4 TIMES DAILY PRN
Status: ACTIVE | OUTPATIENT
Start: 2025-09-02

## 2025-09-02 RX ORDER — AMOXICILLIN 250 MG
1 CAPSULE ORAL 2 TIMES DAILY
Status: DISPENSED | OUTPATIENT
Start: 2025-09-02

## 2025-09-02 RX ORDER — BISACODYL 10 MG
10 SUPPOSITORY, RECTAL RECTAL DAILY PRN
Status: DISCONTINUED | OUTPATIENT
Start: 2025-09-05 | End: 2025-09-02

## 2025-09-02 RX ORDER — CEFAZOLIN SODIUM 1 G/3ML
1 INJECTION, POWDER, FOR SOLUTION INTRAMUSCULAR; INTRAVENOUS EVERY 8 HOURS
Status: COMPLETED | OUTPATIENT
Start: 2025-09-03 | End: 2025-09-03

## 2025-09-02 RX ORDER — LIDOCAINE 40 MG/G
CREAM TOPICAL
Status: ACTIVE | OUTPATIENT
Start: 2025-09-02

## 2025-09-02 RX ORDER — TRANEXAMIC ACID 10 MG/ML
1 INJECTION, SOLUTION INTRAVENOUS ONCE
Status: COMPLETED | OUTPATIENT
Start: 2025-09-02 | End: 2025-09-02

## 2025-09-02 RX ORDER — ACETAMINOPHEN 325 MG/1
975 TABLET ORAL ONCE
Status: COMPLETED | OUTPATIENT
Start: 2025-09-02 | End: 2025-09-02

## 2025-09-02 RX ORDER — SODIUM CHLORIDE, SODIUM LACTATE, POTASSIUM CHLORIDE, CALCIUM CHLORIDE 600; 310; 30; 20 MG/100ML; MG/100ML; MG/100ML; MG/100ML
INJECTION, SOLUTION INTRAVENOUS CONTINUOUS
Status: DISCONTINUED | OUTPATIENT
Start: 2025-09-02 | End: 2025-09-02 | Stop reason: HOSPADM

## 2025-09-02 RX ORDER — OXYCODONE HYDROCHLORIDE 5 MG/1
10 TABLET ORAL EVERY 4 HOURS PRN
Status: ACTIVE | OUTPATIENT
Start: 2025-09-02

## 2025-09-02 RX ORDER — CEFAZOLIN SODIUM/WATER 2 G/20 ML
2 SYRINGE (ML) INTRAVENOUS SEE ADMIN INSTRUCTIONS
Status: DISCONTINUED | OUTPATIENT
Start: 2025-09-02 | End: 2025-09-02 | Stop reason: HOSPADM

## 2025-09-02 RX ORDER — AMIODARONE HYDROCHLORIDE 100 MG/1
100 TABLET ORAL DAILY
Status: DISPENSED | OUTPATIENT
Start: 2025-09-02

## 2025-09-02 RX ORDER — ACETAMINOPHEN 325 MG/1
975 TABLET ORAL EVERY 8 HOURS
Status: DISPENSED | OUTPATIENT
Start: 2025-09-03

## 2025-09-02 RX ORDER — FENTANYL CITRATE 0.05 MG/ML
50 INJECTION, SOLUTION INTRAMUSCULAR; INTRAVENOUS EVERY 5 MIN PRN
Status: DISCONTINUED | OUTPATIENT
Start: 2025-09-02 | End: 2025-09-02 | Stop reason: HOSPADM

## 2025-09-02 RX ORDER — FENTANYL CITRATE 50 UG/ML
INJECTION, SOLUTION INTRAMUSCULAR; INTRAVENOUS PRN
Status: DISCONTINUED | OUTPATIENT
Start: 2025-09-02 | End: 2025-09-02

## 2025-09-02 RX ORDER — ONDANSETRON 2 MG/ML
INJECTION INTRAMUSCULAR; INTRAVENOUS PRN
Status: DISCONTINUED | OUTPATIENT
Start: 2025-09-02 | End: 2025-09-02

## 2025-09-02 RX ORDER — OXYCODONE HYDROCHLORIDE 5 MG/1
5 TABLET ORAL EVERY 4 HOURS PRN
Status: ACTIVE | OUTPATIENT
Start: 2025-09-02

## 2025-09-02 RX ORDER — LIDOCAINE HYDROCHLORIDE 20 MG/ML
INJECTION, SOLUTION INFILTRATION; PERINEURAL PRN
Status: DISCONTINUED | OUTPATIENT
Start: 2025-09-02 | End: 2025-09-02

## 2025-09-02 RX ORDER — MAGNESIUM HYDROXIDE 1200 MG/15ML
LIQUID ORAL PRN
Status: DISCONTINUED | OUTPATIENT
Start: 2025-09-02 | End: 2025-09-02 | Stop reason: HOSPADM

## 2025-09-02 RX ORDER — HYDROMORPHONE HCL IN WATER/PF 6 MG/30 ML
0.2 PATIENT CONTROLLED ANALGESIA SYRINGE INTRAVENOUS EVERY 5 MIN PRN
Status: DISCONTINUED | OUTPATIENT
Start: 2025-09-02 | End: 2025-09-02 | Stop reason: HOSPADM

## 2025-09-02 RX ORDER — POLYETHYLENE GLYCOL 3350 17 G/17G
17 POWDER, FOR SOLUTION ORAL DAILY
Status: DISPENSED | OUTPATIENT
Start: 2025-09-03

## 2025-09-02 RX ORDER — HYDROMORPHONE HCL IN WATER/PF 6 MG/30 ML
0.4 PATIENT CONTROLLED ANALGESIA SYRINGE INTRAVENOUS
Status: ACTIVE | OUTPATIENT
Start: 2025-09-02

## 2025-09-02 RX ORDER — ASPIRIN 81 MG/1
81 TABLET ORAL 2 TIMES DAILY
Status: DISCONTINUED | OUTPATIENT
Start: 2025-09-02 | End: 2025-09-03

## 2025-09-02 RX ADMIN — PHENYLEPHRINE HYDROCHLORIDE 100 MCG: 10 INJECTION INTRAVENOUS at 20:31

## 2025-09-02 RX ADMIN — ROCURONIUM BROMIDE 40 MG: 50 INJECTION, SOLUTION INTRAVENOUS at 17:35

## 2025-09-02 RX ADMIN — ACETAMINOPHEN 975 MG: 325 TABLET ORAL at 17:08

## 2025-09-02 RX ADMIN — PHENYLEPHRINE HYDROCHLORIDE 100 MCG: 10 INJECTION INTRAVENOUS at 18:40

## 2025-09-02 RX ADMIN — PANTOPRAZOLE SODIUM 40 MG: 40 TABLET, DELAYED RELEASE ORAL at 08:29

## 2025-09-02 RX ADMIN — TRANEXAMIC ACID 1 G: 10 INJECTION, SOLUTION INTRAVENOUS at 20:15

## 2025-09-02 RX ADMIN — MICONAZOLE NITRATE: 2 POWDER TOPICAL at 08:30

## 2025-09-02 RX ADMIN — ONDANSETRON 4 MG: 2 INJECTION INTRAMUSCULAR; INTRAVENOUS at 17:57

## 2025-09-02 RX ADMIN — HYDROMORPHONE HYDROCHLORIDE 0.4 MG: 0.2 INJECTION, SOLUTION INTRAMUSCULAR; INTRAVENOUS; SUBCUTANEOUS at 13:04

## 2025-09-02 RX ADMIN — AMIODARONE HYDROCHLORIDE 100 MG: 100 TABLET ORAL at 08:29

## 2025-09-02 RX ADMIN — PHENYLEPHRINE HYDROCHLORIDE 100 MCG: 10 INJECTION INTRAVENOUS at 18:13

## 2025-09-02 RX ADMIN — SODIUM CHLORIDE, SODIUM LACTATE, POTASSIUM CHLORIDE, AND CALCIUM CHLORIDE: .6; .31; .03; .02 INJECTION, SOLUTION INTRAVENOUS at 22:19

## 2025-09-02 RX ADMIN — CEFTRIAXONE SODIUM 2 G: 2 INJECTION, POWDER, FOR SOLUTION INTRAMUSCULAR; INTRAVENOUS at 13:02

## 2025-09-02 RX ADMIN — Medication 200 MG: at 20:25

## 2025-09-02 RX ADMIN — Medication 2 G: at 17:35

## 2025-09-02 RX ADMIN — OXYCODONE HYDROCHLORIDE 5 MG: 5 TABLET ORAL at 10:42

## 2025-09-02 RX ADMIN — DEXAMETHASONE SODIUM PHOSPHATE 4 MG: 4 INJECTION, SOLUTION INTRA-ARTICULAR; INTRALESIONAL; INTRAMUSCULAR; INTRAVENOUS; SOFT TISSUE at 17:57

## 2025-09-02 RX ADMIN — PHENYLEPHRINE HYDROCHLORIDE 100 MCG: 10 INJECTION INTRAVENOUS at 18:16

## 2025-09-02 RX ADMIN — TRANEXAMIC ACID 1 G: 10 INJECTION, SOLUTION INTRAVENOUS at 18:06

## 2025-09-02 RX ADMIN — LEVOTHYROXINE SODIUM 75 MCG: 0.07 TABLET ORAL at 08:29

## 2025-09-02 RX ADMIN — PHENYLEPHRINE HYDROCHLORIDE 100 MCG: 10 INJECTION INTRAVENOUS at 19:16

## 2025-09-02 RX ADMIN — ROSUVASTATIN CALCIUM 10 MG: 10 TABLET, FILM COATED ORAL at 08:29

## 2025-09-02 RX ADMIN — ASPIRIN 81 MG: 81 TABLET, DELAYED RELEASE ORAL at 22:56

## 2025-09-02 RX ADMIN — PHENYLEPHRINE HYDROCHLORIDE 0.5 MCG/KG/MIN: 10 INJECTION INTRAVENOUS at 17:56

## 2025-09-02 RX ADMIN — SERTRALINE HYDROCHLORIDE 150 MG: 50 TABLET ORAL at 08:29

## 2025-09-02 RX ADMIN — SODIUM CHLORIDE, SODIUM LACTATE, POTASSIUM CHLORIDE, AND CALCIUM CHLORIDE: .6; .31; .03; .02 INJECTION, SOLUTION INTRAVENOUS at 17:05

## 2025-09-02 RX ADMIN — FENTANYL CITRATE 50 MCG: 50 INJECTION INTRAMUSCULAR; INTRAVENOUS at 20:23

## 2025-09-02 RX ADMIN — LEVETIRACETAM 500 MG: 500 TABLET, FILM COATED ORAL at 08:29

## 2025-09-02 RX ADMIN — FENTANYL CITRATE 50 MCG: 50 INJECTION INTRAMUSCULAR; INTRAVENOUS at 18:02

## 2025-09-02 RX ADMIN — LIDOCAINE HYDROCHLORIDE 100 MG: 20 INJECTION, SOLUTION INFILTRATION; PERINEURAL at 17:35

## 2025-09-02 RX ADMIN — Medication 20 ML: at 17:25

## 2025-09-02 RX ADMIN — GABAPENTIN 100 MG: 100 CAPSULE ORAL at 08:29

## 2025-09-02 RX ADMIN — SODIUM CHLORIDE, SODIUM LACTATE, POTASSIUM CHLORIDE, AND CALCIUM CHLORIDE: .6; .31; .03; .02 INJECTION, SOLUTION INTRAVENOUS at 18:44

## 2025-09-02 RX ADMIN — PROPOFOL 200 MG: 10 INJECTION, EMULSION INTRAVENOUS at 17:35

## 2025-09-02 RX ADMIN — METOPROLOL TARTRATE 25 MG: 25 TABLET, FILM COATED ORAL at 08:29

## 2025-09-02 RX ADMIN — PHENYLEPHRINE HYDROCHLORIDE 100 MCG: 10 INJECTION INTRAVENOUS at 18:44

## 2025-09-02 ASSESSMENT — ACTIVITIES OF DAILY LIVING (ADL)
ADLS_ACUITY_SCORE: 69
ADLS_ACUITY_SCORE: 69
ADLS_ACUITY_SCORE: 56
ADLS_ACUITY_SCORE: 69
ADLS_ACUITY_SCORE: 69
ADLS_ACUITY_SCORE: 56
ADLS_ACUITY_SCORE: 65
ADLS_ACUITY_SCORE: 60
ADLS_ACUITY_SCORE: 56
ADLS_ACUITY_SCORE: 67
ADLS_ACUITY_SCORE: 56
ADLS_ACUITY_SCORE: 69
ADLS_ACUITY_SCORE: 67
ADLS_ACUITY_SCORE: 56
ADLS_ACUITY_SCORE: 69
ADLS_ACUITY_SCORE: 69
ADLS_ACUITY_SCORE: 56
ADLS_ACUITY_SCORE: 67
ADLS_ACUITY_SCORE: 56
ADLS_ACUITY_SCORE: 56

## 2025-09-03 LAB
ANION GAP SERPL CALCULATED.3IONS-SCNC: 9 MMOL/L (ref 7–15)
BUN SERPL-MCNC: 25.2 MG/DL (ref 8–23)
CALCIUM SERPL-MCNC: 8.2 MG/DL (ref 8.8–10.4)
CHLORIDE SERPL-SCNC: 108 MMOL/L (ref 98–107)
CREAT SERPL-MCNC: 1 MG/DL (ref 0.51–0.95)
EGFRCR SERPLBLD CKD-EPI 2021: 61 ML/MIN/1.73M2
GLUCOSE SERPL-MCNC: 108 MG/DL (ref 70–99)
HCO3 SERPL-SCNC: 26 MMOL/L (ref 22–29)
HGB BLD-MCNC: 8.9 G/DL (ref 11.7–15.7)
HGB BLD-MCNC: 9.1 G/DL (ref 11.7–15.7)
MCV RBC AUTO: 97.6 FL (ref 78–100)
MCV RBC AUTO: 97.9 FL (ref 78–100)
NT-PROBNP SERPL-MCNC: 2317 PG/ML (ref 0–353)
POTASSIUM SERPL-SCNC: 3.8 MMOL/L (ref 3.4–5.3)
SODIUM SERPL-SCNC: 143 MMOL/L (ref 135–145)

## 2025-09-03 PROCEDURE — 99233 SBSQ HOSP IP/OBS HIGH 50: CPT | Performed by: INTERNAL MEDICINE

## 2025-09-03 PROCEDURE — 36415 COLL VENOUS BLD VENIPUNCTURE: CPT | Performed by: ORTHOPAEDIC SURGERY

## 2025-09-03 PROCEDURE — 36415 COLL VENOUS BLD VENIPUNCTURE: CPT | Performed by: INTERNAL MEDICINE

## 2025-09-03 PROCEDURE — 250N000013 HC RX MED GY IP 250 OP 250 PS 637: Performed by: INTERNAL MEDICINE

## 2025-09-03 PROCEDURE — 97535 SELF CARE MNGMENT TRAINING: CPT | Mod: GO | Performed by: OCCUPATIONAL THERAPIST

## 2025-09-03 PROCEDURE — 250N000011 HC RX IP 250 OP 636: Performed by: INTERNAL MEDICINE

## 2025-09-03 PROCEDURE — 250N000011 HC RX IP 250 OP 636: Performed by: ORTHOPAEDIC SURGERY

## 2025-09-03 PROCEDURE — 250N000013 HC RX MED GY IP 250 OP 250 PS 637: Performed by: ORTHOPAEDIC SURGERY

## 2025-09-03 PROCEDURE — 250N000013 HC RX MED GY IP 250 OP 250 PS 637: Performed by: PHYSICIAN ASSISTANT

## 2025-09-03 PROCEDURE — 97161 PT EVAL LOW COMPLEX 20 MIN: CPT | Mod: GP

## 2025-09-03 PROCEDURE — 258N000003 HC RX IP 258 OP 636: Performed by: INTERNAL MEDICINE

## 2025-09-03 PROCEDURE — 97530 THERAPEUTIC ACTIVITIES: CPT | Mod: GP

## 2025-09-03 PROCEDURE — 85018 HEMOGLOBIN: CPT | Performed by: ORTHOPAEDIC SURGERY

## 2025-09-03 PROCEDURE — 210N000001 HC R&B IMCU HEART CARE

## 2025-09-03 PROCEDURE — 97165 OT EVAL LOW COMPLEX 30 MIN: CPT | Mod: GO | Performed by: OCCUPATIONAL THERAPIST

## 2025-09-03 PROCEDURE — 85018 HEMOGLOBIN: CPT | Performed by: INTERNAL MEDICINE

## 2025-09-03 PROCEDURE — 97110 THERAPEUTIC EXERCISES: CPT | Mod: GP

## 2025-09-03 PROCEDURE — 83880 ASSAY OF NATRIURETIC PEPTIDE: CPT | Performed by: INTERNAL MEDICINE

## 2025-09-03 PROCEDURE — 250N000011 HC RX IP 250 OP 636: Performed by: PHYSICIAN ASSISTANT

## 2025-09-03 PROCEDURE — 82435 ASSAY OF BLOOD CHLORIDE: CPT | Performed by: INTERNAL MEDICINE

## 2025-09-03 RX ORDER — POTASSIUM CHLORIDE 1.5 G/1.58G
20 POWDER, FOR SOLUTION ORAL ONCE
Status: COMPLETED | OUTPATIENT
Start: 2025-09-03 | End: 2025-09-03

## 2025-09-03 RX ORDER — FUROSEMIDE 10 MG/ML
40 INJECTION INTRAMUSCULAR; INTRAVENOUS ONCE
Status: COMPLETED | OUTPATIENT
Start: 2025-09-03 | End: 2025-09-03

## 2025-09-03 RX ADMIN — MICONAZOLE NITRATE: 2 POWDER TOPICAL at 21:38

## 2025-09-03 RX ADMIN — CEFAZOLIN 1 G: 1 INJECTION, POWDER, FOR SOLUTION INTRAMUSCULAR; INTRAVENOUS at 09:34

## 2025-09-03 RX ADMIN — GABAPENTIN 100 MG: 100 CAPSULE ORAL at 09:24

## 2025-09-03 RX ADMIN — MIRTAZAPINE 30 MG: 15 TABLET, FILM COATED ORAL at 21:28

## 2025-09-03 RX ADMIN — SERTRALINE HYDROCHLORIDE 150 MG: 50 TABLET ORAL at 09:23

## 2025-09-03 RX ADMIN — LEVETIRACETAM 500 MG: 500 TABLET, FILM COATED ORAL at 09:23

## 2025-09-03 RX ADMIN — FUROSEMIDE 40 MG: 10 INJECTION, SOLUTION INTRAMUSCULAR; INTRAVENOUS at 10:43

## 2025-09-03 RX ADMIN — POTASSIUM CHLORIDE 20 MEQ: 1.5 POWDER, FOR SOLUTION ORAL at 10:43

## 2025-09-03 RX ADMIN — POLYETHYLENE GLYCOL 3350 17 G: 17 POWDER, FOR SOLUTION ORAL at 09:23

## 2025-09-03 RX ADMIN — SENNOSIDES AND DOCUSATE SODIUM 1 TABLET: 50; 8.6 TABLET ORAL at 09:34

## 2025-09-03 RX ADMIN — PANTOPRAZOLE SODIUM 40 MG: 40 TABLET, DELAYED RELEASE ORAL at 09:23

## 2025-09-03 RX ADMIN — METOPROLOL TARTRATE 25 MG: 25 TABLET, FILM COATED ORAL at 09:22

## 2025-09-03 RX ADMIN — ACETAMINOPHEN 975 MG: 325 TABLET ORAL at 09:23

## 2025-09-03 RX ADMIN — LEVETIRACETAM 500 MG: 500 TABLET, FILM COATED ORAL at 21:28

## 2025-09-03 RX ADMIN — GABAPENTIN 100 MG: 100 CAPSULE ORAL at 13:56

## 2025-09-03 RX ADMIN — ROSUVASTATIN CALCIUM 10 MG: 10 TABLET, FILM COATED ORAL at 09:23

## 2025-09-03 RX ADMIN — MICONAZOLE NITRATE: 2 POWDER TOPICAL at 09:25

## 2025-09-03 RX ADMIN — SODIUM CHLORIDE 1000 ML: 0.9 INJECTION, SOLUTION INTRAVENOUS at 01:58

## 2025-09-03 RX ADMIN — RIVAROXABAN 20 MG: 20 TABLET, FILM COATED ORAL at 17:30

## 2025-09-03 RX ADMIN — SENNOSIDES AND DOCUSATE SODIUM 1 TABLET: 50; 8.6 TABLET ORAL at 21:28

## 2025-09-03 RX ADMIN — LEVOTHYROXINE SODIUM 75 MCG: 0.07 TABLET ORAL at 09:23

## 2025-09-03 RX ADMIN — CEFAZOLIN 1 G: 1 INJECTION, POWDER, FOR SOLUTION INTRAMUSCULAR; INTRAVENOUS at 00:42

## 2025-09-03 RX ADMIN — CEFTRIAXONE SODIUM 2 G: 2 INJECTION, POWDER, FOR SOLUTION INTRAMUSCULAR; INTRAVENOUS at 13:56

## 2025-09-03 RX ADMIN — METOPROLOL TARTRATE 25 MG: 25 TABLET, FILM COATED ORAL at 21:27

## 2025-09-03 RX ADMIN — ACETAMINOPHEN 975 MG: 325 TABLET ORAL at 17:30

## 2025-09-03 RX ADMIN — AMIODARONE HYDROCHLORIDE 100 MG: 100 TABLET ORAL at 09:23

## 2025-09-03 RX ADMIN — ACETAMINOPHEN 975 MG: 325 TABLET ORAL at 00:43

## 2025-09-03 RX ADMIN — GABAPENTIN 200 MG: 100 CAPSULE ORAL at 21:27

## 2025-09-03 ASSESSMENT — ACTIVITIES OF DAILY LIVING (ADL)
ADLS_ACUITY_SCORE: 65
ADLS_ACUITY_SCORE: 64
ADLS_ACUITY_SCORE: 66
ADLS_ACUITY_SCORE: 65

## 2025-09-04 VITALS
HEIGHT: 66 IN | HEART RATE: 63 BPM | SYSTOLIC BLOOD PRESSURE: 100 MMHG | BODY MASS INDEX: 29.94 KG/M2 | WEIGHT: 186.29 LBS | OXYGEN SATURATION: 96 % | TEMPERATURE: 98.1 F | DIASTOLIC BLOOD PRESSURE: 45 MMHG | RESPIRATION RATE: 16 BRPM

## 2025-09-04 LAB
ALBUMIN SERPL BCG-MCNC: 2.5 G/DL (ref 3.5–5.2)
ANION GAP SERPL CALCULATED.3IONS-SCNC: 8 MMOL/L (ref 7–15)
BACTERIA SPEC CULT: NORMAL
BACTERIA SPEC CULT: NORMAL
BUN SERPL-MCNC: 27.4 MG/DL (ref 8–23)
CALCIUM SERPL-MCNC: 8.5 MG/DL (ref 8.8–10.4)
CHLORIDE SERPL-SCNC: 108 MMOL/L (ref 98–107)
CREAT SERPL-MCNC: 0.97 MG/DL (ref 0.51–0.95)
EGFRCR SERPLBLD CKD-EPI 2021: 63 ML/MIN/1.73M2
GLUCOSE SERPL-MCNC: 99 MG/DL (ref 70–99)
HCO3 SERPL-SCNC: 27 MMOL/L (ref 22–29)
HGB BLD-MCNC: 9.1 G/DL (ref 11.7–15.7)
MCV RBC AUTO: 99.3 FL (ref 78–100)
PHOSPHATE SERPL-MCNC: 3.4 MG/DL (ref 2.5–4.5)
POTASSIUM SERPL-SCNC: 4.1 MMOL/L (ref 3.4–5.3)
SODIUM SERPL-SCNC: 143 MMOL/L (ref 135–145)

## 2025-09-04 PROCEDURE — 250N000013 HC RX MED GY IP 250 OP 250 PS 637: Performed by: PHYSICIAN ASSISTANT

## 2025-09-04 PROCEDURE — 97110 THERAPEUTIC EXERCISES: CPT | Mod: GP

## 2025-09-04 PROCEDURE — 250N000013 HC RX MED GY IP 250 OP 250 PS 637: Performed by: INTERNAL MEDICINE

## 2025-09-04 PROCEDURE — 97530 THERAPEUTIC ACTIVITIES: CPT | Mod: GP

## 2025-09-04 PROCEDURE — 85018 HEMOGLOBIN: CPT | Performed by: ORTHOPAEDIC SURGERY

## 2025-09-04 PROCEDURE — 250N000011 HC RX IP 250 OP 636: Performed by: PHYSICIAN ASSISTANT

## 2025-09-04 PROCEDURE — 120N000001 HC R&B MED SURG/OB

## 2025-09-04 PROCEDURE — 250N000013 HC RX MED GY IP 250 OP 250 PS 637: Performed by: ORTHOPAEDIC SURGERY

## 2025-09-04 PROCEDURE — 82374 ASSAY BLOOD CARBON DIOXIDE: CPT | Performed by: INTERNAL MEDICINE

## 2025-09-04 PROCEDURE — 36415 COLL VENOUS BLD VENIPUNCTURE: CPT | Performed by: ORTHOPAEDIC SURGERY

## 2025-09-04 PROCEDURE — 250N000011 HC RX IP 250 OP 636: Performed by: INTERNAL MEDICINE

## 2025-09-04 RX ORDER — FUROSEMIDE 10 MG/ML
40 INJECTION INTRAMUSCULAR; INTRAVENOUS 2 TIMES DAILY
Status: COMPLETED | OUTPATIENT
Start: 2025-09-04 | End: 2025-09-04

## 2025-09-04 RX ORDER — POTASSIUM CHLORIDE 750 MG/1
10 TABLET, EXTENDED RELEASE ORAL 2 TIMES DAILY
Status: COMPLETED | OUTPATIENT
Start: 2025-09-04 | End: 2025-09-04

## 2025-09-04 RX ADMIN — ACETAMINOPHEN 975 MG: 325 TABLET ORAL at 17:18

## 2025-09-04 RX ADMIN — FUROSEMIDE 40 MG: 10 INJECTION, SOLUTION INTRAMUSCULAR; INTRAVENOUS at 13:21

## 2025-09-04 RX ADMIN — MICONAZOLE NITRATE: 2 POWDER TOPICAL at 20:30

## 2025-09-04 RX ADMIN — POTASSIUM CHLORIDE 10 MEQ: 750 TABLET, EXTENDED RELEASE ORAL at 20:21

## 2025-09-04 RX ADMIN — AMIODARONE HYDROCHLORIDE 100 MG: 100 TABLET ORAL at 09:16

## 2025-09-04 RX ADMIN — ROSUVASTATIN CALCIUM 10 MG: 10 TABLET, FILM COATED ORAL at 09:16

## 2025-09-04 RX ADMIN — GABAPENTIN 100 MG: 100 CAPSULE ORAL at 09:16

## 2025-09-04 RX ADMIN — ACETAMINOPHEN 975 MG: 325 TABLET ORAL at 00:48

## 2025-09-04 RX ADMIN — MICONAZOLE NITRATE: 2 POWDER TOPICAL at 09:20

## 2025-09-04 RX ADMIN — FUROSEMIDE 40 MG: 10 INJECTION, SOLUTION INTRAMUSCULAR; INTRAVENOUS at 20:21

## 2025-09-04 RX ADMIN — CEFTRIAXONE SODIUM 2 G: 2 INJECTION, POWDER, FOR SOLUTION INTRAMUSCULAR; INTRAVENOUS at 13:21

## 2025-09-04 RX ADMIN — LEVOTHYROXINE SODIUM 75 MCG: 0.07 TABLET ORAL at 09:15

## 2025-09-04 RX ADMIN — ACETAMINOPHEN 975 MG: 325 TABLET ORAL at 09:15

## 2025-09-04 RX ADMIN — LEVETIRACETAM 500 MG: 500 TABLET, FILM COATED ORAL at 20:21

## 2025-09-04 RX ADMIN — SENNOSIDES AND DOCUSATE SODIUM 1 TABLET: 50; 8.6 TABLET ORAL at 20:21

## 2025-09-04 RX ADMIN — METOPROLOL TARTRATE 25 MG: 25 TABLET, FILM COATED ORAL at 09:27

## 2025-09-04 RX ADMIN — SERTRALINE HYDROCHLORIDE 150 MG: 50 TABLET ORAL at 09:15

## 2025-09-04 RX ADMIN — GABAPENTIN 200 MG: 100 CAPSULE ORAL at 20:21

## 2025-09-04 RX ADMIN — RIVAROXABAN 20 MG: 20 TABLET, FILM COATED ORAL at 17:20

## 2025-09-04 RX ADMIN — GABAPENTIN 100 MG: 100 CAPSULE ORAL at 13:22

## 2025-09-04 RX ADMIN — PANTOPRAZOLE SODIUM 40 MG: 40 TABLET, DELAYED RELEASE ORAL at 09:16

## 2025-09-04 RX ADMIN — LEVETIRACETAM 500 MG: 500 TABLET, FILM COATED ORAL at 09:15

## 2025-09-04 RX ADMIN — MIRTAZAPINE 30 MG: 15 TABLET, FILM COATED ORAL at 20:21

## 2025-09-04 RX ADMIN — POTASSIUM CHLORIDE 10 MEQ: 750 TABLET, EXTENDED RELEASE ORAL at 13:25

## 2025-09-04 ASSESSMENT — ACTIVITIES OF DAILY LIVING (ADL)
ADLS_ACUITY_SCORE: 64
ADLS_ACUITY_SCORE: 62
ADLS_ACUITY_SCORE: 64
ADLS_ACUITY_SCORE: 62
ADLS_ACUITY_SCORE: 64
ADLS_ACUITY_SCORE: 62
ADLS_ACUITY_SCORE: 62
ADLS_ACUITY_SCORE: 64
ADLS_ACUITY_SCORE: 62
ADLS_ACUITY_SCORE: 64
ADLS_ACUITY_SCORE: 62
ADLS_ACUITY_SCORE: 64
ADLS_ACUITY_SCORE: 64
ADLS_ACUITY_SCORE: 62
ADLS_ACUITY_SCORE: 62
ADLS_ACUITY_SCORE: 64
ADLS_ACUITY_SCORE: 64
ADLS_ACUITY_SCORE: 62

## (undated) DEVICE — KIT HAND CONTROL ANGIOTOUCH ACIST 65CM AT-P65

## (undated) DEVICE — SU VICRYL 0 CT-1 CR 8X18" J740D

## (undated) DEVICE — INFL DVC KIT W/10CC NITRO IN4530

## (undated) DEVICE — CUTTING BALLOON WOLVERINE 2.5X10MM

## (undated) DEVICE — CATH IVUS OPTICROSS HD 6 3.6FR 1.18MM DIA 135CML H7493935408

## (undated) DEVICE — SU ETHIBOND 2 V-37 4X30" MX69G

## (undated) DEVICE — STPL SKIN 35W 6.9MM  PXW35

## (undated) DEVICE — SU ETHIBOND 5 V-37 4X30" MB66G

## (undated) DEVICE — GW MINAMO STRAIGHT 190CM

## (undated) DEVICE — SU VICRYL+ 0 CTX 18IN VIO VCP764D

## (undated) DEVICE — CATH DIAGNOSTIC RADIAL 5FR TIG 4.0

## (undated) DEVICE — CATH GUIDING BLUE YELLOW PTFE XB3 6FRX100CM 67005200

## (undated) DEVICE — CATH RX TAKERU PTCA BALLOON 1.5X12MM DC-RY1512UA1

## (undated) DEVICE — CATH BALLOON EMERGE 2.5X12MM H7493918912250

## (undated) DEVICE — PACK TOTAL HIP W/U DRAPE SOP15HUFSC

## (undated) DEVICE — PACK TOTAL HIP W/POUCH SOP15HPFSM

## (undated) DEVICE — SYR 50ML LL W/O NDL 309653

## (undated) DEVICE — BAG DECANTER STERILE WHITE DYNJDEC09

## (undated) DEVICE — BONE CEMENT SIMPLEX FULL DOSE 6191-1-001: Type: IMPLANTABLE DEVICE | Site: HIP | Status: NON-FUNCTIONAL

## (undated) DEVICE — SUCTION IRR SYSTEM W/O TIP INTERPULSE HANDPIECE 0210-100-000

## (undated) DEVICE — IMM PILLOW ABDUCT HIP MED 31143061

## (undated) DEVICE — DRAPE CONVERTORS U-DRAPE 60X72" 8476

## (undated) DEVICE — SU VICRYL 2-0 CT-2 27" J333H

## (undated) DEVICE — CATH BALLOON NC EMERGE 3.00X20MM H7493926720300

## (undated) DEVICE — DRAPE STERI U 1015

## (undated) DEVICE — Device

## (undated) DEVICE — LINEN TOWEL PACK X5 5464

## (undated) DEVICE — NDL 19GA 1.5"

## (undated) DEVICE — MANIFOLD KIT ANGIO AUTOMATED 014613

## (undated) DEVICE — BLADE SAW SAGITTAL STRK 18X90X1.27MM HD SYS 6 6118-127-090

## (undated) DEVICE — ESU BIPOLAR SEALER AQUAMANTYS 6MM 23-112-1

## (undated) DEVICE — SOLUTION WOUND CLEANSING 3/4OZ 10% PVP EA-L3011FB-50

## (undated) DEVICE — CATH RX TAKERU OTW PTCA BALLOON 1.5X15MM DC-RY1515UA1

## (undated) DEVICE — INTRO GLIDESHEATH SLENDER 6FR 10X45CM 60-1060

## (undated) DEVICE — CLOSURE SYS SKIN PREMIERPRO EXOFIN FUSION 4X22CM STRL 3472

## (undated) DEVICE — BONE CLEANING TIP INTERPULSE  0210-010-000

## (undated) DEVICE — SLEEVE TR BAND RADIAL COMPRESSION DEVICE 24CM TRB24-REG

## (undated) DEVICE — TOTE ANGIO CORP PC15AT SAN32CC83O

## (undated) DEVICE — WIRE GUIDE 0.035"X260CM SAFE-T-J EXCHANGE G00517

## (undated) DEVICE — ESU GROUND PAD ADULT REM W/15' CORD E7507DB

## (undated) DEVICE — SUCTION MANIFOLD NEPTUNE 2 SYS 4 PORT 0702-020-000

## (undated) DEVICE — SU VICRYL 2-0 CT-2 27" UND J269H

## (undated) DEVICE — DEFIB PRO-PADZ LVP LQD GEL ADULT 8900-2105-01

## (undated) DEVICE — SU STRATAFIX PDS PLUS 1 SPIRAL CTX 70CM SXPP1B101

## (undated) DEVICE — SOLUTION IV IRRIGATION 0.9% NACL 3L R8206

## (undated) DEVICE — DRSG AQUACEL AG 3.5X14"  422607

## (undated) DEVICE — HOOD SURG T7PLUS PEEL AWAY FACE SHIELD STRL LF 0416-801-100

## (undated) DEVICE — DRAPE SHEET REV FOLD 3/4 9349

## (undated) DEVICE — RAD INFLATOR BASIC COMPAK  IN4130

## (undated) DEVICE — SU VICRYL+ 2-0 27IN CP-1 UND VCP266H

## (undated) DEVICE — DRAPE C-ARM 60X42" 1013

## (undated) DEVICE — CATH BALLOON NC EMERGE 3.00X30MM H7493926730300

## (undated) DEVICE — PAD FOAM MCGUIRE KIT 0814-0150

## (undated) DEVICE — SOL NACL 0.9% INJ 250ML BAG 2B1322Q

## (undated) DEVICE — SOL WATER IRRIG 1000ML BOTTLE 2F7114

## (undated) DEVICE — VALVE HEMOSTASIS .096" COPILOT MECH 1003331

## (undated) DEVICE — SU STRATAFIX PDS PLUS 0 CT 45CM SXPP1A406

## (undated) DEVICE — KIT PATIENT CARE HANA TABLE PROFX SUPINE 6855

## (undated) DEVICE — SOL NACL 0.9% IRRIG 1000ML BOTTLE 2F7124

## (undated) DEVICE — CATH BALLOON EMERGE 2.0X20MM H7493918920200

## (undated) DEVICE — SU MONOCRYL 3-0 PS-2 27" Y427H

## (undated) DEVICE — CATH BALLOON NC EMERGE 3.00X15MM H7493926715300

## (undated) DEVICE — CATH BALLOON NC EMERGE 4.00X20MM H7493926720400

## (undated) DEVICE — CATH BALLOON NC EUPHORA 3.00X15MM NCEUP3015X

## (undated) DEVICE — SU VICRYL 2-0 CT-1 27" UND J259H

## (undated) DEVICE — GUIDEWIRE VASC 0.014INX180CM RUNTHROUGH 25-1011

## (undated) DEVICE — DRAPE IOBAN INCISE 23X17" 6650EZ

## (undated) DEVICE — SOL NACL 0.9% INJ 1000ML BAG 2B1324X

## (undated) DEVICE — ADH REMOVER PAD UNI-SOLVE 402300

## (undated) DEVICE — DEVICE RETRIEVER HEWSON 71111579

## (undated) RX ORDER — VERAPAMIL HYDROCHLORIDE 2.5 MG/ML
INJECTION, SOLUTION INTRAVENOUS
Status: DISPENSED
Start: 2021-08-03

## (undated) RX ORDER — DIAZEPAM 5 MG
TABLET ORAL
Status: DISPENSED
Start: 2018-01-30

## (undated) RX ORDER — HEPARIN SODIUM 1000 [USP'U]/ML
INJECTION, SOLUTION INTRAVENOUS; SUBCUTANEOUS
Status: DISPENSED
Start: 2021-08-03

## (undated) RX ORDER — VANCOMYCIN HYDROCHLORIDE 1 G/20ML
INJECTION, POWDER, LYOPHILIZED, FOR SOLUTION INTRAVENOUS
Status: DISPENSED
Start: 2025-09-02

## (undated) RX ORDER — BUPIVACAINE HYDROCHLORIDE AND EPINEPHRINE 5; 5 MG/ML; UG/ML
INJECTION, SOLUTION EPIDURAL; INTRACAUDAL; PERINEURAL
Status: DISPENSED
Start: 2025-09-02

## (undated) RX ORDER — FENTANYL CITRATE 50 UG/ML
INJECTION, SOLUTION INTRAMUSCULAR; INTRAVENOUS
Status: DISPENSED
Start: 2021-08-03

## (undated) RX ORDER — REGADENOSON 0.08 MG/ML
INJECTION, SOLUTION INTRAVENOUS
Status: DISPENSED
Start: 2025-05-06

## (undated) RX ORDER — NITROGLYCERIN 5 MG/ML
VIAL (ML) INTRAVENOUS
Status: DISPENSED
Start: 2021-08-03

## (undated) RX ORDER — ACETAMINOPHEN 325 MG/1
TABLET ORAL
Status: DISPENSED
Start: 2025-09-02

## (undated) RX ORDER — HEPARIN SODIUM 10000 [USP'U]/100ML
INJECTION, SOLUTION INTRAVENOUS
Status: DISPENSED
Start: 2021-08-03

## (undated) RX ORDER — HEPARIN SODIUM 1000 [USP'U]/ML
INJECTION, SOLUTION INTRAVENOUS; SUBCUTANEOUS
Status: DISPENSED
Start: 2018-01-25

## (undated) RX ORDER — NITROGLYCERIN 5 MG/ML
VIAL (ML) INTRAVENOUS
Status: DISPENSED
Start: 2018-01-25

## (undated) RX ORDER — ACETAMINOPHEN 325 MG/1
TABLET ORAL
Status: DISPENSED
Start: 2021-08-03

## (undated) RX ORDER — FENTANYL CITRATE 50 UG/ML
INJECTION, SOLUTION INTRAMUSCULAR; INTRAVENOUS
Status: DISPENSED
Start: 2025-09-02

## (undated) RX ORDER — REGADENOSON 0.08 MG/ML
INJECTION, SOLUTION INTRAVENOUS
Status: DISPENSED
Start: 2021-06-30